# Patient Record
Sex: MALE | Race: WHITE | NOT HISPANIC OR LATINO | Employment: FULL TIME | URBAN - METROPOLITAN AREA
[De-identification: names, ages, dates, MRNs, and addresses within clinical notes are randomized per-mention and may not be internally consistent; named-entity substitution may affect disease eponyms.]

---

## 2017-05-08 ENCOUNTER — OFFICE VISIT (OUTPATIENT)
Dept: FAMILY MEDICINE | Facility: OTHER | Age: 57
End: 2017-05-08
Payer: COMMERCIAL

## 2017-05-08 VITALS
HEIGHT: 71 IN | SYSTOLIC BLOOD PRESSURE: 118 MMHG | HEART RATE: 84 BPM | TEMPERATURE: 97.6 F | RESPIRATION RATE: 18 BRPM | WEIGHT: 265.8 LBS | DIASTOLIC BLOOD PRESSURE: 74 MMHG | OXYGEN SATURATION: 98 % | BODY MASS INDEX: 37.21 KG/M2

## 2017-05-08 DIAGNOSIS — R05.9 COUGH: Primary | ICD-10-CM

## 2017-05-08 DIAGNOSIS — R09.89 RUNNY NOSE: ICD-10-CM

## 2017-05-08 PROCEDURE — 99213 OFFICE O/P EST LOW 20 MIN: CPT | Performed by: FAMILY MEDICINE

## 2017-05-08 RX ORDER — BENZONATATE 100 MG/1
100 CAPSULE ORAL 3 TIMES DAILY PRN
Qty: 21 CAPSULE | Refills: 0 | Status: SHIPPED | OUTPATIENT
Start: 2017-05-08 | End: 2017-05-31

## 2017-05-08 ASSESSMENT — PAIN SCALES - GENERAL: PAINLEVEL: NO PAIN (0)

## 2017-05-08 NOTE — PROGRESS NOTES
SUBJECTIVE:                Derek Stover is a 56 year old male who presents with;  Chief Complaint   Patient presents with     Cough     Acute Illness   Acute illness concerns: Cough  Onset: 1-2 months    Fever: no    Chills/Sweats: no    Headache (location?): YES- while working    Sinus Pressure:no    Conjunctivitis:  no    Ear Pain: no    Rhinorrhea: YES    Congestion: YES    Sore Throat: no     Cough: YES-productive of clear sputum    Wheeze: YES    Decreased Appetite: no    Nausea: no    Vomiting: no    Diarrhea:  no    Dysuria/Freq.: no    Fatigue/Achiness: no    Sick/Strep Exposure: YES- Wife similar symptoms.      Therapies Tried and outcome: Mucinex, benadryl       PROBLEMS TO ADD ON...    Problem list and histories reviewed & adjusted, as indicated.  Additional history: as documented    Patient Active Problem List   Diagnosis     Smoker     Tinea versicolor     Erectile dysfunction     Hyperlipidemia with target LDL less than 130     Past Surgical History:   Procedure Laterality Date     COLONOSCOPY N/A 9/8/2014    Procedure: COMBINED COLONOSCOPY, SINGLE BIOPSY/POLYPECTOMY BY BIOPSY;  Surgeon: Kai Bailey MD;  Location:  GI     ORTHOPEDIC SURGERY      RIght knee scope       Social History   Substance Use Topics     Smoking status: Current Every Day Smoker     Packs/day: 1.50     Years: 31.00     Types: Cigarettes     Smokeless tobacco: Never Used     Alcohol use Yes      Comment: occasional     History reviewed. No pertinent family history.      Current Outpatient Prescriptions   Medication Sig Dispense Refill     benzonatate (TESSALON) 100 MG capsule Take 1 capsule (100 mg) by mouth 3 times daily as needed for cough 21 capsule 0     ibuprofen (ADVIL,MOTRIN) 200 MG tablet Take 1 tablet by mouth every 4 hours as needed for pain. 120 tablet      Allergies   Allergen Reactions     No Known Drug Allergy        Reviewed and updated as needed this visit by clinical staff       Reviewed and updated as  "needed this visit by Provider    Problem list and histories reviewed & adjusted, as indicated.  Allergies   Allergen Reactions     No Known Drug Allergy        OBJECTIVE:                     /74 (Cuff Size: Adult Regular)  Pulse 84  Temp 97.6  F (36.4  C) (Temporal)  Resp 18  Ht 5' 11\" (1.803 m)  Wt 265 lb 12.8 oz (120.6 kg)  SpO2 98%  BMI 37.07 kg/m2  GENERAL: no apparent distress  EYES: Conjunctiva are not injected, no discharge.  EARS: Left TM -no erythema, no effusion,  not bulged.               Right TM -no erythema, no effusion,  not bulged.  NOSE: no discharge, no sinus tenderness  THROAT: no erythema, no exudate, no lesions  NECK: supple, no adenopathy.  CARDIAC: regular rate and rhythm, no murmur  RESP: clear, no wheezing, no rales, no rhonchi  ABD: soft, no distension, no tenderness  SKIN: normal, warm, no rash, nml skin turgor      ASSESSMENT/PLAN:                       ICD-10-CM    1. Cough R05 benzonatate (TESSALON) 100 MG capsule   2. Runny nose R09.89      See [facility] for [facility]Results.  Symptomatic cares and fever control(if indicated) discussed.  Symptomatic therapy suggested: use acetaminophen, ibuprofen prn.   Hydrate well. if not better 2 to 3 days, switch to R with Codeine.  Xray and labs in1 to 2 weeks if not better    Risks, benefits and alternatives of treatments discussed. Plan agreed on.    Will call, return to clinic, or go to ED if worsening or symptoms not improving as discussed.   See patient instructions.         Electronically signed:  Kayla Turner M.D.    --------------------------------------------------------------------------------------------------------------                "

## 2017-05-08 NOTE — PATIENT INSTRUCTIONS
Cough, Chronic, Uncertain Cause (Adult)    Everyone has had a cough as part of the common cold, flu, or bronchitis. This kind of cough occurs along with an achy feeling, low-grade fever, nasal and sinus congestion, and a scratchy or sore throat. This usually gets better in 2 to 3 weeks. A cough that lasts longer than 3 weeks may be due to other causes.  If your cough does not improve over the next 2 weeks, further testing may be needed. Follow up with your healthcare provider as advised. Cough suppressants may be recommended. Based on your exam today, the exact cause of your cough is not certain. Below are some common causes for persistent cough.  Smokers cough   Smoker s cough  doesn t go away. If you continue to smoke, it only gets worse. The cough is from irritation in the air passages. Talk to your healthcare provider about quitting. Medicines or nicotine-replacement products, like gum or the patch, may make quitting easier.  Postnasal drip  A cough that is worse at night may be due to postnasal drip. Excess mucus in the nose drains from the back of your nose to your throat. This triggers the cough reflex. Postnasal drip may be due to a sinus infection or allergy. Common allergens include dust, tobacco smoke (both inhaled and secondhand smoke), environmental pollutants, pollen, mold, pets, cleaning agents, room deodorizers, and chemical fumes. Over-the-counter antihistamines or decongestants may be helpful for allergies. A sinus infection may requires antibiotic treatment. See your healthcare provider if symptoms continue.  Medicines  Certain prescribed medicines can cause a chronic cough in some people:    ACE inhibitors for high blood pressure. These include benazepril, captopril, enalapril, fosinopril, lisinopril, quinapril, ramipril, and others.    Beta-blockers for high blood pressure and other conditions. These include propranolol, atenolol, metoprolol, nadolol, and others.  Let your healthcare provider  know if you are taking any of these.  Asthma  Cough may be the only sign of mild asthma. You may have tests to find out if asthma is causing your cough. You may also take asthma medicine on a trial basis.  Acid reflux (heartburn, GERD)   The esophagus is the tube that carries food from the mouth to the stomach. A valve at its lower end prevents stomach acids from flowing upward. If this valve does not work properly, acid from the stomach enters the esophagus. This may cause a burning pain in the upper abdomen or lower chest, belching, or cough. Symptoms are often worse when lying flat. Avoid eating or drinking before bedtime. Try using extra pillows to raise your upper body, or place 4-inch blocks under the head of your bed. You may try an over-the-counter antacid or an acid-blocking medicine such as famotidine, cimetidine, ranitidine, esomeprazole, lansoprazole, or omeprazole. Stronger medicines for this condition can be prescribed by your healthcare provider.  Follow-up care  Follow up with your healthcare provider, or as advised, if your cough does not improve. Further testing may be needed.  (Note: If an X-ray was taken, a specialist will review it. You will be notified of any new findings that may affect your care.)  When to seek medical advice  Call your healthcare provider right away if any of these occur:    Mild wheezing or difficulty breathing    Fever of 100.4 F (38 C) or higher, or as directed by your healthcare provider    Unexpected weight loss    Coughing up large amounts of colored sputum    Night sweats (sheets and pajamas get soaking wet)  Call 911, or get immediate medical care  Contact emergency services right away if any of these occur:    Coughing up blood    Moderate to severe trouble breathing or wheezing    3272-1842 [employer]. [address] [place] [address], [place] [address]. All rights reserved. This information is not intended as a substitute for professional medical care. Always  follow your healthcare professional's instructions.        Adult Self-Care for Colds  Colds are caused by viruses. They can t be cured with antibiotics. However, you can relieve symptoms and support your body s efforts to heal itself. No matter which symptoms you have, be sure to drink plenty of fluids (water or clear soup); stop smoking and drinking alcohol; and get plenty of rest.   Understand a fever    Take your temperature several times a day. If your fever is 100.4 F (38.0 C) for more than a day, call your doctor.    Relax, lie down. Go to bed if you want. Just get off your feet and rest. Also, drink plenty of fluids to avoid dehydration.    Take acetaminophen or a nonsteroidal anti-inflammatory agent (NSAID), such as ibuprofen.  Treat a troubled nose kindly    Breathe steam or heated humidified air to open blocked nasal passages.  a hot shower or use a vaporizer. Be careful not to get burned by the steam.    Saline nasal sprays and decongestant tablets help open a stuffy nose. Antihistamines can also help, but they can cause side effects such as drowsiness and drying of the eyes, nose, and mouth.  Soothe a sore throat and cough    Gargle every 2 hours with 1/4 teaspoon of salt dissolved in 1/2 cup of warm water. Suck on throat lozenges and cough drops to moisten your throat.    Cough medicines are available but it is unclear how effective they actually are.    Take acetaminophen or an NSAID, such as ibuprofen to ease throat pain  Ease digestive problems    Put fluid back into your body. Take frequent sips of clear liquids such as water or broth. Do not drink beverages with a lot of sugar in them, such as juices and sodas. These can make diarrhea worse. Older children and adults can drink sports drinks.    As your appetite returns, you can resume your normal diet. Ask your doctor whether there are any foods you should avoid.     When to seek medical care  When you first notice symptoms, ask your health  care provider if antiviral medications are appropriate. Antibiotics should not be taken for colds or flu. Also, call your doctor if you have any of the following symptoms or if you aren t feeling better after 7 days:    Shortness of breath    Pain or pressure in the chest or abdomen    Worsening symptoms, especially after a period of improvement    Fever of 100.4 F  (38.0 C) or higher, or fever that doesn t go down with medication    Sudden dizziness or confusion    Severe or continued vomiting    Signs of dehydration, including extreme thirst, dark urine, infrequent urination, dry mouth    Spotted, red, or very sore throat        2856-7834 [employer]. [address] [place] [address], [place] [address]. All rights reserved. This information is not intended as a substitute for professional medical care. Always follow your healthcare professional's instructions.        Preventing Sinusitis  Colds, flu, and allergies can lead more easily to sinusitis. Do your best to prevent sinusitis by preventing these underlying problems. Do what you can to avoid getting colds and other infections. Avoid any allergens (substances that cause allergies), and keep your sinuses as moist as possible.  Tips for air travel  When traveling on an airplane, use saline nasal spray to keep your sinuses moist. Drink plenty of fluids. You may also want to take a decongestant before boarding.   Prevent colds    Do what you can to avoid exposure to colds and flu. Whenever possible, take more time to rest when you feel something  coming on.     Wash your hands often, especially during cold and flu season.    As much as possible, stay away from infected people.    Follow these standbys for beating the  bugs : eat balanced meals, exercise regularly, and get plenty of sleep.  Avoid allergens  First find out what substances you re allergic to. Then take steps to minimize exposure to allergens or irritants in the air such as dust, pollution, and  pollen.    Wear a mask when you clean, or consider hiring a  to help minimize your exposure to dust.    Sit in the nonsmoking sections of restaurants.    Avoid the outdoors during peak pollution hours such as rush hour.    Keep an air conditioner on during allergy season and clean its filter regularly.  Maximize moisture  Keeping your sinuses moist makes your mucus thinner, allowing your sinuses to drain better. This, in turn, helps prevent infection. Ask your doctor about these suggestions:    Use a humidifier, regularly cleaning out any mold or mildew in the reservoir.    Drink several glasses of water a day.    Avoid drying substances such as alcohol and coffee.    Avoid smoke, which dries out sinus linings.    Use saltwater rinses.    4215-3360 [employer]. [address] [place] [address], [place] [address]. All rights reserved. This information is not intended as a substitute for professional medical care. Always follow your healthcare professional's instructions.        Self-Care for Sinusitis  Sinusitis can often be managed with self-care. Self-care can keep sinuses moist and make you feel more comfortable. Remember to follow your doctor's instructions closely, which can make a big difference in getting your sinus problem under control.    Drink fluids  Drinking extra fluids -- a glass every hour or two -- helps thin your mucus, allowing it to drain from your sinuses more easily. A humidifier helps in much the same way. Fluids can also offset the drying effects of certain drugs.  Use saltwater rinses  Rinses help keep your sinuses and nose moist. Mix a teaspoon of salt in 8 ounces of fresh, warm water. Use a bulb syringe to gently squirt the water into your nose a few times a day. You can also buy ready-made saline nasal sprays.  Apply hot or cold packs  Applying heat to the area surrounding your sinuses may make you feel more comfortable. Use a hot water bottle or a hand towel dipped in hot  water. Some people also find ice packs effective for relieving pain.  Medications  Your doctor may prescribe medications to help treat your sinusitis. If you have an infection, antibiotics can help clear it up. If you are prescribed antibiotics, take all pills on schedule until they are gone, even if you feel better. Decongestants help relieve swelling. Use decongestant sprays for short periods only under the direction of your doctor. If you have allergies, your doctor may prescribe medications to help relieve them.     9121-5568 [employer]. [address] [place] [address], [place] [address]. All rights reserved. This information is not intended as a substitute for professional medical care. Always follow your healthcare professional's instructions.

## 2017-05-08 NOTE — MR AVS SNAPSHOT
After Visit Summary   5/8/2017    Derek Stover    MRN: [id number]           Patient Information     Date Of Birth          1960        Visit Information        Provider Department      5/8/2017 9:30 AM Kayla Turner MD [facility]        Today's Diagnoses     Cough    -  1    Runny nose          Care Instructions      Cough, Chronic, Uncertain Cause (Adult)    Everyone has had a cough as part of the common cold, flu, or bronchitis. This kind of cough occurs along with an achy feeling, low-grade fever, nasal and sinus congestion, and a scratchy or sore throat. This usually gets better in 2 to 3 weeks. A cough that lasts longer than 3 weeks may be due to other causes.  If your cough does not improve over the next 2 weeks, further testing may be needed. Follow up with your healthcare provider as advised. Cough suppressants may be recommended. Based on your exam today, the exact cause of your cough is not certain. Below are some common causes for persistent cough.  Smokers cough   Smoker s cough  doesn t go away. If you continue to smoke, it only gets worse. The cough is from irritation in the air passages. Talk to your healthcare provider about quitting. Medicines or nicotine-replacement products, like gum or the patch, may make quitting easier.  Postnasal drip  A cough that is worse at night may be due to postnasal drip. Excess mucus in the nose drains from the back of your nose to your throat. This triggers the cough reflex. Postnasal drip may be due to a sinus infection or allergy. Common allergens include dust, tobacco smoke (both inhaled and secondhand smoke), environmental pollutants, pollen, mold, pets, cleaning agents, room deodorizers, and chemical fumes. Over-the-counter antihistamines or decongestants may be helpful for allergies. A sinus infection may requires antibiotic treatment. See your healthcare provider if symptoms continue.  Medicines  Certain  prescribed medicines can cause a chronic cough in some people:    ACE inhibitors for high blood pressure. These include benazepril, captopril, enalapril, fosinopril, lisinopril, quinapril, ramipril, and others.    Beta-blockers for high blood pressure and other conditions. These include propranolol, atenolol, metoprolol, nadolol, and others.  Let your healthcare provider know if you are taking any of these.  Asthma  Cough may be the only sign of mild asthma. You may have tests to find out if asthma is causing your cough. You may also take asthma medicine on a trial basis.  Acid reflux (heartburn, GERD)   The esophagus is the tube that carries food from the mouth to the stomach. A valve at its lower end prevents stomach acids from flowing upward. If this valve does not work properly, acid from the stomach enters the esophagus. This may cause a burning pain in the upper abdomen or lower chest, belching, or cough. Symptoms are often worse when lying flat. Avoid eating or drinking before bedtime. Try using extra pillows to raise your upper body, or place 4-inch blocks under the head of your bed. You may try an over-the-counter antacid or an acid-blocking medicine such as famotidine, cimetidine, ranitidine, esomeprazole, lansoprazole, or omeprazole. Stronger medicines for this condition can be prescribed by your healthcare provider.  Follow-up care  Follow up with your healthcare provider, or as advised, if your cough does not improve. Further testing may be needed.  (Note: If an X-ray was taken, a specialist will review it. You will be notified of any new findings that may affect your care.)  When to seek medical advice  Call your healthcare provider right away if any of these occur:    Mild wheezing or difficulty breathing    Fever of 100.4 F (38 C) or higher, or as directed by your healthcare provider    Unexpected weight loss    Coughing up large amounts of colored sputum    Night sweats (sheets and pajamas get  soaking wet)  Call 911, or get immediate medical care  Contact emergency services right away if any of these occur:    Coughing up blood    Moderate to severe trouble breathing or wheezing    0429-8363 [employer]. [address] [place] [address], [place] [address]. All rights reserved. This information is not intended as a substitute for professional medical care. Always follow your healthcare professional's instructions.        Adult Self-Care for Colds  Colds are caused by viruses. They can t be cured with antibiotics. However, you can relieve symptoms and support your body s efforts to heal itself. No matter which symptoms you have, be sure to drink plenty of fluids (water or clear soup); stop smoking and drinking alcohol; and get plenty of rest.   Understand a fever    Take your temperature several times a day. If your fever is 100.4 F (38.0 C) for more than a day, call your doctor.    Relax, lie down. Go to bed if you want. Just get off your feet and rest. Also, drink plenty of fluids to avoid dehydration.    Take acetaminophen or a nonsteroidal anti-inflammatory agent (NSAID), such as ibuprofen.  Treat a troubled nose kindly    Breathe steam or heated humidified air to open blocked nasal passages.  a hot shower or use a vaporizer. Be careful not to get burned by the steam.    Saline nasal sprays and decongestant tablets help open a stuffy nose. Antihistamines can also help, but they can cause side effects such as drowsiness and drying of the eyes, nose, and mouth.  Soothe a sore throat and cough    Gargle every 2 hours with 1/4 teaspoon of salt dissolved in 1/2 cup of warm water. Suck on throat lozenges and cough drops to moisten your throat.    Cough medicines are available but it is unclear how effective they actually are.    Take acetaminophen or an NSAID, such as ibuprofen to ease throat pain  Ease digestive problems    Put fluid back into your body. Take frequent sips of clear liquids such  as water or broth. Do not drink beverages with a lot of sugar in them, such as juices and sodas. These can make diarrhea worse. Older children and adults can drink sports drinks.    As your appetite returns, you can resume your normal diet. Ask your doctor whether there are any foods you should avoid.     When to seek medical care  When you first notice symptoms, ask your health care provider if antiviral medications are appropriate. Antibiotics should not be taken for colds or flu. Also, call your doctor if you have any of the following symptoms or if you aren t feeling better after 7 days:    Shortness of breath    Pain or pressure in the chest or abdomen    Worsening symptoms, especially after a period of improvement    Fever of 100.4 F  (38.0 C) or higher, or fever that doesn t go down with medication    Sudden dizziness or confusion    Severe or continued vomiting    Signs of dehydration, including extreme thirst, dark urine, infrequent urination, dry mouth    Spotted, red, or very sore throat        2969-2545 [employer]. [address] [place] [address]. All rights reserved. This information is not intended as a substitute for professional medical care. Always follow your healthcare professional's instructions.        Preventing Sinusitis  Colds, flu, and allergies can lead more easily to sinusitis. Do your best to prevent sinusitis by preventing these underlying problems. Do what you can to avoid getting colds and other infections. Avoid any allergens (substances that cause allergies), and keep your sinuses as moist as possible.  Tips for air travel  When traveling on an airplane, use saline nasal spray to keep your sinuses moist. Drink plenty of fluids. You may also want to take a decongestant before boarding.   Prevent colds    Do what you can to avoid exposure to colds and flu. Whenever possible, take more time to rest when you feel something  coming on.     Wash your hands often,  especially during cold and flu season.    As much as possible, stay away from infected people.    Follow these standbys for beating the  bugs : eat balanced meals, exercise regularly, and get plenty of sleep.  Avoid allergens  First find out what substances you re allergic to. Then take steps to minimize exposure to allergens or irritants in the air such as dust, pollution, and pollen.    Wear a mask when you clean, or consider hiring a  to help minimize your exposure to dust.    Sit in the nonsmoking sections of restaurants.    Avoid the outdoors during peak pollution hours such as rush hour.    Keep an air conditioner on during allergy season and clean its filter regularly.  Maximize moisture  Keeping your sinuses moist makes your mucus thinner, allowing your sinuses to drain better. This, in turn, helps prevent infection. Ask your doctor about these suggestions:    Use a humidifier, regularly cleaning out any mold or mildew in the reservoir.    Drink several glasses of water a day.    Avoid drying substances such as alcohol and coffee.    Avoid smoke, which dries out sinus linings.    Use saltwater rinses.    5336-2520 [employer]. [address] [place] [address]. All rights reserved. This information is not intended as a substitute for professional medical care. Always follow your healthcare professional's instructions.        Self-Care for Sinusitis  Sinusitis can often be managed with self-care. Self-care can keep sinuses moist and make you feel more comfortable. Remember to follow your doctor's instructions closely, which can make a big difference in getting your sinus problem under control.    Drink fluids  Drinking extra fluids -- a glass every hour or two -- helps thin your mucus, allowing it to drain from your sinuses more easily. A humidifier helps in much the same way. Fluids can also offset the drying effects of certain drugs.  Use saltwater rinses  Rinses help keep  your sinuses and nose moist. Mix a teaspoon of salt in 8 ounces of fresh, warm water. Use a bulb syringe to gently squirt the water into your nose a few times a day. You can also buy ready-made saline nasal sprays.  Apply hot or cold packs  Applying heat to the area surrounding your sinuses may make you feel more comfortable. Use a hot water bottle or a hand towel dipped in hot water. Some people also find ice packs effective for relieving pain.  Medications  Your doctor may prescribe medications to help treat your sinusitis. If you have an infection, antibiotics can help clear it up. If you are prescribed antibiotics, take all pills on schedule until they are gone, even if you feel better. Decongestants help relieve swelling. Use decongestant sprays for short periods only under the direction of your doctor. If you have allergies, your doctor may prescribe medications to help relieve them.     [id number] [employer]. [address] [place] [address]. All rights reserved. This information is not intended as a substitute for professional medical care. Always follow your healthcare professional's instructions.              Follow-ups after your visit        Who to contact     If you have questions or need follow up information about today's clinic visit or your schedule please contact [facility] directly at [phone].  Normal or non-critical lab and imaging results will be communicated to you by MyChart, letter or phone within 4 business days after the clinic has received the results. If you do not hear from us within 7 days, please contact the clinic through MyChart or phone. If you have a critical or abnormal lab result, we will notify you by phone as soon as possible.  Submit refill requests through [employer] or call your pharmacy and they will forward the refill request to us. Please allow 3 business days for your refill to be completed.          Additional Information About  "Your Visit        MyChart Information     [employer] gives you secure access to your electronic health record. If you see a primary care provider, you can also send messages to your care team and make appointments. If you have questions, please call your primary care clinic.  If you do not have a primary care provider, please call [phone] and they will assist you.        Care EveryWhere ID     This is your Care EveryWhere ID. This could be used by other organizations to access your [place] medical records  [id number]        Your Vitals Were     Pulse Temperature Respirations Height Pulse Oximetry BMI (Body Mass Index)    84 97.6  F (36.4  C) (Temporal) 18 5' 11\" (1.803 m) 98% 37.07 kg/m2       Blood Pressure from Last 3 Encounters:   05/08/17 118/74   09/08/14 105/65   08/18/14 118/72    Weight from Last 3 Encounters:   05/08/17 265 lb 12.8 oz (120.6 kg)   09/08/14 259 lb 3.2 oz (117.6 kg)   08/18/14 259 lb 3.2 oz (117.6 kg)              Today, you had the following     No orders found for display         Today's Medication Changes          These changes are accurate as of: 5/8/17 10:00 AM.  If you have any questions, ask your nurse or doctor.               Start taking these medicines.        Dose/Directions    benzonatate 100 MG capsule   Commonly known as:  TESSALON   Used for:  Cough   Started by:  Kayla Turner MD        Dose:  100 mg   Take 1 capsule (100 mg) by mouth 3 times daily as needed for cough   Quantity:  21 capsule   Refills:  0            Where to get your medicines      These medications were sent to [id number] - [place] - [address], [facility] [place] [address]     Phone:  [phone]     benzonatate 100 MG capsule                Primary Care Provider Office Phone # Fax #    Carlos Zenon Archibald [phone]       [facility] [address] [address]        Thank you!     Thank you for choosing [facility] " [place]  for your care. Our goal is always to provide you with excellent care. Hearing back from our patients is one way we can continue to improve our services. Please take a few minutes to complete the written survey that you may receive in the mail after your visit with us. Thank you!             Your Updated Medication List - Protect others around you: Learn how to safely use, store and throw away your medicines at www.disposemymeds.org.          This list is accurate as of: 5/8/17 10:00 AM.  Always use your most recent med list.                   Brand Name Dispense Instructions for use    benzonatate 100 MG capsule    TESSALON    21 capsule    Take 1 capsule (100 mg) by mouth 3 times daily as needed for cough       ibuprofen 200 MG tablet    ADVIL/MOTRIN    120 tablet    Take 1 tablet by mouth every 4 hours as needed for pain.

## 2017-05-08 NOTE — NURSING NOTE
"Chief Complaint   Patient presents with     Cough       Initial /74 (Cuff Size: Adult Regular)  Pulse 84  Temp 97.6  F (36.4  C) (Temporal)  Resp 18  Ht 5' 11\" (1.803 m)  Wt 265 lb 12.8 oz (120.6 kg)  SpO2 98%  BMI 37.07 kg/m2 Estimated body mass index is 37.07 kg/(m^2) as calculated from the following:    Height as of this encounter: 5' 11\" (1.803 m).    Weight as of this encounter: 265 lb 12.8 oz (120.6 kg).  Medication Reconciliation: complete   Jailene Schuler CMA (AAMA)    "

## 2017-05-31 ENCOUNTER — OFFICE VISIT (OUTPATIENT)
Dept: FAMILY MEDICINE | Facility: CLINIC | Age: 57
End: 2017-05-31
Payer: COMMERCIAL

## 2017-05-31 VITALS
DIASTOLIC BLOOD PRESSURE: 82 MMHG | SYSTOLIC BLOOD PRESSURE: 122 MMHG | OXYGEN SATURATION: 95 % | WEIGHT: 270.5 LBS | HEART RATE: 82 BPM | TEMPERATURE: 98.3 F | RESPIRATION RATE: 14 BRPM | BODY MASS INDEX: 37.73 KG/M2

## 2017-05-31 DIAGNOSIS — J01.90 ACUTE SINUSITIS WITH SYMPTOMS > 10 DAYS: ICD-10-CM

## 2017-05-31 DIAGNOSIS — R00.2 PALPITATIONS: Primary | ICD-10-CM

## 2017-05-31 DIAGNOSIS — I48.91 ATRIAL FIBRILLATION, UNSPECIFIED TYPE (H): ICD-10-CM

## 2017-05-31 PROBLEM — E66.01 MORBID OBESITY (H): Status: ACTIVE | Noted: 2017-05-31

## 2017-05-31 LAB
ANION GAP SERPL CALCULATED.3IONS-SCNC: 7 MMOL/L (ref 3–14)
BUN SERPL-MCNC: 12 MG/DL (ref 7–30)
CALCIUM SERPL-MCNC: 8.8 MG/DL (ref 8.5–10.1)
CHLORIDE SERPL-SCNC: 109 MMOL/L (ref 94–109)
CO2 SERPL-SCNC: 27 MMOL/L (ref 20–32)
CREAT SERPL-MCNC: 0.97 MG/DL (ref 0.66–1.25)
GFR SERPL CREATININE-BSD FRML MDRD: 80 ML/MIN/1.7M2
GLUCOSE SERPL-MCNC: 89 MG/DL (ref 70–99)
POTASSIUM SERPL-SCNC: 4.1 MMOL/L (ref 3.4–5.3)
SODIUM SERPL-SCNC: 143 MMOL/L (ref 133–144)
TSH SERPL DL<=0.005 MIU/L-ACNC: 2.92 MU/L (ref 0.4–4)

## 2017-05-31 PROCEDURE — 36415 COLL VENOUS BLD VENIPUNCTURE: CPT | Performed by: FAMILY MEDICINE

## 2017-05-31 PROCEDURE — 84443 ASSAY THYROID STIM HORMONE: CPT | Performed by: FAMILY MEDICINE

## 2017-05-31 PROCEDURE — 99214 OFFICE O/P EST MOD 30 MIN: CPT | Performed by: FAMILY MEDICINE

## 2017-05-31 PROCEDURE — 93000 ELECTROCARDIOGRAM COMPLETE: CPT | Performed by: FAMILY MEDICINE

## 2017-05-31 PROCEDURE — 80048 BASIC METABOLIC PNL TOTAL CA: CPT | Performed by: FAMILY MEDICINE

## 2017-05-31 RX ORDER — CARVEDILOL 3.12 MG/1
3.12 TABLET ORAL 2 TIMES DAILY WITH MEALS
Qty: 60 TABLET | Refills: 1 | Status: SHIPPED | OUTPATIENT
Start: 2017-05-31 | End: 2017-07-18

## 2017-05-31 ASSESSMENT — PAIN SCALES - GENERAL: PAINLEVEL: NO PAIN (0)

## 2017-05-31 NOTE — MR AVS SNAPSHOT
After Visit Summary   5/31/2017    Derek Stover    MRN: [id number]           Patient Information     Date Of Birth          1960        Visit Information        Provider Department      5/31/2017 5:40 PM Osbaldo Renee MD [facility]        Today's Diagnoses     Palpitations    -  1    Acute sinusitis with symptoms > 10 days        Atrial fibrillation, unspecified type (H)           Follow-ups after your visit        Follow-up notes from your care team     Return in about 2 weeks (around 6/14/2017) for Recheck atrial fibrillation.      Future tests that were ordered for you today     Open Future Orders        Priority Expected Expires Ordered    Echocardiogram Complete Routine  5/31/2018 5/31/2017            Who to contact     If you have questions or need follow up information about today's clinic visit or your schedule please contact [facility] directly at [phone].  Normal or non-critical lab and imaging results will be communicated to you by [employer], letter or phone within 4 business days after the clinic has received the results. If you do not hear from us within 7 days, please contact the clinic through [employer] or phone. If you have a critical or abnormal lab result, we will notify you by phone as soon as possible.  Submit refill requests through [email or web address] or call your pharmacy and they will forward the refill request to us. Please allow 3 business days for your refill to be completed.          Additional Information About Your Visit        MyChart Information     [employer] gives you secure access to your electronic health record. If you see a primary care provider, you can also send messages to your care team and make appointments. If you have questions, please call your primary care clinic.  If you do not have a primary care provider, please call [phone] and they will assist you.        Care EveryWhere ID     This is your Care EveryWhere ID. This  could be used by other organizations to access your [place] medical records  [id number]        Your Vitals Were     Pulse Temperature Respirations Pulse Oximetry BMI (Body Mass Index)       82 98.3  F (36.8  C) (Tympanic) 14 95% 37.73 kg/m2        Blood Pressure from Last 3 Encounters:   05/31/17 122/82   05/08/17 118/74   09/08/14 105/65    Weight from Last 3 Encounters:   05/31/17 270 lb 8 oz (122.7 kg)   05/08/17 265 lb 12.8 oz (120.6 kg)   09/08/14 259 lb 3.2 oz (117.6 kg)              We Performed the Following     Basic metabolic panel     EKG 12-lead complete w/read - Clinics     TSH with free T4 reflex          Today's Medication Changes          These changes are accurate as of: 5/31/17  6:07 PM.  If you have any questions, ask your nurse or doctor.               Start taking these medicines.        Dose/Directions    amoxicillin-clavulanate 875-125 MG per tablet   Commonly known as:  AUGMENTIN   Used for:  Acute sinusitis with symptoms > 10 days   Started by:  Osbaldo Renee MD        Dose:  1 tablet   Take 1 tablet by mouth 2 times daily   Quantity:  28 tablet   Refills:  0       carvedilol 3.125 MG tablet   Commonly known as:  COREG   Used for:  Atrial fibrillation, unspecified type (H)   Started by:  Osbaldo Renee MD        Dose:  3.125 mg   Take 1 tablet (3.125 mg) by mouth 2 times daily (with meals)   Quantity:  60 tablet   Refills:  1            Where to get your medicines      These medications were sent to [facility][id number] - [place]  [address] [facility] [address]     Phone:  [phone]     amoxicillin-clavulanate 875-125 MG per tablet    carvedilol 3.125 MG tablet                Primary Care Provider Office Phone # Fax #    Carlos Zenon Archibald [phone]       [place] [id number] [place] [address]        Thank you!     Thank you for choosing [facility]  for your care. Our goal is always to  provide you with excellent care. Hearing back from our patients is one way we can continue to improve our services. Please take a few minutes to complete the written survey that you may receive in the mail after your visit with us. Thank you!             Your Updated Medication List - Protect others around you: Learn how to safely use, store and throw away your medicines at www.disposemymeds.org.          This list is accurate as of: 5/31/17  6:07 PM.  Always use your most recent med list.                   Brand Name Dispense Instructions for use    amoxicillin-clavulanate 875-125 MG per tablet    AUGMENTIN    28 tablet    Take 1 tablet by mouth 2 times daily       carvedilol 3.125 MG tablet    COREG    60 tablet    Take 1 tablet (3.125 mg) by mouth 2 times daily (with meals)       ibuprofen 200 MG tablet    ADVIL/MOTRIN    120 tablet    Take 1 tablet by mouth every 4 hours as needed for pain.       MUCINEX PO      Taking the 12 hour

## 2017-05-31 NOTE — LETTER
[address] [address]  Phone: [phone]  Fax: [phone]          June 1, 2017    Derek Stover  [address] [address] [place] [id number]          Dear Derek,      LAB RESULTS:     Thyroid function and renal function are normal.  If you have any further questions or problems, please contact our office.          Sincerely,      Osbaldo Renee M.D.

## 2017-05-31 NOTE — NURSING NOTE
"Chief Complaint   Patient presents with     URI     follow up from 05/08/17. Upper abdominal tightness- he states its not really too much in his chest x1w     Cough     x1m brown mucus     Anorexia     not hungry at all x2d       Initial /82 (BP Location: Right arm, Patient Position: Chair, Cuff Size: Adult Regular)  Pulse 82  Temp 98.3  F (36.8  C) (Tympanic)  Resp 14  Wt 270 lb 8 oz (122.7 kg)  SpO2 95%  BMI 37.73 kg/m2 Estimated body mass index is 37.73 kg/(m^2) as calculated from the following:    Height as of 5/8/17: 5' 11\" (1.803 m).    Weight as of this encounter: 270 lb 8 oz (122.7 kg).  Medication Reconciliation: complete   Health Maintenance Due   Topic Date Due     HEPATITIS C SCREENING  11/19/1980     Maddie Contreras, [facility]      "

## 2017-05-31 NOTE — PROGRESS NOTES
SUBJECTIVE:                                                    Derek Stover is a 56 year old male who presents to clinic today for the following health issues:      Acute Illness   Acute illness concerns: upper abdominal tightness, cough, loss of appetite x2d. Lightheaded when bending over- pressure in his head.   Onset: upper abdominal tightness x5d, cough, heart flutters when coughing jacqueline. In the morning and at night    Fever: no    Chills/Sweats: no    Headache (location?): no    Sinus Pressure:YES- around his eyes    Conjunctivitis:  no    Ear Pain: no    Rhinorrhea: YES- started today    Congestion: YES- chest and nasal (morning)    Sore Throat: no- swollen rt gland off and on x1w     Cough: YES-productive of brown sputum, with shortness of breath    Wheeze: YES- at night    Decreased Appetite: YES- x2d    Nausea: no    Vomiting: no    Diarrhea:  no    Dysuria/Freq.: no    Fatigue/Achiness: YES- fatigue    Sick/Strep Exposure: no     Therapies Tried and outcome: went to see tien on 05/08 and was given tessalon- helped. OTC- benadryl, mucinex, ibu, albuterol inhaler- used from someone else and it helped          Problem list and histories reviewed & adjusted, as indicated.  Additional history: He is a smoker and is having frequent palpitations.     Patient Active Problem List   Diagnosis     Smoker     Tinea versicolor     Erectile dysfunction     Hyperlipidemia with target LDL less than 130     Past Surgical History:   Procedure Laterality Date     COLONOSCOPY N/A 9/8/2014    Procedure: COMBINED COLONOSCOPY, SINGLE BIOPSY/POLYPECTOMY BY BIOPSY;  Surgeon: Kai Bailey MD;  Location:  GI     ORTHOPEDIC SURGERY      RIght knee scope       Social History   Substance Use Topics     Smoking status: Current Every Day Smoker     Packs/day: 1.75     Years: 40.00     Types: Cigarettes     Smokeless tobacco: Never Used     Alcohol use 2.4 oz/week     4 Standard drinks or equivalent per week      Comment:  occasional     No family history on file.      Current Outpatient Prescriptions   Medication Sig Dispense Refill     GuaiFENesin (MUCINEX PO) Taking the 12 hour       ibuprofen (ADVIL,MOTRIN) 200 MG tablet Take 1 tablet by mouth every 4 hours as needed for pain. 120 tablet      Allergies   Allergen Reactions     No Known Drug Allergy      Recent Labs   Lab Test  08/13/14   0846  01/05/12   1157  01/07/11   0758   LDL  143*  166*  135*   HDL  39*  48  42   TRIG  124  160*  144   ALT  20  17  18   CR  1.06  1.02  1.00   GFRESTIMATED  73  78  80   GFRESTBLACK  89  >90  >90   POTASSIUM  4.5  4.9  4.5   TSH   --    --   2.07      BP Readings from Last 3 Encounters:   05/31/17 122/82   05/08/17 118/74   09/08/14 105/65    Wt Readings from Last 3 Encounters:   05/31/17 270 lb 8 oz (122.7 kg)   05/08/17 265 lb 12.8 oz (120.6 kg)   09/08/14 259 lb 3.2 oz (117.6 kg)                    Reviewed and updated as needed this visit by clinical staff  Tobacco  Allergies  Meds  Problems  Soc Hx      Reviewed and updated as needed this visit by Provider  Allergies  Meds  Problems         ROS:  C: NEGATIVE for fever, chills, change in weight  ENT/MOUTH: POSITIVE for nasal congestion, postnasal drainage, rhinorrhea-purulent, sinus pressure and smoker and NEGATIVE for Hx sinus infections, rhinorrhea-purulent and vertigo  RESP:POSITIVE for cough-productive and sputum brown and NEGATIVE for Hx chronic bronchitis, Hx pneumonia, pleurisy, SOB/dyspnea and wheezing  CV: POSITIVE for irregular heart beat and palpitations and NEGATIVE for chest pain/chest pressure, dyspnea on exertion, HX HTN, paroxysmal nocturnal dyspnea and syncope or near-syncope  ROS otherwise negative    OBJECTIVE:                                                    /82 (BP Location: Right arm, Patient Position: Chair, Cuff Size: Adult Regular)  Pulse 82  Temp 98.3  F (36.8  C) (Tympanic)  Resp 14  Wt 270 lb 8 oz (122.7 kg)  SpO2 95%  BMI 37.73 kg/m2  Body  "mass index is 37.73 kg/(m^2).  GENERAL: alert, no distress and obese  HENT: normal cephalic/atraumatic, ear canals and TM's normal, nose and mouth without ulcers or lesions, rhinorrhea purulent, oropharynx clear, oral mucous membranes moist and sinuses: not tender  NECK: no adenopathy, no asymmetry, masses, or scars and thyroid normal to palpation  RESP: lungs clear to auscultation - no rales, rhonchi or wheezes  CV: occasional premature beats, normal S1 S2, no S3 or S4, no murmur, click or rub, peripheral pulses strong and no peripheral edema  ABDOMEN: soft, nontender, no hepatosplenomegaly, no masses and bowel sounds normal  MS: no gross musculoskeletal defects noted, no edema    Diagnostic Test Results:  EKG - atrial fibrillation, rate 87, normal axis, normal intervals, no acute ST/T changes c/w ischemia, no LVH by voltage criteria, unchanged from previous tracings     ASSESSMENT/PLAN:                                                      Tobacco Cessation:   reports that he has been smoking Cigarettes.  He has a 70.00 pack-year smoking history. He has never used smokeless tobacco.  Tobacco Cessation Action Plan: Information offered: Patient not interested at this time    BMI:   Estimated body mass index is 37.73 kg/(m^2) as calculated from the following:    Height as of 5/8/17: 5' 11\" (1.803 m).    Weight as of this encounter: 270 lb 8 oz (122.7 kg).   Weight management plan: Discussed healthy diet and exercise guidelines and patient will follow up in 3 months in clinic to re-evaluate.      1. Palpitations  Acute due to atrial fibrillation. Most likely due to known mitral valve disease. ECHO 2011    Interpretation Summary  The left ventricular ejection fraction is normal. No regional wall motion   abnormalities noted. Thickening of the mitral valve leaflets with   slight/borderline prolapse.  Nodularity of the anterior subvalvular   apparatus. There is mild (1+) mitral regurgitation. No old study available   for " comparison.  - EKG 12-lead complete w/read - Clinics  - TSH with free T4 reflex  - Basic metabolic panel    2. Acute sinusitis with symptoms > 10 days  Acute in smoker with symptoms for 2 weeks. Symptomatic therapy suggested: gargle for sore throat, use mist at bedside for congestion.  Apply facial warm packs for sinus pain.  May use use OTC nasal saline spray two times a day for a week prn.   - amoxicillin-clavulanate (AUGMENTIN) 875-125 MG per tablet; Take 1 tablet by mouth 2 times daily  Dispense: 28 tablet; Refill: 0    3. Atrial fibrillation, unspecified type (H)  Unspecified duration. Will obtain thyroid function and start rate control with Coreg. Low risk for CHADS score=0. Will defer anticoagulation until ECHO completed.   - Echocardiogram Complete; Future  - carvedilol (COREG) 3.125 MG tablet; Take 1 tablet (3.125 mg) by mouth 2 times daily (with meals)  Dispense: 60 tablet; Refill: 1    FURTHER TESTING:       - ECHO  FUTURE APPOINTMENTS:       - Follow-up visit in 2 weeks.  Work on weight loss    Osbaldo Renee MD  [facility]

## 2017-06-02 ENCOUNTER — TELEPHONE (OUTPATIENT)
Dept: FAMILY MEDICINE | Facility: OTHER | Age: 57
End: 2017-06-02

## 2017-06-02 NOTE — TELEPHONE ENCOUNTER
Called patient to schedule appointment, he would like information messaged to him on My Chart.    Irma Grace XRO/

## 2017-06-05 ENCOUNTER — HOSPITAL ENCOUNTER (OUTPATIENT)
Dept: CARDIOLOGY | Facility: CLINIC | Age: 57
Discharge: HOME | End: 2017-06-05
Attending: FAMILY MEDICINE | Admitting: FAMILY MEDICINE
Payer: COMMERCIAL

## 2017-06-05 DIAGNOSIS — I48.91 ATRIAL FIBRILLATION, UNSPECIFIED TYPE (H): ICD-10-CM

## 2017-06-05 PROCEDURE — 40000264 ECHO COMPLETE WITH OPTISON

## 2017-06-05 PROCEDURE — 93306 TTE W/DOPPLER COMPLETE: CPT | Mod: 26 | Performed by: INTERNAL MEDICINE

## 2017-06-05 PROCEDURE — 25500064 ZZH RX 255 OP 636: Performed by: INTERNAL MEDICINE

## 2017-06-05 RX ADMIN — HUMAN ALBUMIN MICROSPHERES AND PERFLUTREN 2 ML: 10; .22 INJECTION, SOLUTION INTRAVENOUS at 09:54

## 2017-06-21 ENCOUNTER — OFFICE VISIT (OUTPATIENT)
Dept: FAMILY MEDICINE | Facility: CLINIC | Age: 57
End: 2017-06-21
Payer: COMMERCIAL

## 2017-06-21 VITALS
OXYGEN SATURATION: 97 % | BODY MASS INDEX: 37.72 KG/M2 | RESPIRATION RATE: 16 BRPM | DIASTOLIC BLOOD PRESSURE: 64 MMHG | HEART RATE: 87 BPM | WEIGHT: 263.5 LBS | SYSTOLIC BLOOD PRESSURE: 94 MMHG | HEIGHT: 70 IN | TEMPERATURE: 98.1 F

## 2017-06-21 DIAGNOSIS — I48.19 PERSISTENT ATRIAL FIBRILLATION (H): ICD-10-CM

## 2017-06-21 DIAGNOSIS — I05.1 RHEUMATIC MITRAL REGURGITATION: Primary | ICD-10-CM

## 2017-06-21 PROCEDURE — 99214 OFFICE O/P EST MOD 30 MIN: CPT | Performed by: FAMILY MEDICINE

## 2017-06-21 ASSESSMENT — PAIN SCALES - GENERAL: PAINLEVEL: NO PAIN (0)

## 2017-06-21 NOTE — PROGRESS NOTES
"  SUBJECTIVE:                                                    Hakeem Borjas is a 56 year old male who presents to clinic today for the following health issues:      Concern - results from echo on 17     Onset:     Description:   He states his \"heart flipping\" has stopped. His shortness of breath is better.     Intensity: mild    Progression of Symptoms:  improved    Accompanying Signs & Symptoms:  His dyspnea has nearly resolved. He has no dizziness or lightheadness.       Previous history of similar problem:   History of MR.     Precipitating factors:   Worsened by:     Alleviating factors:  Improved by:      Therapies Tried and outcome: Coreg      Results for orders placed or performed during the hospital encounter of 17   ECHO COMPLETE WITH OPTISON    Narrative    [id number]  [id number]  [id number]  [id number]^YUNIOR^AMAIRANI^           [facility]  Echocardiography Laboratory  [address] [place] [address]        Name: HAKEEM BORJAS  MRN: [id number]  : 1960  Study Date: 2017 09:05 AM  Age: 56 yrs  Gender: Male  Patient [facility]: [facility]  Reason For Study: , Unspecified atrial fibrillation  History: Smoker,Hyperlipidemia,Obesity,Rheumatic Fever  Ordering Physician: AMAIRANI MOJICA  Referring Physician: Carlos Archibald  Performed By: Zenon Bowling     BSA: 2.4 m2  Height: 71 in  Weight: 270 lb  HR: 71  BP: 122/82 mmHg  _____________________________________________________________________________  __        Procedure  Complete Echo Adult. Contrast Optison.  _____________________________________________________________________________  __        Interpretation Summary     The visual ejection fraction is estimated at 55-60%.  The left ventricle is mildly dilated.  There is mild to moderate concentric left ventricular hypertrophy.  The right ventricle is normal in size and function.  There is severe biatrial enlargement.  There is moderate (2+) mitral " regurgitation.  Mild aortic root dilatation.  The ascending aorta is Mildly dilated.  Technically difficult, suboptimal study. Compared to prior study, changes are  noted.  _____________________________________________________________________________  __        Left Ventricle  The left ventricle is mildly dilated. There is mild to moderate concentric  left ventricular hypertrophy. The visual ejection fraction is estimated at 55-  60%. E by E prime ratio is between 8 and 15, which is indeterminate for  assessment of left ventricular filling pressures. Regional wall motion  abnormalities cannot be excluded due to limited visualization.     Right Ventricle  The right ventricle is normal in size and function.     Atria  There is severe biatrial enlargement. The interatrial septum bows toward the  right atrium, consistent with increased left atrial pressure.     Mitral Valve  The mitral valve leaflets appear thickened, but open well. There is moderate  (2+) mitral regurgitation. There is no mitral valve stenosis.        Tricuspid Valve  The tricuspid valve is not well visualized, but is grossly normal. The  tricuspid valve is not well visualized. There is mild to moderate (1-2+)  tricuspid regurgitation. The right ventricular systolic pressure is  approximated at 27.5 mmHg plus the right atrial pressure.     Aortic Valve  There is moderate trileaflet aortic sclerosis. There is mild (1+) aortic  regurgitation. No hemodynamically significant valvular aortic stenosis.     Pulmonic Valve  The pulmonic valve is not well seen, but is grossly normal. There is trace  pulmonic valvular regurgitation.     Vessels  Mild aortic root dilatation. The ascending aorta is Mildly dilated. Dilated  inferior vena cava. Inferior vena cava not well visualized for estimation of  right atrial pressure.     Pericardium  There is no pericardial effusion.        Rhythm  The rhythm was  undetermined.  _____________________________________________________________________________  __  MMode/2D Measurements & Calculations  IVSd: 1.7 cm     LVIDd: 6.1 cm  LVIDs: 3.5 cm  LVPWd: 1.4 cm  FS: 42.3 %  EDV(Teich): 184.8 ml  ESV(Teich): 50.9 ml  LV mass(C)d: 443.6 grams  LV mass(C)dI: 185.1 grams/m2  Ao root diam: 4.0 cm  LA dimension: 5.7 cm  asc Aorta Diam: 3.7 cm  LA/Ao: 1.4  LVOT diam: 2.4 cm  LVOT area: 4.5 cm2  LA Volume (BP): 268.0 ml  LA Volume Index (BP): 111.7 ml/m2           Doppler Measurements & Calculations  MV E max ming: 88.8 cm/sec  MV dec time: 0.23 sec  AI P1/2t: 529.6 msec  MR ERO: 0.27 cm2  MR volume: 34.3 ml  TR max ming: 260.4 cm/sec  TR max P.5 mmHg  Lateral E/e': 6.4  Medial E/e': 10.2           _____________________________________________________________________________  __           Report approved by: Vu Martin 2017 10:43 AM            Problem list and histories reviewed & adjusted, as indicated.  Additional history: as documented    Patient Active Problem List   Diagnosis     Smoker     Tinea versicolor     Erectile dysfunction     Hyperlipidemia with target LDL less than 130     Morbid obesity (H)     Past Surgical History:   Procedure Laterality Date     COLONOSCOPY N/A 2014    Procedure: COMBINED COLONOSCOPY, SINGLE BIOPSY/POLYPECTOMY BY BIOPSY;  Surgeon: Kai Bailey MD;  Location:  GI     ORTHOPEDIC SURGERY      RIght knee scope       Social History   Substance Use Topics     Smoking status: Current Every Day Smoker     Packs/day: 2.00     Years: 39.00     Types: Cigarettes     Smokeless tobacco: Never Used     Alcohol use 2.4 oz/week     4 Standard drinks or equivalent per week      Comment: occasional     No family history on file.      Current Outpatient Prescriptions   Medication Sig Dispense Refill     carvedilol (COREG) 3.125 MG tablet Take 1 tablet (3.125 mg) by mouth 2 times daily (with meals) 60 tablet 1     ibuprofen (ADVIL,MOTRIN) 200 MG  "tablet Take 1 tablet by mouth every 4 hours as needed for pain. 120 tablet      Allergies   Allergen Reactions     No Known Drug Allergy      Recent Labs   Lab Test  05/31/17   1811  08/13/14   0846  01/05/12   1157  01/07/11   0758   LDL   --   143*  166*  135*   HDL   --   39*  48  42   TRIG   --   124  160*  144   ALT   --   20  17  18   CR  0.97  1.06  1.02  1.00   GFRESTIMATED  80  73  78  80   GFRESTBLACK  >90   GFR Calc    89  >90  >90   POTASSIUM  4.1  4.5  4.9  4.5   TSH  2.92   --    --   2.07      BP Readings from Last 3 Encounters:   06/21/17 94/64   05/31/17 122/82   05/08/17 118/74    Wt Readings from Last 3 Encounters:   06/21/17 263 lb 8 oz (119.5 kg)   05/31/17 270 lb 8 oz (122.7 kg)   05/08/17 265 lb 12.8 oz (120.6 kg)                    Reviewed and updated as needed this visit by clinical staff  Tobacco  Allergies  Meds  Problems  Soc Hx      Reviewed and updated as needed this visit by Provider  Allergies  Meds  Problems         ROS:  C: NEGATIVE for fever, chills, change in weight  E/M: NEGATIVE for ear, mouth and throat problems  R: NEGATIVE for significant cough or SOB  CV: POSITIVE for dyspnea on exertion and NEGATIVE for chest pain/chest pressure, claudication, cyanosis, diaphoresis, irregular heart beat, lower extremity edema, orthopnea, paroxysmal nocturnal dyspnea, syncope or near-syncope and tachycardia  ROS otherwise negative    OBJECTIVE:                                                    BP 94/64 (BP Location: Right arm, Patient Position: Chair, Cuff Size: Adult Large)  Pulse 87  Temp 98.1  F (36.7  C) (Tympanic)  Resp 16  Ht 5' 10.1\" (1.781 m)  Wt 263 lb 8 oz (119.5 kg)  SpO2 97%  BMI 37.7 kg/m2  Body mass index is 37.7 kg/(m^2).  GENERAL: healthy, alert and no distress  NECK: no adenopathy, no asymmetry, masses, or scars and thyroid normal to palpation  RESP: lungs clear to auscultation - no rales, rhonchi or wheezes  CV: regular rates and rhythm, " normal S1 S2, no S3 or S4, grade 1-2/6 diastolic murmur heard best over the RUSB, peripheral pulses strong and no peripheral edema  ABDOMEN: soft, nontender, no hepatosplenomegaly, no masses and bowel sounds normal    Diagnostic Test Results:  none      ASSESSMENT/PLAN:                                                      1. Rheumatic mitral regurgitation  Known MR due to rheumatic fever. Progression of regurgitation and severe bi-atrial enlargement although EF is only slightly down. Will have patient see Cardiology to discuss MV treatment options. He was previously in atrial fibrillation but appears to be in sinus today. Will continue Coreg. BP is on low side but he tolerates this well.   - CARDIOLOGY EVAL ADULT REFERRAL    2. Persistent atrial fibrillation (H)  Known MR due to rheumatic fever. Progression of regurgitation and severe bi-atrial enlargement although EF is only slightly down. Will have patient see Cardiology to discuss MV treatment options. He was previously in atrial fibrillation but appears to be in sinus today. Will continue Coreg. BP is on low side but he tolerates this well. Low risk for CHADS score=0. Will continue daily ASA.  - CARDIOLOGY EVAL ADULT REFERRAL    CONSULTATION/REFERRAL to Cardiology     Osbaldo Renee MD  [facility]

## 2017-06-21 NOTE — NURSING NOTE
"Chief Complaint   Patient presents with     Results     echo on 06/05/17       Initial BP 94/64 (BP Location: Right arm, Patient Position: Chair, Cuff Size: Adult Large)  Pulse 87  Temp 98.1  F (36.7  C) (Tympanic)  Resp 16  Ht 5' 10.1\" (1.781 m)  Wt 263 lb 8 oz (119.5 kg)  SpO2 97%  BMI 37.7 kg/m2 Estimated body mass index is 37.7 kg/(m^2) as calculated from the following:    Height as of this encounter: 5' 10.1\" (1.781 m).    Weight as of this encounter: 263 lb 8 oz (119.5 kg).  Medication Reconciliation: complete   Health Maintenance Due   Topic Date Due     HEPATITIS C SCREENING  11/19/1980     Maddie Contreras, [facility]      "

## 2017-06-21 NOTE — Clinical Note
Please call patient to schedule initial cardiology consult in [place].  Electronically signed by Osbaldo Renee MD

## 2017-06-21 NOTE — MR AVS SNAPSHOT
After Visit Summary   6/21/2017    Derek Stover    MRN: [id number]           Patient Information     Date Of Birth          1960        Visit Information        Provider Department      6/21/2017 5:20 PM Osbaldo Renee MD [facility]        Today's Diagnoses     Rheumatic mitral regurgitation    -  1    Persistent atrial fibrillation (H)           Follow-ups after your visit        Additional Services     CARDIOLOGY EVAL ADULT REFERRAL       Your provider has referred you to:  [facility] [phone]   [email or web address].     Please be aware that coverage of these services is subject to the terms and limitations of your health insurance plan.  Call member services at your health plan with any benefit or coverage questions.      Type of Referral:  New Cardiology Consult    Timeframe requested:  Less than 1 week    Please bring the following to your appointment:  >>   Any x-rays, CTs or MRIs which have been performed.  Contact the facility where they were done to arrange for  prior to your scheduled appointment.  Any new CT, MRI or other procedures ordered by your specialist must be performed at a [place] facility or coordinated by your clinic's referral office.   >>   List of current medications  >>   This referral request   >>   Any documents/labs given to you for this referral                  Who to contact     If you have questions or need follow up information about today's clinic visit or your schedule please contact [facility] directly at [phone].  Normal or non-critical lab and imaging results will be communicated to you by MyChart, letter or phone within 4 business days after the clinic has received the results. If you do not hear from us within 7 days, please contact the clinic through MyChart or phone. If you have a critical or abnormal  "lab result, we will notify you by phone as soon as possible.  Submit refill requests through [employer] or call your pharmacy and they will forward the refill request to us. Please allow 3 business days for your refill to be completed.          Additional Information About Your Visit        [facility] Information     [employer] gives you secure access to your electronic health record. If you see a primary care provider, you can also send messages to your care team and make appointments. If you have questions, please call your primary care clinic.  If you do not have a primary care provider, please call [phone] and they will assist you.        Care EveryWhere ID     This is your Care EveryWhere ID. This could be used by other organizations to access your [place] [facility] medical records  [id number]        Your Vitals Were     Pulse Temperature Respirations Height Pulse Oximetry BMI (Body Mass Index)    87 98.1  F (36.7  C) (Tympanic) 16 5' 10.1\" (1.781 m) 97% 37.7 kg/m2       Blood Pressure from Last 3 Encounters:   06/21/17 94/64   05/31/17 122/82   05/08/17 118/74    Weight from Last 3 Encounters:   06/21/17 263 lb 8 oz (119.5 kg)   05/31/17 270 lb 8 oz (122.7 kg)   05/08/17 265 lb 12.8 oz (120.6 kg)              We Performed the Following     CARDIOLOGY EVAL ADULT REFERRAL        Primary Care Provider Office Phone # Fax #    Carlos Zenon PoedeltainDO [phone]       [address]   [facility] [address]        Equal Access to Services     HOMER SPAIN AH: Hadii aad ku hadasho Soomaali, waaxda luqadaha, qaybta kaalmada adeegyada, malissa smyth adekatie eugene. So [facility] [phone].    ATENCIÓN: Si habla español, tiene a macias disposición servicios gratuitos de asistencia lingüística. Llame al [phone].    We comply with applicable federal civil rights laws and [place] laws. We do not discriminate on the basis of race, color, national origin, age, disability sex, sexual " orientation or gender identity.            Thank you!     Thank you for choosing [facility]  for your care. Our goal is always to provide you with excellent care. Hearing back from our patients is one way we can continue to improve our services. Please take a few minutes to complete the written survey that you may receive in the mail after your visit with us. Thank you!             Your Updated Medication List - Protect others around you: Learn how to safely use, store and throw away your medicines at www.disposemymeds.org.          This list is accurate as of: 6/21/17  6:00 PM.  Always use your most recent med list.                   Brand Name Dispense Instructions for use Diagnosis    carvedilol 3.125 MG tablet    COREG    60 tablet    Take 1 tablet (3.125 mg) by mouth 2 times daily (with meals)    Atrial fibrillation, unspecified type (H)       ibuprofen 200 MG tablet    ADVIL/MOTRIN    120 tablet    Take 1 tablet by mouth every 4 hours as needed for pain.

## 2017-06-26 ENCOUNTER — TELEPHONE (OUTPATIENT)
Dept: FAMILY MEDICINE | Facility: OTHER | Age: 57
End: 2017-06-26

## 2017-06-26 NOTE — TELEPHONE ENCOUNTER
referred the patient to cardiology, I have the appointment scheduled for Tuesday 07/18/17 @ 10:00am in [place] with Dr. Ross.     Patient called back as I was typing message, patient was given message from .   Serenity MENDOZA

## 2017-07-18 ENCOUNTER — OFFICE VISIT (OUTPATIENT)
Dept: CARDIOLOGY | Facility: CLINIC | Age: 57
End: 2017-07-18
Payer: COMMERCIAL

## 2017-07-18 ENCOUNTER — HOSPITAL ENCOUNTER (OUTPATIENT)
Dept: CARDIOLOGY | Facility: CLINIC | Age: 57
Discharge: HOME | End: 2017-07-18
Attending: INTERNAL MEDICINE | Admitting: INTERNAL MEDICINE
Payer: COMMERCIAL

## 2017-07-18 ENCOUNTER — TELEPHONE (OUTPATIENT)
Dept: ANTICOAGULATION | Facility: CLINIC | Age: 57
End: 2017-07-18

## 2017-07-18 VITALS
DIASTOLIC BLOOD PRESSURE: 88 MMHG | OXYGEN SATURATION: 97 % | WEIGHT: 260.1 LBS | BODY MASS INDEX: 37.24 KG/M2 | SYSTOLIC BLOOD PRESSURE: 120 MMHG | HEART RATE: 78 BPM | HEIGHT: 70 IN

## 2017-07-18 DIAGNOSIS — I05.9 MITRAL VALVE DISEASE, RHEUMATIC: ICD-10-CM

## 2017-07-18 DIAGNOSIS — Z79.01 LONG TERM CURRENT USE OF ANTICOAGULANT THERAPY: Primary | ICD-10-CM

## 2017-07-18 DIAGNOSIS — Z72.0 TOBACCO ABUSE: ICD-10-CM

## 2017-07-18 DIAGNOSIS — I48.20 CHRONIC ATRIAL FIBRILLATION (H): ICD-10-CM

## 2017-07-18 DIAGNOSIS — I48.91 ATRIAL FIBRILLATION, UNSPECIFIED TYPE (H): Primary | ICD-10-CM

## 2017-07-18 PROCEDURE — 93005 ELECTROCARDIOGRAM TRACING: CPT | Performed by: REHABILITATION PRACTITIONER

## 2017-07-18 PROCEDURE — 99205 OFFICE O/P NEW HI 60 MIN: CPT | Mod: 25 | Performed by: INTERNAL MEDICINE

## 2017-07-18 PROCEDURE — 93000 ELECTROCARDIOGRAM COMPLETE: CPT | Performed by: INTERNAL MEDICINE

## 2017-07-18 RX ORDER — WARFARIN SODIUM 5 MG/1
TABLET ORAL
Qty: 30 TABLET | Refills: 0 | Status: SHIPPED | OUTPATIENT
Start: 2017-07-18 | End: 2017-08-25

## 2017-07-18 RX ORDER — CARVEDILOL 3.12 MG/1
3.12 TABLET ORAL 2 TIMES DAILY WITH MEALS
Qty: 180 TABLET | Refills: 1 | Status: ON HOLD | OUTPATIENT
Start: 2017-07-18 | End: 2017-09-12

## 2017-07-18 NOTE — LETTER
7/18/2017      RE: Derek Stover  [id number] [facility] [id number]       Dear Colleague,    Thank you for the opportunity to participate in the care of your patient, Derek Stover, at the [facility] at [facility]. Please see a copy of my visit note below.    HPI and Plan:   Mr. Stover is a very pleasant 56-year-old gentleman with history of rheumatic fever as a child. Subsequently he has been noted to have mitral valve regurgitation. He was seen last time and cardiology clinic back in 2011 by Dr. Argueta and was noted to have mild mitral regurgitation with normal LV function and normal LV size. Today patient is reestablishing cardiology care with us. About a month and a half ago patient was diagnosed with atrial fibrillation. Patient tells me that about 4 months ago he had what he describes as a flulike episode with some cough and fever and his symptoms recovered but then again he started noticing dyspnea on exertion. 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 No shortness of breath at rest, no orthopnea or PND. Patient tells me that he has noticed about 2 months ago fluttering sensation in the chest and was seen in the primary care clinic-EKG showed atrial fibrillation. He was started on low-dose carvedilol. He also underwent echocardiogram last month that reported moderate mitral regurgitation with mildly dilated left ventricle, LVEF of 55 to 60%. Patient denies any chest discomfort with physical activity. He does feel mild palpitations/fluttering sensation with physical activity but this is much milder compared to what he felt back in May 2017 before he was started on carvedilol. Unfortunately patient smokes 2 packs per day, he has been smoking for last 40 years, in the past he has successfully quit for 10 months using Chantix  but then restarted smoking because he gained  20 pounds of weight after quitting tobacco. Patient denies any history of hypertension, diabetes or any family history of premature coronary artery disease. Lipid panel in 2014 shows LDL of 143, HDL  39, total cholesterol 207. EKG today confirms that he's in atrial fibrillation with controlled ventriular rates.    Echocardiogram images were personally viewed by me, rheumatic appearance of mitral valve, no significant mitral stenosis, mitral regurgitation appears more eccentric, posteriorly directed, visually it appears at least 3+, severe left atrial enlargement, mildly dilated left lenticular with left ventricle end diastolic dimension of 6 cm, LVF around 55 to 60%. These echocardiogram images were compared to 2011 images and LV size definitely appears more enlarge, left atrium also appears more enlarged and visually mitral regurgitation also appear worse in present study.    Assessment and plan  A very delightful 56 yearr gentleman with history of rheumatic fever as a child, rheumatic valve disease with mitral regurgitation, tobacco abuse, obesity. He has been newly diagnosed with atrial fibrillation in May 2017. He is also having progressive dyspnea on exertion for last 3 to 4 months. Mitral regurgitation is reported moderate on transthoracic echocardiogram but on my personal review of images I suspect that mitral regurgitation is underestimated. My concern is that he may have significant mitral regurgitation that has eventually caused severe left atrium enlargement that has led to atrial fibrillation and also now patient is symptomatic with dyspnea on exertion and LV is mildly enlarged. I had a long discussion with patient regarding need for more comprehensive evaluation of mitral regurgitation. I discussed option of trans esophageal echocardiogram. Risk-benefit of trans esophageal echocardiogram with risks including but not limited to risk sedation, esophageal injury that may rarely require surgery were  discussed with patient. Patient understands the risk involved and the rational of the procedure and agrees to proceed with it. Patient does not have any dysphagia or known esophageal structure or current peptic ulcer disease. I also had a long discussion with patient regarding the pathophysiology of atrial fibrillation, stroke prophylaxis and rate versus rhythm control. Atrial fibrillation appears valvular and oral anticoagulation with Coumadin is usually recommended. Risk-benefit of Coumadin were discussed with patient and he agrees to proceed with it. I'm going to refer patient to Coumadin clinic for Coumadin initiation and follow-up. I also had long discussion with patient regarding tobacco cessation, approximately 8 minutes were spent discussing about tobacco cessation and patient agrees to retry Chantix to help him quit tobacco(in the past he has successfully quit tobacco for 10 months after using Chantix) . Regarding rate was rhythm control strategy, he is essentially asymptomatic from atrial fibrillation at present and rates appear well-controlled and given underlying severe left atrial enlargement at this time I recommend continuing the rate control strategy.    1. Newly diagnosed atrial fibrillation. Valvular in nature due to underlying mitral valve disease which appears rheumatic in nature. Rate control strategy as noted above. To start Coumadin through INR clinic   2. Mitral valve disease. Moderate mitral regurgitation reported but on personal review of echocardiogram images appears to be underestimated. Concern for more severe mitral regurgitation given eccentric jet and severe left atrial enlargement. Mildly enlarged left ventricle. MITCHELL for further evaluation   3. Dyspnea on exertion, could be due to problem number 2 with some contribution from problem number 1.   4. Tobacco abuse, 2 packs per day     Recommendations   MITCHELL  Coumadin through INR clinic   tobacco cessation, Chantix prescribed  follow-up  in 4 to 6 weeks    50 minutes of overtime was spent with more than 50% counseling and coordination of care   Orders Placed This Encounter   Procedures     Tobacco Cessation - for Health Maintenance     QUITPLAN  Referral     INR/ANTICOAG REFERRAL     Follow-Up with Cardiologist     EKG 12-LEAD CLINIC READ     Transesophageal Echocardiogram       Orders Placed This Encounter   Medications     CHANTIX STARTING MONTH RJ 0.5 MG X 11 & 1 MG X 42 tablet     Si     Dispense:  42 tablet     Refill:  0     Use as directed on starting package.     carvedilol (COREG) 3.125 MG tablet     Sig: Take 1 tablet (3.125 mg) by mouth 2 times daily (with meals)     Dispense:  180 tablet     Refill:  1       Medications Discontinued During This Encounter   Medication Reason     carvedilol (COREG) 3.125 MG tablet Reorder         Encounter Diagnoses   Name Primary?     Atrial fibrillation, unspecified type (H) Yes     Tobacco abuse      Mitral valve disease, rheumatic        CURRENT MEDICATIONS:  Current Outpatient Prescriptions   Medication Sig Dispense Refill     CHANTIX STARTING MONTH RJ 0.5 MG X 11 & 1 MG X 42 tablet 42 42 tablet 0     carvedilol (COREG) 3.125 MG tablet Take 1 tablet (3.125 mg) by mouth 2 times daily (with meals) 180 tablet 1     ibuprofen (ADVIL,MOTRIN) 200 MG tablet Take 1 tablet by mouth every 4 hours as needed for pain. 120 tablet      [DISCONTINUED] carvedilol (COREG) 3.125 MG tablet Take 1 tablet (3.125 mg) by mouth 2 times daily (with meals) 60 tablet 1       ALLERGIES     Allergies   Allergen Reactions     No Known Drug Allergy        PAST MEDICAL HISTORY:  Past Medical History:   Diagnosis Date     Heart murmur     rheumatic fever as a child       PAST SURGICAL HISTORY:  Past Surgical History:   Procedure Laterality Date     COLONOSCOPY N/A 2014    Procedure: COMBINED COLONOSCOPY, SINGLE BIOPSY/POLYPECTOMY BY BIOPSY;  Surgeon: Kai Bailey MD;  Location:  GI     ORTHOPEDIC SURGERY      RIght  "knee scope       FAMILY HISTORY:  No family history on file.    SOCIAL HISTORY:  Social History     Social History     Marital status: Single     Spouse name: N/A     Number of children: N/A     Years of education: N/A     Social History Main Topics     Smoking status: Current Every Day Smoker     Packs/day: 2.00     Years: 39.00     Types: Cigarettes     Smokeless tobacco: Never Used     Alcohol use 2.4 oz/week     4 Standard drinks or equivalent per week      Comment: occasional     Drug use: No     Sexual activity: Yes     Partners: Female     Other Topics Concern     Parent/Sibling W/ Cabg, Mi Or Angioplasty Before 65f 55m? No     Social History Narrative       Review of Systems:  Skin:  Negative       Eyes:  Positive for glasses    ENT:         Respiratory:  Positive for wheezing;dyspnea on exertion;shortness of breath;cough;clear expectorant;purulent expectorant     Cardiovascular:    chest pain;Positive for;palpitations chest tightness across center of chest, can feel heart \"flipping\" in morning and at night   Gastroenterology: Negative      Genitourinary:  Negative      Musculoskeletal:  Positive for back pain due to physical job takes two ibuprofen at night before bed  Neurologic:  Positive for headaches heat HA  Psychiatric:  Positive for sleep disturbances due to cough  Heme/Lymph/Imm:  Negative      Endocrine:  Negative        Physical Exam:  Vitals: /88 (BP Location: Right arm, Patient Position: Fowlers, Cuff Size: Adult Large)  Pulse 78  Ht 1.778 m (5' 10\")  Wt 118 kg (260 lb 1.6 oz)  SpO2 97%  BMI 37.32 kg/m2    General- appears comfortable  Neck- normal JVP, no bruit  Cardiovascular system- s1s2 normal, grade 3 x 4 systolic murmur heard over the apex with radiation to axilla, no S3, S4 Rub or Charles  Respiratory system- CTA b/l  Abdomen- soft, non tender  Extremities- no pedal edema  Neurological - alert, oriented  Psych- normal affect  HEENT- no pallor      Oni MD EMANUEL Ross " MD Thelma  [facility]  [address] [place] [address]

## 2017-07-18 NOTE — PATIENT INSTRUCTIONS

## 2017-07-18 NOTE — TELEPHONE ENCOUNTER
Patient returned call. Relayed message below about coumadin at the pharmacy and for him to take starting tonight. I helped schedule him a consult appointment with [facility] on Friday morning 7/21 at 0915. His only question was if it was okay to take ibuprofen every night with the coumadin. I consulted with Perla and she said it was okay for him to take tonight but it shouldn't be an ongoing thing. I relayed this message to Derek and he understood. He had no further questions.     Thank you  Jean Paul Lindsay  Patient Representative

## 2017-07-18 NOTE — PROGRESS NOTES
HPI and Plan:   Mr. Stover is a very pleasant 56-year-old gentleman with history of rheumatic fever as a child. Subsequently he has been noted to have mitral valve regurgitation. He was seen last time and cardiology clinic back in 2011 by Dr. Argueta and was noted to have mild mitral regurgitation with normal LV function and normal LV size. Today patient is reestablishing cardiology care with us. About a month and a half ago patient was diagnosed with atrial fibrillation. Patient tells me that about 4 months ago he had what he describes as a flulike episode with some cough and fever and his symptoms recovered but then again he started noticing dyspnea on exertion. 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 No shortness of breath at rest, no orthopnea or PND. Patient tells me that he has noticed about 2 months ago fluttering sensation in the chest and was seen in the primary care clinic-EKG showed atrial fibrillation. He was started on low-dose carvedilol. He also underwent echocardiogram last month that reported moderate mitral regurgitation with mildly dilated left ventricle, LVEF of 55 to 60%. Patient denies any chest discomfort with physical activity. He does feel mild palpitations/fluttering sensation with physical activity but this is much milder compared to what he felt back in May 2017 before he was started on carvedilol. Unfortunately patient smokes 2 packs per day, he has been smoking for last 40 years, in the past he has successfully quit for 10 months using Chantix  but then restarted smoking because he gained 20 pounds of weight after quitting tobacco. Patient denies any history of hypertension, diabetes or any family history of premature coronary artery disease. Lipid panel in 2014 shows LDL of 143, HDL  39, total cholesterol 207. EKG today confirms that he's in atrial fibrillation with controlled ventriular  rates.    Echocardiogram images were personally viewed by me, rheumatic appearance of mitral valve, no significant mitral stenosis, mitral regurgitation appears more eccentric, posteriorly directed, visually it appears at least 3+, severe left atrial enlargement, mildly dilated left lenticular with left ventricle end diastolic dimension of 6 cm, LVF around 55 to 60%. These echocardiogram images were compared to 2011 images and LV size definitely appears more enlarge, left atrium also appears more enlarged and visually mitral regurgitation also appear worse in present study.    Assessment and plan  A very delightful 56 yearr gentleman with history of rheumatic fever as a child, rheumatic valve disease with mitral regurgitation, tobacco abuse, obesity. He has been newly diagnosed with atrial fibrillation in May 2017. He is also having progressive dyspnea on exertion for last 3 to 4 months. Mitral regurgitation is reported moderate on transthoracic echocardiogram but on my personal review of images I suspect that mitral regurgitation is underestimated. My concern is that he may have significant mitral regurgitation that has eventually caused severe left atrium enlargement that has led to atrial fibrillation and also now patient is symptomatic with dyspnea on exertion and LV is mildly enlarged. I had a long discussion with patient regarding need for more comprehensive evaluation of mitral regurgitation. I discussed option of trans esophageal echocardiogram. Risk-benefit of trans esophageal echocardiogram with risks including but not limited to risk sedation, esophageal injury that may rarely require surgery were discussed with patient. Patient understands the risk involved and the rational of the procedure and agrees to proceed with it. Patient does not have any dysphagia or known esophageal structure or current peptic ulcer disease. I also had a long discussion with patient regarding the pathophysiology of atrial  fibrillation, stroke prophylaxis and rate versus rhythm control. Atrial fibrillation appears valvular and oral anticoagulation with Coumadin is usually recommended. Risk-benefit of Coumadin were discussed with patient and he agrees to proceed with it. I'm going to refer patient to Coumadin clinic for Coumadin initiation and follow-up. I also had long discussion with patient regarding tobacco cessation, approximately 8 minutes were spent discussing about tobacco cessation and patient agrees to retry Chantix to help him quit tobacco(in the past he has successfully quit tobacco for 10 months after using Chantix) . Regarding rate was rhythm control strategy, he is essentially asymptomatic from atrial fibrillation at present and rates appear well-controlled and given underlying severe left atrial enlargement at this time I recommend continuing the rate control strategy.    1. Newly diagnosed atrial fibrillation. Valvular in nature due to underlying mitral valve disease which appears rheumatic in nature. Rate control strategy as noted above. To start Coumadin through INR clinic   2. Mitral valve disease. Moderate mitral regurgitation reported but on personal review of echocardiogram images appears to be underestimated. Concern for more severe mitral regurgitation given eccentric jet and severe left atrial enlargement. Mildly enlarged left ventricle. MITCHELL for further evaluation   3. Dyspnea on exertion, could be due to problem number 2 with some contribution from problem number 1.   4. Tobacco abuse, 2 packs per day     Recommendations   MITCHELL  Coumadin through INR clinic   tobacco cessation, Chantix prescribed  follow-up in 4 to 6 weeks    50 minutes of overtime was spent with more than 50% counseling and coordination of care   Orders Placed This Encounter   Procedures     Tobacco Cessation - for Health Maintenance     QUITPLAN  Referral     INR/ANTICOAG REFERRAL     Follow-Up with Cardiologist     EKG 12-LEAD CLINIC READ      Transesophageal Echocardiogram       Orders Placed This Encounter   Medications     CHANTIX STARTING MONTH RJ 0.5 MG X 11 & 1 MG X 42 tablet     Si     Dispense:  42 tablet     Refill:  0     Use as directed on starting package.     carvedilol (COREG) 3.125 MG tablet     Sig: Take 1 tablet (3.125 mg) by mouth 2 times daily (with meals)     Dispense:  180 tablet     Refill:  1       Medications Discontinued During This Encounter   Medication Reason     carvedilol (COREG) 3.125 MG tablet Reorder         Encounter Diagnoses   Name Primary?     Atrial fibrillation, unspecified type (H) Yes     Tobacco abuse      Mitral valve disease, rheumatic        CURRENT MEDICATIONS:  Current Outpatient Prescriptions   Medication Sig Dispense Refill     CHANTIX STARTING MONTH RJ 0.5 MG X 11 & 1 MG X 42 tablet 42 42 tablet 0     carvedilol (COREG) 3.125 MG tablet Take 1 tablet (3.125 mg) by mouth 2 times daily (with meals) 180 tablet 1     ibuprofen (ADVIL,MOTRIN) 200 MG tablet Take 1 tablet by mouth every 4 hours as needed for pain. 120 tablet      [DISCONTINUED] carvedilol (COREG) 3.125 MG tablet Take 1 tablet (3.125 mg) by mouth 2 times daily (with meals) 60 tablet 1       ALLERGIES     Allergies   Allergen Reactions     No Known Drug Allergy        PAST MEDICAL HISTORY:  Past Medical History:   Diagnosis Date     Heart murmur     rheumatic fever as a child       PAST SURGICAL HISTORY:  Past Surgical History:   Procedure Laterality Date     COLONOSCOPY N/A 2014    Procedure: COMBINED COLONOSCOPY, SINGLE BIOPSY/POLYPECTOMY BY BIOPSY;  Surgeon: Kai Bailey MD;  Location:  GI     ORTHOPEDIC SURGERY      RIght knee scope       FAMILY HISTORY:  No family history on file.    SOCIAL HISTORY:  Social History     Social History     Marital status: Single     Spouse name: N/A     Number of children: N/A     Years of education: N/A     Social History Main Topics     Smoking status: Current Every Day Smoker     Packs/day:  "2.00     Years: 39.00     Types: Cigarettes     Smokeless tobacco: Never Used     Alcohol use 2.4 oz/week     4 Standard drinks or equivalent per week      Comment: occasional     Drug use: No     Sexual activity: Yes     Partners: Female     Other Topics Concern     Parent/Sibling W/ Cabg, Mi Or Angioplasty Before 65f 55m? No     Social History Narrative       Review of Systems:  Skin:  Negative       Eyes:  Positive for glasses    ENT:         Respiratory:  Positive for wheezing;dyspnea on exertion;shortness of breath;cough;clear expectorant;purulent expectorant     Cardiovascular:    chest pain;Positive for;palpitations chest tightness across center of chest, can feel heart \"flipping\" in morning and at night   Gastroenterology: Negative      Genitourinary:  Negative      Musculoskeletal:  Positive for back pain due to physical job takes two ibuprofen at night before bed  Neurologic:  Positive for headaches heat HA  Psychiatric:  Positive for sleep disturbances due to cough  Heme/Lymph/Imm:  Negative      Endocrine:  Negative        Physical Exam:  Vitals: /88 (BP Location: Right arm, Patient Position: Fowlers, Cuff Size: Adult Large)  Pulse 78  Ht 1.778 m (5' 10\")  Wt 118 kg (260 lb 1.6 oz)  SpO2 97%  BMI 37.32 kg/m2    General- appears comfortable  Neck- normal JVP, no bruit  Cardiovascular system- s1s2 normal, grade 3 x 4 systolic murmur heard over the apex with radiation to axilla, no S3, S4 Rub or Charles  Respiratory system- CTA b/l  Abdomen- soft, non tender  Extremities- no pedal edema  Neurological - alert, oriented  Psych- normal affect  HEENT- no pallor        CC  Osbaldo Renee MD  [facility]  [address] [place] [address]              "

## 2017-07-18 NOTE — MR AVS SNAPSHOT
After Visit Summary   7/18/2017    Derek Stover    MRN: [id number]           Patient Information     Date Of Birth          1960        Visit Information        Provider Department      7/18/2017 10:00 AM Oni Ross MD [facility]        Today's Diagnoses     Atrial fibrillation, unspecified type (H)    -  1    Tobacco abuse        Mitral valve disease, rheumatic          Care Instructions      HOW TO QUIT SMOKING  Smoking is one of the hardest habits to break. About half of all those who have ever smoked have been able to quit, and most of those (about 70%) who still smoke want to quit. Here are some of the best ways to stop smoking.     KEEP TRYING:  It takes most smokers about 8 tries before they are finally able to fully quit. So, the more often you try and fail, the better your chance of quitting the next time! So, don't give up!    GO COLD TURKEY:  Most ex-smokers quit cold turkey. Trying to cut back gradually doesn't seem to work as well, perhaps because it continues the smoking habit. Also, it is possible to fool yourself by inhaling more while smoking fewer cigarettes. This results in the same amount of nicotine in your body!    GET SUPPORT:  Support programs can make an important difference, especially for the heavy smoker. These groups offer lectures, methods to change your behavior and peer support. Call the free national Quitline for more information. 800-QUIT-NOW (583-279-1889). Low-cost or free programs are offered by many hospitals, local chapters of the American Lung Association (914-206-5279) and the American Cancer Society (352-962-8026). Support at home is important too. Non-smokers can help by offering praise and encouragement. If the smoker fails to quit, encourage them to try again!    OVER-THE-COUNTER MEDICINES:  For those who can't quit on their own, Nicotine Replacement Therapy (NRT) may make quitting much easier. Certain aids such as the  nicotine patch, gum and lozenge are available without a prescription. However, it is best to use these under the guidance of your doctor. The skin patch provides a steady supply of nicotine to the body. Nicotine gum and lozenge gives temporary bursts of low levels of nicotine. Both methods take the edge off the craving for cigarettes. WARNING: If you feel symptoms of nicotine overdose, such as nausea, vomiting, dizziness, weakness, or fast heartbeat, stop using these and see your doctor.    PRESCRIPTION MEDICINES:  After evaluating your smoking patterns and prior attempts at quitting, your doctor may offer a prescription medicine such as bupropion (Zyban, Wellbutrin), varenicline (Chantix, Champix), a niocotine inhaler or nasal spray. Each has its unique advantage and side effects which your doctor can review with you.    HEALTH BENEFITS OF QUITTING:  The benefits of quitting start right away and keep improving the longer you go without smokin minutes: blood pressure and pulse return to normal  8 hours: oxygen levels return to normal  2 days: ability to smell and taste begins to improve as damaged nerves start to regrow  2-3 weeks: circulation and lung function improves  1-9 months: decreased cough, congestion and shortness of breath; less tired  1 year: risk of heart attack decreases by half  5 years: risk of lung cancer decreases by half; risk of stroke becomes the same as a non-smoker  For information about how to quit smoking, visit the following links:  National Cancer [place] ,   Clearing the Air, Quit Smoking Today   - an online booklet. http://www.smokefree.gov/pubs/clearing_the_air.pdf  Smokefree.gov http://smokefree.gov/  QuitNet http://www.quitnet.com/    9764-4440 Lazaro Wallace, [address] [place] [address], [place] [address]. All rights reserved. This information is not intended as a substitute for professional medical care. Always follow your healthcare professional's instructions.    The Benefits  of Living Smoke Free  What do you want to gain from quitting? Check off some reasons to quit.  Health Benefits  ___ Reduce my risk of lung cancer, heart disease, chronic lung disease  ___ Have fewer wrinkles and softer skin  ___ Improve my sense of taste and smell  ___ For pregnant women--reduce the risk of having a miscarriage, stillbirth, premature birth, or low-birth-weight baby  Personal Benefits  ___ Feel more in control of my life  ___ Have better-smelling hair, breath, clothes, home, and car  ___ Save time by not having to take smoke breaks, buy cigarettes, or hunt for a light  ___ Have whiter teeth  Family Benefits  ___ Reduce my children s respiratory tract infections  ___ Set a good example for my children  ___ Reduce my family s cancer risk  Financial Benefits  ___ Save hundreds of dollars each year that would be spent on cigarettes  ___ Save money on medical bills  ___ Save on life, health, and car insurance premiums    Those Dollars Add Up!  Cigarettes are expensive, and getting more expensive all the time. Do you realize how much money you are spending on cigarettes per year? What is the average amount you spend on a pack of cigarettes? What is the average number of packs that you smoke per day? Using your answers to these questions, fill in this formula to help you find out:  ($ _____ per pack) ×  ( _____ number of packs per day) × (365 days) =  $ _____ yearly cost of smoking  Besides tobacco, there are other costs, including extra cleaning bills and replacement costs for clothing and furniture; medical expenses for smoking-related illnesses; and higher health, life, and car insurance premiums.    Cigars and Pipes Count Too!  Cigars and pipes are also dangerous. So are smokeless (chewing) tobacco and snuff. All of these products contain nicotine, a highly addictive substance that has harmful effects on your body. Quitting smoking means giving up all tobacco products.      8981-6471 Lazaro Wallace,  [address] [place] [address]. All rights reserved. This information is not intended as a substitute for professional medical care. Always follow your healthcare professional's instructions.          Follow-ups after your visit        Additional Services     INR/ANTICOAG REFERRAL       Indication for Anticoagulation: Atrial Fibrillation  Range:  2.0 - 3.0  If nonstandard INR desired, range desired/explain:   Expected duration of therapy: Lifetime    Please be aware that coverage of these services is subject to the terms and limitations of your health insurance plan.  Call member services at your health plan with any benefit or coverage questions.            QUITPLAN  Referral       [place] TO[employer]ACCO QUITLINES FAX FORM  Fax form to: [phone]    The clinic will facilitate the referral to the quitline.    Provider Information:  ===============================================================  Oin Ross MD  ID#: [id number] - [facility]: [facility] [phone] [phone]   [email or web address]  Payor: SELECTCARE / Plan: [facility] LABORCARE / Product Type: Indemnity /   ===============================================================    The Public Health Service Guideline does not recommend providing over-the-counter nicotine replacement therapy products without physician authorization to patients with the following conditions: pregnancy, uncontrolled high blood pressure, or cardiovascular diseases.     I authorize the [place] Tobacco Quitlines to provide over-the-counter nicotine replacement products for the patient listed below if the patient's health plan benefits cover NRT or if the patient is eligible for QUITPLAN services.    Patient Consented to:  ===============================================================  - YES - I am ready to quit tobacco and request the above information be given to the quitline so they may contact me.  I  understand that one of [place]'s Tobacco Quitlines will inform my provider about my participation.  ===============================================================  Please check the BEST 3-hour call window for them to reach you: 5pm - 8pm  May we leave a message?  YES  Language Preference:  English  Phone Number: Home Phone      [phone]  Mobile          [phone]     E-mail Address: [email or web address]    ========================================================================  FOR QUITLINE USE ONLY:  THIS INFORMATION WILL BE PROVIDED BACK TO THE PROVIDER  Contact date: __/ __/__ or ____ Did not reach after three attempts.    Outcome:  __ Enrolled in telephone counseling program  __ Declined  __ Not Reached    Stage of readiness: _______________________  Planned Quit Date: ___/ ___/ ___  Comments:      2011 [facility]   This message funded by [employer] of [place], an independent licensee of the Blue Cross and Blue Shield Association. Rev. 11/1/12            Follow-Up with Cardiologist                 Future tests that were ordered for you today     Open Future Orders        Priority Expected Expires Ordered    Follow-Up with Cardiologist Routine 8/28/2017 7/18/2018 7/18/2017    Transesophageal Echocardiogram Routine 7/25/2017 7/18/2018 7/18/2017    QUITPLAN  Referral Routine  9/16/2017 7/18/2017            Who to contact     If you have questions or need follow up information about today's clinic visit or your schedule please contact [facility] directly at [phone].  Normal or non-critical lab and imaging results will be communicated to you by MyChart, letter or phone within 4 business days after the clinic has received the results. If you do not hear from us within 7 days, please contact the clinic through [employer] or phone. If you have a critical or abnormal lab result, we will notify you by phone as soon as possible.  Submit refill requests through  "[employer] or call your pharmacy and they will forward the refill request to us. Please allow 3 business days for your refill to be completed.          Additional Information About Your Visit        MyChart Information     [employer] gives you secure access to your electronic health record. If you see a primary care provider, you can also send messages to your care team and make appointments. If you have questions, please call your primary care clinic.  If you do not have a primary care provider, please call [phone] and they will assist you.        Care EveryWhere ID     This is your Care EveryWhere ID. This could be used by other organizations to access your [facility] medical records  [id number]        Your Vitals Were     Pulse Height Pulse Oximetry BMI (Body Mass Index)          78 1.778 m (5' 10\") 97% 37.32 kg/m2         Blood Pressure from Last 3 Encounters:   07/18/17 120/88   06/21/17 94/64   05/31/17 122/82    Weight from Last 3 Encounters:   07/18/17 118 kg (260 lb 1.6 oz)   06/21/17 119.5 kg (263 lb 8 oz)   05/31/17 122.7 kg (270 lb 8 oz)              We Performed the Following     EKG 12-LEAD CLINIC READ     INR/ANTICOAG REFERRAL     Tobacco Cessation - for Health Maintenance          Today's Medication Changes          These changes are accurate as of: 7/18/17 10:56 AM.  If you have any questions, ask your nurse or doctor.               Start taking these medicines.        Dose/Directions    CHANTIX STARTING MONTH RJ 0.5 MG X 11 & 1 MG X 42 tablet   Used for:  Tobacco abuse   Generic drug:  varenicline        42   Quantity:  42 tablet   Refills:  0            Where to get your medicines      These medications were sent to [id number] - [place] [address]  [address] [facility] [address]     Phone:  [phone]     carvedilol 3.125 MG tablet    CHANTIX STARTING MONTH RJ 0.5 MG X 11 & 1 MG X 42 tablet                Primary Care Provider Office Phone # Fax #    Carlos " Zenon Archibald,  [phone]       [address]   [facility] [address]        Equal Access to Services     HOMER SPAIN : Ainsley ware hillary Shukla, wacharlyda luqbrian, renatata kaalmada desirae, malissa de leon braulio eugene. So [facility] [phone].    ATENCIÓN: Si habla español, tiene a macias disposición servicios gratuitos de asistencia lingüística. Llame al [phone].    We comply with applicable federal civil rights laws and [place] laws. We do not discriminate on the basis of race, color, national origin, age, disability sex, sexual orientation or gender identity.            Thank you!     Thank you for choosing [facility]  for your care. Our goal is always to provide you with excellent care. Hearing back from our patients is one way we can continue to improve our services. Please take a few minutes to complete the written survey that you may receive in the mail after your visit with us. Thank you!             Your Updated Medication List - Protect others around you: Learn how to safely use, store and throw away your medicines at www.disposemymeds.org.          This list is accurate as of: 7/18/17 10:56 AM.  Always use your most recent med list.                   Brand Name Dispense Instructions for use Diagnosis    carvedilol 3.125 MG tablet    COREG    180 tablet    Take 1 tablet (3.125 mg) by mouth 2 times daily (with meals)    Atrial fibrillation, unspecified type (H)       CHANTIX STARTING MONTH RJ 0.5 MG X 11 & 1 MG X 42 tablet   Generic drug:  varenicline     42 tablet    42    Tobacco abuse       ibuprofen 200 MG tablet    ADVIL/MOTRIN    120 tablet    Take 1 tablet by mouth every 4 hours as needed for pain.

## 2017-07-18 NOTE — TELEPHONE ENCOUNTER
Dr. Ross advised pt to start coumadin, though he did not send any to the pharmacy. I have sent 5 mg tablet of coumadin to the pharmacy for him.   VM left for pt advising him to take one 5 mg tab of Coumadin Tuesday evening, Wednesday evening, and Thursday evening, and that he should call and make an appt for a consult with [facility] on Friday morning.     Will keep TE open to be sure he schedules an appt.     Perla Walden RN

## 2017-07-21 ENCOUNTER — TELEPHONE (OUTPATIENT)
Dept: CARDIOLOGY | Facility: CLINIC | Age: 57
End: 2017-07-21

## 2017-07-21 ENCOUNTER — ANTICOAGULATION THERAPY VISIT (OUTPATIENT)
Dept: ANTICOAGULATION | Facility: CLINIC | Age: 57
End: 2017-07-21
Payer: COMMERCIAL

## 2017-07-21 ENCOUNTER — TELEPHONE (OUTPATIENT)
Dept: ANTICOAGULATION | Facility: CLINIC | Age: 57
End: 2017-07-21

## 2017-07-21 DIAGNOSIS — I48.91 ATRIAL FIBRILLATION (H): ICD-10-CM

## 2017-07-21 DIAGNOSIS — Z79.01 LONG-TERM (CURRENT) USE OF ANTICOAGULANTS: ICD-10-CM

## 2017-07-21 LAB — INR POINT OF CARE: 1.8 (ref 0.86–1.14)

## 2017-07-21 PROCEDURE — 99207 ZZC NO CHARGE NURSE ONLY: CPT

## 2017-07-21 PROCEDURE — 85610 PROTHROMBIN TIME: CPT | Mod: QW

## 2017-07-21 PROCEDURE — 36416 COLLJ CAPILLARY BLOOD SPEC: CPT

## 2017-07-21 NOTE — TELEPHONE ENCOUNTER
Dr. Renee is out of clinic this week.  Patient does not need bridging.  Okay to stop Coumadin 5 days prior and resume 12- 24 hours after procedure.     Electronically signed by Michelle León CNP.

## 2017-07-21 NOTE — TELEPHONE ENCOUNTER
Pt will be having a MITCHELL on 7/25. Please review pt's history and advise if he/she will need to be bridged with Lovenox. If so, 1 mg/kg BID or 1.5 mg/kg daily? If not, OK to stop coumadin 5 days prior and resume usual dose 12-24 hours after procedure? Please advise.  Pt is new to coumadin for A Fib as of this week.   Perla Palomino RN

## 2017-07-21 NOTE — TELEPHONE ENCOUNTER
Routed to Dr. Ross as well  Please read notes below and send back to the [facility] pool, as I will be leaving until Tuesday.   Perla Walden RN

## 2017-07-21 NOTE — MR AVS SNAPSHOT
Derek SHERMAN Stover   7/21/2017 9:15 AM   Anticoagulation Therapy Visit    Description:  56 year old male   Provider:  [facility] ANTI COAG   Department:  [facility]           INR as of 7/21/2017     Today's INR 1.8!      Anticoagulation Summary as of 7/21/2017     INR goal 2.0-3.0   Today's INR 1.8!   Full instructions 5 mg on Mon, Wed, Fri; 2.5 mg all other days   Next INR check 7/28/2017    Indications   Long-term (current) use of anticoagulants [Z79.01] [Z79.01]  Atrial fibrillation (H) [I48.91] [I48.91]         Your next Anticoagulation Clinic appointment(s)     Jul 28, 2017  9:15 AM CDT   [facility]    [address] [place] [address]   [phone]              Contact Numbers     Clinic Number:         July 2017 Details    Sun Mon Tue Wed Thu Fri Sat           1                 2               3               4               5               6               7               8                 9               10               11               12               13               14               15                 16               17               18               19               20               21      5 mg   See details      22      2.5 mg           23      2.5 mg         24      5 mg         25      2.5 mg         26      5 mg         27      2.5 mg         28            29                 30               31                     Date Details   07/21 This INR check       Date of next INR:  7/28/2017         How to take your warfarin dose     To take:  2.5 mg Take 0.5 of a 5 mg tablet.    To take:  5 mg Take 1 of the 5 mg tablets.

## 2017-07-21 NOTE — TELEPHONE ENCOUNTER
Left detailed message and asked patient to call and leave a message if he had further questions or concerns.   reviewed MITCHELL  scheduled 7- to arrive at 7:00 for a  8:30 am procedure. Instructed patient to not eat or drink after midnight and take AM medications to include ASA. . Verified someone will drive patient home and be available 24 hours post MITCHELL.

## 2017-07-21 NOTE — PROGRESS NOTES
ANTICOAGULATION INITIAL CLINIC VISIT    Patient Name:  Derek Stover  Date:  7/21/2017  Referred by:   Contact Type:  Face to Face    SUBJECTIVE:  Coumadin education was completed today.  Topics covered include:  -Introduction to coumadin  -Proper Administration  -INR Testing  -Sign/Symptoms of Bleeding  -Signs/Symptoms of Clot Formation or Stroke  -Dietary Intake of Vitamin K  -Drug Interactions  -Anticoagulation Identification (bracelet, necklace or wallet card)  -Future Surgery  -Effects of Alcohol, Tobacco, and Exercise on Coumadin           Patient Findings     Comments Starting taking 5 mg of Coumadin on Tuesday            OBJECTIVE    INR Protime   Date Value Ref Range Status   07/21/2017 1.8 (A) 0.86 - 1.14 Final       ASSESSMENT / PLAN  INR assessment SUB    Recheck INR In: 1 WEEK    INR Location Clinic      Anticoagulation Summary as of 7/21/2017     INR goal 2.0-3.0   Today's INR 1.8!   Maintenance plan 5 mg (5 mg x 1) on Mon, Wed, Fri; 2.5 mg (5 mg x 0.5) all other days   Full instructions 5 mg on Mon, Wed, Fri; 2.5 mg all other days   Weekly total 25 mg   Plan last modified Perla Walden RN (7/21/2017)   Next INR check 7/28/2017   Target end date     Indications   Long-term (current) use of anticoagulants [Z79.01] [Z79.01]  Atrial fibrillation (H) [I48.91] [I48.91]         Anticoagulation Episode Summary     INR check location     Preferred lab     Send INR reminders to [facility] POOL    Comments 5 mg       Anticoagulation Care Providers     Provider Role Specialty Phone number    Luimaru Carlos Yarbrough DO [facility] [phone]            See the Encounter Report to view Anticoagulation Flowsheet and Dosing Calendar (Go to Encounters tab in chart review, and find the Anticoagulation Therapy Visit)    Dosage adjustment made based on physician directed care plan.    Started taking Coumadin Tuesday (5 mg daily).   Unable to come until Friday        Perla Walden,  RN

## 2017-07-25 ENCOUNTER — HOSPITAL ENCOUNTER (OUTPATIENT)
Dept: CARDIOLOGY | Facility: CLINIC | Age: 57
End: 2017-07-25
Attending: INTERNAL MEDICINE | Admitting: INTERNAL MEDICINE
Payer: COMMERCIAL

## 2017-07-25 ENCOUNTER — HOSPITAL ENCOUNTER (OUTPATIENT)
Facility: CLINIC | Age: 57
Discharge: HOME | End: 2017-07-25
Attending: INTERNAL MEDICINE | Admitting: INTERNAL MEDICINE
Payer: COMMERCIAL

## 2017-07-25 VITALS
OXYGEN SATURATION: 93 % | TEMPERATURE: 97.5 F | BODY MASS INDEX: 34.9 KG/M2 | WEIGHT: 257.7 LBS | HEART RATE: 71 BPM | DIASTOLIC BLOOD PRESSURE: 73 MMHG | HEIGHT: 72 IN | RESPIRATION RATE: 16 BRPM | SYSTOLIC BLOOD PRESSURE: 106 MMHG

## 2017-07-25 DIAGNOSIS — I05.9 MITRAL VALVE DISEASE, RHEUMATIC: ICD-10-CM

## 2017-07-25 PROCEDURE — 25000128 H RX IP 250 OP 636: Performed by: INTERNAL MEDICINE

## 2017-07-25 PROCEDURE — 25000125 ZZHC RX 250: Performed by: INTERNAL MEDICINE

## 2017-07-25 PROCEDURE — 40000235 ZZH STATISTIC TELEMETRY

## 2017-07-25 PROCEDURE — 93325 DOPPLER ECHO COLOR FLOW MAPG: CPT | Mod: 26 | Performed by: INTERNAL MEDICINE

## 2017-07-25 PROCEDURE — 93312 ECHO TRANSESOPHAGEAL: CPT | Mod: 26 | Performed by: INTERNAL MEDICINE

## 2017-07-25 PROCEDURE — 93320 DOPPLER ECHO COMPLETE: CPT | Mod: 26 | Performed by: INTERNAL MEDICINE

## 2017-07-25 PROCEDURE — 40000857 ZZH STATISTIC TEE INCLUDES SEDATION

## 2017-07-25 PROCEDURE — 76376 3D RENDER W/INTRP POSTPROCES: CPT

## 2017-07-25 RX ORDER — NALOXONE HYDROCHLORIDE 0.4 MG/ML
.1-.4 INJECTION, SOLUTION INTRAMUSCULAR; INTRAVENOUS; SUBCUTANEOUS
Status: DISCONTINUED | OUTPATIENT
Start: 2017-07-25 | End: 2017-07-25 | Stop reason: HOSPADM

## 2017-07-25 RX ORDER — SODIUM CHLORIDE 9 MG/ML
INJECTION, SOLUTION INTRAVENOUS CONTINUOUS PRN
Status: DISCONTINUED | OUTPATIENT
Start: 2017-07-25 | End: 2017-07-25 | Stop reason: HOSPADM

## 2017-07-25 RX ORDER — FENTANYL CITRATE 50 UG/ML
25-50 INJECTION, SOLUTION INTRAMUSCULAR; INTRAVENOUS
Status: DISCONTINUED | OUTPATIENT
Start: 2017-07-25 | End: 2017-07-25 | Stop reason: HOSPADM

## 2017-07-25 RX ORDER — FENTANYL CITRATE 50 UG/ML
50-100 INJECTION, SOLUTION INTRAMUSCULAR; INTRAVENOUS
Status: COMPLETED | OUTPATIENT
Start: 2017-07-25 | End: 2017-07-25

## 2017-07-25 RX ORDER — FLUMAZENIL 0.1 MG/ML
0.2 INJECTION, SOLUTION INTRAVENOUS
Status: DISCONTINUED | OUTPATIENT
Start: 2017-07-25 | End: 2017-07-25 | Stop reason: HOSPADM

## 2017-07-25 RX ORDER — LIDOCAINE HYDROCHLORIDE 40 MG/ML
1.5 SOLUTION TOPICAL ONCE
Status: COMPLETED | OUTPATIENT
Start: 2017-07-25 | End: 2017-07-25

## 2017-07-25 RX ORDER — GLYCOPYRROLATE 0.2 MG/ML
0.1 INJECTION, SOLUTION INTRAMUSCULAR; INTRAVENOUS ONCE
Status: COMPLETED | OUTPATIENT
Start: 2017-07-25 | End: 2017-07-25

## 2017-07-25 RX ADMIN — FENTANYL CITRATE 125 MCG: 50 INJECTION, SOLUTION INTRAMUSCULAR; INTRAVENOUS at 08:44

## 2017-07-25 RX ADMIN — SODIUM CHLORIDE: 9 INJECTION, SOLUTION INTRAVENOUS at 08:02

## 2017-07-25 RX ADMIN — BENZOCAINE 2 SPRAY: 220 SPRAY, METERED PERIODONTAL at 08:27

## 2017-07-25 RX ADMIN — GLYCOPYRROLATE 0.1 MG: 0.2 INJECTION, SOLUTION INTRAMUSCULAR; INTRAVENOUS at 08:08

## 2017-07-25 RX ADMIN — MIDAZOLAM HYDROCHLORIDE 5 MG: 1 INJECTION, SOLUTION INTRAMUSCULAR; INTRAVENOUS at 08:42

## 2017-07-25 RX ADMIN — LIDOCAINE HYDROCHLORIDE 1.5 ML: 40 SOLUTION TOPICAL at 08:15

## 2017-07-25 NOTE — PROGRESS NOTES
Sedation Post Procedure Summary:    Immediately prior to starting the procedure a Time Out was conducted with procedural staff and re-confirmed the patient s name, procedure, and site/side. Md completed sedation assessment.     Consent obtained from MITCHELL after discussing the risks, benefits and alternatives.       Indication:  Sedation was required to allow for MITCHELL    Sedatives: Fentanyl 125 Mcg and Versed 5 Mg    Vital signs, airway, and pulse oximetry were monitored throughout the procedure and sedation was maintained until the procedure was complete.      Patient tolerance: Patient tolerated the procedure well with no immediate complications.        (See Doc Flow-sheets and MAR for additional information)    Ramone Watson

## 2017-07-25 NOTE — PROGRESS NOTES
Care Suites Discharge Summary    Discharge Criteria:   Discharge Criteria met per MD orders: Yes.   Vital signs stable.     Pt demonstrates ability to ambulate safely: Yes.  (See discharge questionnaire for additional information)    Discharge instructions & education:   Discharge instructions reviewed with patient and spouse. Patient verbalizes understanding.   Patient education provided:  MITCHELL instructions    Medications:   Patient will be discharging on new medications- No. Patient verbalizes reason for use, start date, and side effects NA.    Items returned to patient:   Home and hospital acquired medications returned to patient NA   Listed belongings gathered and returned to patient: Yes    Patient discharged to home with spouse.     Ramone Watson

## 2017-07-25 NOTE — IP AVS SNAPSHOT
[place] [address]    [place] [address]    Phone:  [phone]                                       After Visit Summary   7/25/2017    Derek Stover    MRN: [id number]           After Visit Summary Signature Page     I have received my discharge instructions, and my questions have been answered. I have discussed any challenges I see with this plan with the nurse or doctor.    ..........................................................................................................................................  Patient/Patient Representative Signature      ..........................................................................................................................................  Patient Representative Print Name and Relationship to Patient    ..................................................               ................................................  Date                                            Time    ..........................................................................................................................................  Reviewed by Signature/Title    ...................................................              ..............................................  Date                                                            Time

## 2017-07-25 NOTE — DISCHARGE INSTRUCTIONS
MITCHELL  (Transesophageal Echocardiogram)  Discharge Instructions    After you go home:      Have an adult stay with you for 6 hours.       For 24 hours - due to the sedation you received:    Relax and take it easy.    Do NOT make any important or legal decisions.    Do NOT drive or operate machines at home or at work.    Do NOT drink alcohol.    Diet:    You may resume your normal diet, but no scratchy foods for two days.    If your throat is sore, eat cold, bland or soft foods.    You may have heartburn if the tube used in the exam entered your stomach.  If so:   - Do not eat acidic and spicy foods.   - Do not eat three hours before bedtime. Clear liquids are okay.   - When lying down, use two pillows to raise your head.    Medicines:      Take your medications, including blood thinners, unless your provider tells you not to.    If you have stopped any medicines, check with your provider about when to restart them.    You may take Tylenol (Acetaminophen) if your throat is sore.    You may take antacids if you have heartburn.      Follow Up Appointments:      Follow up with your cardiologist at [facility] of patient preference as instructed.    Follow up with your primary care provider as needed.    Call the clinic if:      You have heartburn that is severe or lasts more than 72 hours.    You have a sore throat that feels worse after 72 hours.    You have shortness of breath, neck pain, chest pain, fever, chills, coughing up blood, or other unusual signs.    Questions or concerns      [facility] [place]:    [phone] [facility] (7 days a week)

## 2017-07-25 NOTE — IP AVS SNAPSHOT
MRN:[id number]                      After Visit Summary   7/25/2017    Derek Stover    MRN: [id number]           Visit Information        Department      7/25/2017  6:06 AM [facility]          Review of your medicines      UNREVIEWED medicines. Ask your doctor about these medicines        Dose / Directions    carvedilol 3.125 MG tablet   Commonly known as:  COREG   Used for:  Atrial fibrillation, unspecified type (H)        Dose:  3.125 mg   Take 1 tablet (3.125 mg) by mouth 2 times daily (with meals)   Quantity:  180 tablet   Refills:  1       CHANTIX STARTING MONTH RJ 0.5 MG X 11 & 1 MG X 42 tablet   Used for:  Tobacco abuse   Generic drug:  varenicline        42   Quantity:  42 tablet   Refills:  0       ibuprofen 200 MG tablet   Commonly known as:  ADVIL/MOTRIN        Dose:  200 mg   Take 1 tablet by mouth every 4 hours as needed for pain.   Quantity:  120 tablet   Refills:  0       warfarin 5 MG tablet   Commonly known as:  COUMADIN   Used for:  Long term current use of anticoagulant therapy, Chronic atrial fibrillation (H)        Take 5 mg daily, or as directed by the coumadin clinic.   Quantity:  30 tablet   Refills:  0                Protect others around you: Learn how to safely use, store and throw away your medicines at www.disposemymeds.org.         Follow-ups after your visit        Your next 10 appointments already scheduled     Jul 25, 2017  8:30 AM CDT   Ech Tito with KYLIE   [facility] ([facility])    [address] [place] [address]   [phone]           1.  Please bring or wear a comfortable two-piece outfit. 2.  Arrival time: -   [facility]:  arrive 75 minutes prior to examination time. -   [facility]:  arrive 90 minutes prior to examination time. -   [facility]:   arrive 15 minutes prior to examination time. 3.  Plan to have someone here to drive you home after the test. -   Someone should stay with  you for 6 hours after your test. 4.  No food or drink: -   6 hours before the test 5.  If you take antacids or water pills (diuretics): Do not take them until after your test. You may take blood pressure medicine with a few sips of water. 6.  If you have diabetes: -   Morning slots preferred -   If you take insulin, call your diabetes care team. Ask if you should take a   dose the morning of your test. -   If you take diabetes medicine by mouth, don't take it on the morning of your test. Bring it with you to take after the test. (If you have questions, call your diabetes care team.) 7.  Bring a list of any medicines you are taking. 8.  Do not drive for 24 hours after the test. 9.   A responsible adult must stay with you for 24 hours after the test.  10.  For any questions that cannot be answered, please contact the ordering physician            Jul 28, 2017  9:15 AM CDT   Anticoagulation Visit with PH ANTI COAG   [facility] ([address] [address]   [phone]            Sep 05, 2017  9:00 AM CDT   Return Visit with Oni Ross MD   [facility] ([address] [address]   [phone]               Care Instructions        Further instructions from your care team       MITCHELL  (Transesophageal Echocardiogram)  Discharge Instructions    After you go home:      Have an adult stay with you for 6 hours.       For 24 hours - due to the sedation you received:    Relax and take it easy.    Do NOT make any important or legal decisions.    Do NOT drive or operate machines at home or at work.    Do NOT drink alcohol.    Diet:    You may resume your normal diet, but no scratchy foods for two days.    If your throat is sore, eat cold, bland or soft foods.    You may have heartburn if the tube used in the exam entered your stomach.  If so:   - Do not eat acidic and spicy foods.   - Do not eat  three hours before bedtime. Clear liquids are okay.   - When lying down, use two pillows to raise your head.    Medicines:      Take your medications, including blood thinners, unless your provider tells you not to.    If you have stopped any medicines, check with your provider about when to restart them.    You may take Tylenol (Acetaminophen) if your throat is sore.    You may take antacids if you have heartburn.      Follow Up Appointments:      Follow up with your cardiologist at [facility] of patient preference as instructed.    Follow up with your primary care provider as needed.    Call the clinic if:      You have heartburn that is severe or lasts more than 72 hours.    You have a sore throat that feels worse after 72 hours.    You have shortness of breath, neck pain, chest pain, fever, chills, coughing up blood, or other unusual signs.    Questions or concerns      [facility] [place]:    [phone] [facility] (7 days a week)         Additional Information About Your Visit        [employer] Information     [employer] gives you secure access to your electronic health record. If you see a primary care provider, you can also send messages to your care team and make appointments. If you have questions, please call your primary care clinic.  If you do not have a primary care provider, please call [phone] and they will assist you.        Care EveryWhere ID     This is your Care EveryWhere ID. This could be used by other organizations to access your [place] medical records  [id number]        Your Vitals Were     Blood Pressure Pulse Temperature Respirations Height Weight    103/67 (BP Location: Left arm) 76 97.5  F (36.4  C) (Oral) 18 1.829 m (6') 116.9 kg (257 lb 11.2 oz)    Pulse Oximetry BMI (Body Mass Index)                95% 34.95 kg/m2           Primary Care Provider Office Phone # Fax #    Carloselva Archibald [phone]      Equal Access to  Services     [facility]: Hadii aad ku hadhaileeevan Sahrashaquille, wacharlyda luqadaha, qaybta kaalmaangel merrill, malissa ramsey . So [facility] [phone].    ATENCIÓN: Si habla lavern, tiene a macias disposición servicios gratuitos de asistencia lingüística. Llame al [phone].    We comply with applicable federal civil rights laws and [place] laws. We do not discriminate on the basis of race, color, national origin, age, disability sex, sexual orientation or gender identity.            Thank you!     Thank you for choosing [facility] for your care. Our goal is always to provide you with excellent care. Hearing back from our patients is one way we can continue to improve our services. Please take a few minutes to complete the written survey that you may receive in the mail after you visit with us. Thank you!             Medication List: This is a list of all your medications and when to take them. Check marks below indicate your daily home schedule. Keep this list as a reference.      Medications           Morning Afternoon Evening Bedtime As Needed    carvedilol 3.125 MG tablet   Commonly known as:  COREG   Take 1 tablet (3.125 mg) by mouth 2 times daily (with meals)                                CHANTIX STARTING MONTH RJ 0.5 MG X 11 & 1 MG X 42 tablet   42   Generic drug:  varenicline                                ibuprofen 200 MG tablet   Commonly known as:  ADVIL/MOTRIN   Take 1 tablet by mouth every 4 hours as needed for pain.                                warfarin 5 MG tablet   Commonly known as:  COUMADIN   Take 5 mg daily, or as directed by the coumadin clinic.

## 2017-07-26 ENCOUNTER — DOCUMENTATION ONLY (OUTPATIENT)
Dept: CARDIOLOGY | Facility: CLINIC | Age: 57
End: 2017-07-26

## 2017-07-26 ENCOUNTER — TELEPHONE (OUTPATIENT)
Dept: CARDIOLOGY | Facility: CLINIC | Age: 57
End: 2017-07-26

## 2017-07-26 NOTE — PROGRESS NOTES
"Hi Dr. Ross: MITCHELL for your review that was done to evaluate the severity of his mitral valve disease. He returns to see you on 9/5. We can contact him if you would like-Thanks, Timoteo      HR: 71  BP: 103/67 mmHg  _____________________________________________________________________________  __        Procedure  Complete MITCHELL Adult. MITCHELL Probe #2 was used during the procedure. 3D image  acquisition, reconstruction, and real-time interpretation was performed.  _____________________________________________________________________________  __        Interpretation Summary     The left ventricle is moderately dilated.  The visual ejection fraction is estimated at 55%.  Left ventricular systolic function is low normal.  Mildly decreased right ventricular systolic function  The left atrium is severely dilated.  The right atrium is moderate to severely dilated.  Unusual echo structure on LA side of intra atrial septum. I believe this is a  fold of tissue from the primum septum. Prominent fossa ovalis and negative  bubble study. May wish to repeat MITCHELL in 6 months to exclude that this isn't  early myxoma (doubt)  MV is not classic rheumatic. The ant. leaflet is mildly thickened but not very  restricted (not classic hockey stick). The posterior lealfet may be slightly  retracted toward LV such that the closure plane is incomplete. The MR jet is  laterally directed to back wall of LA with \"coanda\" effect. PISA ERO 0.51cm2,  regurg vol 72cc suggests 3 to 4+ MR  There is mild to moderate (1-2+) tricuspid regurgitation.  Right ventricular systolic pressure is elevated, consistent with mild  pulmonary hypertension.  The right ventricular systolic pressure is approximated at 35-40mmHg plus the  right atrial pressure.  Aortic valve is trileaflet but not normal. The RCC/LCC are thickened and  mildly retracted.  There is mild to moderate (1-2+) aortic regurgitation.  No hemodynamically significant valvular aortic stenosis.  On 3D echo of " "MV, there is a small \"nodule\" on A2 but suspect the MR is due to  limited post. leaflet tissue along entire surface coapting with the ant.  leaflet (this is c/w observation that the post lealfet looks retracted from  the true closure plane)  _____________________________________________________________________________  __        MITCHELL  Versed (5mg) was given intravenously. Fentanyl (125mcg) was given  intravenously. Total sedation time: 37 minutes of continuous bedside 1:1  monitoring. The transesophageal probe was passed without difficulty.     Left Ventricle  The left ventricle is moderately dilated. There is borderline concentric left  ventricular hypertrophy. The visual ejection fraction is estimated at 55%.  Left ventricular systolic function is low normal. Normal left ventricular wall  motion.     Right Ventricle  Mildly decreased right ventricular systolic function.     Atria  The left atrium is severely dilated. No thrombus is detected in the left  atrial appendage. The right atrium is moderate to severely dilated. Unusual  echo structure on LA side of intra atrial septum. I believe this is a fold of  tissue from the primum septum. Prominent fossa ovalis and negative bubble  study. May wish to repeat MITCHELL in 6 months to exclude that this isn't early  myxoma (doubt).        Mitral Valve  MV is not classic rheumatic. The ant. leaflet is mildly thickened but not very  restricted (not classic hockey stick). The posterior lealfet may be slightly  retracted toward LV such that the closure plane is incomplete. The MR jet is  laterally directed to back wall of LA with \"coanda\" effect. PISA ERO 0.51cm2,  regurg vol 72cc suggests 3 to 4+ MR. There is mod-severe to severe (3-4+)  mitral regurgitation.     Tricuspid Valve  There is mild to moderate (1-2+) tricuspid regurgitation. The right  ventricular systolic pressure is approximated at 35-40mmHg plus the right  atrial pressure. Right ventricular systolic pressure is " elevated, consistent  with mild pulmonary hypertension.     Aortic Valve  Aortic valve is trileaflet but not normal. The RCC/LCC are thickened and  mildly retracted. There is mild to moderate (1-2+) aortic regurgitation. No  hemodynamically significant valvular aortic stenosis.     Pulmonic Valve  The pulmonic valve is not well seen, but is grossly normal.     Vessels  Normal size aorta.        Pericardial/Pleural  The pericardium appears normal.     Rhythm  The rhythm was atrial fibrillation.  _____________________________________________________________________________  __           Doppler Measurements & Calculations  TR max ming: 318.3 cm/sec  TR max P.7 mmHg           _____________________________________________________________________________  __           Report approved by: Camden Tam            Assessment and plan  A very delightful 56 yearr gentleman with history of rheumatic fever as a child, rheumatic valve disease with mitral regurgitation, tobacco abuse, obesity. He has been newly diagnosed with atrial fibrillation in May 2017. He is also having progressive dyspnea on exertion for last 3 to 4 months. Mitral regurgitation is reported moderate on transthoracic echocardiogram but on my personal review of images I suspect that mitral regurgitation is underestimated. My concern is that he may have significant mitral regurgitation that has eventually caused severe left atrium enlargement that has led to atrial fibrillation and also now patient is symptomatic with dyspnea on exertion and LV is mildly enlarged. I had a long discussion with patient regarding need for more comprehensive evaluation of mitral regurgitation. I discussed option of trans esophageal echocardiogram. Risk-benefit of trans esophageal echocardiogram with risks including but not limited to risk sedation, esophageal injury that may rarely require surgery were discussed with patient. Patient understands the risk involved and the  rational of the procedure and agrees to proceed with it. Patient does not have any dysphagia or known esophageal structure or current peptic ulcer disease. I also had a long discussion with patient regarding the pathophysiology of atrial fibrillation, stroke prophylaxis and rate versus rhythm control. Atrial fibrillation appears valvular and oral anticoagulation with Coumadin is usually recommended. Risk-benefit of Coumadin were discussed with patient and he agrees to proceed with it. I'm going to refer patient to Coumadin clinic for Coumadin initiation and follow-up. I also had long discussion with patient regarding tobacco cessation, approximately 8 minutes were spent discussing about tobacco cessation and patient agrees to retry Chantix to help him quit tobacco(in the past he has successfully quit tobacco for 10 months after using Chantix) . Regarding rate was rhythm control strategy, he is essentially asymptomatic from atrial fibrillation at present and rates appear well-controlled and given underlying severe left atrial enlargement at this time I recommend continuing the rate control strategy.     1. Newly diagnosed atrial fibrillation. Valvular in nature due to underlying mitral valve disease which appears rheumatic in nature. Rate control strategy as noted above. To start Coumadin through INR clinic   2. Mitral valve disease. Moderate mitral regurgitation reported but on personal review of echocardiogram images appears to be underestimated. Concern for more severe mitral regurgitation given eccentric jet and severe left atrial enlargement. Mildly enlarged left ventricle. MITCHELL for further evaluation   3. Dyspnea on exertion, could be due to problem number 2 with some contribution from problem number 1.   4. Tobacco abuse, 2 packs per day      Recommendations   MITCHELL  Coumadin through INR clinic   tobacco cessation, Chantix prescribed  follow-up in 4 to 6 weeks

## 2017-07-26 NOTE — TELEPHONE ENCOUNTER
----- Message from Jihan Ruiz RN sent at 7/26/2017  4:33 PM CDT -----  Regarding: FW: coronary angio + cv surgery      ----- Message -----     From: Oni Ross MD     Sent: 7/25/2017   9:06 PM       To: Yair Finley RN, Jihan Ruiz RN  Subject: coronary angio + cv surgery                      I reviewed MITCHELL, it shows severe MR, dilated LV  I recommend following  1. CV surgery review for MVR  2. Coronary angiogram for pre MVR (will need to withhold coumadin 4 days prior to procedure)    For convenience both these may be scheduled the same day.    Thanks  Oni

## 2017-07-27 NOTE — PROGRESS NOTES
I reviewed MITCHELL images, MR is severe and he is symptomatic from it with LV dilatation.   I recommend  1. CV surgery review - the left atrial inter atrial mass can also be explored during surgery.  2. Coronary angiogram for pre MVR, will need to withhold coumadin for 4 days prior to cath    For convenience above may be scheduled the same day.    Thanks  Oni

## 2017-07-28 ENCOUNTER — ANTICOAGULATION THERAPY VISIT (OUTPATIENT)
Dept: ANTICOAGULATION | Facility: CLINIC | Age: 57
End: 2017-07-28
Payer: COMMERCIAL

## 2017-07-28 DIAGNOSIS — I34.0 MITRAL VALVE REGURGITATION: Primary | ICD-10-CM

## 2017-07-28 DIAGNOSIS — Z79.01 LONG-TERM (CURRENT) USE OF ANTICOAGULANTS: ICD-10-CM

## 2017-07-28 DIAGNOSIS — I48.91 ATRIAL FIBRILLATION (H): ICD-10-CM

## 2017-07-28 LAB — INR POINT OF CARE: 2.7 (ref 0.86–1.14)

## 2017-07-28 PROCEDURE — 36416 COLLJ CAPILLARY BLOOD SPEC: CPT

## 2017-07-28 PROCEDURE — 99207 ZZC NO CHARGE NURSE ONLY: CPT

## 2017-07-28 PROCEDURE — 85610 PROTHROMBIN TIME: CPT | Mod: QW

## 2017-07-28 NOTE — MR AVS SNAPSHOT
Derek SHERMAN Stover   7/28/2017 9:15 AM   Anticoagulation Therapy Visit    Description:  56 year old male   Provider:  [facility]   Department:  [facility]           INR as of 7/28/2017     Today's INR 2.7      Anticoagulation Summary as of 7/28/2017     INR goal 2.0-3.0   Today's INR 2.7   Full instructions 5 mg on Mon, Wed, Fri; 2.5 mg all other days   Next INR check 8/11/2017    Indications   Long-term (current) use of anticoagulants [Z79.01] [Z79.01]  Atrial fibrillation (H) [I48.91] [I48.91]         Your next Anticoagulation Clinic appointment(s)     Aug 11, 2017  9:15 AM CDT   Anticoagulation Visit with PH ANTI COAG   [facility]    [address] [place] [address]   [phone]              Contact Numbers     Clinic Number:         July 2017 Details    Sun Mon Tue Wed Thu Fri Sat           1                 2               3               4               5               6               7               8                 9               10               11               12               13               14               15                 16               17               18               19               20               21               22                 23               24               25               26               27               28      5 mg   See details      29      2.5 mg           30      2.5 mg         31      5 mg               Date Details   07/28 This INR check               How to take your warfarin dose     To take:  2.5 mg Take 0.5 of a 5 mg tablet.    To take:  5 mg Take 1 of the 5 mg tablets.           August 2017 Details    Sun Mon Tue Wed Thu Fri Sat       1      2.5 mg         2      5 mg         3      2.5 mg         4      5 mg         5      2.5 mg           6      2.5 mg         7      5 mg         8      2.5 mg         9      5 mg         10      2.5 mg         11            12                 13               14                15               16               17               18               19                 20               21               22               23               24               25               26                 27               28               29               30               31                  Date Details   No additional details    Date of next INR:  8/11/2017         How to take your warfarin dose     To take:  2.5 mg Take 0.5 of a 5 mg tablet.    To take:  5 mg Take 1 of the 5 mg tablets.

## 2017-07-28 NOTE — PROGRESS NOTES
ANTICOAGULATION FOLLOW-UP CLINIC VISIT    Patient Name:  Derek Stover  Date:  7/28/2017  Contact Type:  Face to Face    SUBJECTIVE:     Patient Findings     Positives No Problem Findings           OBJECTIVE    INR Protime   Date Value Ref Range Status   07/28/2017 2.7 (A) 0.86 - 1.14 Final       ASSESSMENT / PLAN  INR assessment THER    Recheck INR In: 2 WEEKS    INR Location Clinic      Anticoagulation Summary as of 7/28/2017     INR goal 2.0-3.0   Today's INR 2.7   Maintenance plan 5 mg (5 mg x 1) on Mon, Wed, Fri; 2.5 mg (5 mg x 0.5) all other days   Full instructions 5 mg on Mon, Wed, Fri; 2.5 mg all other days   Weekly total 25 mg   No change documented Perla Walden RN   Plan last modified Perla Walden RN (7/21/2017)   Next INR check 8/11/2017   Target end date     Indications   Long-term (current) use of anticoagulants [Z79.01] [Z79.01]  Atrial fibrillation (H) [I48.91] [I48.91]         Anticoagulation Episode Summary     INR check location     Preferred lab     Send INR reminders to [facility]    Comments 5 mg       Anticoagulation Care Providers     Provider Role Specialty Phone number    Carlos Archibald DO [facility] [phone]            See the Encounter Report to view Anticoagulation Flowsheet and Dosing Calendar (Go to Encounters tab in chart review, and find the Anticoagulation Therapy Visit)    Dosage adjustment made based on physician directed care plan.        Perla Walden RN

## 2017-07-28 NOTE — TELEPHONE ENCOUNTER
Phoned patient with review of MITCHELL and Dr. Ross's recommendations. I told patient that Dr. Ross wants him to be worked up for mitral valve surgery. This will require a coronary angiogram nad RY vist and a office visit with Dr. Marie. Patient is agreeable to pursuing this and coming to [place] for same day RY visit and angiogram. This will be for 8/14 with Radha Kenyon and angiogram at 10:00. He will then see Dr. Marie on 8/21. Zachary

## 2017-08-04 PROBLEM — I48.91 ATRIAL FIBRILLATION (H): Status: RESOLVED | Noted: 2017-07-21 | Resolved: 2017-08-04

## 2017-08-10 DIAGNOSIS — I34.0 MITRAL REGURGITATION: Primary | ICD-10-CM

## 2017-08-10 RX ORDER — SODIUM CHLORIDE 9 MG/ML
INJECTION, SOLUTION INTRAVENOUS CONTINUOUS
Status: CANCELLED | OUTPATIENT
Start: 2017-08-10

## 2017-08-10 RX ORDER — POTASSIUM CHLORIDE 1500 MG/1
20 TABLET, EXTENDED RELEASE ORAL
Status: CANCELLED | OUTPATIENT
Start: 2017-08-10

## 2017-08-10 RX ORDER — LIDOCAINE 40 MG/G
CREAM TOPICAL
Status: CANCELLED | OUTPATIENT
Start: 2017-08-10

## 2017-08-11 ENCOUNTER — ANTICOAGULATION THERAPY VISIT (OUTPATIENT)
Dept: ANTICOAGULATION | Facility: CLINIC | Age: 57
End: 2017-08-11
Payer: COMMERCIAL

## 2017-08-11 ENCOUNTER — TELEPHONE (OUTPATIENT)
Dept: INTERNAL MEDICINE | Facility: CLINIC | Age: 57
End: 2017-08-11

## 2017-08-11 ENCOUNTER — TELEPHONE (OUTPATIENT)
Dept: CARDIOLOGY | Facility: CLINIC | Age: 57
End: 2017-08-11

## 2017-08-11 DIAGNOSIS — I48.91 ATRIAL FIBRILLATION (H): ICD-10-CM

## 2017-08-11 DIAGNOSIS — Z72.0 TOBACCO ABUSE: Primary | ICD-10-CM

## 2017-08-11 DIAGNOSIS — Z79.01 LONG-TERM (CURRENT) USE OF ANTICOAGULANTS: ICD-10-CM

## 2017-08-11 LAB — INR POINT OF CARE: 2.8 (ref 0.86–1.14)

## 2017-08-11 PROCEDURE — 36416 COLLJ CAPILLARY BLOOD SPEC: CPT

## 2017-08-11 PROCEDURE — 99207 ZZC NO CHARGE NURSE ONLY: CPT

## 2017-08-11 PROCEDURE — 85610 PROTHROMBIN TIME: CPT | Mod: QW

## 2017-08-11 RX ORDER — VARENICLINE TARTRATE 1 MG/1
1 TABLET, FILM COATED ORAL 2 TIMES DAILY
Qty: 56 TABLET | Refills: 2 | Status: ON HOLD | OUTPATIENT
Start: 2017-08-11 | End: 2017-09-12

## 2017-08-11 NOTE — PROGRESS NOTES
ANTICOAGULATION FOLLOW-UP CLINIC VISIT    Patient Name:  Derek Stover  Date:  8/11/2017  Contact Type:  Face to Face    SUBJECTIVE:     Patient Findings     Positives Intentional hold of therapy (Pt holding 8/10-8/13 for procedure on Monday)    Comments Per TE from cardiology 7/26/17, pt advised to hold coumadin for 4 days prior for angiogram on Monday, unsure of plan for pt after procedure as far as possible surgery in near future.  Tentatively scheduled pt for INR 8/18 at lab, but advised pt to call with plan after Monday.  Pt also states that he quit smoking 7/31.           OBJECTIVE    INR Protime   Date Value Ref Range Status   08/11/2017 2.8 (A) 0.86 - 1.14 Final       ASSESSMENT / PLAN  INR assessment THER    Recheck INR In: 1 WEEK    INR Location Clinic      Anticoagulation Summary as of 8/11/2017     INR goal 2.0-3.0   Today's INR 2.8   Maintenance plan 5 mg (5 mg x 1) on Mon, Wed, Fri; 2.5 mg (5 mg x 0.5) all other days   Full instructions 8/11: Hold; 8/12: Hold; 8/13: Hold; 8/14: 7.5 mg; 8/15: 5 mg; Otherwise 5 mg on Mon, Wed, Fri; 2.5 mg all other days   Weekly total 25 mg   Plan last modified Ghanshyam Tian RN (8/11/2017)   Next INR check 8/18/2017   Target end date     Indications   Atrial fibrillation (H) [I48.91]  Atrial fibrillation (H) [I48.91] (Resolved) [I48.91]  Long-term (current) use of anticoagulants [Z79.01] [Z79.01]         Anticoagulation Episode Summary     INR check location     Preferred lab     Send INR reminders to [facility]    Comments       Anticoagulation Care Providers     Provider Role Specialty Phone number    Carlos Archibald DO [facility] Internal Medicine [phone]            See the Encounter Report to view Anticoagulation Flowsheet and Dosing Calendar (Go to Encounters tab in chart review, and find the Anticoagulation Therapy Visit)    Dosage adjustment made based on physician directed care plan.    Ghanshyam Tian, LUL

## 2017-08-11 NOTE — TELEPHONE ENCOUNTER
Spoke to patient to review left heart cath scheduled 8/14/17 to arrive at 0800 for a 1000 procedure. Instructed patient to not eat or drink after midnight and take AM medications to include aspirin 325 mg one day prior to procedure and on the morning of the procedure. Reviewed per Dr. Ross that patient is to hold Coumadin for 4 days prior to procedure and patient confirmed that he stopped taking this yesterday 8/10/17. Verified patient does NOT have a known contrast allergy. Verified that patient's wife will drive patient home and be available 24 hours post procedure. Answered all Derek's questions and he verbalized understanding. Orders placed in Epic. ELISA Finley RN

## 2017-08-11 NOTE — TELEPHONE ENCOUNTER
Chantix  Routing refill request to provider for review/approval because:  Pt had starter pack, needs refill      Last Written Prescription Date: 7/18/17  Last Fill Quantity: 42 tabs (starting pack), # refills: 0  Last Office Visit with [facility], [facility] or [facility] prescribing provider: 6/21/17   Next 5 appointments (look out 90 days)     Aug 14, 2017  7:30 AM CDT   Return Visit with JESSIE Del Real CNP   [facility] [place] ([facility])    [address] [place] [id number] [phone]            Sep 05, 2017  9:00 AM CDT   [facility]    [address] [place] [id number] [phone]                   BP Readings from Last 3 Encounters:   07/25/17 106/73   07/18/17 120/88   06/21/17 94/64     Ghanshyam Tian RN, BSN

## 2017-08-11 NOTE — MR AVS SNAPSHOT
Derek Stover   8/11/2017 9:15 AM   Anticoagulation Therapy Visit    Description:  56 year old male   Provider:  PH ANTI COAG   Department:  [facility]           INR as of 8/11/2017     Today's INR 2.8      Anticoagulation Summary as of 8/11/2017     INR goal 2.0-3.0   Today's INR 2.8   Full instructions 8/11: Hold; 8/12: Hold; 8/13: Hold; 8/14: 7.5 mg; 8/15: 5 mg; Otherwise 5 mg on Mon, Wed, Fri; 2.5 mg all other days   Next INR check 8/18/2017    Indications   Atrial fibrillation (H) [I48.91]  Atrial fibrillation (H) [I48.91] (Resolved) [I48.91]  Long-term (current) use of anticoagulants [Z79.01] [Z79.01]         Contact Numbers     Clinic Number:         August 2017 Details    Sun Mon Tue Wed Thu Fri Sat       1               2               3               4               5                 6               7               8               9               10               11      Hold   See details      12      Hold           13      Hold         14      7.5 mg         15      5 mg         16      5 mg         17      2.5 mg         18            19                 20               21               22               23               24               25               26                 27               28               29               30               31                  Date Details   08/11 This INR check       Date of next INR:  8/18/2017         How to take your warfarin dose     To take:  2.5 mg Take 0.5 of a 5 mg tablet.    To take:  5 mg Take 1 of the 5 mg tablets.    To take:  7.5 mg Take 1.5 of the 5 mg tablets.    Hold Do not take your warfarin dose. See the Details table to the right for additional instructions.

## 2017-08-14 ENCOUNTER — OFFICE VISIT (OUTPATIENT)
Dept: CARDIOLOGY | Facility: CLINIC | Age: 57
End: 2017-08-14
Payer: COMMERCIAL

## 2017-08-14 ENCOUNTER — HOSPITAL ENCOUNTER (OUTPATIENT)
Facility: CLINIC | Age: 57
Discharge: HOME | End: 2017-08-14
Attending: INTERNAL MEDICINE | Admitting: INTERNAL MEDICINE
Payer: COMMERCIAL

## 2017-08-14 ENCOUNTER — APPOINTMENT (OUTPATIENT)
Dept: CARDIOLOGY | Facility: CLINIC | Age: 57
End: 2017-08-14
Attending: INTERNAL MEDICINE
Payer: COMMERCIAL

## 2017-08-14 VITALS
BODY MASS INDEX: 37.37 KG/M2 | WEIGHT: 261 LBS | SYSTOLIC BLOOD PRESSURE: 95 MMHG | DIASTOLIC BLOOD PRESSURE: 67 MMHG | HEART RATE: 82 BPM | HEIGHT: 70 IN

## 2017-08-14 VITALS
RESPIRATION RATE: 16 BRPM | DIASTOLIC BLOOD PRESSURE: 54 MMHG | SYSTOLIC BLOOD PRESSURE: 108 MMHG | BODY MASS INDEX: 35.21 KG/M2 | WEIGHT: 260 LBS | TEMPERATURE: 97.9 F | HEIGHT: 72 IN | HEART RATE: 71 BPM | OXYGEN SATURATION: 97 %

## 2017-08-14 DIAGNOSIS — I48.91 ATRIAL FIBRILLATION, UNSPECIFIED TYPE (H): Primary | ICD-10-CM

## 2017-08-14 DIAGNOSIS — Z72.0 TOBACCO ABUSE: ICD-10-CM

## 2017-08-14 DIAGNOSIS — I34.0 MITRAL VALVE REGURGITATION: ICD-10-CM

## 2017-08-14 DIAGNOSIS — I05.1 RHEUMATIC MITRAL REGURGITATION: ICD-10-CM

## 2017-08-14 LAB
ANION GAP SERPL CALCULATED.3IONS-SCNC: 5 MMOL/L (ref 3–14)
APTT PPP: 28 SEC (ref 22–37)
BUN SERPL-MCNC: 20 MG/DL (ref 7–30)
CALCIUM SERPL-MCNC: 8.8 MG/DL (ref 8.5–10.1)
CHLORIDE SERPL-SCNC: 106 MMOL/L (ref 94–109)
CO2 SERPL-SCNC: 28 MMOL/L (ref 20–32)
CREAT SERPL-MCNC: 1.17 MG/DL (ref 0.66–1.25)
ERYTHROCYTE [DISTWIDTH] IN BLOOD BY AUTOMATED COUNT: 14.1 % (ref 10–15)
GFR SERPL CREATININE-BSD FRML MDRD: 65 ML/MIN/1.7M2
GLUCOSE SERPL-MCNC: 97 MG/DL (ref 70–99)
HCT VFR BLD AUTO: 46 % (ref 40–53)
HGB BLD-MCNC: 15.5 G/DL (ref 13.3–17.7)
INR PPP: 1.1 (ref 0.86–1.14)
MCH RBC QN AUTO: 31.3 PG (ref 26.5–33)
MCHC RBC AUTO-ENTMCNC: 33.7 G/DL (ref 31.5–36.5)
MCV RBC AUTO: 93 FL (ref 78–100)
PLATELET # BLD AUTO: 125 10E9/L (ref 150–450)
POTASSIUM SERPL-SCNC: 4.4 MMOL/L (ref 3.4–5.3)
RBC # BLD AUTO: 4.96 10E12/L (ref 4.4–5.9)
SODIUM SERPL-SCNC: 139 MMOL/L (ref 133–144)
WBC # BLD AUTO: 8.2 10E9/L (ref 4–11)

## 2017-08-14 PROCEDURE — 40000065 ZZH STATISTIC EKG NON-CHARGEABLE

## 2017-08-14 PROCEDURE — 25000128 H RX IP 250 OP 636: Performed by: INTERNAL MEDICINE

## 2017-08-14 PROCEDURE — 93005 ELECTROCARDIOGRAM TRACING: CPT

## 2017-08-14 PROCEDURE — 27211089 ZZH KIT ACIST INJECTOR CR3

## 2017-08-14 PROCEDURE — 27210787 ZZH MANIFOLD CR2

## 2017-08-14 PROCEDURE — 85610 PROTHROMBIN TIME: CPT | Performed by: INTERNAL MEDICINE

## 2017-08-14 PROCEDURE — 27210946 ZZH KIT HC TOTES DISP CR8

## 2017-08-14 PROCEDURE — 27210795 ZZH PAD DEFIB QUICK CR4

## 2017-08-14 PROCEDURE — 93454 CORONARY ARTERY ANGIO S&I: CPT

## 2017-08-14 PROCEDURE — 40000852 ZZH STATISTIC HEART CATH LAB OR EP LAB

## 2017-08-14 PROCEDURE — 93454 CORONARY ARTERY ANGIO S&I: CPT | Mod: 26 | Performed by: INTERNAL MEDICINE

## 2017-08-14 PROCEDURE — 85027 COMPLETE CBC AUTOMATED: CPT | Performed by: INTERNAL MEDICINE

## 2017-08-14 PROCEDURE — 99153 MOD SED SAME PHYS/QHP EA: CPT

## 2017-08-14 PROCEDURE — 85730 THROMBOPLASTIN TIME PARTIAL: CPT | Performed by: INTERNAL MEDICINE

## 2017-08-14 PROCEDURE — C1769 GUIDE WIRE: HCPCS

## 2017-08-14 PROCEDURE — 99152 MOD SED SAME PHYS/QHP 5/>YRS: CPT | Performed by: INTERNAL MEDICINE

## 2017-08-14 PROCEDURE — 93010 ELECTROCARDIOGRAM REPORT: CPT | Performed by: INTERNAL MEDICINE

## 2017-08-14 PROCEDURE — 99214 OFFICE O/P EST MOD 30 MIN: CPT | Performed by: NURSE PRACTITIONER

## 2017-08-14 PROCEDURE — B2111ZZ FLUOROSCOPY OF MULTIPLE CORONARY ARTERIES USING LOW OSMOLAR CONTRAST: ICD-10-PCS | Performed by: INTERNAL MEDICINE

## 2017-08-14 PROCEDURE — C1760 CLOSURE DEV, VASC: HCPCS

## 2017-08-14 PROCEDURE — 25000125 ZZHC RX 250: Performed by: INTERNAL MEDICINE

## 2017-08-14 PROCEDURE — 36415 COLL VENOUS BLD VENIPUNCTURE: CPT | Performed by: INTERNAL MEDICINE

## 2017-08-14 PROCEDURE — 27210910 ZZH NEEDLE CR6

## 2017-08-14 PROCEDURE — 99152 MOD SED SAME PHYS/QHP 5/>YRS: CPT

## 2017-08-14 PROCEDURE — 80048 BASIC METABOLIC PNL TOTAL CA: CPT | Performed by: INTERNAL MEDICINE

## 2017-08-14 RX ORDER — LIDOCAINE HYDROCHLORIDE 10 MG/ML
1-10 INJECTION, SOLUTION EPIDURAL; INFILTRATION; INTRACAUDAL; PERINEURAL
Status: COMPLETED | OUTPATIENT
Start: 2017-08-14 | End: 2017-08-14

## 2017-08-14 RX ORDER — NITROGLYCERIN 0.4 MG/1
0.4 TABLET SUBLINGUAL EVERY 5 MIN PRN
Status: DISCONTINUED | OUTPATIENT
Start: 2017-08-14 | End: 2017-08-14 | Stop reason: HOSPADM

## 2017-08-14 RX ORDER — NALOXONE HYDROCHLORIDE 0.4 MG/ML
.2-.4 INJECTION, SOLUTION INTRAMUSCULAR; INTRAVENOUS; SUBCUTANEOUS
Status: DISCONTINUED | OUTPATIENT
Start: 2017-08-14 | End: 2017-08-14 | Stop reason: HOSPADM

## 2017-08-14 RX ORDER — ASPIRIN 81 MG/1
81 TABLET ORAL DAILY
Status: ON HOLD | COMMUNITY
End: 2017-09-12

## 2017-08-14 RX ORDER — METOPROLOL TARTRATE 1 MG/ML
5 INJECTION, SOLUTION INTRAVENOUS EVERY 5 MIN PRN
Status: DISCONTINUED | OUTPATIENT
Start: 2017-08-14 | End: 2017-08-14 | Stop reason: HOSPADM

## 2017-08-14 RX ORDER — ATROPINE SULFATE 0.1 MG/ML
0.5 INJECTION INTRAVENOUS EVERY 5 MIN PRN
Status: DISCONTINUED | OUTPATIENT
Start: 2017-08-14 | End: 2017-08-14 | Stop reason: HOSPADM

## 2017-08-14 RX ORDER — NALOXONE HYDROCHLORIDE 0.4 MG/ML
0.4 INJECTION, SOLUTION INTRAMUSCULAR; INTRAVENOUS; SUBCUTANEOUS EVERY 5 MIN PRN
Status: DISCONTINUED | OUTPATIENT
Start: 2017-08-14 | End: 2017-08-14 | Stop reason: HOSPADM

## 2017-08-14 RX ORDER — MORPHINE SULFATE 2 MG/ML
1-2 INJECTION, SOLUTION INTRAMUSCULAR; INTRAVENOUS EVERY 5 MIN PRN
Status: DISCONTINUED | OUTPATIENT
Start: 2017-08-14 | End: 2017-08-14 | Stop reason: HOSPADM

## 2017-08-14 RX ORDER — NITROGLYCERIN 5 MG/ML
100-200 VIAL (ML) INTRAVENOUS
Status: DISCONTINUED | OUTPATIENT
Start: 2017-08-14 | End: 2017-08-14 | Stop reason: HOSPADM

## 2017-08-14 RX ORDER — NIFEDIPINE 10 MG/1
10 CAPSULE ORAL
Status: DISCONTINUED | OUTPATIENT
Start: 2017-08-14 | End: 2017-08-14 | Stop reason: HOSPADM

## 2017-08-14 RX ORDER — DEXTROSE MONOHYDRATE 25 G/50ML
12.5-5 INJECTION, SOLUTION INTRAVENOUS EVERY 30 MIN PRN
Status: DISCONTINUED | OUTPATIENT
Start: 2017-08-14 | End: 2017-08-14 | Stop reason: HOSPADM

## 2017-08-14 RX ORDER — NALOXONE HYDROCHLORIDE 0.4 MG/ML
.1-.4 INJECTION, SOLUTION INTRAMUSCULAR; INTRAVENOUS; SUBCUTANEOUS
Status: DISCONTINUED | OUTPATIENT
Start: 2017-08-14 | End: 2017-08-14 | Stop reason: HOSPADM

## 2017-08-14 RX ORDER — LIDOCAINE 40 MG/G
CREAM TOPICAL
Status: DISCONTINUED | OUTPATIENT
Start: 2017-08-14 | End: 2017-08-14 | Stop reason: HOSPADM

## 2017-08-14 RX ORDER — POTASSIUM CHLORIDE 1500 MG/1
20 TABLET, EXTENDED RELEASE ORAL
Status: DISCONTINUED | OUTPATIENT
Start: 2017-08-14 | End: 2017-08-14 | Stop reason: HOSPADM

## 2017-08-14 RX ORDER — ASPIRIN 325 MG
325 TABLET ORAL
Status: DISCONTINUED | OUTPATIENT
Start: 2017-08-14 | End: 2017-08-14 | Stop reason: HOSPADM

## 2017-08-14 RX ORDER — VERAPAMIL HYDROCHLORIDE 2.5 MG/ML
1-2.5 INJECTION, SOLUTION INTRAVENOUS
Status: DISCONTINUED | OUTPATIENT
Start: 2017-08-14 | End: 2017-08-14 | Stop reason: HOSPADM

## 2017-08-14 RX ORDER — DOBUTAMINE HYDROCHLORIDE 200 MG/100ML
2-20 INJECTION INTRAVENOUS CONTINUOUS PRN
Status: DISCONTINUED | OUTPATIENT
Start: 2017-08-14 | End: 2017-08-14 | Stop reason: HOSPADM

## 2017-08-14 RX ORDER — ENALAPRILAT 1.25 MG/ML
1.25-2.5 INJECTION INTRAVENOUS
Status: DISCONTINUED | OUTPATIENT
Start: 2017-08-14 | End: 2017-08-14 | Stop reason: HOSPADM

## 2017-08-14 RX ORDER — SODIUM CHLORIDE 9 MG/ML
INJECTION, SOLUTION INTRAVENOUS CONTINUOUS
Status: DISCONTINUED | OUTPATIENT
Start: 2017-08-14 | End: 2017-08-14 | Stop reason: HOSPADM

## 2017-08-14 RX ORDER — PROTAMINE SULFATE 10 MG/ML
25-100 INJECTION, SOLUTION INTRAVENOUS EVERY 5 MIN PRN
Status: DISCONTINUED | OUTPATIENT
Start: 2017-08-14 | End: 2017-08-14 | Stop reason: HOSPADM

## 2017-08-14 RX ORDER — LIDOCAINE HYDROCHLORIDE 10 MG/ML
30 INJECTION, SOLUTION EPIDURAL; INFILTRATION; INTRACAUDAL; PERINEURAL
Status: DISCONTINUED | OUTPATIENT
Start: 2017-08-14 | End: 2017-08-14 | Stop reason: HOSPADM

## 2017-08-14 RX ORDER — HYDRALAZINE HYDROCHLORIDE 20 MG/ML
10-20 INJECTION INTRAMUSCULAR; INTRAVENOUS
Status: DISCONTINUED | OUTPATIENT
Start: 2017-08-14 | End: 2017-08-14 | Stop reason: HOSPADM

## 2017-08-14 RX ORDER — FENTANYL CITRATE 50 UG/ML
25-50 INJECTION, SOLUTION INTRAMUSCULAR; INTRAVENOUS
Status: DISCONTINUED | OUTPATIENT
Start: 2017-08-14 | End: 2017-08-14 | Stop reason: HOSPADM

## 2017-08-14 RX ORDER — SODIUM NITROPRUSSIDE 25 MG/ML
100-200 INJECTION INTRAVENOUS
Status: DISCONTINUED | OUTPATIENT
Start: 2017-08-14 | End: 2017-08-14 | Stop reason: HOSPADM

## 2017-08-14 RX ORDER — NITROGLYCERIN 20 MG/100ML
.07-1.7 INJECTION INTRAVENOUS CONTINUOUS PRN
Status: DISCONTINUED | OUTPATIENT
Start: 2017-08-14 | End: 2017-08-14 | Stop reason: HOSPADM

## 2017-08-14 RX ORDER — EPTIFIBATIDE 2 MG/ML
2 INJECTION, SOLUTION INTRAVENOUS CONTINUOUS PRN
Status: DISCONTINUED | OUTPATIENT
Start: 2017-08-14 | End: 2017-08-14 | Stop reason: HOSPADM

## 2017-08-14 RX ORDER — ASPIRIN 81 MG/1
81-324 TABLET, CHEWABLE ORAL
Status: DISCONTINUED | OUTPATIENT
Start: 2017-08-14 | End: 2017-08-14 | Stop reason: HOSPADM

## 2017-08-14 RX ORDER — HEPARIN SODIUM 1000 [USP'U]/ML
1000-10000 INJECTION, SOLUTION INTRAVENOUS; SUBCUTANEOUS EVERY 5 MIN PRN
Status: DISCONTINUED | OUTPATIENT
Start: 2017-08-14 | End: 2017-08-14 | Stop reason: HOSPADM

## 2017-08-14 RX ORDER — ACETAMINOPHEN 325 MG/1
325-650 TABLET ORAL EVERY 4 HOURS PRN
Status: DISCONTINUED | OUTPATIENT
Start: 2017-08-14 | End: 2017-08-14 | Stop reason: HOSPADM

## 2017-08-14 RX ORDER — PROMETHAZINE HYDROCHLORIDE 25 MG/ML
6.25-25 INJECTION, SOLUTION INTRAMUSCULAR; INTRAVENOUS EVERY 4 HOURS PRN
Status: DISCONTINUED | OUTPATIENT
Start: 2017-08-14 | End: 2017-08-14 | Stop reason: HOSPADM

## 2017-08-14 RX ORDER — IOPAMIDOL 755 MG/ML
40 INJECTION, SOLUTION INTRAVASCULAR ONCE
Status: COMPLETED | OUTPATIENT
Start: 2017-08-14 | End: 2017-08-14

## 2017-08-14 RX ORDER — CLOPIDOGREL BISULFATE 75 MG/1
75 TABLET ORAL
Status: DISCONTINUED | OUTPATIENT
Start: 2017-08-14 | End: 2017-08-14 | Stop reason: HOSPADM

## 2017-08-14 RX ORDER — NICARDIPINE HYDROCHLORIDE 2.5 MG/ML
100 INJECTION INTRAVENOUS
Status: DISCONTINUED | OUTPATIENT
Start: 2017-08-14 | End: 2017-08-14 | Stop reason: HOSPADM

## 2017-08-14 RX ORDER — ADENOSINE 3 MG/ML
12-12000 INJECTION, SOLUTION INTRAVENOUS
Status: DISCONTINUED | OUTPATIENT
Start: 2017-08-14 | End: 2017-08-14 | Stop reason: HOSPADM

## 2017-08-14 RX ORDER — POTASSIUM CHLORIDE 7.45 MG/ML
10 INJECTION INTRAVENOUS
Status: DISCONTINUED | OUTPATIENT
Start: 2017-08-14 | End: 2017-08-14 | Stop reason: HOSPADM

## 2017-08-14 RX ORDER — FLUMAZENIL 0.1 MG/ML
0.2 INJECTION, SOLUTION INTRAVENOUS
Status: DISCONTINUED | OUTPATIENT
Start: 2017-08-14 | End: 2017-08-14 | Stop reason: HOSPADM

## 2017-08-14 RX ORDER — HYDROCODONE BITARTRATE AND ACETAMINOPHEN 5; 325 MG/1; MG/1
1-2 TABLET ORAL EVERY 4 HOURS PRN
Status: DISCONTINUED | OUTPATIENT
Start: 2017-08-14 | End: 2017-08-14 | Stop reason: HOSPADM

## 2017-08-14 RX ORDER — PHENYLEPHRINE HCL IN 0.9% NACL 1 MG/10 ML
20-100 SYRINGE (ML) INTRAVENOUS
Status: DISCONTINUED | OUTPATIENT
Start: 2017-08-14 | End: 2017-08-14 | Stop reason: HOSPADM

## 2017-08-14 RX ORDER — PROTAMINE SULFATE 10 MG/ML
1-5 INJECTION, SOLUTION INTRAVENOUS
Status: DISCONTINUED | OUTPATIENT
Start: 2017-08-14 | End: 2017-08-14 | Stop reason: HOSPADM

## 2017-08-14 RX ORDER — ATROPINE SULFATE 0.1 MG/ML
.5-1 INJECTION INTRAVENOUS
Status: DISCONTINUED | OUTPATIENT
Start: 2017-08-14 | End: 2017-08-14 | Stop reason: HOSPADM

## 2017-08-14 RX ORDER — CLOPIDOGREL BISULFATE 75 MG/1
300-600 TABLET ORAL
Status: DISCONTINUED | OUTPATIENT
Start: 2017-08-14 | End: 2017-08-14 | Stop reason: HOSPADM

## 2017-08-14 RX ORDER — LORAZEPAM 2 MG/ML
.5-2 INJECTION INTRAMUSCULAR EVERY 4 HOURS PRN
Status: DISCONTINUED | OUTPATIENT
Start: 2017-08-14 | End: 2017-08-14 | Stop reason: HOSPADM

## 2017-08-14 RX ORDER — DOPAMINE HYDROCHLORIDE 160 MG/100ML
2-20 INJECTION, SOLUTION INTRAVENOUS CONTINUOUS PRN
Status: DISCONTINUED | OUTPATIENT
Start: 2017-08-14 | End: 2017-08-14 | Stop reason: HOSPADM

## 2017-08-14 RX ORDER — DIPHENHYDRAMINE HYDROCHLORIDE 50 MG/ML
25-50 INJECTION INTRAMUSCULAR; INTRAVENOUS
Status: DISCONTINUED | OUTPATIENT
Start: 2017-08-14 | End: 2017-08-14 | Stop reason: HOSPADM

## 2017-08-14 RX ORDER — METHYLPREDNISOLONE SODIUM SUCCINATE 125 MG/2ML
125 INJECTION, POWDER, LYOPHILIZED, FOR SOLUTION INTRAMUSCULAR; INTRAVENOUS
Status: DISCONTINUED | OUTPATIENT
Start: 2017-08-14 | End: 2017-08-14 | Stop reason: HOSPADM

## 2017-08-14 RX ORDER — FUROSEMIDE 10 MG/ML
20-100 INJECTION INTRAMUSCULAR; INTRAVENOUS
Status: DISCONTINUED | OUTPATIENT
Start: 2017-08-14 | End: 2017-08-14 | Stop reason: HOSPADM

## 2017-08-14 RX ORDER — ONDANSETRON 2 MG/ML
4 INJECTION INTRAMUSCULAR; INTRAVENOUS EVERY 4 HOURS PRN
Status: DISCONTINUED | OUTPATIENT
Start: 2017-08-14 | End: 2017-08-14 | Stop reason: HOSPADM

## 2017-08-14 RX ORDER — BUPIVACAINE HYDROCHLORIDE 2.5 MG/ML
1-10 INJECTION, SOLUTION EPIDURAL; INFILTRATION; INTRACAUDAL
Status: DISCONTINUED | OUTPATIENT
Start: 2017-08-14 | End: 2017-08-14 | Stop reason: HOSPADM

## 2017-08-14 RX ORDER — EPTIFIBATIDE 2 MG/ML
180 INJECTION, SOLUTION INTRAVENOUS EVERY 10 MIN PRN
Status: DISCONTINUED | OUTPATIENT
Start: 2017-08-14 | End: 2017-08-14 | Stop reason: HOSPADM

## 2017-08-14 RX ORDER — PRASUGREL 10 MG/1
10-60 TABLET, FILM COATED ORAL
Status: DISCONTINUED | OUTPATIENT
Start: 2017-08-14 | End: 2017-08-14 | Stop reason: HOSPADM

## 2017-08-14 RX ORDER — NITROGLYCERIN 5 MG/ML
100-500 VIAL (ML) INTRAVENOUS
Status: DISCONTINUED | OUTPATIENT
Start: 2017-08-14 | End: 2017-08-14 | Stop reason: HOSPADM

## 2017-08-14 RX ORDER — POTASSIUM CHLORIDE 29.8 MG/ML
20 INJECTION INTRAVENOUS
Status: DISCONTINUED | OUTPATIENT
Start: 2017-08-14 | End: 2017-08-14 | Stop reason: HOSPADM

## 2017-08-14 RX ADMIN — IOPAMIDOL 40 ML: 755 INJECTION, SOLUTION INTRAVASCULAR at 10:15

## 2017-08-14 RX ADMIN — SODIUM CHLORIDE: 9 INJECTION, SOLUTION INTRAVENOUS at 08:38

## 2017-08-14 RX ADMIN — LIDOCAINE HYDROCHLORIDE 80 MG: 10 INJECTION, SOLUTION EPIDURAL; INFILTRATION; INTRACAUDAL; PERINEURAL at 09:53

## 2017-08-14 RX ADMIN — FENTANYL CITRATE 100 MCG: 50 INJECTION, SOLUTION INTRAMUSCULAR; INTRAVENOUS at 09:56

## 2017-08-14 RX ADMIN — MIDAZOLAM HYDROCHLORIDE 2 MG: 1 INJECTION, SOLUTION INTRAMUSCULAR; INTRAVENOUS at 09:56

## 2017-08-14 NOTE — DISCHARGE INSTRUCTIONS
Cardiac Angiogram Discharge Instructions - Femoral    After you go home:      Have an adult stay with you until tomorrow.    Drink extra fluids for 2 days.    You may resume your normal diet.    No smoking       For 24 hours - due to the sedation you received:    Relax and take it easy.    Do NOT make any important or legal decisions.    Do NOT drive or operate machines at home or at work.    Do NOT drink alcohol.    Care of Groin Puncture Site:      For the first 24 hrs - check the puncture site every 1-2 hours while awake.    For 2 days, when you cough, sneeze, laugh or move your bowels, hold your hand over the puncture site and press firmly.    Remove the bandaid after 24 hours. If there is minor oozing, apply another bandaid and remove it after 12 hours.    It is normal to have a small bruise or pea size lump at the site.    You may shower tomorrow. Do NOT take a bath, or use a hot tub or pool for at least 3 days. Do NOT scrub the site. Do not use lotion or powder near the puncture site.    Activity:            For 2 days:    No stooping or squatting    Do NOT do any heavy activity such as exercise, lifting, or straining.     No housework, yard work or any activity that make you sweat    Do NOT lift more than 10 pounds    Bleeding:      If you start bleeding from the site in your groin, lie down flat and press firmly on/above the site for 10 minutes.     Once bleeding stops, lay flat for 2 hours.     Call [facility] as soon as you can.       Call 911 right away if you have heavy bleeding or bleeding that does not stop.      Medicines:      If you are taking antiplatelet medications such as Plavix, Brilinta or Effient, do not stop taking it until you talk to your cardiologist.      If you are on Metformin (Glucophage) and your GFR (kidney function level) is >30, you may continue taking your Metformin.    If you are on Metformin (Glucophage) and your GFR (kidney function level) is <30, do not restart the Metformin  for 48 hours after your procedure. Check with your primary care giver before restarting the Metformin to see if you need to have blood drawn to recheck your kidney function (GFR).    Take your medications, including blood thinners, unless your provider tells you not to.  If you take Coumadin (Warfarin), have your INR checked by your provider in  3-5 days. Call your clinic to schedule this.    If you have stopped any medicines, check with your provider about when to restart them.    Follow Up Appointments:      Follow up with [facility] Nurse Practitioner at [facility] of patient preference in 7-10 days.    Call the clinic if:      You have increased pain or a large or growing hard lump around the site.    The site is red, swollen, hot or tender.    Blood or fluid is draining from the site.    You have chills or a fever greater than 101 F (38 C).    Your leg turns feels numb, cool or changes color.    You have hives, a rash or unusual itching.    New pain in the back or belly that you cannot control with Tylenol.    Any questions or concerns.          [facility] [place]:    [phone] [facility] (7 days a week)

## 2017-08-14 NOTE — LETTER
8/14/2017    Carlos Zenon Dragan, DO  [address] [address]    RE: Derek Jaureguiberg       Dear Colleague,    I had the pleasure of seeing Derek Stover in the [facility].    PRIMARY CARDIOLOGIST:  Oni Ross MD       PRIMARY PHYSICIAN:  Osbaldo Renee MD       Derek Stover is a 56-year-old gentleman who is here today to review the recommendations for a coronary angiogram as part of a workup in preparation for surgical management of his severe mitral valve regurgitation.      This gentleman has a history of rheumatic fever as a child, and subsequently he has been followed rather intermittently; however, being more recently he reestablished cardiac care last month in [place] with Dr. Ross.  Cardiac echo indicated rheumatic appearance of mitral valve without significant mitral stenosis.  Mitral regurgitation appeared more eccentric, posteriorly directed, 3+ severe left atrial enlargement, mild dilated left ventricle.  There was mild aortic root dilatation with ascending aorta, moderately dilated.  The patient went on to have a transesophageal echo, which demonstrated severe mitral regurgitation.  Coronary angiogram now has been recommended.      This gentleman's past medical history is significant for newly diagnosed atrial fibrillation.  He has been started on warfarin therapy; however, it has been held for 4 days prior to today.He reports no bleeding issues with warfarin.    He also has a 40 year history of smoking 2 packs a day.  He has started and stopped smoking several times over the years.  However, he did quit smoking 14 days ago.  His wife still smokes.    His last cholesterol documented was checked back in 2014.  At that time, his total cholesterol was 207, HDL 39, , triglycerides 124.      The patient does not have a history of hypertension, diabetes or family history of premature coronary artery disease.      Today he reports  shortness of breath if he walks fast or walks more than 1 block.  He denies any shortness of breath at rest, no orthopnea or PND.  At times he can feel a fluttering sensation in his chest, but after starting carvedilol, that has diminished.      PHYSICAL EXAMINATION:   GENERAL:  The patient is alert and oriented.  Skin is warm and dry.  He is in no acute distress.   VITAL SIGNS:  Blood pressure is 95/67.  Pulse rate is 84.  He weighs 261 pounds.  He is alert and oriented.  He is in no acute distress.   CARDIAC:  Heart tones are regular with an S1, S2 with a 3/6 murmur heard at the left mid clavicular line, 5th intercostal space.  Some radiation towards the axilla.   LUNGS:  Clear without crackles or wheezes.   ABDOMEN:  Large.  Negative for any abdominal bruits.     NECK:  Neck veins are flat.  Negative for carotid bruits.  Negative for femoral bruits.   EXTREMITIES:  Lower extremities with 1+ pulses without edema.     Outpatient Encounter Prescriptions as of 8/14/2017   Medication Sig Dispense Refill     aspirin EC 81 MG EC tablet Take 81 mg by mouth daily Took  4 baby ASA 8/13 and 8/14 in AM       varenicline (CHANTIX) 1 MG tablet Take 1 tablet (1 mg) by mouth 2 times daily 56 tablet 2     carvedilol (COREG) 3.125 MG tablet Take 1 tablet (3.125 mg) by mouth 2 times daily (with meals) 180 tablet 1     warfarin (COUMADIN) 5 MG tablet Take 5 mg daily, or as directed by the coumadin clinic. (Patient taking differently: Take 5 mg daily, or as directed by the coumadin clinic.  ON HOLD!!!!!) 30 tablet 0     [DISCONTINUED] CHANTIX STARTING MONTH RJ 0.5 MG X 11 & 1 MG X 42 tablet 42 42 tablet 0     [DISCONTINUED] ibuprofen (ADVIL,MOTRIN) 200 MG tablet Take 1 tablet by mouth every 4 hours as needed for pain. 120 tablet      No facility-administered encounter medications on file as of 8/14/2017.       IMPRESSION AND PLAN:   1.  Patient with recent diagnosis of severe mitral regurgitation with a history of rheumatic fever as  a child.  A coronary angiogram has been recommended as part of his workup for mitral valve surgical management.  Today I did review the coronary angiogram procedure with the patient as well as the risks.  The patient is accompanied today by his wife.  Their questions were answered.  The patient will have this procedure done later this morning.  He verbalizes understanding of the risks involved and the rationale for the procedure and agrees to proceed with it.  His warfarin therapy has been held for 4 days.  He has been n.p.o. since going to bed last night.   2.  Tobacco abuse.  He was congratulated on his 2 week history of being smoke free.     3.  Newly diagnosed atrial fibrillation.  Rate-control strategy is pursued.  The patient will restart his warfarin, and his INR is being managed through the INR Clinic.      It has been my pleasure to meet Derek.     Sincerely,    JESSIE Rogers CNP     [facility]    cc:   Osbaldo Renee MD  [address] [facility] [address]

## 2017-08-14 NOTE — PROGRESS NOTES
Care Suites Discharge Summary    Discharge Criteria:   Discharge Criteria met per MD orders: Yes.   Vital signs stable.     Pt demonstrates ability to ambulate safely: Yes.  (See discharge questionnaire for additional information)    Discharge instructions & education:   Discharge instructions reviewed with patient and spouse. Patient verbalizes understanding.   Patient education provided:  Cardiac angiogram and contrast discharge instructions    Medications:   Patient will be discharging on new medications- No.    Items returned to patient:   Home and hospital acquired medications returned to patient NA   Listed belongings gathered and returned to patient: Yes    Patient discharged to home    Ramone Watson

## 2017-08-14 NOTE — MR AVS SNAPSHOT
After Visit Summary   8/14/2017    Derek Stover    MRN: [id number]           Patient Information     Date Of Birth          1960        Visit Information        Provider Department      8/14/2017 7:30 AM Radha Kenyon, JESSIE VINES [facility] [place]        Today's Diagnoses     Mitral valve regurgitation           Follow-ups after your visit        Your next 10 appointments already scheduled     Aug 14, 2017 10:00 AM CDT   Heart Cath Left Heart Cath with SHCVR1   [facility] ([facility])    [address]  [place] [address]   [phone]            Aug 18, 2017  9:30 AM CDT   LAB with [facility] LAB [facility]   [facility]    [address] [place] [id number] [phone]           Patient must bring picture ID. Patient should be prepared to give a urine specimen  Please do not eat 10-12 hours before your appointment if you are coming in fasting for labs on lipids, cholesterol, or glucose (sugar). Pregnant women should follow their Care Team instructions. Water with medications is okay. Do not drink coffee or other fluids. If you have concerns about taking  your medications, please ask at office or if scheduling via [employer], send a message by clicking on Secure Messaging, Message Your Care Team.            Aug 21, 2017  2:00 PM CDT   New Patient Visit with Ivana Marie MD   [facility] ([facility])    [address] [place] [address]   [phone]            Sep 05, 2017  9:00 AM CDT   Return Visit with Oni Ross MD   [facility]    [address] [place] [id number] [phone]              Who to contact     If you have questions or need follow up information about today's clinic visit or your schedule please contact [facility]   "[place] directly at [phone].  Normal or non-critical lab and imaging results will be communicated to you by MyChart, letter or phone within 4 business days after the clinic has received the results. If you do not hear from us within 7 days, please contact the clinic through [employer] or phone. If you have a critical or abnormal lab result, we will notify you by phone as soon as possible.  Submit refill requests through [employer] or call your pharmacy and they will forward the refill request to us. Please allow 3 business days for your refill to be completed.          Additional Information About Your Visit        [employer] Information     [employer] gives you secure access to your electronic health record. If you see a primary care provider, you can also send messages to your care team and make appointments. If you have questions, please call your primary care clinic.  If you do not have a primary care provider, please call [phone] and they will assist you.        Care EveryWhere ID     This is your Care EveryWhere ID. This could be used by other organizations to access your [place] medical records  [id number]        Your Vitals Were     Pulse Height BMI (Body Mass Index)             82 1.778 m (5' 10\") 37.45 kg/m2          Blood Pressure from Last 3 Encounters:   08/14/17 95/67   07/25/17 106/73   07/18/17 120/88    Weight from Last 3 Encounters:   08/14/17 118.4 kg (261 lb)   07/25/17 116.9 kg (257 lb 11.2 oz)   07/18/17 118 kg (260 lb 1.6 oz)              We Performed the Following     Follow-Up with Cardiac Advanced Practice Provider          Today's Medication Changes          These changes are accurate as of: 8/14/17  7:48 AM.  If you have any questions, ask your nurse or doctor.               These medicines have changed or have updated prescriptions.        Dose/Directions    warfarin 5 MG tablet   Commonly known as:  COUMADIN   This may have changed:  additional instructions   Used for:  Long term " current use of anticoagulant therapy, Chronic atrial fibrillation (H)        Take 5 mg daily, or as directed by the coumadin clinic.   Quantity:  30 tablet   Refills:  0                Primary Care Provider Office Phone # Fax #    Osbaldo Renee [phone]       [address] [facility] [address]        Equal Access to Services     HOMER SPAIN : Hadii kendrick ku hadasho Soomaali, waaxda luqadaha, qaybta kaalmada adekatieyada, malissa ulloarohinijamila eugene. So [facility] [phone].    ATENCIÓN: Si habla español, tiene a macias disposición servicios gratuitos de asistencia lingüística. Llame al [phone].    We comply with applicable federal civil rights laws and [place] laws. We do not discriminate on the basis of race, color, national origin, age, disability sex, sexual orientation or gender identity.            Thank you!     Thank you for choosing [facility] [place]  for your care. Our goal is always to provide you with excellent care. Hearing back from our patients is one way we can continue to improve our services. Please take a few minutes to complete the written survey that you may receive in the mail after your visit with us. Thank you!             Your Updated Medication List - Protect others around you: Learn how to safely use, store and throw away your medicines at www.disposemymeds.org.          This list is accurate as of: 8/14/17  7:48 AM.  Always use your most recent med list.                   Brand Name Dispense Instructions for use Diagnosis    aspirin EC 81 MG EC tablet      Take 81 mg by mouth daily Took  4 baby ASA 8/13 and 8/14 in AM        carvedilol 3.125 MG tablet    COREG    180 tablet    Take 1 tablet (3.125 mg) by mouth 2 times daily (with meals)    Atrial fibrillation, unspecified type (H)       varenicline 1 MG tablet    CHANTIX    56 tablet    Take 1 tablet (1 mg) by mouth 2 times daily    Tobacco abuse       warfarin 5 MG tablet     COUMADIN    30 tablet    Take 5 mg daily, or as directed by the coumadin clinic.    Long term current use of anticoagulant therapy, Chronic atrial fibrillation (H)

## 2017-08-14 NOTE — IP AVS SNAPSHOT
[place] [address]    [place] [address]    Phone:  [phone]                                       After Visit Summary   8/14/2017    Derek Stover    MRN: [id number]           After Visit Summary Signature Page     I have received my discharge instructions, and my questions have been answered. I have discussed any challenges I see with this plan with the nurse or doctor.    ..........................................................................................................................................  Patient/Patient Representative Signature      ..........................................................................................................................................  Patient Representative Print Name and Relationship to Patient    ..................................................               ................................................  Date                                            Time    ..........................................................................................................................................  Reviewed by Signature/Title    ...................................................              ..............................................  Date                                                            Time

## 2017-08-14 NOTE — PROGRESS NOTES
PRIMARY CARDIOLOGIST:  Oni Ross MD       PRIMARY PHYSICIAN:  Osbaldo Renee MD       HISTORY OF PRESENT ILLNESS:  Derek Stover is a 56-year-old gentleman who is here today to review the recommendations for a coronary angiogram as part of a workup in preparation for surgical management of his severe mitral valve regurgitation.      This gentleman has a history of rheumatic fever as a child, and subsequently he has been followed rather intermittently; however, being more recently he reestablished cardiac care last month in [place] with Dr. Ross.  Cardiac echo indicated rheumatic appearance of mitral valve without significant mitral stenosis.  Mitral regurgitation appeared more eccentric, posteriorly directed, 3+ severe left atrial enlargement, mild dilated left ventricle.  There was mild aortic root dilatation with ascending aorta, moderately dilated.  The patient went on to have a transesophageal echo, which demonstrated severe mitral regurgitation.  Coronary angiogram now has been recommended.      This gentleman's past medical history is significant for newly diagnosed atrial fibrillation.  He has been started on warfarin therapy; however, it has been held for 4 days prior to today.He reports no bleeding issues with warfarin.    He also has a 40 year history of smoking 2 packs a day.  He has started and stopped smoking several times over the years.  However, he did quit smoking 14 days ago.  His wife still smokes.    His last cholesterol documented was checked back in 2014.  At that time, his total cholesterol was 207, HDL 39, , triglycerides 124.      The patient does not have a history of hypertension, diabetes or family history of premature coronary artery disease.      Today he reports shortness of breath if he walks fast or walks more than 1 block.  He denies any shortness of breath at rest, no orthopnea or PND.  At times he can feel a fluttering sensation in his chest, but after starting  carvedilol, that has diminished.      PHYSICAL EXAMINATION:   GENERAL:  The patient is alert and oriented.  Skin is warm and dry.  He is in no acute distress.   VITAL SIGNS:  Blood pressure is 95/67.  Pulse rate is 84.  He weighs 261 pounds.  He is alert and oriented.  He is in no acute distress.   CARDIAC:  Heart tones are regular with an S1, S2 with a 3/6 murmur heard at the left mid clavicular line, 5th intercostal space.  Some radiation towards the axilla.   LUNGS:  Clear without crackles or wheezes.   ABDOMEN:  Large.  Negative for any abdominal bruits.     NECK:  Neck veins are flat.  Negative for carotid bruits.  Negative for femoral bruits.   EXTREMITIES:  Lower extremities with 1+ pulses without edema.      IMPRESSION AND PLAN:   1.  Patient with recent diagnosis of severe mitral regurgitation with a history of rheumatic fever as a child.  A coronary angiogram has been recommended as part of his workup for mitral valve surgical management.  Today I did review the coronary angiogram procedure with the patient as well as the risks.  The patient is accompanied today by his wife.  Their questions were answered.  The patient will have this procedure done later this morning.  He verbalizes understanding of the risks involved and the rationale for the procedure and agrees to proceed with it.  His warfarin therapy has been held for 4 days.  He has been n.p.o. since going to bed last night.   2.  Tobacco abuse.  He was congratulated on his 2 week history of being smoke free.     3.  Newly diagnosed atrial fibrillation.  Rate-control strategy is pursued.  The patient will restart his warfarin, and his INR is being managed through the INR Clinic.      It has been my pleasure to meet HakeemJESSIE ARDON, MOHINDER             D: 2017 07:59   T: 2017 08:44   MT: lamonte      Name:     HAKEEM BORJAS   MRN:      [id number]        Account:      [id number]   :      1960           Service Date:  08/14/2017      Document: [id number]

## 2017-08-14 NOTE — IP AVS SNAPSHOT
MRN:[id number]                      After Visit Summary   8/14/2017    Derek Stover    MRN: [id number]           Visit Information        Department      8/14/2017  8:01 AM [facility]          Review of your medicines      UNREVIEWED medicines. Ask your doctor about these medicines        Dose / Directions    aspirin EC 81 MG EC tablet        Dose:  81 mg   Take 81 mg by mouth daily Took  4 baby ASA 8/13 and 8/14 in AM   Refills:  0       carvedilol 3.125 MG tablet   Commonly known as:  COREG   Used for:  Atrial fibrillation, unspecified type (H)        Dose:  3.125 mg   Take 1 tablet (3.125 mg) by mouth 2 times daily (with meals)   Quantity:  180 tablet   Refills:  1       varenicline 1 MG tablet   Commonly known as:  CHANTIX   Used for:  Tobacco abuse        Dose:  1 mg   Take 1 tablet (1 mg) by mouth 2 times daily   Quantity:  56 tablet   Refills:  2       warfarin 5 MG tablet   Commonly known as:  COUMADIN   Used for:  Long term current use of anticoagulant therapy, Chronic atrial fibrillation (H)        Take 5 mg daily, or as directed by the coumadin clinic.   Quantity:  30 tablet   Refills:  0                Protect others around you: Learn how to safely use, store and throw away your medicines at www.disposemymeds.org.         Follow-ups after your visit        Your next 10 appointments already scheduled     Aug 18, 2017  9:30 AM CDT   LAB with [facility] Formerly named [facility] ([facility])    [address] [place] [id number] [phone]           Patient must bring picture ID. Patient should be prepared to give a urine specimen  Please do not eat 10-12 hours before your appointment if you are coming in fasting for labs on lipids, cholesterol, or glucose (sugar). Pregnant women should follow their Care Team instructions. Water with medications is okay. Do not drink coffee or other fluids. If you have concerns about taking  your  medications, please ask at office or if scheduling via [employer] send a message by clicking on Secure Messaging, Message Your Care Team.            Aug 21, 2017  2:00 PM CDT   New Patient Visit with Ivana Marie MD   [facility] Cardiothoracic ([facility])    [address] [place] [address]   [phone]            Sep 05, 2017  9:00 AM CDT   [facility]    [address] [place] [address]   [phone]               Care Instructions        After Care Instructions     Discharge Instructions - Follow up with [facility] Heart Nurse Practitioner        Follow up with [facility] Heart Nurse Practitioner at [facility] Heart Clinic of patient preference in 7-10 days.            Discharge Instructions - IF on Metformin (Glucophage or Glucovance) or Metformin containing medications       IF on Metformin (Glucophage or Glucovance) or Metformin containing medications , schedule a Basic Metabolic Panel at [facility] Heart or Primary Clinic in 48 - 72 hours post procedure and PRIOR TO resuming the Metformin or Metformin containing medications.  Hold Metformin (Glucophage or Glucovance) or Metformin containing medications until after the Basic Metabolic Panel on the 2nd or 3rd day following the procedure.  May resume after blood draw is complete.                  Further instructions from your care team       Cardiac Angiogram Discharge Instructions - Femoral    After you go home:      Have an adult stay with you until tomorrow.    Drink extra fluids for 2 days.    You may resume your normal diet.    No smoking       For 24 hours - due to the sedation you received:    Relax and take it easy.    Do NOT make any important or legal decisions.    Do NOT drive or operate machines at home or at work.    Do NOT drink alcohol.    Care of Groin Puncture Site:      For the first 24 hrs - check the puncture site every 1-2 hours while awake.    For 2  days, when you cough, sneeze, laugh or move your bowels, hold your hand over the puncture site and press firmly.    Remove the bandaid after 24 hours. If there is minor oozing, apply another bandaid and remove it after 12 hours.    It is normal to have a small bruise or pea size lump at the site.    You may shower tomorrow. Do NOT take a bath, or use a hot tub or pool for at least 3 days. Do NOT scrub the site. Do not use lotion or powder near the puncture site.    Activity:            For 2 days:    No stooping or squatting    Do NOT do any heavy activity such as exercise, lifting, or straining.     No housework, yard work or any activity that make you sweat    Do NOT lift more than 10 pounds    Bleeding:      If you start bleeding from the site in your groin, lie down flat and press firmly on/above the site for 10 minutes.     Once bleeding stops, lay flat for 2 hours.     Call [facility] as soon as you can.       Call 911 right away if you have heavy bleeding or bleeding that does not stop.      Medicines:      If you are taking antiplatelet medications such as Plavix, Brilinta or Effient, do not stop taking it until you talk to your cardiologist.      If you are on Metformin (Glucophage) and your GFR (kidney function level) is >30, you may continue taking your Metformin.    If you are on Metformin (Glucophage) and your GFR (kidney function level) is <30, do not restart the Metformin for 48 hours after your procedure. Check with your primary care giver before restarting the Metformin to see if you need to have blood drawn to recheck your kidney function (GFR).    Take your medications, including blood thinners, unless your provider tells you not to.  If you take Coumadin (Warfarin), have your INR checked by your provider in  3-5 days. Call your clinic to schedule this.    If you have stopped any medicines, check with your provider about when to restart them.    Follow Up Appointments:      Follow up with [facility]  Heart Nurse Practitioner at [facility] Clinic of patient preference in 7-10 days.    Call the clinic if:      You have increased pain or a large or growing hard lump around the site.    The site is red, swollen, hot or tender.    Blood or fluid is draining from the site.    You have chills or a fever greater than 101 F (38 C).    Your leg turns feels numb, cool or changes color.    You have hives, a rash or unusual itching.    New pain in the back or belly that you cannot control with Tylenol.    Any questions or concerns.          [facility] [place]:    [phone] [facility] (7 days a week)           Additional Information About Your Visit        [employer] Information     [employer] gives you secure access to your electronic health record. If you see a primary care provider, you can also send messages to your care team and make appointments. If you have questions, please call your primary care clinic.  If you do not have a primary care provider, please call [phone] and they will assist you.        Care EveryWhere ID     This is your Care EveryWhere ID. This could be used by other organizations to access your [place] medical records  [id number]        Your Vitals Were     Blood Pressure Pulse Temperature Respirations Height Weight    107/57 77 97.9  F (36.6  C) (Oral) 18 1.829 m (6') 117.9 kg (260 lb)    Pulse Oximetry BMI (Body Mass Index)                97% 35.26 kg/m2           Primary Care Provider Office Phone # Fax #    Osbaldo Renee [phone]      Equal Access to Services     HOMER SPAIN : Hadii kendrick ku hadasho Soomaali, waaxda luqadaha, qaybta kaalmada adeegyada, malissa ramsey . So [facility] [phone].    ATENCIÓN: Si habla español, tiene a macias disposición servicios gratuitos de asistencia lingüística. Llame al [phone].    We comply with applicable federal civil rights laws and [place] laws. We do not discriminate on the basis  of race, color, national origin, age, disability sex, sexual orientation or gender identity.            Thank you!     Thank you for choosing [facility] for your care. Our goal is always to provide you with excellent care. Hearing back from our patients is one way we can continue to improve our services. Please take a few minutes to complete the written survey that you may receive in the mail after you visit with us. Thank you!             Medication List: This is a list of all your medications and when to take them. Check marks below indicate your daily home schedule. Keep this list as a reference.      Medications           Morning Afternoon Evening Bedtime As Needed    aspirin EC 81 MG EC tablet   Take 81 mg by mouth daily Took  4 baby ASA 8/13 and 8/14 in AM                                carvedilol 3.125 MG tablet   Commonly known as:  COREG   Take 1 tablet (3.125 mg) by mouth 2 times daily (with meals)                                varenicline 1 MG tablet   Commonly known as:  CHANTIX   Take 1 tablet (1 mg) by mouth 2 times daily                                warfarin 5 MG tablet   Commonly known as:  COUMADIN   Take 5 mg daily, or as directed by the coumadin clinic.

## 2017-08-14 NOTE — PROGRESS NOTES
HPI and Plan:   See dictation    No orders of the defined types were placed in this encounter.    Orders Placed This Encounter   Medications     aspirin EC 81 MG EC tablet     Sig: Take 81 mg by mouth daily Took  4 baby ASA 8/13 and 8/14 in AM     Medications Discontinued During This Encounter   Medication Reason     ibuprofen (ADVIL,MOTRIN) 200 MG tablet Stopped by Patient     CHANTIX STARTING MONTH RJ 0.5 MG X 11 & 1 MG X 42 tablet Therapy completed         Encounter Diagnosis   Name Primary?     Mitral valve regurgitation        CURRENT MEDICATIONS:  Current Outpatient Prescriptions   Medication Sig Dispense Refill     aspirin EC 81 MG EC tablet Take 81 mg by mouth daily Took  4 baby ASA 8/13 and 8/14 in AM       varenicline (CHANTIX) 1 MG tablet Take 1 tablet (1 mg) by mouth 2 times daily 56 tablet 2     carvedilol (COREG) 3.125 MG tablet Take 1 tablet (3.125 mg) by mouth 2 times daily (with meals) 180 tablet 1     warfarin (COUMADIN) 5 MG tablet Take 5 mg daily, or as directed by the coumadin clinic. (Patient taking differently: Take 5 mg daily, or as directed by the coumadin clinic.  ON HOLD!!!!!) 30 tablet 0       ALLERGIES     Allergies   Allergen Reactions     No Known Drug Allergy        PAST MEDICAL HISTORY:  Past Medical History:   Diagnosis Date     Atrial fibrillation (H)     Valvular Afib.  Diagnosed 06/2017. Pt initiated on coumadin 7/18/17     Erectile dysfunction      Heart murmur     rheumatic fever as a child     Hyperlipidemia      Mitral regurgitation     rheumatic fever as a child. 3-4+ per MITCHELL 7/25/17     Tobacco abuse        PAST SURGICAL HISTORY:  Past Surgical History:   Procedure Laterality Date     COLONOSCOPY N/A 9/8/2014    Procedure: COMBINED COLONOSCOPY, SINGLE BIOPSY/POLYPECTOMY BY BIOPSY;  Surgeon: Kai Bailey MD;  Location:  GI     ORTHOPEDIC SURGERY      RIght knee scope       FAMILY HISTORY:  Family History   Problem Relation Age of Onset     DIABETES Mother      Obesity  "Mother      DIABETES Father        SOCIAL HISTORY:  Social History     Social History     Marital status: Single     Spouse name: N/A     Number of children: N/A     Years of education: N/A     Social History Main Topics     Smoking status: Former Smoker     Packs/day: 2.00     Years: 39.00     Types: Cigarettes     Quit date: 8/1/2017     Smokeless tobacco: Never Used     Alcohol use 2.4 oz/week     4 Standard drinks or equivalent per week      Comment: occasional     Drug use: No     Sexual activity: Yes     Partners: Female     Other Topics Concern     Parent/Sibling W/ Cabg, Mi Or Angioplasty Before 65f 55m? No     Social History Narrative       Review of Systems:  Skin:  Negative     Eyes:  Positive for glasses  ENT:  Negative    Respiratory:  Positive for dyspnea on exertion  Cardiovascular:    chest pain;Positive for;palpitations;lightheadedness (heaviness, palpitations, none recently)  Gastroenterology: Negative    Genitourinary:  Negative    Musculoskeletal:  Positive for back pain  Neurologic:  Positive for headaches  Psychiatric:  Positive for sleep disturbances  Heme/Lymph/Imm:  Negative    Endocrine:  Negative      Physical Exam:  Vitals: BP 95/67  Pulse 82  Ht 1.778 m (5' 10\")  Wt 118.4 kg (261 lb)  BMI 37.45 kg/m2    Constitutional:  cooperative, alert and oriented, well developed, well nourished, in no acute distress        Skin:  warm and dry to the touch, no apparent skin lesions or masses noted        Head:  not assessed this visit;no masses or lesions        Eyes:  pupils equal and round, conjunctivae and lids unremarkable, sclera white, no xanthalasma, EOMS intact, no nystagmus        ENT:           Neck:  carotid pulses are full and equal bilaterally, JVP normal, no carotid bruit, no thyromegaly        Chest:  normal breath sounds, clear to auscultation, normal A-P diameter, normal symmetry, normal respiratory excursion, no use of accessory muscles        Cardiac: regular rhythm, normal " S1/S2, no S3 or S4, apical impulse not displaced, no murmurs, gallops or rubs       systolic murmur;grade 3;radiation to the axilla          Abdomen:           Vascular: pulses full and equal, no bruits auscultated                                      Extremities and Back:  no deformities, clubbing, cyanosis, erythema observed;no edema        Neurological:  affect appropriate, oriented to time, person and place          Recent Lab Results:  LIPID RESULTS:  Lab Results   Component Value Date    CHOL 207 (H) 08/13/2014    HDL 39 (L) 08/13/2014     (H) 08/13/2014    TRIG 124 08/13/2014    CHOLHDLRATIO 5.3 (H) 08/13/2014       LIVER ENZYME RESULTS:  Lab Results   Component Value Date    AST 8 08/13/2014    ALT 20 08/13/2014       CBC RESULTS:  Lab Results   Component Value Date    WBC 8.7 08/13/2014    RBC 4.87 08/13/2014    HGB 15.4 08/13/2014    HCT 46.0 08/13/2014    MCV 95 08/13/2014    MCH 31.6 08/13/2014    MCHC 33.5 08/13/2014    RDW 14.5 08/13/2014     08/13/2014       BMP RESULTS:  Lab Results   Component Value Date     05/31/2017    POTASSIUM 4.1 05/31/2017    CHLORIDE 109 05/31/2017    CO2 27 05/31/2017    ANIONGAP 7 05/31/2017    GLC 89 05/31/2017    BUN 12 05/31/2017    CR 0.97 05/31/2017    GFRESTIMATED 80 05/31/2017    GFRESTBLACK >90   GFR Calc   05/31/2017    FIORELLA 8.8 05/31/2017        A1C RESULTS:  No results found for: A1C    INR RESULTS:  Lab Results   Component Value Date    INR 2.8 (A) 08/11/2017    INR 2.7 (A) 07/28/2017           CC  Carloselva Archibald, DO  [address] [place] [address]

## 2017-08-16 LAB — INTERPRETATION ECG - MUSE: NORMAL

## 2017-08-18 ENCOUNTER — ANTICOAGULATION THERAPY VISIT (OUTPATIENT)
Dept: ANTICOAGULATION | Facility: CLINIC | Age: 57
End: 2017-08-18
Payer: COMMERCIAL

## 2017-08-18 DIAGNOSIS — I48.91 ATRIAL FIBRILLATION (H): ICD-10-CM

## 2017-08-18 DIAGNOSIS — Z79.01 LONG-TERM (CURRENT) USE OF ANTICOAGULANTS: ICD-10-CM

## 2017-08-18 LAB — INR POINT OF CARE: 2.3 (ref 0.86–1.14)

## 2017-08-18 PROCEDURE — 85610 PROTHROMBIN TIME: CPT | Mod: QW

## 2017-08-18 PROCEDURE — 99207 ZZC NO CHARGE NURSE ONLY: CPT

## 2017-08-18 PROCEDURE — 36416 COLLJ CAPILLARY BLOOD SPEC: CPT

## 2017-08-18 NOTE — PROGRESS NOTES
ANTICOAGULATION FOLLOW-UP CLINIC VISIT    Patient Name:  Derek Stover  Date:  8/18/2017  Contact Type:  Face to Face    SUBJECTIVE:     Patient Findings     Positives Intentional hold of therapy (Held Thu, Fri, Sat, Sun for angiogram), No Problem Findings    Comments Pt has appt w/cardiologist to discuss valve surgery on Monday. Advised pt to call [facility] if surgery is going to be soon, so appt Fri can be scheduled sooner if needed.           OBJECTIVE    INR Protime   Date Value Ref Range Status   08/18/2017 2.3 (A) 0.86 - 1.14 Final       ASSESSMENT / PLAN  INR assessment THER    Recheck INR In: 1 WEEK    INR Location Clinic      Anticoagulation Summary as of 8/18/2017     INR goal 2.0-3.0   Today's INR 2.3   Maintenance plan 5 mg (5 mg x 1) on Fri; 2.5 mg (5 mg x 0.5) all other days   Full instructions 5 mg on Fri; 2.5 mg all other days   Weekly total 20 mg   Plan last modified Ghanshyam Tian RN (8/18/2017)   Next INR check 8/25/2017   Target end date     Indications   Atrial fibrillation (H) [I48.91]  Atrial fibrillation (H) [I48.91] (Resolved) [I48.91]  Long-term (current) use of anticoagulants [Z79.01] [Z79.01]         Anticoagulation Episode Summary     INR check location     Preferred lab     Send INR reminders to SAMY MARIE    Comments       Anticoagulation Care Providers     Provider Role Specialty Phone number    Carlos ArchibaldDO [facility] [phone]            See the Encounter Report to view Anticoagulation Flowsheet and Dosing Calendar (Go to Encounters tab in chart review, and find the Anticoagulation Therapy Visit)    Dosage adjustment made based on physician directed care plan.    Ghanshyam Tian, LUL

## 2017-08-18 NOTE — MR AVS SNAPSHOT
Derek SHERMAN Stover   8/18/2017 9:30 AM   Anticoagulation Therapy Visit    Description:  56 year old male   Provider:  PH ANTI COAG   Department:  [facility]           INR as of 8/18/2017     Today's INR 2.3      Anticoagulation Summary as of 8/18/2017     INR goal 2.0-3.0   Today's INR 2.3   Full instructions 5 mg on Fri; 2.5 mg all other days   Next INR check 8/25/2017    Indications   Atrial fibrillation (H) [I48.91]  Atrial fibrillation (H) [I48.91] (Resolved) [I48.91]  Long-term (current) use of anticoagulants [Z79.01] [Z79.01]         Your next Anticoagulation Clinic appointment(s)     Aug 18, 2017  9:30 AM CDT   [facility]    [address] [place] [id number]   [phone]              Contact Numbers     Clinic Number:         August 2017 Details    Sun Mon Tue Wed Thu Fri Sat       1               2               3               4               5                 6               7               8               9               10               11               12                 13               14               15               16               17               18      5 mg   See details      19      2.5 mg           20      2.5 mg         21      2.5 mg         22      2.5 mg         23      2.5 mg         24      2.5 mg         25            26                 27               28               29               30               31                  Date Details   08/18 This INR check       Date of next INR:  8/25/2017         How to take your warfarin dose     To take:  2.5 mg Take 0.5 of a 5 mg tablet.    To take:  5 mg Take 1 of the 5 mg tablets.

## 2017-08-21 ENCOUNTER — HOSPITAL ENCOUNTER (OUTPATIENT)
Dept: GENERAL RADIOLOGY | Facility: CLINIC | Age: 57
Discharge: HOME | End: 2017-08-21
Attending: SURGERY | Admitting: SURGERY
Payer: COMMERCIAL

## 2017-08-21 ENCOUNTER — OFFICE VISIT (OUTPATIENT)
Dept: CARDIOLOGY | Facility: CLINIC | Age: 57
End: 2017-08-21

## 2017-08-21 ENCOUNTER — HOSPITAL ENCOUNTER (OUTPATIENT)
Dept: LAB | Facility: CLINIC | Age: 57
End: 2017-08-21
Attending: SURGERY
Payer: COMMERCIAL

## 2017-08-21 VITALS
OXYGEN SATURATION: 95 % | SYSTOLIC BLOOD PRESSURE: 90 MMHG | BODY MASS INDEX: 38.51 KG/M2 | HEART RATE: 82 BPM | DIASTOLIC BLOOD PRESSURE: 68 MMHG | HEIGHT: 70 IN | WEIGHT: 269 LBS

## 2017-08-21 DIAGNOSIS — I05.9 MITRAL VALVE DISEASE: Primary | ICD-10-CM

## 2017-08-21 DIAGNOSIS — I05.9 MITRAL VALVE DISEASE: ICD-10-CM

## 2017-08-21 LAB
ALBUMIN UR-MCNC: NEGATIVE MG/DL
APPEARANCE UR: CLEAR
BILIRUB UR QL STRIP: NEGATIVE
COLOR UR AUTO: NORMAL
GLUCOSE UR STRIP-MCNC: NEGATIVE MG/DL
HGB UR QL STRIP: NEGATIVE
KETONES UR STRIP-MCNC: NEGATIVE MG/DL
LEUKOCYTE ESTERASE UR QL STRIP: NEGATIVE
NITRATE UR QL: NEGATIVE
PH UR STRIP: 6.5 PH (ref 5–7)
SOURCE: NORMAL
SP GR UR STRIP: 1.01 (ref 1–1.03)
UROBILINOGEN UR STRIP-MCNC: NORMAL MG/DL (ref 0–2)

## 2017-08-21 PROCEDURE — 86923 COMPATIBILITY TEST ELECTRIC: CPT | Performed by: SURGERY

## 2017-08-21 PROCEDURE — 36415 COLL VENOUS BLD VENIPUNCTURE: CPT | Performed by: SURGERY

## 2017-08-21 PROCEDURE — 81003 URINALYSIS AUTO W/O SCOPE: CPT | Performed by: SURGERY

## 2017-08-21 PROCEDURE — 71020 XR CHEST 2 VW: CPT

## 2017-08-21 PROCEDURE — 86901 BLOOD TYPING SEROLOGIC RH(D): CPT | Performed by: SURGERY

## 2017-08-21 PROCEDURE — 86900 BLOOD TYPING SEROLOGIC ABO: CPT | Performed by: SURGERY

## 2017-08-21 PROCEDURE — 86850 RBC ANTIBODY SCREEN: CPT | Performed by: SURGERY

## 2017-08-21 NOTE — MR AVS SNAPSHOT
"              After Visit Summary   8/21/2017    Derek Stover    MRN: [id number]           Patient Information     Date Of Birth          1960        Visit Information        Provider Department      8/21/2017 2:00 PM Ivana Marie MD [facility] Cardiothoracic        Today's Diagnoses     Mitral valve disease    -  1       Follow-ups after your visit        Who to contact     Please call your clinic at [phone] to:    Ask questions about your health    Make or cancel appointments    Discuss your medicines    Learn about your test results    Speak to your doctor   If you have compliments or concerns about an experience at your clinic, or if you wish to file a complaint, please contact [facility] Patient Relations at [phone] or email us at [email or web address]         Additional Information About Your Visit        MyChart Information     [employer] gives you secure access to your electronic health record. If you see a primary care provider, you can also send messages to your care team and make appointments. If you have questions, please call your primary care clinic.  If you do not have a primary care provider, please call [phone] and they will assist you.      [employer] is an electronic gateway that provides easy, online access to your medical records. With [employer], you can request a clinic appointment, read your test results, renew a prescription or communicate with your care team.     To access your existing account, please contact your [facility] Clinic or call [phone] for assistance.        Care EveryWhere ID     This is your Care EveryWhere ID. This could be used by other organizations to access your [place] medical records  [id number]        Your Vitals Were     Pulse Height Pulse Oximetry BMI (Body Mass Index)          82 1.778 m (5' 10\") 95% 38.6 kg/m2         Blood Pressure from Last 3 Encounters:   09/14/17 105/67 "   09/05/17 104/70   08/21/17 90/68    Weight from Last 3 Encounters:   09/14/17 125.3 kg (276 lb 3.8 oz)   09/05/17 122.8 kg (270 lb 12.8 oz)   08/21/17 122 kg (269 lb)               Primary Care Provider Office Phone # Fax #    Osbaldo Renee [phone]       [address] [facility] [address]        Equal Access to Services     HOMER SPAIN : Hadii aad ku hadasho Soomaali, waaxda luqadaha, qaybta kaalmada adeegyada, waxay idiin hayaan adeeg haley latalia . So [facility] [phone].    ATENCIÓN: Si habla español, tiene a macias disposición servicios gratuitos de asistencia lingüística. [facility] [phone].    We comply with applicable federal civil rights laws and [place] laws. We do not discriminate on the basis of race, color, national origin, age, disability sex, sexual orientation or gender identity.            Thank you!     Thank you for choosing [facility]  for your care. Our goal is always to provide you with excellent care. Hearing back from our patients is one way we can continue to improve our services. Please take a few minutes to complete the written survey that you may receive in the mail after your visit with us. Thank you!             Your Updated Medication List - Protect others around you: Learn how to safely use, store and throw away your medicines at www.disposemymeds.org.      Notice  As of 8/21/2017 11:59 PM    You have not been prescribed any medications.

## 2017-08-21 NOTE — LETTER
8/21/2017      RE: Derek Stover  [address] [facility] [address]       REFERRING CARDIOLOGIST:  Oni Ross MD       REASON FOR CONSULTATION:  Evaluation for mitral valve surgery.      HISTORY OF PRESENT ILLNESS:  Mr. 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 Ross.  He also went into atrial fibrillation recently, which was felt to be associated with underlying mitral valve disease and was started on rate-control therapy and Coumadin.  Most recently, he is also now having some symptoms of shortness of breath.  A recent MITCHELL demonstrated severe mitral valve regurgitation and mild to moderate tricuspid valve regurgitation with preserved LV function.  In anticipation for surgery, a coronary angiogram was also performed that demonstrated no coronary artery disease.  The patient presents today for evaluation for mitral valve surgery.      PAST MEDICAL HISTORY:  Rheumatic fever as a child.      PAST SURGICAL HISTORY:  Right knee arthroscopic surgery.      FAMILY HISTORY:  Noncontributory.      SOCIAL HISTORY:  He was a 2 pack a day smoker for 39 years, but just recently quit.  He denies any significant alcohol use.      ALLERGIES:  No known drug allergies.      CURRENT MEDICATIONS:  Coumadin, carvedilol, Chantix and aspirin.      REVIEW OF SYSTEMS:  As per HPI.  All other 10 point reviews of systems are completed and were otherwise negative unless stated above.      PHYSICAL EXAMINATION:     VITAL SIGNS:  Stable.   GENERAL:  He appears well, in no acute distress.   HEENT:  Within normal limits.   NECK:  Supple.  No lymphadenopathy.   CARDIOVASCULAR:  Irregularly irregular.  Normal S1, S2.  Grade 3/6 systolic murmur at the apex.   LUNGS:  Clear bilaterally.   ABDOMEN:  Soft, nontender, nondistended.   EXTREMITIES:  Negative for edema, cyanosis or clubbing.    NEUROLOGIC:  He is A&O x3 with no focal deficits.      LABORATORY STUDIES:  Coronary angiogram performed 08/14/2017 demonstrated no significant coronary artery disease.  Transesophageal echo performed 07/25/2017 demonstrated normal LV systolic size and function with a severely dilated left atrium, moderate to severely dilated right atrium, severe mitral valve regurgitation with mildly thickened leaflets and a slightly retracted posterior leaflet with eccentric jet.  Mild to moderate tricuspid valve regurgitation.      IMPRESSION AND PLAN:  Mr. Stover is a 56-year-old gentleman with a history of rheumatic fever as a child and now has severe mitral valve regurgitation.  On echo, his mitral valve does not have the classic appearance of rheumatic heart disease, and he has no significant mitral valve stenosis; however, the leaflets do appear thickened.  He also has new onset atrial fibrillation recently and was started on Coumadin.  Even though his AFib has not been long-standing, given the severely dilated left atrium, I do not think that adding a Aldana maze procedure would have a high success rate of restoring normal sinus rhythm, although we could still consider it, but I am at this point hesitant.  My recommendation is mitral valve repair/replacement.  Given the appearance of his mitral valve and the leaflet thickening, I do not think a repair would be highly likely.  We did discuss the most likelihood of replacing the valve with a mechanical valve given his age and the fact that he is already on Coumadin.  He understands.  I went over the indications for the mitral valve surgery, mitral valve replacement, exclusion of left atrial appendage and its risks and benefits and the potential complications.  These complications include, but are not strictly limited to, infection, bleeding, stroke, postop MI, postop arrhythmias, pulmonary or renal complications.  I think overall he is an excellent surgical candidate, and I  quoted an operative mortality risk of 1%.  The patient understands and agrees to proceed.  We will be scheduling him for a mitral valve replacement and exclusion of the left atrial appendage within the next couple weeks.        Thank you very much for this referral.         TAN EUGENE MD             D: 2017 10:29   T: 2017 09:05   MT: lamonte      Name:     HAKEEM BORJAS   MRN:      [id number]        Account:      [id number]   :      1960           Service Date: 2017      Document: [id number]        Tan Eugene MD

## 2017-08-21 NOTE — LETTER
8/21/2017      RE: Derek Stover  [address] [facility] [address]       Dear Colleague,    Thank you for the opportunity to participate in the care of your patient, Derek Stover, at the [facility] CARDIOTHORACIC at [facility]. Please see a copy of my visit note below.    REFERRING CARDIOLOGIST:  Oni Ross MD       REASON FOR CONSULTATION:  Evaluation for mitral valve surgery.      HISTORY OF PRESENT ILLNESS:  Mr. 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 Ross.  He also went into atrial fibrillation recently, which was felt to be associated with underlying mitral valve disease and was started on rate-control therapy and Coumadin.  Most recently, he is also now having some symptoms of shortness of breath.  A recent MITCHELL demonstrated severe mitral valve regurgitation and mild to moderate tricuspid valve regurgitation with preserved LV function.  In anticipation for surgery, a coronary angiogram was also performed that demonstrated no coronary artery disease.  The patient presents today for evaluation for mitral valve surgery.      PAST MEDICAL HISTORY:  Rheumatic fever as a child.      PAST SURGICAL HISTORY:  Right knee arthroscopic surgery.      FAMILY HISTORY:  Noncontributory.      SOCIAL HISTORY:  He was a 2 pack a day smoker for 39 years, but just recently quit.  He denies any significant alcohol use.      ALLERGIES:  No known drug allergies.      CURRENT MEDICATIONS:  Coumadin, carvedilol, Chantix and aspirin.      REVIEW OF SYSTEMS:  As per HPI.  All other 10 point reviews of systems are completed and were otherwise negative unless stated above.      PHYSICAL EXAMINATION:     VITAL SIGNS:  Stable.   GENERAL:  He appears well, in no acute distress.   HEENT:  Within normal limits.   NECK:  Supple.  No  lymphadenopathy.   CARDIOVASCULAR:  Irregularly irregular.  Normal S1, S2.  Grade 3/6 systolic murmur at the apex.   LUNGS:  Clear bilaterally.   ABDOMEN:  Soft, nontender, nondistended.   EXTREMITIES:  Negative for edema, cyanosis or clubbing.   NEUROLOGIC:  He is A&O x3 with no focal deficits.      LABORATORY STUDIES:  Coronary angiogram performed 08/14/2017 demonstrated no significant coronary artery disease.  Transesophageal echo performed 07/25/2017 demonstrated normal LV systolic size and function with a severely dilated left atrium, moderate to severely dilated right atrium, severe mitral valve regurgitation with mildly thickened leaflets and a slightly retracted posterior leaflet with eccentric jet.  Mild to moderate tricuspid valve regurgitation.      IMPRESSION AND PLAN:  Mr. Stover is a 56-year-old gentleman with a history of rheumatic fever as a child and now has severe mitral valve regurgitation.  On echo, his mitral valve does not have the classic appearance of rheumatic heart disease, and he has *** significant mitral valve stenosis; however, the leaflets do appear thickened.  He also has new onset atrial fibrillation recently and was started on Coumadin.  Even though his AFib has not been long-standing, given the severely dilated left atrium, I do not think that adding a Aldana maze procedure would have a high success rate of restoring normal sinus rhythm, although we could still consider it, but I am at this point hesitant.  My recommendation is mitral valve repair/replacement.  Given the appearance of his mitral valve and the leaflet thickening, I do not think a repair would be highly likely.  We did discuss the most likelihood of replacing the valve with a mechanical valve given his age and the fact that he is already on Coumadin.  He understands.  I went over the indications for the mitral valve surgery, mitral valve replacement, exclusion of left atrial appendage and its risks and benefits and  the potential complications.  These complications include, but are not strictly limited to, infection, bleeding, stroke, postop MI, postop arrhythmias, pulmonary or renal complications.  I think overall he is an excellent surgical candidate, and I quoted an operative mortality risk of 1%.  The patient understands and agrees to proceed.  We will be scheduling him for a mitral valve replacement and exclusion of the left atrial appendage within the next couple weeks.        Thank you very much for this referral.         TAN EUGENE MD             D: 2017 10:29   T: 2017 09:05   MT: lamonte      Name:     HAKEEM BORJAS   MRN:      [id number]        Account:      [id number]   :      1960           Service Date: 2017      Document: [id number]

## 2017-08-22 ENCOUNTER — TELEPHONE (OUTPATIENT)
Dept: CARDIOLOGY | Facility: CLINIC | Age: 57
End: 2017-08-22

## 2017-08-22 DIAGNOSIS — I05.9 MITRAL VALVE DISEASE, RHEUMATIC: Primary | ICD-10-CM

## 2017-08-22 NOTE — TELEPHONE ENCOUNTER
Per task, pt needs to schedule surgery with Dr. Marie after labor day. Talked with pt and offered him the first opening 9/12. Pt ok with that. Will call if anything changes

## 2017-08-23 ENCOUNTER — TELEPHONE (OUTPATIENT)
Dept: OTHER | Facility: CLINIC | Age: 57
End: 2017-08-23

## 2017-08-23 DIAGNOSIS — I05.1 RHEUMATIC MITRAL REGURGITATION: Primary | ICD-10-CM

## 2017-08-23 NOTE — TELEPHONE ENCOUNTER
Spoke with patient this am. OR scheduled 9/12. Last dose of Coumadin 9/6. PFT's scheduled 8/29. Dental appt 8/29.

## 2017-08-24 DIAGNOSIS — I05.9 MITRAL VALVE DISEASE: Primary | ICD-10-CM

## 2017-08-24 RX ORDER — MUPIROCIN 20 MG/G
OINTMENT TOPICAL 2 TIMES DAILY
Status: CANCELLED | OUTPATIENT
Start: 2017-08-24 | End: 2017-08-25

## 2017-08-24 RX ORDER — CHLORHEXIDINE GLUCONATE 40 MG/ML
SOLUTION TOPICAL ONCE
Status: CANCELLED | OUTPATIENT
Start: 2017-08-24 | End: 2017-08-24

## 2017-08-24 RX ORDER — CEFAZOLIN SODIUM 1 G/50ML
3 SOLUTION INTRAVENOUS
Status: CANCELLED | OUTPATIENT
Start: 2017-08-24

## 2017-08-24 RX ORDER — CEFAZOLIN SODIUM 1 G/3ML
1 INJECTION, POWDER, FOR SOLUTION INTRAMUSCULAR; INTRAVENOUS SEE ADMIN INSTRUCTIONS
Status: CANCELLED | OUTPATIENT
Start: 2017-08-24

## 2017-08-24 RX ORDER — METOPROLOL TARTRATE 25 MG/1
25 TABLET, FILM COATED ORAL
Status: CANCELLED | OUTPATIENT
Start: 2017-08-24

## 2017-08-25 ENCOUNTER — ANTICOAGULATION THERAPY VISIT (OUTPATIENT)
Dept: ANTICOAGULATION | Facility: CLINIC | Age: 57
End: 2017-08-25
Payer: COMMERCIAL

## 2017-08-25 DIAGNOSIS — Z79.01 LONG-TERM (CURRENT) USE OF ANTICOAGULANTS: ICD-10-CM

## 2017-08-25 DIAGNOSIS — I48.20 CHRONIC ATRIAL FIBRILLATION (H): ICD-10-CM

## 2017-08-25 DIAGNOSIS — I48.91 ATRIAL FIBRILLATION (H): ICD-10-CM

## 2017-08-25 DIAGNOSIS — Z79.01 LONG TERM CURRENT USE OF ANTICOAGULANT THERAPY: ICD-10-CM

## 2017-08-25 LAB — INR POINT OF CARE: 2 (ref 0.86–1.14)

## 2017-08-25 PROCEDURE — 85610 PROTHROMBIN TIME: CPT | Mod: QW

## 2017-08-25 PROCEDURE — 99207 ZZC NO CHARGE NURSE ONLY: CPT

## 2017-08-25 PROCEDURE — 36416 COLLJ CAPILLARY BLOOD SPEC: CPT

## 2017-08-25 RX ORDER — WARFARIN SODIUM 5 MG/1
TABLET ORAL
Qty: 25 TABLET | Refills: 0 | Status: ON HOLD | OUTPATIENT
Start: 2017-08-25 | End: 2017-09-12

## 2017-08-25 NOTE — PROGRESS NOTES
ANTICOAGULATION FOLLOW-UP CLINIC VISIT    Patient Name:  Derek Stover  Date:  8/25/2017  Contact Type:  Face to Face    SUBJECTIVE:     Patient Findings     Positives No Problem Findings           OBJECTIVE    INR Protime   Date Value Ref Range Status   08/25/2017 2.0 (A) 0.86 - 1.14 Final       ASSESSMENT / PLAN  INR assessment THER    Recheck INR In: 10 DAYS    INR Location Clinic      Anticoagulation Summary as of 8/25/2017     INR goal 2.0-3.0   Today's INR 2.0   Maintenance plan 5 mg (5 mg x 1) on Fri; 2.5 mg (5 mg x 0.5) all other days   Full instructions 5 mg on Fri; 2.5 mg all other days   Weekly total 20 mg   No change documented Perla Walden RN   Plan last modified Ghanshyam Tian RN (8/18/2017)   Next INR check 9/5/2017   Target end date     Indications   Atrial fibrillation (H) [I48.91]  Atrial fibrillation (H) [I48.91] (Resolved) [I48.91]  Long-term (current) use of anticoagulants [Z79.01] [Z79.01]         Anticoagulation Episode Summary     INR check location     Preferred lab     Send INR reminders to [facility] [place]    Comments       Anticoagulation Care Providers     Provider Role Specialty Phone number    Dragan Carlos Yarbrough DO [facility] Internal Medicine [phone]            See the Encounter Report to view Anticoagulation Flowsheet and Dosing Calendar (Go to Encounters tab in chart review, and find the Anticoagulation Therapy Visit)    Dosage adjustment made based on physician directed care plan.    Mitral valve replacement on 9/12. Per pt, he was advised by surgeon's nurse that he does not need to bridge with lovenox, but should hold starting 9/6. He will be admitted 7 days (per pt) and then rehabbing at a facility for 2 weeks.     Perla Walden, RN

## 2017-08-25 NOTE — MR AVS SNAPSHOT
Derek SHERMAN Stover   8/25/2017 10:30 AM   Anticoagulation Therapy Visit    Description:  56 year old male   Provider:  [facility] ANTI COAG   Department:  Ph Anticoag           INR as of 8/25/2017     Today's INR 2.0      Anticoagulation Summary as of 8/25/2017     INR goal 2.0-3.0   Today's INR 2.0   Full instructions 5 mg on Fri; 2.5 mg all other days   Next INR check 9/5/2017    Indications   Atrial fibrillation (H) [I48.91]  Atrial fibrillation (H) [I48.91] (Resolved) [I48.91]  Long-term (current) use of anticoagulants [Z79.01] [Z79.01]         Your next Anticoagulation Clinic appointment(s)     Aug 25, 2017 10:30 AM CDT   Anticoagulation Visit with [facility]AG   [facility] ([address] [address]   [phone]            Sep 05, 2017  8:45 AM CDT   Anticoagulation Visit with PH ANTI COAG   [facility] ([address] [address]   [phone]              Contact Numbers     Clinic Number:         August 2017 Details    Sun Mon Tue Wed Thu Fri Sat       1               2               3               4               5                 6               7               8               9               10               11               12                 13               14               15               16               17               18               19                 20               21               22               23               24               25      5 mg   See details      26      2.5 mg           27      2.5 mg         28      2.5 mg         29      2.5 mg         30      2.5 mg         31      2.5 mg            Date Details   08/25 This INR check               How to take your warfarin dose     To take:  2.5 mg Take 0.5 of a 5 mg tablet.    To take:  5 mg Take 1 of the 5 mg tablets.           September 2017 Details    Sun Mon Tue Wed Thu Fri Sat          1      5 mg          2      2.5 mg           3      2.5 mg         4      2.5 mg         5            6               7               8               9                 10               11               12               13               14               15               16                 17               18               19               20               21               22               23                 24               25               26               27               28               29               30                Date Details   No additional details    Date of next INR:  9/5/2017         How to take your warfarin dose     To take:  2.5 mg Take 0.5 of a 5 mg tablet.    To take:  5 mg Take 1 of the 5 mg tablets.

## 2017-08-25 NOTE — PROGRESS NOTES
REFERRING CARDIOLOGIST:  Oni Ross MD       REASON FOR CONSULTATION:  Evaluation for mitral valve surgery.      HISTORY OF PRESENT ILLNESS:  Mr. 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 Ross.  He also went into atrial fibrillation recently, which was felt to be associated with underlying mitral valve disease and was started on rate-control therapy and Coumadin.  Most recently, he is also now having some symptoms of shortness of breath.  A recent MITCHELL demonstrated severe mitral valve regurgitation and mild to moderate tricuspid valve regurgitation with preserved LV function.  In anticipation for surgery, a coronary angiogram was also performed that demonstrated no coronary artery disease.  The patient presents today for evaluation for mitral valve surgery.      PAST MEDICAL HISTORY:  Rheumatic fever as a child.      PAST SURGICAL HISTORY:  Right knee arthroscopic surgery.      FAMILY HISTORY:  Noncontributory.      SOCIAL HISTORY:  He was a 2 pack a day smoker for 39 years, but just recently quit.  He denies any significant alcohol use.      ALLERGIES:  No known drug allergies.      CURRENT MEDICATIONS:  Coumadin, carvedilol, Chantix and aspirin.      REVIEW OF SYSTEMS:  As per HPI.  All other 10 point reviews of systems are completed and were otherwise negative unless stated above.      PHYSICAL EXAMINATION:     VITAL SIGNS:  Stable.   GENERAL:  He appears well, in no acute distress.   HEENT:  Within normal limits.   NECK:  Supple.  No lymphadenopathy.   CARDIOVASCULAR:  Irregularly irregular.  Normal S1, S2.  Grade 3/6 systolic murmur at the apex.   LUNGS:  Clear bilaterally.   ABDOMEN:  Soft, nontender, nondistended.   EXTREMITIES:  Negative for edema, cyanosis or clubbing.   NEUROLOGIC:  He is A&O x3 with no focal deficits.      LABORATORY STUDIES:  Coronary  angiogram performed 08/14/2017 demonstrated no significant coronary artery disease.  Transesophageal echo performed 07/25/2017 demonstrated normal LV systolic size and function with a severely dilated left atrium, moderate to severely dilated right atrium, severe mitral valve regurgitation with mildly thickened leaflets and a slightly retracted posterior leaflet with eccentric jet.  Mild to moderate tricuspid valve regurgitation.      IMPRESSION AND PLAN:  Mr. Stover is a 56-year-old gentleman with a history of rheumatic fever as a child and now has severe mitral valve regurgitation.  On echo, his mitral valve does not have the classic appearance of rheumatic heart disease, and he has no significant mitral valve stenosis; however, the leaflets do appear thickened.  He also has new onset atrial fibrillation recently and was started on Coumadin.  Even though his AFib has not been long-standing, given the severely dilated left atrium, I do not think that adding a Aldana maze procedure would have a high success rate of restoring normal sinus rhythm, although we could still consider it, but I am at this point hesitant.  My recommendation is mitral valve repair/replacement.  Given the appearance of his mitral valve and the leaflet thickening, I do not think a repair would be highly likely.  We did discuss the most likelihood of replacing the valve with a mechanical valve given his age and the fact that he is already on Coumadin.  He understands.  I went over the indications for the mitral valve surgery, mitral valve replacement, exclusion of left atrial appendage and its risks and benefits and the potential complications.  These complications include, but are not strictly limited to, infection, bleeding, stroke, postop MI, postop arrhythmias, pulmonary or renal complications.  I think overall he is an excellent surgical candidate, and I quoted an operative mortality risk of 1%.  The patient understands and agrees to  proceed.  We will be scheduling him for a mitral valve replacement and exclusion of the left atrial appendage within the next couple weeks.        Thank you very much for this referral.         TAN EUGENE MD             D: 2017 10:29   T: 2017 09:05   MT: lamonte      Name:     HAKEEM BORJAS   MRN:      [id number]        Account:      [id number]   :      1960           Service Date: 2017      Document: [id number]

## 2017-08-29 ENCOUNTER — TELEPHONE (OUTPATIENT)
Dept: FAMILY MEDICINE | Facility: OTHER | Age: 57
End: 2017-08-29

## 2017-08-29 ENCOUNTER — HOSPITAL ENCOUNTER (OUTPATIENT)
Dept: RESPIRATORY THERAPY | Facility: CLINIC | Age: 57
Discharge: HOME | End: 2017-08-29
Attending: SURGERY | Admitting: SURGERY
Payer: COMMERCIAL

## 2017-08-29 VITALS — OXYGEN SATURATION: 97 %

## 2017-08-29 DIAGNOSIS — Z29.89 NEED FOR SBE (SUBACUTE BACTERIAL ENDOCARDITIS) PROPHYLAXIS: ICD-10-CM

## 2017-08-29 DIAGNOSIS — I05.9 MITRAL VALVE DISEASE, RHEUMATIC: ICD-10-CM

## 2017-08-29 PROCEDURE — 25000125 ZZHC RX 250: Performed by: SURGERY

## 2017-08-29 PROCEDURE — 94726 PLETHYSMOGRAPHY LUNG VOLUMES: CPT

## 2017-08-29 PROCEDURE — 94729 DIFFUSING CAPACITY: CPT

## 2017-08-29 PROCEDURE — 94060 EVALUATION OF WHEEZING: CPT

## 2017-08-29 RX ORDER — ALBUTEROL SULFATE 0.83 MG/ML
2.5 SOLUTION RESPIRATORY (INHALATION)
Status: COMPLETED | OUTPATIENT
Start: 2017-08-29 | End: 2017-08-29

## 2017-08-29 RX ORDER — AMOXICILLIN 500 MG/1
CAPSULE ORAL
Qty: 9 CAPSULE | Refills: 0 | Status: ON HOLD | OUTPATIENT
Start: 2017-08-29 | End: 2017-09-12

## 2017-08-29 RX ADMIN — ALBUTEROL SULFATE 2.5 MG: 2.5 SOLUTION RESPIRATORY (INHALATION) at 15:04

## 2017-08-29 NOTE — PROGRESS NOTES
Pre-Bronch    Post-Bronch         Actual Pred %Pred  Actual %Pred %Chng       ---- SPIROMETRY ----                          FVC (L) 3.37 4.83 69   4.05 83 +20            FEV1 (L) 2.62 3.78 69   2.97 78 +13            FEV1/FVC (%) 78 78     73   -5            FEV1/SVC (%) 62 74                      FEV1/FEV6 (%) 78 80     75   -4            FEF Max (L/sec) 7.24 9.57 75   8.33 87 +15            FEF 25-75% (L/sec) 2.24 3.30 68   3.25 98 +44            FIVC (L) 3.22       3.37   +4            FIF Max (L/sec) 3.34 7.74 43   4.15 53 +24            Expiratory Time (sec) 7.00       7.30   +4            ----LUNG MECHANICS                           MVV (L/min)   147                      MEP (cmH2O)                          MIP (cmH2O)                          ---- LUNG VOLUMES ----                          SVC (L) 4.22 5.11 82                    IC (L) 3.75 4.38 85                    ERV (L) 0.47 0.73 64                    FRC(Pleth) (L) 3.77 3.56 105                    RV (Pleth) (L) 3.30 2.30 143                    TLC (Pleth) (L) 7.52 7.13 105                    RV/TLC (Pleth) (%) 44 35                      ---- DIFFUSION ----                          DLCOunc (ml/min/mmHg) 31.78 29.63 107                    DLCOcor (ml/min/mmHg) 31.02 29.63 104                    DL/VA (ml/min/mmHg/L) 4.61 4.34                      VA (L) 6.73 6.82 98                    IVC (L) 4.08                        Hgb (gm/dL) 15.5 12-18

## 2017-08-29 NOTE — DISCHARGE INSTRUCTIONS
Thank you for completing pulmonary function testing today.  All results will be scanned into your epic results for your doctor to review.  Please resume taking all your current prescribed medications and diet as directed by your provider.   If you have not heard from your provider about your testing within two weeks and do not have a follow-up appointment scheduled with them please contact your provider about any questions you have concerning your testing.   Thank you  [facility] Pulmonary Function [facility]

## 2017-08-29 NOTE — TELEPHONE ENCOUNTER
Reason for Call:  Medication or medication refill:    Do you use a [facility]?  Name of the pharmacy and phone number for the current request:  [facility] - [phone]    Name of the medication requested: antibiotic, pre dental work    Other request: appointment is today at 10:00 am, please call in as soon as possible.   Patient states is having open heart surgery on 9/12/17    Can we leave a detailed message on this number? YES    Phone number patient can be reached at: Home number on file [phone] (home)    Best Time: any    Call taken on 8/29/2017 at 8:26 AM by Chela Bailey

## 2017-08-29 NOTE — PROGRESS NOTES
The FEV1, FVC and BCF46-58% are reduced but the FEV1/FVC ratio is normal.  The inspiratory flow rates are reduced.  The FVC is reduced relative to the SVC indicating air trapping.  The airway resistance is normal.  While the TLC, FRC and SVC are within normal limits, the RV is increased.  Following administration of bronchodilators, there is a significant response indicated by the increased FVC.  The diffusing capacity is normal.    Minimal airway obstruction and a response to bronchodilators indicates asthma.    IMPRESSION:  Minimal Airflow Obstruction  -Asthmatic Type        This preliminary report should not be used clinically unless reviewed and signed by a physician.

## 2017-08-29 NOTE — TELEPHONE ENCOUNTER
Please notify patient.  Signed Prescriptions:                        Disp   Refills    amoxicillin (AMOXIL) 500 MG capsule        9 caps*0        Sig: Take 6 tablets one hour before dental procedure and 3           tablets 6 hours after  Authorizing Provider: OSBALDO MOJICA    Electronically signed by Osbaldo Mojica MD

## 2017-09-03 LAB
DLCOCOR-%PRED-PRE: 104 %
DLCOCOR-PRE: 31.02 ML/MIN/MMHG
DLCOUNC-%PRED-PRE: 107 %
DLCOUNC-PRE: 31.78 ML/MIN/MMHG
DLCOUNC-PRED: 29.63 ML/MIN/MMHG
ERV-%PRED-PRE: 64 %
ERV-PRE: 0.47 L
ERV-PRED: 0.73 L
EXPTIME-PRE: 7 SEC
FEF2575-%PRED-POST: 98 %
FEF2575-%PRED-PRE: 68 %
FEF2575-POST: 3.25 L/SEC
FEF2575-PRE: 2.24 L/SEC
FEF2575-PRED: 3.3 L/SEC
FEFMAX-%PRED-PRE: 75 %
FEFMAX-PRE: 7.24 L/SEC
FEFMAX-PRED: 9.57 L/SEC
FEV1-%PRED-PRE: 69 %
FEV1-PRE: 2.62 L
FEV1FEV6-PRE: 78 %
FEV1FEV6-PRED: 80 %
FEV1FVC-PRE: 78 %
FEV1FVC-PRED: 78 %
FEV1SVC-PRE: 62 %
FEV1SVC-PRED: 74 %
FIFMAX-PRE: 3.34 L/SEC
FRCPLETH-%PRED-PRE: 105 %
FRCPLETH-PRE: 3.77 L
FRCPLETH-PRED: 3.56 L
FVC-%PRED-PRE: 69 %
FVC-PRE: 3.37 L
FVC-PRED: 4.83 L
GAW-%PRED-PRE: 62 %
GAW-PRE: 0.64 L/S/CMH2O
GAW-PRED: 1.03 L/S/CMH2O
IC-%PRED-PRE: 85 %
IC-PRE: 3.75 L
IC-PRED: 4.38 L
RVPLETH-%PRED-PRE: 143 %
RVPLETH-PRE: 3.3 L
RVPLETH-PRED: 2.3 L
SGAW-%PRED-PRE: 253 %
SGAW-PRE: 0.2 1/CMH2O*S
SGAW-PRED: 0.08 1/CMH2O*S
SRAW-%PRED-PRE: 117 %
SRAW-PRE: 5.59 CMH2O*S
SRAW-PRED: 4.76 CMH2O*S
TLCPLETH-%PRED-PRE: 105 %
TLCPLETH-PRE: 7.52 L
TLCPLETH-PRED: 7.13 L
VA-%PRED-PRE: 98 %
VA-PRE: 6.73 L
VC-%PRED-PRE: 82 %
VC-PRE: 4.22 L
VC-PRED: 5.11 L

## 2017-09-05 ENCOUNTER — OFFICE VISIT (OUTPATIENT)
Dept: CARDIOLOGY | Facility: CLINIC | Age: 57
End: 2017-09-05
Payer: COMMERCIAL

## 2017-09-05 ENCOUNTER — ANTICOAGULATION THERAPY VISIT (OUTPATIENT)
Dept: ANTICOAGULATION | Facility: CLINIC | Age: 57
End: 2017-09-05
Payer: COMMERCIAL

## 2017-09-05 VITALS
DIASTOLIC BLOOD PRESSURE: 70 MMHG | OXYGEN SATURATION: 97 % | BODY MASS INDEX: 38.77 KG/M2 | WEIGHT: 270.8 LBS | HEART RATE: 80 BPM | SYSTOLIC BLOOD PRESSURE: 104 MMHG | HEIGHT: 70 IN

## 2017-09-05 DIAGNOSIS — I05.9 MITRAL VALVE DISEASE, RHEUMATIC: ICD-10-CM

## 2017-09-05 DIAGNOSIS — I48.91 ATRIAL FIBRILLATION (H): ICD-10-CM

## 2017-09-05 DIAGNOSIS — I48.91 ATRIAL FIBRILLATION, UNSPECIFIED TYPE (H): ICD-10-CM

## 2017-09-05 DIAGNOSIS — Z79.01 LONG-TERM (CURRENT) USE OF ANTICOAGULANTS: ICD-10-CM

## 2017-09-05 LAB — INR POINT OF CARE: 1.8 (ref 0.86–1.14)

## 2017-09-05 PROCEDURE — 36416 COLLJ CAPILLARY BLOOD SPEC: CPT

## 2017-09-05 PROCEDURE — 99207 ZZC NO CHARGE NURSE ONLY: CPT

## 2017-09-05 PROCEDURE — 85610 PROTHROMBIN TIME: CPT | Mod: QW

## 2017-09-05 PROCEDURE — 99213 OFFICE O/P EST LOW 20 MIN: CPT | Performed by: INTERNAL MEDICINE

## 2017-09-05 NOTE — LETTER
9/5/2017    Osbaldo Renee MD  [address] [facility] [address]    RE: Derek Stover       Dear Colleague,    I had the pleasure of seeing Derek Stover in the [facility].    REASON FOR CLINIC VISIT:  Followup MITCHELL and coronary angiogram.      Mr. Stover is a very pleasant 56-year-old gentleman who I saw in July of this year for new-onset dyspnea on exertion and newly diagnosed atrial fibrillation with transthoracic echocardiogram showing at least 3+ mitral regurgitation.  He underwent MITCHELL that confirmed 3-4+ mitral regurgitation, eccentric jet.  It was not classical dramatic in appearance, but there was mitral valve thickening.  LV was mild to moderately dilated with LVEF of 55%.  The patient also underwent coronary angiogram that did not show any coronary artery disease.  He was seen by Dr. Marie from [facility] Surgery and is planned for a mitral valve replacement next week.  The patient is on Coumadin and low-dose carvedilol.  He is today coming to clinic accompanied by his wife.  The patient works in [employer] and over the last few months has been noticing progressive dyspnea on exertion.  He was smoking 2 packs per day but has quit tobacco since 1 month.      PHYSICAL EXAMINATION:   VITAL SIGNS:  Blood pressure 104/70, heart rate 80 and regular, weight 270 pounds, BMI 38.94.   GENERAL:  The patient appears pleasant, comfortable.   NECK:  Normal JVP, no bruit.   CARDIOVASCULAR SYSTEM:  Regular, normal rate.  There is grade 3/6 systolic murmur heard over the apex radiating to the axilla, no S3, S4, rub or gallop.   RESPIRATORY SYSTEM:  Clear to auscultation bilaterally.   ABDOMEN:  Soft, nontender.   EXTREMITIES:  No pitting pedal edema.   NEUROLOGICAL:  Alert, oriented x3.   PSYCHIATRIC:  Normal affect.   SKIN:  No obvious rash.   HEENT:  No pallor or icterus.     Outpatient Encounter Prescriptions as of 9/5/2017   Medication Sig Dispense Refill     [DISCONTINUED] warfarin (COUMADIN)  5 MG tablet Take 5 mg on Friday and 2.5 mg all other days, or as directed by the coumadin clinic. 25 tablet 0     [DISCONTINUED] varenicline (CHANTIX) 1 MG tablet Take 1 tablet (1 mg) by mouth 2 times daily 56 tablet 2     [DISCONTINUED] carvedilol (COREG) 3.125 MG tablet Take 1 tablet (3.125 mg) by mouth 2 times daily (with meals) 180 tablet 1     [DISCONTINUED] amoxicillin (AMOXIL) 500 MG capsule Take 6 tablets one hour before dental procedure and 3 tablets 6 hours after (Patient not taking: Reported on 9/5/2017) 9 capsule 0     [DISCONTINUED] aspirin EC 81 MG EC tablet Take 81 mg by mouth daily Took  4 baby ASA 8/13 and 8/14 in AM       No facility-administered encounter medications on file as of 9/5/2017.       IMPRESSION AND PLAN:  A very delightful 56-year-old gentleman with 3-4+ mitral regurgitation with LV dilatation, progressive dyspnea on exertion, newly diagnosed atrial fibrillation.  Adequate rate control on beta blocker, on Coumadin for stroke prophylaxis with no significant coronary artery disease on pre MVR coronary angiogram.  The patient has history of tobacco abuse but fortunately has quit for the last 1 month.  The patient is set up for mitral valve surgery next week.  The patient does not have any new complaints.  I commended him on quitting tobacco.  We will see him back in Cardiology Clinic after the surgery.     Again, thank you for allowing me to participate in the care of your patient.      Sincerely,    Oni Ross MD     [facility]

## 2017-09-05 NOTE — LETTER
9/5/2017      RE: Derek Stover  [address] [facility] [address]       Dear Colleague,    Thank you for the opportunity to participate in the care of your patient, Derek Stover, at the [facility]. Please see a copy of my visit note below.    HPI and Plan:   See dictation(#[id number])    No orders of the defined types were placed in this encounter.      No orders of the defined types were placed in this encounter.      There are no discontinued medications.      Encounter Diagnoses   Name Primary?     Atrial fibrillation, unspecified type (H)      Mitral valve disease, rheumatic        CURRENT MEDICATIONS:  Current Outpatient Prescriptions   Medication Sig Dispense Refill     warfarin (COUMADIN) 5 MG tablet Take 5 mg on Friday and 2.5 mg all other days, or as directed by the coumadin clinic. 25 tablet 0     varenicline (CHANTIX) 1 MG tablet Take 1 tablet (1 mg) by mouth 2 times daily 56 tablet 2     carvedilol (COREG) 3.125 MG tablet Take 1 tablet (3.125 mg) by mouth 2 times daily (with meals) 180 tablet 1     amoxicillin (AMOXIL) 500 MG capsule Take 6 tablets one hour before dental procedure and 3 tablets 6 hours after (Patient not taking: Reported on 9/5/2017) 9 capsule 0     aspirin EC 81 MG EC tablet Take 81 mg by mouth daily Took  4 baby ASA 8/13 and 8/14 in AM         ALLERGIES     Allergies   Allergen Reactions     No Known Drug Allergy        PAST MEDICAL HISTORY:  Past Medical History:   Diagnosis Date     Atrial fibrillation (H)     Valvular Afib.  Diagnosed 06/2017. Pt initiated on coumadin 7/18/17     Erectile dysfunction      Heart murmur     rheumatic fever as a child     Hyperlipidemia      Mitral regurgitation     rheumatic fever as a child. 3-4+ per MITCHELL 7/25/17     Tobacco abuse        PAST SURGICAL HISTORY:  Past Surgical History:   Procedure Laterality Date     COLONOSCOPY N/A 9/8/2014    Procedure: COMBINED COLONOSCOPY,  "SINGLE BIOPSY/POLYPECTOMY BY BIOPSY;  Surgeon: Kai Bailey MD;  Location:  GI     ORTHOPEDIC SURGERY      RIght knee scope       FAMILY HISTORY:  Family History   Problem Relation Age of Onset     DIABETES Mother      Obesity Mother      DIABETES Father        SOCIAL HISTORY:  Social History     Social History     Marital status:      Spouse name: N/A     Number of children: N/A     Years of education: N/A     Social History Main Topics     Smoking status: Former Smoker     Packs/day: 2.00     Years: 39.00     Types: Cigarettes     Quit date: 8/1/2017     Smokeless tobacco: Never Used     Alcohol use 2.4 oz/week     4 Standard drinks or equivalent per week      Comment: occasional     Drug use: No     Sexual activity: Yes     Partners: Female     Other Topics Concern     Parent/Sibling W/ Cabg, Mi Or Angioplasty Before 65f 55m? No     Social History Narrative       Review of Systems:  Skin:  Negative       Eyes:  Positive for glasses    ENT:  Negative      Respiratory:  Positive for dyspnea on exertion     Cardiovascular:  edema;Negative for Positive for;chest pain;palpitations;lightheadedness;dizziness pressure in chest when bending over, pin prick feeling on the left side of chest when gets stressed, bending over will get dizzy   Gastroenterology: Negative      Genitourinary:  Negative      Musculoskeletal:  Positive for back pain due to physical job   Neurologic:  Positive for headaches had one took tylenol  Psychiatric:  Positive for sleep disturbances has to get up to use the restroom   Heme/Lymph/Imm:  Negative      Endocrine:  Negative        Physical Exam:  Vitals: /70 (BP Location: Right arm, Patient Position: Fowlers, Cuff Size: Adult Large)  Pulse 80  Ht 1.778 m (5' 10\")  Wt 122.8 kg (270 lb 12.8 oz)  SpO2 97%  BMI 38.86 kg/m2    Constitutional:           Skin:           Head:           Eyes:           ENT:           Neck:           Chest:             Cardiac:                "     Abdomen:           Vascular:                                          Extremities and Back:                 Neurological:             Oni Ross MD

## 2017-09-05 NOTE — MR AVS SNAPSHOT
Derek Stover   9/5/2017 8:45 AM   Anticoagulation Therapy Visit    Description:  56 year old male   Provider:  [facility]   Department:  [facility]           INR as of 9/5/2017     Today's INR 1.8!      Anticoagulation Summary as of 9/5/2017     INR goal 2.0-3.0   Today's INR 1.8!   Full instructions 9/5: 5 mg; Otherwise 5 mg on Mon, Fri; 2.5 mg all other days   Next INR check 10/10/2017    Indications   Atrial fibrillation (H) [I48.91]  Atrial fibrillation (H) [I48.91] (Resolved) [I48.91]  Long-term (current) use of anticoagulants [Z79.01] [Z79.01]         Contact Numbers     Clinic Number:         September 2017 Details    Sun Mon Tue Wed Thu Fri Sat          1               2                 3               4               5      5 mg   See details      6      2.5 mg         7      2.5 mg         8      5 mg         9      2.5 mg           10      2.5 mg         11      5 mg         12      2.5 mg         13      2.5 mg         14      2.5 mg         15      5 mg         16      2.5 mg           17      2.5 mg         18      5 mg         19      2.5 mg         20      2.5 mg         21      2.5 mg         22      5 mg         23      2.5 mg           24      2.5 mg         25      5 mg         26      2.5 mg         27      2.5 mg         28      2.5 mg         29      5 mg         30      2.5 mg          Date Details   09/05 This INR check               How to take your warfarin dose     To take:  2.5 mg Take 0.5 of a 5 mg tablet.    To take:  5 mg Take 1 of the 5 mg tablets.           October 2017 Details    Sun Mon Tue Wed Thu Fri Sat     1      2.5 mg         2      5 mg         3      2.5 mg         4      2.5 mg         5      2.5 mg         6      5 mg         7      2.5 mg           8      2.5 mg         9      5 mg         10            11               12               13               14                 15               16               17               18               19                20               21                 22               23               24               25               26               27               28                 29               30               31                    Date Details   No additional details    Date of next INR:  10/10/2017         How to take your warfarin dose     To take:  2.5 mg Take 0.5 of a 5 mg tablet.    To take:  5 mg Take 1 of the 5 mg tablets.

## 2017-09-05 NOTE — PROGRESS NOTES
HPI and Plan:   See dictation(#[id number])    No orders of the defined types were placed in this encounter.      No orders of the defined types were placed in this encounter.      There are no discontinued medications.      Encounter Diagnoses   Name Primary?     Atrial fibrillation, unspecified type (H)      Mitral valve disease, rheumatic        CURRENT MEDICATIONS:  Current Outpatient Prescriptions   Medication Sig Dispense Refill     warfarin (COUMADIN) 5 MG tablet Take 5 mg on Friday and 2.5 mg all other days, or as directed by the coumadin clinic. 25 tablet 0     varenicline (CHANTIX) 1 MG tablet Take 1 tablet (1 mg) by mouth 2 times daily 56 tablet 2     carvedilol (COREG) 3.125 MG tablet Take 1 tablet (3.125 mg) by mouth 2 times daily (with meals) 180 tablet 1     amoxicillin (AMOXIL) 500 MG capsule Take 6 tablets one hour before dental procedure and 3 tablets 6 hours after (Patient not taking: Reported on 9/5/2017) 9 capsule 0     aspirin EC 81 MG EC tablet Take 81 mg by mouth daily Took  4 baby ASA 8/13 and 8/14 in AM         ALLERGIES     Allergies   Allergen Reactions     No Known Drug Allergy        PAST MEDICAL HISTORY:  Past Medical History:   Diagnosis Date     Atrial fibrillation (H)     Valvular Afib.  Diagnosed 06/2017. Pt initiated on coumadin 7/18/17     Erectile dysfunction      Heart murmur     rheumatic fever as a child     Hyperlipidemia      Mitral regurgitation     rheumatic fever as a child. 3-4+ per MITCHELL 7/25/17     Tobacco abuse        PAST SURGICAL HISTORY:  Past Surgical History:   Procedure Laterality Date     COLONOSCOPY N/A 9/8/2014    Procedure: COMBINED COLONOSCOPY, SINGLE BIOPSY/POLYPECTOMY BY BIOPSY;  Surgeon: Kai Bailey MD;  Location:  GI     ORTHOPEDIC SURGERY      RIght knee scope       FAMILY HISTORY:  Family History   Problem Relation Age of Onset     DIABETES Mother      Obesity Mother      DIABETES Father        SOCIAL HISTORY:  Social History     Social History      "Marital status:      Spouse name: N/A     Number of children: N/A     Years of education: N/A     Social History Main Topics     Smoking status: Former Smoker     Packs/day: 2.00     Years: 39.00     Types: Cigarettes     Quit date: 8/1/2017     Smokeless tobacco: Never Used     Alcohol use 2.4 oz/week     4 Standard drinks or equivalent per week      Comment: occasional     Drug use: No     Sexual activity: Yes     Partners: Female     Other Topics Concern     Parent/Sibling W/ Cabg, Mi Or Angioplasty Before 65f 55m? No     Social History Narrative       Review of Systems:  Skin:  Negative       Eyes:  Positive for glasses    ENT:  Negative      Respiratory:  Positive for dyspnea on exertion     Cardiovascular:  edema;Negative for Positive for;chest pain;palpitations;lightheadedness;dizziness pressure in chest when bending over, pin prick feeling on the left side of chest when gets stressed, bending over will get dizzy   Gastroenterology: Negative      Genitourinary:  Negative      Musculoskeletal:  Positive for back pain due to physical job   Neurologic:  Positive for headaches had one took tylenol  Psychiatric:  Positive for sleep disturbances has to get up to use the restroom   Heme/Lymph/Imm:  Negative      Endocrine:  Negative        Physical Exam:  Vitals: /70 (BP Location: Right arm, Patient Position: Fowlers, Cuff Size: Adult Large)  Pulse 80  Ht 1.778 m (5' 10\")  Wt 122.8 kg (270 lb 12.8 oz)  SpO2 97%  BMI 38.86 kg/m2    Constitutional:           Skin:           Head:           Eyes:           ENT:           Neck:           Chest:             Cardiac:                    Abdomen:           Vascular:                                          Extremities and Back:                 Neurological:                 CC  Oni Ross MD  [address]  [place] [address]                  "

## 2017-09-05 NOTE — MR AVS SNAPSHOT
After Visit Summary   9/5/2017    Derek Stover    MRN: [id number]           Patient Information     Date Of Birth          1960        Visit Information        Provider Department      9/5/2017 9:00 AM Oni Ross MD [facility]        Today's Diagnoses     Atrial fibrillation, unspecified type (H)        Mitral valve disease, rheumatic           Follow-ups after your visit        Your next 10 appointments already scheduled     Sep 12, 2017  6:00 AM CDT   Ech Tito- Or with SHECHR2   [facility] ([facility])    [address] [place] [address] [phone]           1.  Please bring or wear a comfortable two-piece outfit. 2.  Arrival time: -   [facility]:  arrive 75 minutes prior to examination time. -   [facility]:  arrive 90 minutes prior to examination time. -   [facility]:   arrive 15 minutes prior to examination time. 3.  Plan to have someone here to drive you home after the test. -   Someone should stay with you for 6 hours after your test. 4.  No food or drink: -   6 hours before the test 5.  If you take antacids or water pills (diuretics): Do not take them until after your test. You may take blood pressure medicine with a few sips of water. 6.  If you have diabetes: -   Morning slots preferred -   If you take insulin, call your diabetes care team. Ask if you should take a   dose the morning of your test. -   If you take diabetes medicine by mouth, don't take it on the morning of your test. Bring it with you to take after the test. (If you have questions, call your diabetes care team.) 7.  Bring a list of any medicines you are taking. 8.  Do not drive for 24 hours after the test. 9.  For any questions that cannot be answered, please contact the ordering physician.            Sep 12, 2017   Procedure with Ivana Marie MD   [facility] (--)    [address]  [place]  "[phone]              Who to contact     If you have questions or need follow up information about today's clinic visit or your schedule please contact [facility] directly at [phone].  Normal or non-critical lab and imaging results will be communicated to you by MyChart, letter or phone within 4 business days after the clinic has received the results. If you do not hear from us within 7 days, please contact the clinic through [employer] or phone. If you have a critical or abnormal lab result, we will notify you by phone as soon as possible.  Submit refill requests through [employer] or call your pharmacy and they will forward the refill request to us. Please allow 3 business days for your refill to be completed.          Additional Information About Your Visit        [employer] Information     [employer] gives you secure access to your electronic health record. If you see a primary care provider, you can also send messages to your care team and make appointments. If you have questions, please call your primary care clinic.  If you do not have a primary care provider, please call [phone] and they will assist you.        Care EveryWhere ID     This is your Care EveryWhere ID. This could be used by other organizations to access your [facility] medical records  [id number]        Your Vitals Were     Pulse Height Pulse Oximetry BMI (Body Mass Index)          80 1.778 m (5' 10\") 97% 38.86 kg/m2         Blood Pressure from Last 3 Encounters:   09/05/17 104/70   08/21/17 90/68   08/14/17 108/54    Weight from Last 3 Encounters:   09/05/17 122.8 kg (270 lb 12.8 oz)   08/21/17 122 kg (269 lb)   08/14/17 117.9 kg (260 lb)              We Performed the Following     Follow-Up with Cardiologist        Primary Care Provider Office Phone # Fax #    Osbaldo Renee [phone]       [address] [facility] [address]        Equal Access to Services     HOMER SPAIN AH: Ainsley thornton " Sahrashaquille, wacharlyda luqadaha, qaybta kaalmada desirae, malissa rodriguezfaustino valorie. So [facility] [phone].    ATENCIÓN: Si luis eduardo puckett, tiene a macias disposición servicios gratuitos de asistencia lingüística. Taco al [phone].    We comply with applicable federal civil rights laws and [place] laws. We do not discriminate on the basis of race, color, national origin, age, disability sex, sexual orientation or gender identity.            Thank you!     Thank you for choosing [facility]  for your care. Our goal is always to provide you with excellent care. Hearing back from our patients is one way we can continue to improve our services. Please take a few minutes to complete the written survey that you may receive in the mail after your visit with us. Thank you!             Your Updated Medication List - Protect others around you: Learn how to safely use, store and throw away your medicines at www.disposemymeds.org.          This list is accurate as of: 9/5/17  9:20 AM.  Always use your most recent med list.                   Brand Name Dispense Instructions for use Diagnosis    amoxicillin 500 MG capsule    AMOXIL    9 capsule    Take 6 tablets one hour before dental procedure and 3 tablets 6 hours after    Need for SBE (subacute bacterial endocarditis) prophylaxis       aspirin EC 81 MG EC tablet      Take 81 mg by mouth daily Took  4 baby ASA 8/13 and 8/14 in AM        carvedilol 3.125 MG tablet    COREG    180 tablet    Take 1 tablet (3.125 mg) by mouth 2 times daily (with meals)    Atrial fibrillation, unspecified type (H)       varenicline 1 MG tablet    CHANTIX    56 tablet    Take 1 tablet (1 mg) by mouth 2 times daily    Tobacco abuse       warfarin 5 MG tablet    COUMADIN    25 tablet    Take 5 mg on Friday and 2.5 mg all other days, or as directed by the coumadin clinic.    Long term current use of anticoagulant therapy, Chronic atrial fibrillation (H)

## 2017-09-05 NOTE — PROGRESS NOTES
ANTICOAGULATION FOLLOW-UP CLINIC VISIT    Patient Name:  Derek Stover  Date:  9/5/2017  Contact Type:  Face to Face    SUBJECTIVE:     Patient Findings     Positives Unexplained INR or factor level change           OBJECTIVE    INR Protime   Date Value Ref Range Status   09/05/2017 1.8 (A) 0.86 - 1.14 Final       ASSESSMENT / PLAN  INR assessment SUB    Recheck INR In: 4 WEEKS    INR Location Clinic      Anticoagulation Summary as of 9/5/2017     INR goal 2.0-3.0   Today's INR 1.8!   Maintenance plan 5 mg (5 mg x 1) on Mon, Fri; 2.5 mg (5 mg x 0.5) all other days   Full instructions 9/5: 5 mg; 9/7: Hold; 9/8: Hold; 9/9: Hold; 9/10: Hold; 9/11: Hold; Otherwise 5 mg on Mon, Fri; 2.5 mg all other days   Weekly total 22.5 mg   Plan last modified Perla Walden RN (9/5/2017)   Next INR check 10/10/2017   Target end date     Indications   Atrial fibrillation (H) [I48.91]  Atrial fibrillation (H) [I48.91] (Resolved) [I48.91]  Long-term (current) use of anticoagulants [Z79.01] [Z79.01]         Anticoagulation Episode Summary     INR check location     Preferred lab     Send INR reminders to [facility]NTHIA    Comments       Anticoagulation Care Providers     Provider Role Specialty Phone number    Carlos ArchibaldDO [facility] [phone]            See the Encounter Report to view Anticoagulation Flowsheet and Dosing Calendar (Go to Encounters tab in chart review, and find the Anticoagulation Therapy Visit)    Dosage adjustment made based on physician directed care plan.    Pt mauri start holding Coumadin on 9/7-9/11, he will be having mitral valve replacement on 9/12. He is thinking he will be admitted for  A week, then will be rehabbing for at least two weeks after that. I have advised him to f/u with me when he is discharged      Perla Walden, RN

## 2017-09-05 NOTE — PROGRESS NOTES
REASON FOR CLINIC VISIT:  Followup MITCHELL and coronary angiogram.      HISTORY OF PRESENT ILLNESS:  Mr. Stover is a very pleasant 56-year-old gentleman who I saw in July of this year for new-onset dyspnea on exertion and newly diagnosed atrial fibrillation with transthoracic echocardiogram showing at least 3+ mitral regurgitation.  He underwent MITCHELL that confirmed 3-4+ mitral regurgitation, eccentric jet.  It was not classical dramatic in appearance, but there was mitral valve thickening.  LV was mild to moderately dilated with LVEF of 55%.  The patient also underwent coronary angiogram that did not show any coronary artery disease.  He was seen by Dr. Marie from CV Surgery and is planned for a mitral valve replacement next week.  The patient is on Coumadin and low-dose carvedilol.  He is today coming to clinic accompanied by his wife.  The patient works in [employer] and over the last few months has been noticing progressive dyspnea on exertion.  He was smoking 2 packs per day but has quit tobacco since 1 month.      PHYSICAL EXAMINATION:   VITAL SIGNS:  Blood pressure 104/70, heart rate 80 and regular, weight 270 pounds, BMI 38.94.   GENERAL:  The patient appears pleasant, comfortable.   NECK:  Normal JVP, no bruit.   CARDIOVASCULAR SYSTEM:  Regular, normal rate.  There is grade 3/6 systolic murmur heard over the apex radiating to the axilla, no S3, S4, rub or gallop.   RESPIRATORY SYSTEM:  Clear to auscultation bilaterally.   ABDOMEN:  Soft, nontender.   EXTREMITIES:  No pitting pedal edema.   NEUROLOGICAL:  Alert, oriented x3.   PSYCHIATRIC:  Normal affect.   SKIN:  No obvious rash.   HEENT:  No pallor or icterus.      IMPRESSION AND PLAN:  A very delightful 56-year-old gentleman with 3-4+ mitral regurgitation with LV dilatation, progressive dyspnea on exertion, newly diagnosed atrial fibrillation.  Adequate rate control on beta blocker, on Coumadin for stroke prophylaxis with no significant coronary artery disease on  pre MVR coronary angiogram.  The patient has history of tobacco abuse but fortunately has quit for the last 1 month.  The patient is set up for mitral valve surgery next week.  The patient does not have any new complaints.  I commended him on quitting tobacco.  We will see him back in Cardiology Clinic after the surgery.         HATTIE SHAVER MD             D: 2017 09:24   T: 2017 09:51   MT: al      Name:     HAKEEM BORJAS   MRN:      [id number]        Account:      [id number]   :      1960           Service Date: 2017      Document: [id number]

## 2017-09-08 NOTE — H&P (VIEW-ONLY)
HPI and Plan:   See dictation(#[id number])    No orders of the defined types were placed in this encounter.      No orders of the defined types were placed in this encounter.      There are no discontinued medications.      Encounter Diagnoses   Name Primary?     Atrial fibrillation, unspecified type (H)      Mitral valve disease, rheumatic        CURRENT MEDICATIONS:  Current Outpatient Prescriptions   Medication Sig Dispense Refill     warfarin (COUMADIN) 5 MG tablet Take 5 mg on Friday and 2.5 mg all other days, or as directed by the coumadin clinic. 25 tablet 0     varenicline (CHANTIX) 1 MG tablet Take 1 tablet (1 mg) by mouth 2 times daily 56 tablet 2     carvedilol (COREG) 3.125 MG tablet Take 1 tablet (3.125 mg) by mouth 2 times daily (with meals) 180 tablet 1     amoxicillin (AMOXIL) 500 MG capsule Take 6 tablets one hour before dental procedure and 3 tablets 6 hours after (Patient not taking: Reported on 9/5/2017) 9 capsule 0     aspirin EC 81 MG EC tablet Take 81 mg by mouth daily Took  4 baby ASA 8/13 and 8/14 in AM         ALLERGIES     Allergies   Allergen Reactions     No Known Drug Allergy        PAST MEDICAL HISTORY:  Past Medical History:   Diagnosis Date     Atrial fibrillation (H)     Valvular Afib.  Diagnosed 06/2017. Pt initiated on coumadin 7/18/17     Erectile dysfunction      Heart murmur     rheumatic fever as a child     Hyperlipidemia      Mitral regurgitation     rheumatic fever as a child. 3-4+ per MITCHELL 7/25/17     Tobacco abuse        PAST SURGICAL HISTORY:  Past Surgical History:   Procedure Laterality Date     COLONOSCOPY N/A 9/8/2014    Procedure: COMBINED COLONOSCOPY, SINGLE BIOPSY/POLYPECTOMY BY BIOPSY;  Surgeon: Kai Bailey MD;  Location:  GI     ORTHOPEDIC SURGERY      RIght knee scope       FAMILY HISTORY:  Family History   Problem Relation Age of Onset     DIABETES Mother      Obesity Mother      DIABETES Father        SOCIAL HISTORY:  Social History     Social History      "Marital status:      Spouse name: N/A     Number of children: N/A     Years of education: N/A     Social History Main Topics     Smoking status: Former Smoker     Packs/day: 2.00     Years: 39.00     Types: Cigarettes     Quit date: 8/1/2017     Smokeless tobacco: Never Used     Alcohol use 2.4 oz/week     4 Standard drinks or equivalent per week      Comment: occasional     Drug use: No     Sexual activity: Yes     Partners: Female     Other Topics Concern     Parent/Sibling W/ Cabg, Mi Or Angioplasty Before 65f 55m? No     Social History Narrative       Review of Systems:  Skin:  Negative       Eyes:  Positive for glasses    ENT:  Negative      Respiratory:  Positive for dyspnea on exertion     Cardiovascular:  edema;Negative for Positive for;chest pain;palpitations;lightheadedness;dizziness pressure in chest when bending over, pin prick feeling on the left side of chest when gets stressed, bending over will get dizzy   Gastroenterology: Negative      Genitourinary:  Negative      Musculoskeletal:  Positive for back pain due to physical job   Neurologic:  Positive for headaches had one took tylenol  Psychiatric:  Positive for sleep disturbances has to get up to use the restroom   Heme/Lymph/Imm:  Negative      Endocrine:  Negative        Physical Exam:  Vitals: /70 (BP Location: Right arm, Patient Position: Fowlers, Cuff Size: Adult Large)  Pulse 80  Ht 1.778 m (5' 10\")  Wt 122.8 kg (270 lb 12.8 oz)  SpO2 97%  BMI 38.86 kg/m2    Constitutional:           Skin:           Head:           Eyes:           ENT:           Neck:           Chest:             Cardiac:                    Abdomen:           Vascular:                                          Extremities and Back:                 Neurological:                 CC  Oni Ross MD  [id number]  [place] [id number]                  "

## 2017-09-11 ENCOUNTER — ANESTHESIA EVENT (OUTPATIENT)
Dept: SURGERY | Facility: CLINIC | Age: 57
DRG: 219 | End: 2017-09-11
Payer: COMMERCIAL

## 2017-09-12 ENCOUNTER — APPOINTMENT (OUTPATIENT)
Dept: GENERAL RADIOLOGY | Facility: CLINIC | Age: 57
DRG: 219 | End: 2017-09-12
Attending: INTERNAL MEDICINE
Payer: COMMERCIAL

## 2017-09-12 ENCOUNTER — APPOINTMENT (OUTPATIENT)
Dept: GENERAL RADIOLOGY | Facility: CLINIC | Age: 57
DRG: 219 | End: 2017-09-12
Attending: THORACIC SURGERY (CARDIOTHORACIC VASCULAR SURGERY)
Payer: COMMERCIAL

## 2017-09-12 ENCOUNTER — HOSPITAL ENCOUNTER (INPATIENT)
Facility: CLINIC | Age: 57
LOS: 5 days | Discharge: HOME | DRG: 219 | End: 2017-09-17
Attending: SURGERY | Admitting: SURGERY
Payer: COMMERCIAL

## 2017-09-12 ENCOUNTER — ANESTHESIA (OUTPATIENT)
Dept: SURGERY | Facility: CLINIC | Age: 57
DRG: 219 | End: 2017-09-12
Payer: COMMERCIAL

## 2017-09-12 ENCOUNTER — HOSPITAL ENCOUNTER (OUTPATIENT)
Dept: CARDIOLOGY | Facility: CLINIC | Age: 57
DRG: 219 | End: 2017-09-12
Attending: SURGERY | Admitting: SURGERY
Payer: COMMERCIAL

## 2017-09-12 DIAGNOSIS — I05.1 RHEUMATIC MITRAL REGURGITATION: ICD-10-CM

## 2017-09-12 DIAGNOSIS — I05.9 MITRAL VALVE DISORDERS(424.0): ICD-10-CM

## 2017-09-12 DIAGNOSIS — Z95.4 S/P MITRAL VALVE REPLACEMENT WITH METALLIC VALVE: Primary | ICD-10-CM

## 2017-09-12 PROBLEM — I34.0 SEVERE MITRAL REGURGITATION: Status: ACTIVE | Noted: 2017-09-12

## 2017-09-12 LAB
ABO + RH BLD: NORMAL
ABO + RH BLD: NORMAL
ALBUMIN SERPL-MCNC: 2.7 G/DL (ref 3.4–5)
ALP SERPL-CCNC: 59 U/L (ref 40–150)
ALT SERPL W P-5'-P-CCNC: 25 U/L (ref 0–70)
ANION GAP SERPL CALCULATED.3IONS-SCNC: 11 MMOL/L (ref 3–14)
ANION GAP SERPL CALCULATED.3IONS-SCNC: 9 MMOL/L (ref 3–14)
APTT PPP: 48 SEC (ref 22–37)
APTT PPP: 52 SEC (ref 22–37)
AST SERPL W P-5'-P-CCNC: 52 U/L (ref 0–45)
BASE DEFICIT BLDA-SCNC: 3.5 MMOL/L
BASE DEFICIT BLDA-SCNC: 3.8 MMOL/L
BASE DEFICIT BLDA-SCNC: 5 MMOL/L
BASOPHILS # BLD AUTO: 0 10E9/L (ref 0–0.2)
BASOPHILS NFR BLD AUTO: 0 %
BILIRUB SERPL-MCNC: 1 MG/DL (ref 0.2–1.3)
BLD GP AB SCN SERPL QL: NORMAL
BLD PROD TYP BPU: NORMAL
BLD UNIT ID BPU: 0
BLOOD BANK CMNT PATIENT-IMP: NORMAL
BLOOD PRODUCT CODE: NORMAL
BPU ID: NORMAL
BUN SERPL-MCNC: 15 MG/DL (ref 7–30)
BUN SERPL-MCNC: 16 MG/DL (ref 7–30)
CA-I BLD-SCNC: 4.3 MG/DL (ref 4.4–5.2)
CA-I BLD-SCNC: 5 MG/DL (ref 4.4–5.2)
CA-I SERPL ISE-MCNC: 4.4 MG/DL (ref 4.4–5.2)
CALCIUM SERPL-MCNC: 8 MG/DL (ref 8.5–10.1)
CALCIUM SERPL-MCNC: 8.1 MG/DL (ref 8.5–10.1)
CHLORIDE SERPL-SCNC: 109 MMOL/L (ref 94–109)
CHLORIDE SERPL-SCNC: 110 MMOL/L (ref 94–109)
CO2 BLD-SCNC: 24 MMOL/L (ref 21–28)
CO2 BLD-SCNC: 24 MMOL/L (ref 21–28)
CO2 BLD-SCNC: 25 MMOL/L (ref 21–28)
CO2 BLD-SCNC: 25 MMOL/L (ref 21–28)
CO2 BLD-SCNC: 26 MMOL/L (ref 21–28)
CO2 BLD-SCNC: 26 MMOL/L (ref 21–28)
CO2 BLD-SCNC: 27 MMOL/L (ref 21–28)
CO2 BLD-SCNC: 27 MMOL/L (ref 21–28)
CO2 BLD-SCNC: 28 MMOL/L (ref 21–28)
CO2 BLDCOV-SCNC: 27 MMOL/L (ref 21–28)
CO2 SERPL-SCNC: 22 MMOL/L (ref 20–32)
CO2 SERPL-SCNC: 24 MMOL/L (ref 20–32)
CPB APPLIED: ABNORMAL
CREAT SERPL-MCNC: 1.17 MG/DL (ref 0.66–1.25)
CREAT SERPL-MCNC: 1.21 MG/DL (ref 0.66–1.25)
DIFFERENTIAL METHOD BLD: ABNORMAL
EOSINOPHIL # BLD AUTO: 0.1 10E9/L (ref 0–0.7)
EOSINOPHIL NFR BLD AUTO: 0.3 %
ERYTHROCYTE [DISTWIDTH] IN BLOOD BY AUTOMATED COUNT: 14.3 % (ref 10–15)
ERYTHROCYTE [DISTWIDTH] IN BLOOD BY AUTOMATED COUNT: 14.3 % (ref 10–15)
FIBRINOGEN PPP-MCNC: 245 MG/DL (ref 200–420)
GFR SERPL CREATININE-BSD FRML MDRD: 62 ML/MIN/1.7M2
GFR SERPL CREATININE-BSD FRML MDRD: 65 ML/MIN/1.7M2
GLUCOSE BLDC GLUCOMTR-MCNC: 120 MG/DL (ref 70–99)
GLUCOSE BLDC GLUCOMTR-MCNC: 129 MG/DL (ref 70–99)
GLUCOSE BLDC GLUCOMTR-MCNC: 145 MG/DL (ref 70–99)
GLUCOSE BLDC GLUCOMTR-MCNC: 145 MG/DL (ref 70–99)
GLUCOSE BLDC GLUCOMTR-MCNC: 150 MG/DL (ref 70–99)
GLUCOSE BLDC GLUCOMTR-MCNC: 157 MG/DL (ref 70–99)
GLUCOSE BLDC GLUCOMTR-MCNC: 165 MG/DL (ref 70–99)
GLUCOSE BLDC GLUCOMTR-MCNC: 165 MG/DL (ref 70–99)
GLUCOSE BLDC GLUCOMTR-MCNC: 166 MG/DL (ref 70–99)
GLUCOSE BLDC GLUCOMTR-MCNC: 175 MG/DL (ref 70–99)
GLUCOSE BLDC GLUCOMTR-MCNC: 189 MG/DL (ref 70–99)
GLUCOSE SERPL-MCNC: 185 MG/DL (ref 70–99)
GLUCOSE SERPL-MCNC: 226 MG/DL (ref 70–99)
HCO3 BLD-SCNC: 22 MMOL/L (ref 21–28)
HCO3 BLD-SCNC: 22 MMOL/L (ref 21–28)
HCO3 BLD-SCNC: 24 MMOL/L (ref 21–28)
HCT VFR BLD AUTO: 32.4 % (ref 40–53)
HCT VFR BLD AUTO: 37 % (ref 40–53)
HCT VFR BLD CALC: 31 %PCV (ref 40–53)
HCT VFR BLD CALC: 33 %PCV (ref 40–53)
HCT VFR BLD CALC: 34 %PCV (ref 40–53)
HCT VFR BLD CALC: 34 %PCV (ref 40–53)
HCT VFR BLD CALC: 35 %PCV (ref 40–53)
HCT VFR BLD CALC: 36 %PCV (ref 40–53)
HCT VFR BLD CALC: 36 %PCV (ref 40–53)
HCT VFR BLD CALC: 38 %PCV (ref 40–53)
HCT VFR BLD CALC: 39 %PCV (ref 40–53)
HGB BLD CALC-MCNC: 10.5 G/DL (ref 13.3–17.7)
HGB BLD CALC-MCNC: 11.2 G/DL (ref 13.3–17.7)
HGB BLD CALC-MCNC: 11.6 G/DL (ref 13.3–17.7)
HGB BLD CALC-MCNC: 11.6 G/DL (ref 13.3–17.7)
HGB BLD CALC-MCNC: 11.9 G/DL (ref 13.3–17.7)
HGB BLD CALC-MCNC: 12.2 G/DL (ref 13.3–17.7)
HGB BLD CALC-MCNC: 12.2 G/DL (ref 13.3–17.7)
HGB BLD CALC-MCNC: 12.9 G/DL (ref 13.3–17.7)
HGB BLD CALC-MCNC: 13.3 G/DL (ref 13.3–17.7)
HGB BLD-MCNC: 11 G/DL (ref 13.3–17.7)
HGB BLD-MCNC: 12.5 G/DL (ref 13.3–17.7)
IMM GRANULOCYTES # BLD: 0.1 10E9/L (ref 0–0.4)
IMM GRANULOCYTES NFR BLD: 0.5 %
INR PPP: 1.1 (ref 0.86–1.14)
INR PPP: 1.3 (ref 0.86–1.14)
INR PPP: 1.39 (ref 0.86–1.14)
LACTATE BLD-SCNC: 0.6 MMOL/L (ref 0.7–2.1)
LACTATE BLD-SCNC: 0.7 MMOL/L (ref 0.7–2.1)
LACTATE BLD-SCNC: 0.8 MMOL/L (ref 0.7–2.1)
LACTATE BLD-SCNC: 1 MMOL/L (ref 0.7–2.1)
LACTATE BLD-SCNC: 1.4 MMOL/L (ref 0.7–2.1)
LACTATE BLD-SCNC: 1.8 MMOL/L (ref 0.7–2.1)
LACTATE BLD-SCNC: 2.3 MMOL/L (ref 0.7–2.1)
LACTATE BLD-SCNC: 3.7 MMOL/L (ref 0.7–2.1)
LACTATE SERPL-SCNC: 2.5 MMOL/L (ref 0.4–2)
LYMPHOCYTES # BLD AUTO: 1.2 10E9/L (ref 0.8–5.3)
LYMPHOCYTES NFR BLD AUTO: 8.1 %
MAGNESIUM SERPL-MCNC: 3.3 MG/DL (ref 1.6–2.3)
MCH RBC QN AUTO: 31.1 PG (ref 26.5–33)
MCH RBC QN AUTO: 31.1 PG (ref 26.5–33)
MCHC RBC AUTO-ENTMCNC: 33.8 G/DL (ref 31.5–36.5)
MCHC RBC AUTO-ENTMCNC: 34 G/DL (ref 31.5–36.5)
MCV RBC AUTO: 92 FL (ref 78–100)
MCV RBC AUTO: 92 FL (ref 78–100)
MONOCYTES # BLD AUTO: 0.6 10E9/L (ref 0–1.3)
MONOCYTES NFR BLD AUTO: 3.8 %
MRSA DNA SPEC QL NAA+PROBE: NEGATIVE
NEUTROPHILS # BLD AUTO: 12.9 10E9/L (ref 1.6–8.3)
NEUTROPHILS NFR BLD AUTO: 87.3 %
NRBC # BLD AUTO: 0 10*3/UL
NRBC BLD AUTO-RTO: 0 /100
NUM BPU REQUESTED: 4
OXYHGB MFR BLD: 93 % (ref 92–100)
OXYHGB MFR BLD: 95 % (ref 92–100)
OXYHGB MFR BLD: 97 % (ref 92–100)
PCO2 BLD: 44 MM HG (ref 35–45)
PCO2 BLD: 47 MM HG (ref 35–45)
PCO2 BLD: 49 MM HG (ref 35–45)
PCO2 BLD: 51 MM HG (ref 35–45)
PCO2 BLD: 52 MM HG (ref 35–45)
PCO2 BLD: 53 MM HG (ref 35–45)
PCO2 BLD: 56 MM HG (ref 35–45)
PCO2 BLDV: 54 MM HG (ref 40–50)
PH BLD: 7.26 PH (ref 7.35–7.45)
PH BLD: 7.27 PH (ref 7.35–7.45)
PH BLD: 7.28 PH (ref 7.35–7.45)
PH BLD: 7.28 PH (ref 7.35–7.45)
PH BLD: 7.3 PH (ref 7.35–7.45)
PH BLD: 7.31 PH (ref 7.35–7.45)
PH BLD: 7.32 PH (ref 7.35–7.45)
PH BLD: 7.34 PH (ref 7.35–7.45)
PH BLD: 7.35 PH (ref 7.35–7.45)
PH BLD: 7.36 PH (ref 7.35–7.45)
PH BLD: 7.38 PH (ref 7.35–7.45)
PH BLD: 7.38 PH (ref 7.35–7.45)
PH BLDV: 7.3 PH (ref 7.32–7.43)
PHOSPHATE SERPL-MCNC: 2.7 MG/DL (ref 2.5–4.5)
PLATELET # BLD AUTO: 117 10E9/L (ref 150–450)
PLATELET # BLD AUTO: 99 10E9/L (ref 150–450)
PO2 BLD: 126 MM HG (ref 80–105)
PO2 BLD: 168 MM HG (ref 80–105)
PO2 BLD: 176 MM HG (ref 80–105)
PO2 BLD: 194 MM HG (ref 80–105)
PO2 BLD: 203 MM HG (ref 80–105)
PO2 BLD: 280 MM HG (ref 80–105)
PO2 BLD: 318 MM HG (ref 80–105)
PO2 BLD: 340 MM HG (ref 80–105)
PO2 BLD: 384 MM HG (ref 80–105)
PO2 BLD: 415 MM HG (ref 80–105)
PO2 BLD: 81 MM HG (ref 80–105)
PO2 BLD: 85 MM HG (ref 80–105)
PO2 BLDV: 49 MM HG (ref 25–47)
POTASSIUM BLD-SCNC: 4.1 MMOL/L (ref 3.4–5.3)
POTASSIUM BLD-SCNC: 4.3 MMOL/L (ref 3.4–5.3)
POTASSIUM BLD-SCNC: 4.6 MMOL/L (ref 3.4–5.3)
POTASSIUM BLD-SCNC: 4.8 MMOL/L (ref 3.4–5.3)
POTASSIUM BLD-SCNC: 5 MMOL/L (ref 3.4–5.3)
POTASSIUM BLD-SCNC: 5.2 MMOL/L (ref 3.4–5.3)
POTASSIUM BLD-SCNC: 5.3 MMOL/L (ref 3.4–5.3)
POTASSIUM BLD-SCNC: 5.4 MMOL/L (ref 3.4–5.3)
POTASSIUM BLD-SCNC: 5.5 MMOL/L (ref 3.4–5.3)
POTASSIUM SERPL-SCNC: 4.2 MMOL/L (ref 3.4–5.3)
POTASSIUM SERPL-SCNC: 4.3 MMOL/L (ref 3.4–5.3)
PROT SERPL-MCNC: 5.2 G/DL (ref 6.8–8.8)
RBC # BLD AUTO: 3.54 10E12/L (ref 4.4–5.9)
RBC # BLD AUTO: 4.02 10E12/L (ref 4.4–5.9)
SAO2 % BLDA FROM PO2: 100 % (ref 92–100)
SAO2 % BLDA FROM PO2: 99 % (ref 92–100)
SAO2 % BLDV FROM PO2: 79 %
SODIUM BLD-SCNC: 139 MMOL/L (ref 133–144)
SODIUM BLD-SCNC: 139 MMOL/L (ref 133–144)
SODIUM BLD-SCNC: 140 MMOL/L (ref 133–144)
SODIUM BLD-SCNC: 141 MMOL/L (ref 133–144)
SODIUM BLD-SCNC: 142 MMOL/L (ref 133–144)
SODIUM SERPL-SCNC: 142 MMOL/L (ref 133–144)
SODIUM SERPL-SCNC: 143 MMOL/L (ref 133–144)
SPECIMEN EXP DATE BLD: NORMAL
SPECIMEN SOURCE: NORMAL
TRANSFUSION STATUS PATIENT QL: NORMAL
WBC # BLD AUTO: 14.8 10E9/L (ref 4–11)
WBC # BLD AUTO: 18.9 10E9/L (ref 4–11)

## 2017-09-12 PROCEDURE — 37000009 ZZH ANESTHESIA TECHNICAL FEE, EACH ADDTL 15 MIN: Performed by: SURGERY

## 2017-09-12 PROCEDURE — 80053 COMPREHEN METABOLIC PANEL: CPT | Performed by: SURGERY

## 2017-09-12 PROCEDURE — 99291 CRITICAL CARE FIRST HOUR: CPT | Performed by: INTERNAL MEDICINE

## 2017-09-12 PROCEDURE — 41000023 ZZH PERA-PERFUSION, SH ONLY,  EACH ADDTL 15 MIN: Performed by: SURGERY

## 2017-09-12 PROCEDURE — P9037 PLATE PHERES LEUKOREDU IRRAD: HCPCS | Performed by: SURGERY

## 2017-09-12 PROCEDURE — 82803 BLOOD GASES ANY COMBINATION: CPT

## 2017-09-12 PROCEDURE — 88305 TISSUE EXAM BY PATHOLOGIST: CPT | Performed by: SURGERY

## 2017-09-12 PROCEDURE — 25000125 ZZHC RX 250

## 2017-09-12 PROCEDURE — 27210995 ZZH RX 272: Performed by: SURGERY

## 2017-09-12 PROCEDURE — 25000125 ZZHC RX 250: Performed by: SURGERY

## 2017-09-12 PROCEDURE — 85610 PROTHROMBIN TIME: CPT | Performed by: ANESTHESIOLOGY

## 2017-09-12 PROCEDURE — 25000125 ZZHC RX 250: Performed by: NURSE ANESTHETIST, CERTIFIED REGISTERED

## 2017-09-12 PROCEDURE — 84295 ASSAY OF SERUM SODIUM: CPT

## 2017-09-12 PROCEDURE — 25000565 ZZH ISOFLURANE, EA 15 MIN: Performed by: SURGERY

## 2017-09-12 PROCEDURE — P9059 PLASMA, FRZ BETWEEN 8-24HOUR: HCPCS | Performed by: SURGERY

## 2017-09-12 PROCEDURE — 41000022 ZZH PER-PERFUSION, SH ONLY,  1ST 30 MIN: Performed by: SURGERY

## 2017-09-12 PROCEDURE — 25000128 H RX IP 250 OP 636: Performed by: SURGERY

## 2017-09-12 PROCEDURE — 40000275 ZZH STATISTIC RCP TIME EA 10 MIN

## 2017-09-12 PROCEDURE — 93010 ELECTROCARDIOGRAM REPORT: CPT | Mod: 77 | Performed by: INTERNAL MEDICINE

## 2017-09-12 PROCEDURE — 40000986 XR CHEST PORT 1 VW

## 2017-09-12 PROCEDURE — 85014 HEMATOCRIT: CPT

## 2017-09-12 PROCEDURE — 02RG0JZ REPLACEMENT OF MITRAL VALVE WITH SYNTHETIC SUBSTITUTE, OPEN APPROACH: ICD-10-PCS | Performed by: SURGERY

## 2017-09-12 PROCEDURE — 85610 PROTHROMBIN TIME: CPT | Performed by: SURGERY

## 2017-09-12 PROCEDURE — 25800025 ZZH RX 258: Performed by: THORACIC SURGERY (CARDIOTHORACIC VASCULAR SURGERY)

## 2017-09-12 PROCEDURE — 25000128 H RX IP 250 OP 636: Performed by: ANESTHESIOLOGY

## 2017-09-12 PROCEDURE — 5A1221Z PERFORMANCE OF CARDIAC OUTPUT, CONTINUOUS: ICD-10-PCS | Performed by: SURGERY

## 2017-09-12 PROCEDURE — 36000075 ZZH SURGERY LEVEL 6 EA 15 ADDTL MIN: Performed by: SURGERY

## 2017-09-12 PROCEDURE — 25000132 ZZH RX MED GY IP 250 OP 250 PS 637: Performed by: SURGERY

## 2017-09-12 PROCEDURE — 87641 MR-STAPH DNA AMP PROBE: CPT | Performed by: SURGERY

## 2017-09-12 PROCEDURE — 83735 ASSAY OF MAGNESIUM: CPT | Performed by: THORACIC SURGERY (CARDIOTHORACIC VASCULAR SURGERY)

## 2017-09-12 PROCEDURE — 36415 COLL VENOUS BLD VENIPUNCTURE: CPT | Performed by: ANESTHESIOLOGY

## 2017-09-12 PROCEDURE — 25000128 H RX IP 250 OP 636: Performed by: NURSE ANESTHETIST, CERTIFIED REGISTERED

## 2017-09-12 PROCEDURE — 83605 ASSAY OF LACTIC ACID: CPT | Performed by: THORACIC SURGERY (CARDIOTHORACIC VASCULAR SURGERY)

## 2017-09-12 PROCEDURE — 85027 COMPLETE CBC AUTOMATED: CPT | Performed by: THORACIC SURGERY (CARDIOTHORACIC VASCULAR SURGERY)

## 2017-09-12 PROCEDURE — 85025 COMPLETE CBC W/AUTO DIFF WBC: CPT | Performed by: SURGERY

## 2017-09-12 PROCEDURE — 40000344 ZZHCL STATISTIC THAWING COMPONENT: Performed by: SURGERY

## 2017-09-12 PROCEDURE — 82330 ASSAY OF CALCIUM: CPT | Performed by: THORACIC SURGERY (CARDIOTHORACIC VASCULAR SURGERY)

## 2017-09-12 PROCEDURE — 27211024 ZZHC OR SUPPLY OTHER OPNP: Performed by: SURGERY

## 2017-09-12 PROCEDURE — 82805 BLOOD GASES W/O2 SATURATION: CPT | Performed by: THORACIC SURGERY (CARDIOTHORACIC VASCULAR SURGERY)

## 2017-09-12 PROCEDURE — 83605 ASSAY OF LACTIC ACID: CPT

## 2017-09-12 PROCEDURE — 93005 ELECTROCARDIOGRAM TRACING: CPT

## 2017-09-12 PROCEDURE — 85730 THROMBOPLASTIN TIME PARTIAL: CPT | Performed by: THORACIC SURGERY (CARDIOTHORACIC VASCULAR SURGERY)

## 2017-09-12 PROCEDURE — 85730 THROMBOPLASTIN TIME PARTIAL: CPT | Performed by: SURGERY

## 2017-09-12 PROCEDURE — 88305 TISSUE EXAM BY PATHOLOGIST: CPT | Mod: 26 | Performed by: SURGERY

## 2017-09-12 PROCEDURE — 85610 PROTHROMBIN TIME: CPT | Performed by: THORACIC SURGERY (CARDIOTHORACIC VASCULAR SURGERY)

## 2017-09-12 PROCEDURE — 84132 ASSAY OF SERUM POTASSIUM: CPT

## 2017-09-12 PROCEDURE — 36000073 ZZH SURGERY LEVEL 6 1ST 30 MIN: Performed by: SURGERY

## 2017-09-12 PROCEDURE — 00000146 ZZHCL STATISTIC GLUCOSE BY METER IP

## 2017-09-12 PROCEDURE — 93010 ELECTROCARDIOGRAM REPORT: CPT | Performed by: INTERNAL MEDICINE

## 2017-09-12 PROCEDURE — 20000003 ZZH R&B ICU

## 2017-09-12 PROCEDURE — 40000171 ZZH STATISTIC PRE-PROCEDURE ASSESSMENT III: Performed by: SURGERY

## 2017-09-12 PROCEDURE — 40000008 ZZH STATISTIC AIRWAY CARE

## 2017-09-12 PROCEDURE — 25000128 H RX IP 250 OP 636: Performed by: THORACIC SURGERY (CARDIOTHORACIC VASCULAR SURGERY)

## 2017-09-12 PROCEDURE — 80048 BASIC METABOLIC PNL TOTAL CA: CPT | Performed by: THORACIC SURGERY (CARDIOTHORACIC VASCULAR SURGERY)

## 2017-09-12 PROCEDURE — P9041 ALBUMIN (HUMAN),5%, 50ML: HCPCS

## 2017-09-12 PROCEDURE — 85384 FIBRINOGEN ACTIVITY: CPT | Performed by: SURGERY

## 2017-09-12 PROCEDURE — 25000128 H RX IP 250 OP 636

## 2017-09-12 PROCEDURE — 25000131 ZZH RX MED GY IP 250 OP 636 PS 637: Performed by: THORACIC SURGERY (CARDIOTHORACIC VASCULAR SURGERY)

## 2017-09-12 PROCEDURE — 82330 ASSAY OF CALCIUM: CPT

## 2017-09-12 PROCEDURE — 25000125 ZZHC RX 250: Performed by: THORACIC SURGERY (CARDIOTHORACIC VASCULAR SURGERY)

## 2017-09-12 PROCEDURE — 27810169 ZZH OR IMPLANT GENERAL: Performed by: SURGERY

## 2017-09-12 PROCEDURE — 84100 ASSAY OF PHOSPHORUS: CPT | Performed by: THORACIC SURGERY (CARDIOTHORACIC VASCULAR SURGERY)

## 2017-09-12 PROCEDURE — 37000008 ZZH ANESTHESIA TECHNICAL FEE, 1ST 30 MIN: Performed by: SURGERY

## 2017-09-12 PROCEDURE — 27210794 ZZH OR GENERAL SUPPLY STERILE: Performed by: SURGERY

## 2017-09-12 PROCEDURE — 25000132 ZZH RX MED GY IP 250 OP 250 PS 637: Performed by: THORACIC SURGERY (CARDIOTHORACIC VASCULAR SURGERY)

## 2017-09-12 PROCEDURE — 87640 STAPH A DNA AMP PROBE: CPT | Performed by: SURGERY

## 2017-09-12 DEVICE — IMPLANTABLE DEVICE: Type: IMPLANTABLE DEVICE | Site: HEART | Status: FUNCTIONAL

## 2017-09-12 RX ORDER — HYDRALAZINE HYDROCHLORIDE 20 MG/ML
10 INJECTION INTRAMUSCULAR; INTRAVENOUS EVERY 30 MIN PRN
Status: DISCONTINUED | OUTPATIENT
Start: 2017-09-12 | End: 2017-09-17 | Stop reason: HOSPADM

## 2017-09-12 RX ORDER — ONDANSETRON 2 MG/ML
4 INJECTION INTRAMUSCULAR; INTRAVENOUS EVERY 6 HOURS PRN
Status: DISCONTINUED | OUTPATIENT
Start: 2017-09-12 | End: 2017-09-17 | Stop reason: HOSPADM

## 2017-09-12 RX ORDER — CEFAZOLIN SODIUM 1 G/50ML
3 SOLUTION INTRAVENOUS
Status: COMPLETED | OUTPATIENT
Start: 2017-09-12 | End: 2017-09-12

## 2017-09-12 RX ORDER — CHLORHEXIDINE GLUCONATE 40 MG/ML
SOLUTION TOPICAL ONCE
Status: DISCONTINUED | OUTPATIENT
Start: 2017-09-12 | End: 2017-09-12 | Stop reason: HOSPADM

## 2017-09-12 RX ORDER — SUFENTANIL CITRATE 50 UG/ML
INJECTION EPIDURAL; INTRAVENOUS PRN
Status: DISCONTINUED | OUTPATIENT
Start: 2017-09-12 | End: 2017-09-12

## 2017-09-12 RX ORDER — IPRATROPIUM BROMIDE AND ALBUTEROL SULFATE 2.5; .5 MG/3ML; MG/3ML
3 SOLUTION RESPIRATORY (INHALATION) EVERY 4 HOURS PRN
Status: DISCONTINUED | OUTPATIENT
Start: 2017-09-12 | End: 2017-09-17 | Stop reason: HOSPADM

## 2017-09-12 RX ORDER — HEPARIN SODIUM 1000 [USP'U]/ML
INJECTION, SOLUTION INTRAVENOUS; SUBCUTANEOUS PRN
Status: DISCONTINUED | OUTPATIENT
Start: 2017-09-12 | End: 2017-09-12

## 2017-09-12 RX ORDER — HYDROMORPHONE HYDROCHLORIDE 1 MG/ML
.3-.5 INJECTION, SOLUTION INTRAMUSCULAR; INTRAVENOUS; SUBCUTANEOUS
Status: DISCONTINUED | OUTPATIENT
Start: 2017-09-12 | End: 2017-09-17 | Stop reason: HOSPADM

## 2017-09-12 RX ORDER — MUPIROCIN 20 MG/G
OINTMENT TOPICAL 2 TIMES DAILY
Status: DISCONTINUED | OUTPATIENT
Start: 2017-09-12 | End: 2017-09-12 | Stop reason: HOSPADM

## 2017-09-12 RX ORDER — CEFAZOLIN SODIUM 2 G/100ML
2 INJECTION, SOLUTION INTRAVENOUS EVERY 8 HOURS
Status: COMPLETED | OUTPATIENT
Start: 2017-09-12 | End: 2017-09-13

## 2017-09-12 RX ORDER — SODIUM CHLORIDE 9 MG/ML
INJECTION, SOLUTION INTRAVENOUS CONTINUOUS
Status: DISCONTINUED | OUTPATIENT
Start: 2017-09-12 | End: 2017-09-17 | Stop reason: HOSPADM

## 2017-09-12 RX ORDER — MAGNESIUM SULFATE HEPTAHYDRATE 40 MG/ML
4 INJECTION, SOLUTION INTRAVENOUS EVERY 4 HOURS PRN
Status: DISCONTINUED | OUTPATIENT
Start: 2017-09-12 | End: 2017-09-17 | Stop reason: HOSPADM

## 2017-09-12 RX ORDER — ALBUMIN, HUMAN INJ 5% 5 %
500-1000 SOLUTION INTRAVENOUS
Status: DISCONTINUED | OUTPATIENT
Start: 2017-09-12 | End: 2017-09-13

## 2017-09-12 RX ORDER — ALBUTEROL SULFATE 90 UG/1
6 AEROSOL, METERED RESPIRATORY (INHALATION) EVERY 4 HOURS PRN
Status: DISCONTINUED | OUTPATIENT
Start: 2017-09-12 | End: 2017-09-17 | Stop reason: HOSPADM

## 2017-09-12 RX ORDER — MAGNESIUM HYDROXIDE 1200 MG/15ML
LIQUID ORAL PRN
Status: DISCONTINUED | OUTPATIENT
Start: 2017-09-12 | End: 2017-09-12 | Stop reason: HOSPADM

## 2017-09-12 RX ORDER — MEPERIDINE HYDROCHLORIDE 25 MG/ML
12.5-25 INJECTION INTRAMUSCULAR; INTRAVENOUS; SUBCUTANEOUS
Status: DISCONTINUED | OUTPATIENT
Start: 2017-09-12 | End: 2017-09-13

## 2017-09-12 RX ORDER — DEXTROSE MONOHYDRATE, SODIUM CHLORIDE, AND POTASSIUM CHLORIDE 50; 1.49; 4.5 G/1000ML; G/1000ML; G/1000ML
INJECTION, SOLUTION INTRAVENOUS CONTINUOUS
Status: DISCONTINUED | OUTPATIENT
Start: 2017-09-12 | End: 2017-09-13

## 2017-09-12 RX ORDER — NITROGLYCERIN 20 MG/100ML
.07-.1 INJECTION INTRAVENOUS CONTINUOUS
Status: DISCONTINUED | OUTPATIENT
Start: 2017-09-12 | End: 2017-09-13

## 2017-09-12 RX ORDER — METOPROLOL TARTRATE 25 MG/1
25 TABLET, FILM COATED ORAL EVERY 12 HOURS
Status: DISCONTINUED | OUTPATIENT
Start: 2017-09-13 | End: 2017-09-14

## 2017-09-12 RX ORDER — NALOXONE HYDROCHLORIDE 0.4 MG/ML
.1-.4 INJECTION, SOLUTION INTRAMUSCULAR; INTRAVENOUS; SUBCUTANEOUS
Status: DISCONTINUED | OUTPATIENT
Start: 2017-09-12 | End: 2017-09-17 | Stop reason: HOSPADM

## 2017-09-12 RX ORDER — DEXTROSE MONOHYDRATE 25 G/50ML
25-50 INJECTION, SOLUTION INTRAVENOUS
Status: DISCONTINUED | OUTPATIENT
Start: 2017-09-12 | End: 2017-09-17 | Stop reason: HOSPADM

## 2017-09-12 RX ORDER — PROPOFOL 10 MG/ML
INJECTION, EMULSION INTRAVENOUS PRN
Status: DISCONTINUED | OUTPATIENT
Start: 2017-09-12 | End: 2017-09-12

## 2017-09-12 RX ORDER — NALOXONE HYDROCHLORIDE 0.4 MG/ML
.1-.4 INJECTION, SOLUTION INTRAMUSCULAR; INTRAVENOUS; SUBCUTANEOUS
Status: DISCONTINUED | OUTPATIENT
Start: 2017-09-12 | End: 2017-09-12

## 2017-09-12 RX ORDER — ASPIRIN 81 MG/1
81 TABLET ORAL DAILY
Status: DISCONTINUED | OUTPATIENT
Start: 2017-09-13 | End: 2017-09-17 | Stop reason: HOSPADM

## 2017-09-12 RX ORDER — POTASSIUM CHLORIDE 1.5 G/1.58G
20-40 POWDER, FOR SOLUTION ORAL
Status: DISCONTINUED | OUTPATIENT
Start: 2017-09-12 | End: 2017-09-17 | Stop reason: HOSPADM

## 2017-09-12 RX ORDER — METOPROLOL TARTRATE 25 MG/1
25 TABLET, FILM COATED ORAL
Status: DISCONTINUED | OUTPATIENT
Start: 2017-09-12 | End: 2017-09-12 | Stop reason: HOSPADM

## 2017-09-12 RX ORDER — SODIUM CHLORIDE, SODIUM LACTATE, POTASSIUM CHLORIDE, CALCIUM CHLORIDE 600; 310; 30; 20 MG/100ML; MG/100ML; MG/100ML; MG/100ML
INJECTION, SOLUTION INTRAVENOUS CONTINUOUS
Status: DISCONTINUED | OUTPATIENT
Start: 2017-09-12 | End: 2017-09-12 | Stop reason: HOSPADM

## 2017-09-12 RX ORDER — LIDOCAINE 40 MG/G
CREAM TOPICAL
Status: DISCONTINUED | OUTPATIENT
Start: 2017-09-12 | End: 2017-09-17 | Stop reason: HOSPADM

## 2017-09-12 RX ORDER — DEXTROSE MONOHYDRATE 25 G/50ML
25-50 INJECTION, SOLUTION INTRAVENOUS
Status: DISCONTINUED | OUTPATIENT
Start: 2017-09-12 | End: 2017-09-12

## 2017-09-12 RX ORDER — VECURONIUM BROMIDE 1 MG/ML
INJECTION, POWDER, LYOPHILIZED, FOR SOLUTION INTRAVENOUS PRN
Status: DISCONTINUED | OUTPATIENT
Start: 2017-09-12 | End: 2017-09-12

## 2017-09-12 RX ORDER — OXYCODONE HYDROCHLORIDE 5 MG/1
5-10 TABLET ORAL
Status: DISCONTINUED | OUTPATIENT
Start: 2017-09-12 | End: 2017-09-17 | Stop reason: HOSPADM

## 2017-09-12 RX ORDER — NICOTINE POLACRILEX 4 MG
15-30 LOZENGE BUCCAL
Status: DISCONTINUED | OUTPATIENT
Start: 2017-09-12 | End: 2017-09-12

## 2017-09-12 RX ORDER — NICOTINE POLACRILEX 4 MG
15-30 LOZENGE BUCCAL
Status: DISCONTINUED | OUTPATIENT
Start: 2017-09-12 | End: 2017-09-17 | Stop reason: HOSPADM

## 2017-09-12 RX ORDER — EPHEDRINE SULFATE 50 MG/ML
INJECTION, SOLUTION INTRAMUSCULAR; INTRAVENOUS; SUBCUTANEOUS PRN
Status: DISCONTINUED | OUTPATIENT
Start: 2017-09-12 | End: 2017-09-12

## 2017-09-12 RX ORDER — LIDOCAINE HYDROCHLORIDE 20 MG/ML
INJECTION, SOLUTION INFILTRATION; PERINEURAL PRN
Status: DISCONTINUED | OUTPATIENT
Start: 2017-09-12 | End: 2017-09-12

## 2017-09-12 RX ORDER — SODIUM CHLORIDE, SODIUM LACTATE, POTASSIUM CHLORIDE, CALCIUM CHLORIDE 600; 310; 30; 20 MG/100ML; MG/100ML; MG/100ML; MG/100ML
INJECTION, SOLUTION INTRAVENOUS CONTINUOUS PRN
Status: DISCONTINUED | OUTPATIENT
Start: 2017-09-12 | End: 2017-09-12

## 2017-09-12 RX ORDER — POTASSIUM CHLORIDE 7.45 MG/ML
10 INJECTION INTRAVENOUS
Status: DISCONTINUED | OUTPATIENT
Start: 2017-09-12 | End: 2017-09-17 | Stop reason: HOSPADM

## 2017-09-12 RX ORDER — SODIUM CHLORIDE 9 MG/ML
INJECTION, SOLUTION INTRAVENOUS CONTINUOUS PRN
Status: DISCONTINUED | OUTPATIENT
Start: 2017-09-12 | End: 2017-09-12

## 2017-09-12 RX ORDER — ACETAMINOPHEN 325 MG/1
975 TABLET ORAL EVERY 8 HOURS
Status: DISPENSED | OUTPATIENT
Start: 2017-09-12 | End: 2017-09-15

## 2017-09-12 RX ORDER — PROTAMINE SULFATE 10 MG/ML
INJECTION, SOLUTION INTRAVENOUS PRN
Status: DISCONTINUED | OUTPATIENT
Start: 2017-09-12 | End: 2017-09-12

## 2017-09-12 RX ORDER — FENTANYL CITRATE 50 UG/ML
50-100 INJECTION, SOLUTION INTRAMUSCULAR; INTRAVENOUS
Status: DISCONTINUED | OUTPATIENT
Start: 2017-09-12 | End: 2017-09-13

## 2017-09-12 RX ORDER — PROPOFOL 10 MG/ML
5-75 INJECTION, EMULSION INTRAVENOUS CONTINUOUS
Status: DISCONTINUED | OUTPATIENT
Start: 2017-09-12 | End: 2017-09-13

## 2017-09-12 RX ORDER — ACETAMINOPHEN 650 MG/1
650 SUPPOSITORY RECTAL EVERY 4 HOURS PRN
Status: DISCONTINUED | OUTPATIENT
Start: 2017-09-12 | End: 2017-09-13

## 2017-09-12 RX ORDER — ONDANSETRON 4 MG/1
4 TABLET, ORALLY DISINTEGRATING ORAL EVERY 6 HOURS PRN
Status: DISCONTINUED | OUTPATIENT
Start: 2017-09-12 | End: 2017-09-17 | Stop reason: HOSPADM

## 2017-09-12 RX ORDER — CEFAZOLIN SODIUM 1 G/3ML
1 INJECTION, POWDER, FOR SOLUTION INTRAMUSCULAR; INTRAVENOUS SEE ADMIN INSTRUCTIONS
Status: DISCONTINUED | OUTPATIENT
Start: 2017-09-12 | End: 2017-09-12 | Stop reason: HOSPADM

## 2017-09-12 RX ORDER — CHLORHEXIDINE GLUCONATE ORAL RINSE 1.2 MG/ML
15 SOLUTION DENTAL EVERY 12 HOURS
Status: DISCONTINUED | OUTPATIENT
Start: 2017-09-12 | End: 2017-09-13

## 2017-09-12 RX ORDER — PROPOFOL 10 MG/ML
10-20 INJECTION, EMULSION INTRAVENOUS EVERY 30 MIN PRN
Status: DISCONTINUED | OUTPATIENT
Start: 2017-09-12 | End: 2017-09-13

## 2017-09-12 RX ORDER — POTASSIUM CL/LIDO/0.9 % NACL 10MEQ/0.1L
10 INTRAVENOUS SOLUTION, PIGGYBACK (ML) INTRAVENOUS
Status: DISCONTINUED | OUTPATIENT
Start: 2017-09-12 | End: 2017-09-17 | Stop reason: HOSPADM

## 2017-09-12 RX ORDER — AMOXICILLIN 250 MG
1-2 CAPSULE ORAL 2 TIMES DAILY
Status: DISCONTINUED | OUTPATIENT
Start: 2017-09-12 | End: 2017-09-17 | Stop reason: HOSPADM

## 2017-09-12 RX ORDER — POTASSIUM CHLORIDE 1500 MG/1
20-40 TABLET, EXTENDED RELEASE ORAL
Status: DISCONTINUED | OUTPATIENT
Start: 2017-09-12 | End: 2017-09-17 | Stop reason: HOSPADM

## 2017-09-12 RX ORDER — POTASSIUM CHLORIDE 29.8 MG/ML
20 INJECTION INTRAVENOUS
Status: DISCONTINUED | OUTPATIENT
Start: 2017-09-12 | End: 2017-09-17 | Stop reason: HOSPADM

## 2017-09-12 RX ORDER — MUPIROCIN 20 MG/G
0.5 OINTMENT TOPICAL 2 TIMES DAILY
Status: DISCONTINUED | OUTPATIENT
Start: 2017-09-12 | End: 2017-09-17 | Stop reason: HOSPADM

## 2017-09-12 RX ORDER — ACETAMINOPHEN 325 MG/1
650 TABLET ORAL EVERY 4 HOURS PRN
Status: DISCONTINUED | OUTPATIENT
Start: 2017-09-15 | End: 2017-09-17 | Stop reason: HOSPADM

## 2017-09-12 RX ADMIN — Medication 5 MG: at 08:47

## 2017-09-12 RX ADMIN — Medication 1 G/HR: at 09:30

## 2017-09-12 RX ADMIN — Medication 10 MG: at 08:37

## 2017-09-12 RX ADMIN — Medication 1 G: at 10:25

## 2017-09-12 RX ADMIN — SUFENTANIL CITRATE 50 MCG: 50 INJECTION EPIDURAL; INTRAVENOUS at 12:17

## 2017-09-12 RX ADMIN — PHENYLEPHRINE HYDROCHLORIDE 100 MCG: 10 INJECTION, SOLUTION INTRAMUSCULAR; INTRAVENOUS; SUBCUTANEOUS at 11:41

## 2017-09-12 RX ADMIN — RANITIDINE 150 MG: 150 TABLET ORAL at 06:06

## 2017-09-12 RX ADMIN — MIDAZOLAM HYDROCHLORIDE 2 MG: 1 INJECTION, SOLUTION INTRAMUSCULAR; INTRAVENOUS at 07:32

## 2017-09-12 RX ADMIN — PHENYLEPHRINE HYDROCHLORIDE 100 MCG: 10 INJECTION, SOLUTION INTRAMUSCULAR; INTRAVENOUS; SUBCUTANEOUS at 11:42

## 2017-09-12 RX ADMIN — PHENYLEPHRINE HYDROCHLORIDE 100 MCG: 10 INJECTION, SOLUTION INTRAMUSCULAR; INTRAVENOUS; SUBCUTANEOUS at 08:29

## 2017-09-12 RX ADMIN — MIDAZOLAM HYDROCHLORIDE 1 MG: 1 INJECTION, SOLUTION INTRAMUSCULAR; INTRAVENOUS at 08:08

## 2017-09-12 RX ADMIN — MIDAZOLAM HYDROCHLORIDE 1 MG: 1 INJECTION, SOLUTION INTRAMUSCULAR; INTRAVENOUS at 09:33

## 2017-09-12 RX ADMIN — VANCOMYCIN HYDROCHLORIDE 1500 MG: 5 INJECTION, POWDER, LYOPHILIZED, FOR SOLUTION INTRAVENOUS at 20:13

## 2017-09-12 RX ADMIN — PHENYLEPHRINE HYDROCHLORIDE 100 MCG: 10 INJECTION, SOLUTION INTRAMUSCULAR; INTRAVENOUS; SUBCUTANEOUS at 08:47

## 2017-09-12 RX ADMIN — SUFENTANIL CITRATE 20 MCG: 50 INJECTION EPIDURAL; INTRAVENOUS at 08:23

## 2017-09-12 RX ADMIN — PHENYLEPHRINE HYDROCHLORIDE 0.3 MCG/KG/MIN: 10 INJECTION, SOLUTION INTRAMUSCULAR; INTRAVENOUS; SUBCUTANEOUS at 08:30

## 2017-09-12 RX ADMIN — ROCURONIUM BROMIDE 30 MG: 10 INJECTION INTRAVENOUS at 13:16

## 2017-09-12 RX ADMIN — SODIUM CHLORIDE: 9 INJECTION, SOLUTION INTRAVENOUS at 08:05

## 2017-09-12 RX ADMIN — Medication 5 MG: at 08:25

## 2017-09-12 RX ADMIN — SUFENTANIL CITRATE 20 MCG: 50 INJECTION EPIDURAL; INTRAVENOUS at 12:37

## 2017-09-12 RX ADMIN — Medication 3 G: at 08:32

## 2017-09-12 RX ADMIN — LIDOCAINE HYDROCHLORIDE 100 MG: 20 INJECTION, SOLUTION INFILTRATION; PERINEURAL at 08:20

## 2017-09-12 RX ADMIN — AMIODARONE HYDROCHLORIDE 0.5 MG/MIN: 50 INJECTION, SOLUTION INTRAVENOUS at 21:41

## 2017-09-12 RX ADMIN — CHLORHEXIDINE GLUCONATE 15 ML: 1.2 RINSE ORAL at 20:08

## 2017-09-12 RX ADMIN — SODIUM CHLORIDE: 9 INJECTION, SOLUTION INTRAVENOUS at 12:45

## 2017-09-12 RX ADMIN — PHENYLEPHRINE HYDROCHLORIDE 100 MCG: 10 INJECTION, SOLUTION INTRAMUSCULAR; INTRAVENOUS; SUBCUTANEOUS at 08:40

## 2017-09-12 RX ADMIN — PHENYLEPHRINE HYDROCHLORIDE 100 MCG: 10 INJECTION, SOLUTION INTRAMUSCULAR; INTRAVENOUS; SUBCUTANEOUS at 08:32

## 2017-09-12 RX ADMIN — MIDAZOLAM HYDROCHLORIDE 1 MG: 1 INJECTION, SOLUTION INTRAMUSCULAR; INTRAVENOUS at 10:26

## 2017-09-12 RX ADMIN — SODIUM CHLORIDE, POTASSIUM CHLORIDE, SODIUM LACTATE AND CALCIUM CHLORIDE: 600; 310; 30; 20 INJECTION, SOLUTION INTRAVENOUS at 08:05

## 2017-09-12 RX ADMIN — MUPIROCIN: 20 OINTMENT TOPICAL at 06:06

## 2017-09-12 RX ADMIN — MIDAZOLAM HYDROCHLORIDE 1 MG: 1 INJECTION, SOLUTION INTRAMUSCULAR; INTRAVENOUS at 12:04

## 2017-09-12 RX ADMIN — CEFAZOLIN SODIUM 2 G: 2 INJECTION, SOLUTION INTRAVENOUS at 21:34

## 2017-09-12 RX ADMIN — Medication 5 MG: at 08:46

## 2017-09-12 RX ADMIN — SUFENTANIL CITRATE 20 MCG: 50 INJECTION EPIDURAL; INTRAVENOUS at 12:39

## 2017-09-12 RX ADMIN — EPINEPHRINE 0.03 MCG/KG/MIN: 1 INJECTION INTRAMUSCULAR; INTRAVENOUS; SUBCUTANEOUS at 11:45

## 2017-09-12 RX ADMIN — PHENYLEPHRINE HYDROCHLORIDE 100 MCG: 10 INJECTION, SOLUTION INTRAMUSCULAR; INTRAVENOUS; SUBCUTANEOUS at 08:42

## 2017-09-12 RX ADMIN — PHENYLEPHRINE HYDROCHLORIDE 100 MCG: 10 INJECTION, SOLUTION INTRAMUSCULAR; INTRAVENOUS; SUBCUTANEOUS at 08:41

## 2017-09-12 RX ADMIN — POTASSIUM CHLORIDE, DEXTROSE MONOHYDRATE AND SODIUM CHLORIDE: 150; 5; 450 INJECTION, SOLUTION INTRAVENOUS at 14:52

## 2017-09-12 RX ADMIN — Medication 5 MG: at 11:45

## 2017-09-12 RX ADMIN — Medication 49000 UNITS: at 09:01

## 2017-09-12 RX ADMIN — SUFENTANIL CITRATE 10 MCG: 50 INJECTION EPIDURAL; INTRAVENOUS at 08:50

## 2017-09-12 RX ADMIN — PHENYLEPHRINE HYDROCHLORIDE 100 MCG: 10 INJECTION, SOLUTION INTRAMUSCULAR; INTRAVENOUS; SUBCUTANEOUS at 08:48

## 2017-09-12 RX ADMIN — PHENYLEPHRINE HYDROCHLORIDE 200 MCG: 10 INJECTION, SOLUTION INTRAMUSCULAR; INTRAVENOUS; SUBCUTANEOUS at 09:02

## 2017-09-12 RX ADMIN — SUFENTANIL CITRATE 30 MCG: 50 INJECTION EPIDURAL; INTRAVENOUS at 08:21

## 2017-09-12 RX ADMIN — ROCURONIUM BROMIDE 50 MG: 10 INJECTION INTRAVENOUS at 08:22

## 2017-09-12 RX ADMIN — SODIUM CHLORIDE: 9 INJECTION, SOLUTION INTRAVENOUS at 18:48

## 2017-09-12 RX ADMIN — MUPIROCIN 0.5 G: 20 OINTMENT TOPICAL at 20:25

## 2017-09-12 RX ADMIN — PHENYLEPHRINE HYDROCHLORIDE 100 MCG: 10 INJECTION, SOLUTION INTRAMUSCULAR; INTRAVENOUS; SUBCUTANEOUS at 08:38

## 2017-09-12 RX ADMIN — MIDAZOLAM HYDROCHLORIDE 2 MG: 1 INJECTION, SOLUTION INTRAMUSCULAR; INTRAVENOUS at 08:47

## 2017-09-12 RX ADMIN — PHENYLEPHRINE HYDROCHLORIDE 100 MCG: 10 INJECTION, SOLUTION INTRAMUSCULAR; INTRAVENOUS; SUBCUTANEOUS at 08:36

## 2017-09-12 RX ADMIN — MIDAZOLAM HYDROCHLORIDE 2 MG: 1 INJECTION, SOLUTION INTRAMUSCULAR; INTRAVENOUS at 08:20

## 2017-09-12 RX ADMIN — DEXMEDETOMIDINE HYDROCHLORIDE 0.3 MCG/KG/HR: 4 INJECTION, SOLUTION INTRAVENOUS at 09:38

## 2017-09-12 RX ADMIN — MIDAZOLAM HYDROCHLORIDE 1 MG: 1 INJECTION, SOLUTION INTRAMUSCULAR; INTRAVENOUS at 08:00

## 2017-09-12 RX ADMIN — VECURONIUM BROMIDE 10 MG: 1 INJECTION, POWDER, LYOPHILIZED, FOR SOLUTION INTRAVENOUS at 10:01

## 2017-09-12 RX ADMIN — AMIODARONE HYDROCHLORIDE 1 MG/MIN: 50 INJECTION, SOLUTION INTRAVENOUS at 11:50

## 2017-09-12 RX ADMIN — Medication 1 G: at 12:40

## 2017-09-12 RX ADMIN — Medication 5 MG: at 08:29

## 2017-09-12 RX ADMIN — PHENYLEPHRINE HYDROCHLORIDE 100 MCG: 10 INJECTION, SOLUTION INTRAMUSCULAR; INTRAVENOUS; SUBCUTANEOUS at 08:35

## 2017-09-12 RX ADMIN — VECURONIUM BROMIDE 10 MG: 1 INJECTION, POWDER, LYOPHILIZED, FOR SOLUTION INTRAVENOUS at 08:45

## 2017-09-12 RX ADMIN — PHENYLEPHRINE HYDROCHLORIDE 5 MCG: 10 INJECTION, SOLUTION INTRAMUSCULAR; INTRAVENOUS; SUBCUTANEOUS at 11:48

## 2017-09-12 RX ADMIN — SUFENTANIL CITRATE 20 MCG: 50 INJECTION EPIDURAL; INTRAVENOUS at 08:49

## 2017-09-12 RX ADMIN — Medication 5 MG: at 08:30

## 2017-09-12 RX ADMIN — SUFENTANIL CITRATE 10 MCG: 50 INJECTION EPIDURAL; INTRAVENOUS at 12:38

## 2017-09-12 RX ADMIN — PROPOFOL 70 MG: 10 INJECTION, EMULSION INTRAVENOUS at 08:22

## 2017-09-12 RX ADMIN — SODIUM CHLORIDE 1.5 UNITS/HR: 9 INJECTION, SOLUTION INTRAVENOUS at 14:25

## 2017-09-12 RX ADMIN — SUFENTANIL CITRATE 20 MCG: 50 INJECTION EPIDURAL; INTRAVENOUS at 12:15

## 2017-09-12 RX ADMIN — AMIODARONE HYDROCHLORIDE 150 MG: 1.5 INJECTION, SOLUTION INTRAVENOUS at 11:22

## 2017-09-12 RX ADMIN — PANTOPRAZOLE SODIUM 40 MG: 40 INJECTION, POWDER, FOR SOLUTION INTRAVENOUS at 14:55

## 2017-09-12 RX ADMIN — Medication 5 G/HR: at 08:30

## 2017-09-12 RX ADMIN — MIDAZOLAM HYDROCHLORIDE 1 MG: 1 INJECTION, SOLUTION INTRAMUSCULAR; INTRAVENOUS at 11:17

## 2017-09-12 RX ADMIN — SODIUM CHLORIDE, POTASSIUM CHLORIDE, SODIUM LACTATE AND CALCIUM CHLORIDE: 600; 310; 30; 20 INJECTION, SOLUTION INTRAVENOUS at 06:06

## 2017-09-12 RX ADMIN — Medication 10 MG: at 08:42

## 2017-09-12 RX ADMIN — VANCOMYCIN HYDROCHLORIDE 2000 MG: 500 INJECTION, POWDER, LYOPHILIZED, FOR SOLUTION INTRAVENOUS at 07:36

## 2017-09-12 RX ADMIN — Medication 5 MG: at 08:31

## 2017-09-12 RX ADMIN — AMIODARONE HYDROCHLORIDE 1 MG/MIN: 50 INJECTION, SOLUTION INTRAVENOUS at 16:24

## 2017-09-12 RX ADMIN — PROTAMINE SULFATE 250 MG: 10 INJECTION, SOLUTION INTRAVENOUS at 12:18

## 2017-09-12 RX ADMIN — PHENYLEPHRINE HYDROCHLORIDE 100 MCG: 10 INJECTION, SOLUTION INTRAMUSCULAR; INTRAVENOUS; SUBCUTANEOUS at 11:43

## 2017-09-12 RX ADMIN — LIDOCAINE HYDROCHLORIDE 100 MG: 20 INJECTION, SOLUTION INFILTRATION; PERINEURAL at 11:29

## 2017-09-12 ASSESSMENT — ENCOUNTER SYMPTOMS: DYSRHYTHMIAS: 1

## 2017-09-12 ASSESSMENT — LIFESTYLE VARIABLES: TOBACCO_USE: 1

## 2017-09-12 NOTE — PROGRESS NOTES
1600: pt awakens and follows. Denies pain. RT to bedside, huddle complete. CPAP started at 1618.   MDA at bedside to repo Parveen, pulled back 10 cm. CXR pending. csccrn

## 2017-09-12 NOTE — PROGRESS NOTES
Patient was placed in restraints during direct anesthesia recovery.  See flowsheet for details about provider(s) involved, patient and family education, discontinuation criteria and restraint type.csccrn

## 2017-09-12 NOTE — PLAN OF CARE
Problem: Cardiac Surgery (Adult)  Goal: Signs and Symptoms of Listed Potential Problems Will be Absent or Manageable (Cardiac Surgery)  Signs and symptoms of listed potential problems will be absent or manageable by discharge/transition of care (reference Cardiac Surgery (Adult) CPG).  Outcome: No Change  Pt remains stable post op MVR with mech [facility]. Valve click audible , no rub. CT output total in 6 hours = 95 cc. rhythem remains stable. PA cath in proper position, c/o 5.7,c/i 2.4, sv 85, and svr 869. Stable. csccrn

## 2017-09-12 NOTE — PROGRESS NOTES
Pt arrives to ICU at 1322, EKG, CXR complete,PA cath position discussed with [facility]. Formal report suggests repositioning, will notify MDA.   BP stable on epi, amio. No further v tac at this time. Ct minimal. csccrn

## 2017-09-12 NOTE — BRIEF OP NOTE
[facility]    Brief Operative Note    Pre-operative diagnosis: MITRAL VALVE DISEASE  Post-operative diagnosis Severe mitral regurgitation  Procedure: Procedure(s):  MITRAL VALVE REPLACEMENT  [facility] Masters Series Mechanical Heart Valve 33mm  Ref, 33MJ-501 Serial [id number]   ON PUMP, MITCHELL - Wound Class: I-Clean  Surgeon: Surgeon(s) and Role:     * Ivana Marie MD - Primary  Anesthesia: General   Estimated blood loss: 3L  Drains: Mediastinal chest tubes x2  Specimens:   ID Type Source Tests Collected by Time Destination   A : Anterior Mitral Valve Leaflet Tissue Heart SURGICAL PATHOLOGY EXAM Ivana Marie MD 9/12/2017 10:05 AM      Findings:   Severe MR, AI, pericarditis  Complications: none  Implants: 33mm mechanical mitral valve

## 2017-09-12 NOTE — ANESTHESIA POSTPROCEDURE EVALUATION
Patient: Derek Stover    Procedure(s):  MITRAL VALVE REPLACEMENT  [facility] Masters Series Mechanical Heart Valve 33mm  Ref, 33MJ-501 Serial [id number]   ON PUMP, MITCHELL - Wound Class: I-Clean    Diagnosis:MITRAL VALVE DISEASEQ  Diagnosis Additional Information: No value filed.    Anesthesia Type:  General, ETT    Note:  Anesthesia Post Evaluation    Patient location during evaluation: ICU  Patient participation: Unable to evaluate secondary to administered sedation  Airway patency: patent  Cardiovascular status: acceptable  Respiratory status: acceptable  Hydration status: acceptable       Comments: Taken directly to ICU, intubated and sedated; Epi gtt for BP management; precedex gtt for sedation; on amiodarone for episodes of irritability following CPB; currently VSS; report given to intensivist;        Last vitals:  Vitals:    09/12/17 1545 09/12/17 1600 09/12/17 1615   BP:      Resp: 15 14 19   Temp: 36.2  C (97.2  F) 36.2  C (97.2  F) 36.3  C (97.3  F)   SpO2: 97% 97% 99%         Electronically Signed By: KERRY SANTANA MD  September 12, 2017  4:36 PM

## 2017-09-12 NOTE — PROVIDER NOTIFICATION
BEBETO reviewed with [facility], will repeat PS attempt @1900. Flipped back to CMV. [facility]

## 2017-09-12 NOTE — IP AVS SNAPSHOT
MRN:[id number]                      After Visit Summary   9/12/2017    Derek Stover    MRN: [id number]           Thank you!     Thank you for choosing [facility] for your care. Our goal is always to provide you with excellent care. Hearing back from our patients is one way we can continue to improve our services. Please take a few minutes to complete the written survey that you may receive in the mail after you visit with us. Thank you!        Patient Information     Date Of Birth          1960        Designated Caregiver       Most Recent Value    Caregiver    Will someone help with your care after discharge? no [pt planning on rehab on discharge as wife works ]      About your hospital stay     You were admitted on:  September 12, 2017 You last received care in the:  [place] [address] [facility]    You were discharged on:  September 17, 2017       Who to Call     For medical emergencies, please call 911.  For non-urgent questions about your medical care, please call your primary care provider or clinic, [phone]  For questions related to your surgery, please call your surgery clinic        Attending Provider     Provider Specialty    Ivana Marie MD Thoracic Diseases       Primary Care Provider Office Phone # Fax #    Osbaldo Renee [phone]      After Care Instructions     Diet       Follow this diet upon discharge: Orders Placed This Encounter      Snacks/Supplements Adult: Other - Please comment; Chocolate PLUS2 milkshake at 10am and 2pm  (RD); Between Meals      Advance Diet as Tolerated: Regular Diet Adult; Regular Diet Adult            Wound care and dressings       Cleanse sternal wound daily with antibacterial soap                  Follow-up Appointments     Follow-up and recommended labs and tests        *Take 7.5 mg coumadin tonight 9/17/17 and have your INR drawn tomorrow. Your INR goal is 2.5-3.5 for your mechanical mitral  valve  *Follow up primary care provider in 7-10 days after discharge in order to review your medication, vital signs, obtain any necessary lab work your doctor may want, and to update them on your hospitalization and medical issues.  *Follow up with Aminta/Liza Adame with Dr Marie, heart surgeon, at [facility] [address] on October 2, 2017 at 2:00 PM. If any questions or concerns call [phone].  You will see us once at this visit and then if everything is going well you will not need to see us again.  You will follow long term with your cardiologist.  *Follow up with Danny JOHNSTON with cardiology on 10/10/17 at 0945 to discuss rhythm issues and medication  *Follow up with Dr Ross, cardiologist, on November 16, 2017 at 0930. This is who you will follow with long term about your heart issues. [phone].  *For [place] outpatient cardiac rehab, call [phone].                  Your next 10 appointments already scheduled     Sep 18, 2017 10:30 AM CDT   Anticoagulation Visit with PH ANTI COAG   [facility]    [address] [facility] [place] [facility] [id number] [phone]            Sep 20, 2017 12:30 PM CDT   Cardiac Evaluation with ANUM Seaman   [facility] ([facility])    [address] [place] [address] [address] [place] [address] [phone]            Sep 22, 2017  2:10 PM CDT   Office Visit with Osbaldo Renee MD   [facility]    [address] [facility] [phone]           Bring a current list of meds and any records pertaining to this visit. For Physicals, please bring immunization records and any forms needing to be filled out. Please arrive 10 minutes early to complete paperwork.            Oct 02, 2017  3:00 PM CDT   Post-Op with [facility] CARDIOTHORACIC SURGERY, PA   [facility] ([facility])    [address]  [address]   [phone]            Oct 10, 2017  9:45 AM CDT   Return Visit with Danny Mariee PA-C   [facility] ([facility])    [address] [place] [id number] [phone]            Nov 16, 2017  9:30 AM CST   Return Visit with Oni Ross MD   [facility] ([address] [phone]              Additional Services     CARDIAC REHAB REFERRAL       Please be aware that coverage of these services is subject to the terms and limitations of your health insurance plan.  Call member services at your health plan with any benefit or coverage questions.     Order is sent electronically to central rehab scheduling. Call [phone] if you haven't been contacted regarding these appointments within 2 business days of discharge.            CARDIAC REHAB REFERRAL       Your provider has referred you to: [facility]: [facility] [phone]   [email or web address]            INR Clinic Referral       S/p mechanical MV INR goal 2.5-3.5                  Further instructions from your care team       YOUR CARE AFTER HEART SURGERY   DISCHARGE INTRUCTIONS      Weight - Weigh yourself daily and record on daily weight sheet provided in this packet.     Incentive Spirometer - Continue to use 3 to 4 times a day for 10 days; follow use of spirometer with coughing. Use attached sheet to report progress.     Daily shower - Wash all surgical incisions daily with liquid antibacterial soap, such as Dial.   No tub baths until incisions are healed. The sticky glue used to close your incisions may peel off slowly.    Incisions - Avoid all lotions, oils, ointments or sunscreen on incisions for 8 weeks. Avoid sunlight on incision sites for 8 weeks and then use sunscreen on incisions for one year.   You may have numbness, tingling, and increased  sensitivity around your incision lines for several weeks/months as the nerves grow back. Some drainage from the sites where your chest tubes were is normal and can persist for a while until it has healed. It is best to keep this open to air to allow scab formation and healing, you can cover it with a gauze pad or band-aid if needed.     Diet - Eat a well balanced diet to promote healing. It can be normal to have a decreased appetite for a while after surgery. You can eat whatever it takes to keep up a normal food/calorie intake in the immediate post-operative period for about a month. Try to limit high sodium (salt) foods to prevent fluid retention.  If you are a diabetic, continue to balance your carbohydrate intake with your medicine. Eventually you will need to adopt a heart healthy diet, especially if you have coronary artery disease.     Daily exercise/activity precautions - Cardiac rehab therapist will review your restrictions with you.  No lifting, pushing or pulling anything over 10 pounds for 12 weeks if you are a diabetic or 8 weeks if you are not a diabetic (reference: a gallon of milk weighs 8 pounds).    Positioning -Keep your legs elevated above the level of your heart when you are in bed. You may lie on your side and stomach if it s comfortable and you have no sternal click (popping in breastbone).    Pain medications - Use as directed. Call surgeon for pain medication refill if needed. Other medications should be filled by your primary doctor.     Depression - It is not uncommon after surgery.  If it lasts longer than two weeks or you feel you need to talk to someone, contact your primary physician.    Driving -No driving for 4 weeks from the day of surgery (or until your surgeon has approved it).    Work and Travel - Consult with your physician about specific work and travel restrictions    Cardiac Rehabilitation - Usually begins approximately one week after discharge and lasts up to 6 weeks. In the  meantime, continue to work on the rehab activities you learned in the hospital and maintain your physical activity. Aerobic activity, especially walking, is a great way to regain your physical endurance.     Checking your Blood sugar (glucose) - If you have been instructed to begin checking your blood sugars at home, record them on the back page of this packet and take the packet with you to your follow appointment with you primary physician (usually about 1 week after surgery).          CALL YOUR SURGEON IF ANY OF THE FOLLOWING OCCURS:      Fever - If you feel chills, shaking, or your temperature is over 101 degrees    Sternal Click - Some popping or clicking sensations can be normal as the chest has been stretched open. If you notice a significant change or if pain becomes bothersome, please call.    Angina/Chest Pain - Or symptoms similar to those you experienced before surgery    Infection - If incisions look infected (increasing redness, tenderness, swelling, warmth, odor, drainage, or change in color if drainage).    Weight gain - Please call if weight gain of 2-3pounds over 24 hours or if greater than 5 pounds in 1 week as this may indicate fluid overload and could signify a problem with your heart.     Swelling - If you notice worsening swelling in your legs. A certain amount of swelling is expected, especially if you had a vein taken out of your leg for bypass surgery.      Shortness of breath - Not relieved by rest    Persistent heart palpitations - Rapid, pounding heartbeat    Dizziness/lightheadedness - While sitting or at rest    Pain - Not relieved by pain medications      Your Daily Weight  Weigh yourself every morning in the same clothes after urinating and before breakfast.* Call your physician if your weight increases 2-3 lbs in one day or 3-5 lbs in a week**  My baseline weight (first morning home):____________   Date Weight   Date Weight   1.    17.     2.    18.     3.    19.     4.    20.      5.    21.     6.    22.     7.    23.     8.    24.     9.    25.     10.    26.     11.    27.     12.    28.     13.    29.     14.    30.     15.    31.     16.    32.           Incentive Spirometer- After Surgery Progress Record    Discharge Volume_______________ml    As you recover from your surgery it is important to continue the use of your incentive spirometer at home.  We recommend that you use your spirometer at least 3-4 times per day.  You should take 5 slow deep breaths with each use. Inhale slowly to raise the piston in the chamber as high as you are able.  Hold breath to count of 3 and then exhale normally.  Allow piston to return to bottom of chamber, rest and repeat 4 more times. It is helpful to see your progress by recording your average volume in the spaces provided below.    Day  1       Day  2       Day  3       Day  4       Day  5       Day  6       Day  7       Day  8       Day  9       Day  10             Warfarin Instruction     You have started taking a medicine called warfarin. This is a blood-thinning medicine (anticoagulant). It helps prevent and treat blood clots.      Before leaving the hospital, make sure you know how much to take and how long to take it.      You will need regular blood tests to make sure your blood is clotting safely. It is very important to see your doctor for regular blood tests.    Talk to your doctor before taking any new medicine (this includes over-the-counter drugs and herbal products). Many medicines can interact with warfarin. This may cause more bleeding or too much clotting.     Eating a lot of vitamin K--found in green, leafy vegetables--can change the way warfarin works in your body. Do NOT avoid these foods. Instead, try to eat the same amount each day.     Bleeding is the most common side-effect of warfarin. You may notice bleeding gums, a bloody nose, bruises and bleeding longer when you cut yourself. See a doctor at once if:   o You cough up  "blood  o You find blood in your stool (poop)  o You have a deep cut, or a cut that bleeds longer than 10 minutes   o You have a bad cut, hard fall, accident or hit your head (go to urgent care or the emergency room).    For women who can get pregnant: This medicine can harm an unborn baby. Be very careful not to get pregnant while taking this medicine. If you think you might be pregnant, call your doctor right away.    For more information, read \"Guide to Warfarin Therapy,  the booklet you received in the hospital.        Pending Results     Date and Time Order Name Status Description    9/12/2017 1145 Plasma prepare order unit In process             Statement of Approval     Ordered          09/17/17 1232  I have reviewed and agree with all the recommendations and orders detailed in this document.  EFFECTIVE NOW     Approved and electronically signed by:  Aminta Devlin PA-C             Admission Information     Date & Time Provider Department Dept. Phone    9/12/2017 Ivana Marie MD [place] [address] [facility] [phone]      Your Vitals Were     Blood Pressure Pulse Temperature Respirations Height Weight    107/57 72 97.9  F (36.6  C) (Oral) 16 1.829 m (6') 102.9 kg (226 lb 13.7 oz)    Pulse Oximetry BMI (Body Mass Index)                96% 30.77 kg/m2          MyChart Information     [employer] gives you secure access to your electronic health record. If you see a primary care provider, you can also send messages to your care team and make appointments. If you have questions, please call your primary care clinic.  If you do not have a primary care provider, please call [phone] and they will assist you.        Care EveryWhere ID     This is your Care EveryWhere ID. This could be used by other organizations to access your [facility] [place] medical records  [id number]        Equal Access to Services     HOMER SPAIN AH: ignacio Huddleston, marisol melgar " malissa merrillkatie meza'aan ah. So [facility] [phone].    ATENCIÓN: Si farzadla lavern, tiene a macias disposición servicios gratuitos de asistencia lingüística. Taco al [phone].    We comply with applicable federal civil rights laws and [place] laws. We do not discriminate on the basis of race, color, national origin, age, disability sex, sexual orientation or gender identity.               Review of your medicines      START taking        Dose / Directions    aspirin 81 MG EC tablet   Used for:  S/P mitral valve replacement with metallic valve        Dose:  81 mg   Take 1 tablet (81 mg) by mouth daily   Quantity:  30 tablet   Refills:  0       metoprolol 50 MG tablet   Commonly known as:  LOPRESSOR   Used for:  S/P mitral valve replacement with metallic valve        Dose:  50 mg   Take 1 tablet (50 mg) by mouth every 12 hours   Quantity:  60 tablet   Refills:  3       oxyCODONE 5 MG IR tablet   Commonly known as:  ROXICODONE   Used for:  S/P mitral valve replacement with metallic valve        Dose:  5-10 mg   Take 1-2 tablets (5-10 mg) by mouth every 3 hours as needed for moderate to severe pain   Quantity:  60 tablet   Refills:  0       pantoprazole 40 MG EC tablet   Commonly known as:  PROTONIX   Used for:  S/P mitral valve replacement with metallic valve   Notes to Patient:  Last dose 10/2/17        Dose:  40 mg   Take 1 tablet (40 mg) by mouth every morning for 14 days   Quantity:  14 tablet   Refills:  0       senna-docusate 8.6-50 MG per tablet   Commonly known as:  SENOKOT-S;PERICOLACE   Used for:  S/P mitral valve replacement with metallic valve   Notes to Patient:  As needed to soften stool        Dose:  1-2 tablet   Take 1-2 tablets by mouth 2 times daily   Quantity:  100 tablet   Refills:  0         CONTINUE these medicines which may have CHANGED, or have new prescriptions. If we are uncertain of the size of tablets/capsules you have at home, strength may be listed as something  that might have changed.        Dose / Directions    acetaminophen 325 MG tablet   Commonly known as:  TYLENOL   This may have changed:    - medication strength  - how much to take  - when to take this  - reasons to take this   Used for:  S/P mitral valve replacement with metallic valve        Dose:  650 mg   Take 2 tablets (650 mg) by mouth every 4 hours as needed for other (surgical pain)   Quantity:  100 tablet   Refills:  0       warfarin 7.5 MG tablet   Commonly known as:  COUMADIN   This may have changed:    - medication strength  - how much to take  - when to take this  - additional instructions        Dose:  7.5 mg   Take 1 tablet (7.5 mg) by mouth once for 1 dose   Quantity:  1 tablet   Refills:  0         CONTINUE these medicines which have NOT CHANGED        Dose / Directions    AMOXIL PO        Dose:  5242-1546 mg   Take 1,500-3,000 mg by mouth daily as needed (patient takes 6 X 500 = 3,000 mg dose 1 hour before dental appointment and 3 X 500 = 1,500 mg dose 6 hours after dental appointment.)   Refills:  0       CHANTIX PO   Indication:  Treatment to Stop Smoking        Dose:  1 mg   Take 1 mg by mouth 2 times daily   Refills:  0         STOP taking     COREG PO                Where to get your medicines      These medications were sent to [facility] [place], [place] - [address]  [address] [facility] [address], [place] [address]     Phone:  [phone]     acetaminophen 325 MG tablet    aspirin 81 MG EC tablet    metoprolol 50 MG tablet    pantoprazole 40 MG EC tablet    senna-docusate 8.6-50 MG per tablet    warfarin 7.5 MG tablet         Some of these will need a paper prescription and others can be bought over the counter. Ask your nurse if you have questions.     Bring a paper prescription for each of these medications     oxyCODONE 5 MG IR tablet                Protect others around you: Learn how to safely use, store and throw away your medicines at www.disposemymeds.org.              Medication List: This is a list of all your medications and when to take them. Check marks below indicate your daily home schedule. Keep this list as a reference.      Medications           Morning Afternoon Evening Bedtime As Needed    acetaminophen 325 MG tablet   Commonly known as:  TYLENOL   Take 2 tablets (650 mg) by mouth every 4 hours as needed for other (surgical pain)   Last time this was given:  650 mg on 9/17/2017 10:48 AM   Next Dose Due:  Anytime after 3:00 PM 9/17/17                                   AMOXIL PO   Take 1,500-3,000 mg by mouth daily as needed (patient takes 6 X 500 = 3,000 mg dose 1 hour before dental appointment and 3 X 500 = 1,500 mg dose 6 hours after dental appointment.)                                   aspirin 81 MG EC tablet   Take 1 tablet (81 mg) by mouth daily   Last time this was given:  81 mg on 9/17/2017 10:42 AM                                   CHANTIX PO   Take 1 mg by mouth 2 times daily   Last time this was given:  1 mg on 9/17/2017 10:43 AM   Next Dose Due:  9/18/17                                        metoprolol 50 MG tablet   Commonly known as:  LOPRESSOR   Take 1 tablet (50 mg) by mouth every 12 hours   Last time this was given:  50 mg on 9/17/2017 10:42 AM   Next Dose Due:  9/17/17 8:00 PM                                      oxyCODONE 5 MG IR tablet   Commonly known as:  ROXICODONE   Take 1-2 tablets (5-10 mg) by mouth every 3 hours as needed for moderate to severe pain   Last time this was given:  5 mg on 9/16/2017  8:05 PM                                   pantoprazole 40 MG EC tablet   Commonly known as:  PROTONIX   Take 1 tablet (40 mg) by mouth every morning for 14 days   Last time this was given:  40 mg on 9/17/2017 10:42 AM   Notes to Patient:  Last dose 10/2/17                                   senna-docusate 8.6-50 MG per tablet   Commonly known as:  SENOKOT-S;PERICOLACE   Take 1-2 tablets by mouth 2 times daily   Last time this was given:  1 tablet on  9/16/2017  8:05 PM   Notes to Patient:  As needed to soften stool                                      warfarin 7.5 MG tablet   Commonly known as:  COUMADIN   Take 1 tablet (7.5 mg) by mouth once for 1 dose   Last time this was given:  5 mg on 9/16/2017  7:00 PM

## 2017-09-12 NOTE — ANESTHESIA PROCEDURE NOTES
CENTRAL LINE INSERTION PROCEDURE NOTE:   Pre-Procedure  Performed by KERRY SANTANA  Location: pre-op      Pre-Anesthestic Checklist: patient identified, IV checked, risks and benefits discussed, informed consent, monitors and equipment checked and pre-op evaluation    Timeout  Correct Patient: Yes   Correct Procedure: Yes   Correct Site: Yes   Correct Laterality: N/A   Correct Position: Yes   Site Marked: N/A   .   Procedure Documentation   Procedure: central line  Position: Trendelenburg  Patient Prep;all elements of maximal sterile barrier technique followed, mask, hat, sterile gown, sterile gloves, draped, hand hygiene, chlorhexidine gluconate and isopropyl alcohol, patient draped      Insertion Site:right, internal jugular  Using U/S with sterile probe cover and sterile gel, vein evaluated for patency/adequacy of cortis insertion is adequate, and using realtime U/S imaging the dino was punctured, and needle was observed entering vein on U/S. A permanent image is entered into the patient's record.   Skin infiltrated with mL of 1% lidocaine.  Catheter: 9 Green[place]ic, 10 cm (sheath introducer), PA Catheter  Assessment/Narrative         Secured by suture  Tegaderm and Biopatch dressing used.  blood aspirated from all lumens  All lumens flushed: Yes    Comments:  PA placed catheter placed in sterile manner with good wave form.  No complications.

## 2017-09-12 NOTE — ANESTHESIA PREPROCEDURE EVALUATION
"  Anesthesia Evaluation     .             ROS/MED HX    ENT/Pulmonary:     (+)ADELA risk factors snores loudly, obese, observed stopped breathing tobacco use, Past use 2 packs/day  , . .   (-) sleep apnea   Neurologic:       Cardiovascular:     (+) Dyslipidemia, ----. Taking blood thinners : . . ROMAN, . :. dysrhythmias a-fib, valvular problems/murmurs type: MR MR: 3-4+;:. Previous cardiac testing Echodate:8/2017results:Interpretation Summary     The left ventricle is moderately dilated.  The visual ejection fraction is estimated at 55%.  Left ventricular systolic function is low normal.  Mildly decreased right ventricular systolic function  The left atrium is severely dilated.  The right atrium is moderate to severely dilated.  Unusual echo structure on LA side of intra atrial septum. I believe this is a  fold of tissue from the primum septum. Prominent fossa ovalis and negative  bubble study. May wish to repeat MITCHELL in 6 months to exclude that this isn't  early myxoma (doubt)  MV is not classic rheumatic. The ant. leaflet is mildly thickened but not very  restricted (not classic hockey stick). The posterior lealfet may be slightly  retracted toward LV such that the closure plane is incomplete. The MR jet is  laterally directed to back wall of LA with \"coanda\" effect. PISA ERO 0.51cm2,  regurg vol 72cc suggests 3 to 4+ MR  There is mild to moderate (1-2+) tricuspid regurgitation.  Right ventricular systolic pressure is elevated, consistent with mild  pulmonary hypertension.  The right ventricular systolic pressure is approximated at 35-40mmHg plus the  right atrial pressure.  Aortic valve is trileaflet but not normal. The RCC/LCC are thickened and  mildly retracted.  There is mild to moderate (1-2+) aortic regurgitation.  No hemodynamically significant valvular aortic stenosis.  On 3D echo of MV, there is a small \"nodule\" on A2 but suspect the MR is due to  limited post. leaflet tissue along entire surface coapting with " the ant.  leaflet (this is c/w observation that the post lealfet looks retracted from  the true closure plane)  There were no complications associated with this procedure.date: results: date: results:Cath date: 8/2017 results:Coronary findings    Coronary circulation is codominant    LM: Left main is a large-caliber long in length vessel. This vessel  gives rise to the LAD and circumflex respectively. There is no disease  the left main    LAD: Left anterior descending artery is a medium size vessel which  extends and wraps on apex. This vessel gives rise to several septal  perforators and 2 major diagonal vessels.  There is no disease in the  LAD or its branches    CFX: Circumflex is a large-caliber codominant vessel. This vessel  gives rise to 1 major obtuse marginal branch in the mid segment and  left-sided posterior lateral branches. There is no disease in the  circumflex or its branches    RCA: Right coronary is a large-caliber codominant vessel. This vessel  gives rise to a conus branch proximally there are RV marginal branches  in the mid segment. The vessel then terminates with a medium sized  PDA. There is no disease in the RCA or its branches      Summary  1.  Normal coronary angiography  2.  Bed rest 2 hours post Angio-Seal  3.  Follow up with cardiothoracic surgery as scheduled          METS/Exercise Tolerance:     Hematologic:         Musculoskeletal:         GI/Hepatic:        (-) GERD   Renal/Genitourinary:         Endo:     (+) Obesity, .      Psychiatric:         Infectious Disease:         Malignancy:         Other:                     Physical Exam  Normal systems: dental    Airway   Mallampati: II  TM distance: >3 FB  Neck ROM: full    Dental     Cardiovascular   Rhythm and rate: irregular and normal      Pulmonary    breath sounds clear to auscultation                    Anesthesia Plan      History & Physical Review  History and physical reviewed and following examination; no interval  change.    ASA Status:  4 .    NPO Status:  > 8 hours    Plan for General and ETT with Intravenous induction. Maintenance will be Balanced.      Additional equipment: Arterial Line, Central Line, CVP, PA Catheter and MITCHELL      Postoperative Care      Consents  Anesthetic plan, risks, benefits and alternatives discussed with:  Patient.  Use of blood products discussed: Yes.   Consented to blood products.  .                          .

## 2017-09-12 NOTE — ANESTHESIA PROCEDURE NOTES
ARTERIAL LINE PROCEDURE NOTE:   Pre-Procedure  Performed by KERRY SANTANA  Location: pre-op      Pre-Anesthestic Checklist: patient identified, IV checked, risks and benefits discussed, informed consent, monitors and equipment checked and pre-op evaluation    Timeout  Correct Patient: Yes   Correct Procedure: Yes   Correct Site: Yes   Correct Laterality: N/A   Correct Position: Yes   Site Marked: N/A   .   Procedure Documentation  Procedure: arterial line    Supine  Insertion Site:left, radial.Skin infiltrated with mL of 1% lidocaine. Injection technique: Seldinger Technique  .  .  Patient Prep;chlorhexidine gluconate and isopropyl alcohol, patient draped    Assessment/Narrative    Catheter: 20 gauge, 12 cm     Secured by suture and other  Tegaderm dressing used.        Comments:  No complications.

## 2017-09-12 NOTE — CONSULTS
CARDIAC SURGERY NUTRITION CONSULT    Received standing order to assess and educate patient.    Patient inappropriate for instructions at this time.    Will follow and complete assessment once patient is extubated and/or transferred to medical unit.    Kenia Carrero RD, LD, CNSC

## 2017-09-12 NOTE — ANESTHESIA CARE TRANSFER NOTE
Patient: Derek Stover    Procedure(s):  MITRAL VALVE REPLACEMENT  SJM Masters Series Mechanical Heart Valve 33mm  Ref, [id number]-[id number] Serial [id number]   ON PUMP, MITCHELL - Wound Class: I-Clean    Diagnosis: MITRAL VALVE DISEASEQ  Diagnosis Additional Information: No value filed.    Anesthesia Type:   General, ETT     Note:  Airway :ETT  Patient transferred to:PACU  Comments: Anesthesia Care Note    Patient: Derek Stover    Transferred to: ICU    Patient vital signs: Stable    Airway: ETT in situ w/ambu    Monitors placed. VSS. PIV patent. No change in dentition. Report given to ROCÍO Enamorado CRNA   9/12/2017        Vitals: (Last set prior to Anesthesia Care Transfer)    CRNA VITALS  9/12/2017 1248 - 9/12/2017 1329      9/12/2017             Resp Rate (set): 10                Electronically Signed By: JESSIE Carvalho CRNA  September 12, 2017  1:29 PM

## 2017-09-12 NOTE — PLAN OF CARE
Problem: Ventilation, Mechanical Invasive (Adult)  Goal: Signs and Symptoms of Listed Potential Problems Will be Absent or Manageable (Ventilation, Mechanical Invasive)  Signs and symptoms of listed potential problems will be absent or manageable by discharge/transition of care (reference Ventilation, Mechanical Invasive (Adult) CPG).   Outcome: Improving  Pt remains intubated. Will repeat PS wean at 1900. LS clear. Scant ett scrt. csccrn

## 2017-09-12 NOTE — PROGRESS NOTES
Pt assesses for weaning readiness. Breathing above the vent, and follow commands. Thus, set on Pressure support and draw ABG after half hour, Result shows Respiratory acidosis. Set back to CMV will give some time and follow with weaning readiness.otherwise, normal vitals.9/12/2017  Zac Jasmine

## 2017-09-12 NOTE — PROGRESS NOTES
Critical Care  Note      09/12/2017    Name: Derek Stover MRN#: [id number]   Age: 56 year old YOB: 1960     [facility]    MV DAY # 0             Problem List:   Active Problems:    Severe mitral regurgitation  s/p MVR 9/12/17  tobacco use (2 ppd per report)  Mitral regurg 2/2 rheum fever as child  H/o A fib           Summary/Hospital Course:     56M h/o a fib, on coumadin at baseline, with severe MR 2/2 rheumatic fever as a child who underwent MV replacement 9/12/17.  Noted to have pericarditis in OR, somewhat oozy, got 2 ffp, 2 ptlts in addition to cell saver.  Had v fib coming off pump, but improved with amio loading.  RV somewhat sluggish post-op, but overall decent EF per anesthesia report.      Assessment and plan :     Derek Stover IS a 69 year old male admitted on 9/12/17 for severe MVR and MV replacement.   I have personally reviewed the daily labs, imaging studies, cultures and discussed the case with referring physician and consulting physicians.   My assessment and plan by system for this patient is as follows:    Neurology/Psychiatry:   1. Analgesia -- fentanyl, oxycodone, tylenol, dilaudid ordered prn.    2.  Anxiety -- wean sedative drips to extubate.  No acute anxiety needs at this time.    Cardiovascular:   1. Shock, post-procedural, unspecified - likely vasoplegia following heart/lung bypass which would be expected after a procedure of this magnitude.  Phenylephrine and epinephrine to maintain goal MAP 65  2.  S/p mvr -- ASA, metoprolol  3.  V tach coming off pump -- amio drip tonight.    Pulmonary/Ventilator Management:   1. Airway -- intubated post-op.  Weaning to extubate  2. Tobacco hx -- no air trapping on vent now.  Monitor for evidence of underlying disease.    GI and Nutrition :   1. Diet when cleared by CT surgery   2. Protonix and ranitidine for PUD prophy    Renal/Fluids/Electrolytes:   1. Monitor UOP/creatinine post-op.  2. Replete electrolytes  prn.  3. IVF and albumin administration for volume prn per protocol.    Infectious Disease:   1. Prophylactic abx post-op per CT surgery    Endocrine:   1. Monitor for stress induced hyperglycemia. ICU insulin protocol, goal sugar <180       Hematology/Oncology:   1. Hgb/pltlts stable post op.  pRBC for goal hgb 8.0 or as indicated by CT surgery     IV/Access:   1. Venous access - Central access from OR  2. Arterial access - remove A-line when off vasoactives and extubated      ICU Prophylaxis:   1. DVT: Hep Subq/mechanical  2. VAP: HOB 30 degrees, chlorhexidine rinse  3. Stress Ulcer: PPI/H2 blocker  4. Restraints: Nonviolent soft two point restraints required and necessary for patient safety and continued cares and good effect as patient continues to pull at necessary lines, tubes despite education and distraction. Will readdress daily.   5. IV Access - central access required and necessary for continued patient cares  6. Disposition - critically ill        Key goals for next 24 hours:   1.  Wean vent and extubate  2.  Wean vasoactives as tolerated  3.  Analgesia post-op             Medical History:     Past Medical History:   Diagnosis Date     Atrial fibrillation (H)     Valvular Afib.  Diagnosed 06/2017. Pt initiated on coumadin 7/18/17     Erectile dysfunction      Heart murmur     rheumatic fever as a child     Hyperlipidemia      Mitral regurgitation     rheumatic fever as a child. 3-4+ per MITCHELL 7/25/17     Tobacco abuse      Past Surgical History:   Procedure Laterality Date     COLONOSCOPY N/A 9/8/2014    Procedure: COMBINED COLONOSCOPY, SINGLE BIOPSY/POLYPECTOMY BY BIOPSY;  Surgeon: Kai Bailey MD;  Location:  GI     ORTHOPEDIC SURGERY      RIght knee scope     Social History     Social History     Marital status:      Spouse name: N/A     Number of children: N/A     Years of education: N/A     Occupational History     Not on file.     Social History Main Topics     Smoking status: Former  Smoker     Packs/day: 2.00     Years: 39.00     Types: Cigarettes     Quit date: 8/1/2017     Smokeless tobacco: Never Used     Alcohol use 2.4 oz/week     4 Standard drinks or equivalent per week      Comment: occasional     Drug use: No     Sexual activity: Yes     Partners: Female     Other Topics Concern     Parent/Sibling W/ Cabg, Mi Or Angioplasty Before 65f 55m? No     Social History Narrative        Allergies   Allergen Reactions     No Known Drug Allergy               Key Medications:       sodium chloride (PF)  3 mL Intracatheter Q8H     insulin aspart   Subcutaneous TID w/meals     pantoprazole (PROTONIX) IV PEDS/NICU  40 mg Intravenous Daily     chlorhexidine  15 mL Mouth/Throat Q12H     [START ON 9/13/2017] metoprolol  25 mg Oral Q12H    Or     [START ON 9/13/2017] metoprolol  25 mg Per NG tube Q12H     ceFAZolin  2 g Intravenous Q8H     mupirocin  0.5 g Both Nostrils BID     vancomycin (VANCOCIN) IV  1,500 mg Intravenous Q12H     [START ON 9/13/2017] aspirin  81 mg Oral Daily     acetaminophen  975 mg Oral Q8H     senna-docusate  1-2 tablet Oral BID       amiodarone (CORDARONE) infusion ADULT 1 mg/min (09/12/17 1448)     amiodarone (CORDARONE) infusion ADULT       dextrose 5% and 0.45% NaCl + KCl 20 mEq/L 30 mL/hr at 09/12/17 1452     IV fluid REPLACEMENT ONLY       insulin (regular) 1 Units/hr (09/12/17 1506)     EPINEPHrine IV infusion ADULT 0.04 mcg/kg/min (09/12/17 1456)     phenylephrine IV infusion ADULT       nitroGLYcerin       propofol (DIPRIVAN) infusion          Home Meds    No current facility-administered medications on file prior to encounter.   No current outpatient prescriptions on file prior to encounter.           Physical Examination:   Temp:  [95.9  F (35.5  C)-98  F (36.7  C)] 96.4  F (35.8  C)  Heart Rate:  [71-78] 71  Resp:  [13-22] 13  BP: ()/(51-74) 98/65  MAP:  [75 mmHg-81 mmHg] 81 mmHg  Arterial Line BP: ()/(52-65) 110/65  SpO2:  [91 %-99 %] 97 %    Intake/Output  Summary (Last 24 hours) at 09/12/17 1525  Last data filed at 09/12/17 1506   Gross per 24 hour   Intake           3916.5 ml   Output             4275 ml   Net           -358.5 ml     Wt Readings from Last 4 Encounters:   09/12/17 122 kg (269 lb)   09/05/17 122.8 kg (270 lb 12.8 oz)   08/21/17 122 kg (269 lb)   08/14/17 117.9 kg (260 lb)     Arterial Line BP: ()/(52-65) 110/65  MAP:  [75 mmHg-81 mmHg] 81 mmHg  BP - Mean:  [74-75] 74  CVP:  [17 mmHg-19 mmHg] 19 mmHg  Ventilation Mode: CMV/AC  Rate Set (breaths/minute): 14 breaths/min  Tidal Volume Set (mL): 550 mL  PEEP (cm H2O): 5 cmH2O  Oxygen Concentration (%): 80 %  Resp: 13    Recent Labs  Lab 09/12/17  1345 09/12/17  1231 09/12/17  1159 09/12/17  1137   PH 7.27* 7.31*  7.30* 7.38 7.38   PCO2 53* 49*  49* 44 44   PO2 81 415*  384* 318* 176*   HCO3 24 25  24 26 26       GEN: no acute distress, intubated/sedated  HEENT: head ncat, sclera anicteric, OP patent, trachea midline, PERRL  PULM: unlabored synchronous with vent, clear anteriorly    CV/COR: RRR S1S2 no gallop,  No rub, no murmur.  Midline incision dressed, c/d/i.  ABD: soft nontender, hypoactive bowel sounds, no mass  EXT:   No Edema,   Warm x4  NEURO: sedated but no gross deficit apparent  SKIN: no obvious rash  LINES: clean, dry intact         Data:   All data and imaging reviewed     ROUTINE ICU LABS (Last four results)  CMP  Recent Labs  Lab 09/12/17  1345 09/12/17  1253 09/12/17  1231 09/12/17  1159    143 139 139   POTASSIUM 4.3 4.2 4.6 5.2   CHLORIDE 109 110*  --   --    CO2 24 22  --   --    ANIONGAP 9 11  --   --    * 226*  --   --    BUN 16 15  --   --    CR 1.21 1.17  --   --    GFRESTIMATED 62 65  --   --    GFRESTBLACK 75 78  --   --    FIORELLA 8.1* 8.0*  --   --    MAG 3.3*  --   --   --    PHOS 2.7  --   --   --    PROTTOTAL  --  5.2*  --   --    ALBUMIN  --  2.7*  --   --    BILITOTAL  --  1.0  --   --    ALKPHOS  --  59  --   --    AST  --  52*  --   --    ALT  --  25  --    --      CBC  Recent Labs  Lab 09/12/17  1345 09/12/17  1253 09/12/17  1231 09/12/17  1159   WBC 18.9* 14.8*  --   --    RBC 4.02* 3.54*  --   --    HGB 12.5* 11.0* 10.5* 11.2*   HCT 37.0* 32.4*  --   --    MCV 92 92  --   --    MCH 31.1 31.1  --   --    MCHC 33.8 34.0  --   --    RDW 14.3 14.3  --   --    * 99*  --   --      INR  Recent Labs  Lab 09/12/17  1345 09/12/17  1253 09/12/17  0602   INR 1.30* 1.39* 1.10     Arterial Blood Gas  Recent Labs  Lab 09/12/17  1345 09/12/17  1231 09/12/17  1159 09/12/17  1137   PH 7.27* 7.31*  7.30* 7.38 7.38   PCO2 53* 49*  49* 44 44   PO2 81 415*  384* 318* 176*   HCO3 24 25  24 26 26       All cultures:  No results for input(s): CULT in the last 168 hours.  Recent Results (from the past 24 hour(s))   XR Chest Port 1 View    Narrative    XR CHEST PORT 1 VW 9/12/2017 1:55 PM    COMPARISON: 8/21/2017    HISTORY: Postop cardiovascular surgery.      Impression    IMPRESSION: Median sternotomy wires appear intact. Right internal  jugular central venous catheter tip coiled in the left main pulmonary  artery, recommend repositioning. Valvular prosthesis in place.  Endotracheal tube tip approximately 2 cm above the leigha. Enteric  tube courses below the diaphragm, tip not included in this image.  Mediastinal drains in place. No pneumothorax on either side. No  significant pleural effusion.    ETHAN SHAH MD     Labs (personally reviewed):  Mild creatinine increase 1.21, lactate improved to 2.5, mild respiratory acidosis at time abg was drawn 7.27/53/81/24, but patient now over-breathing vent.  hgb stable at 12.5.  Mild leukocytosis at 18.9.  pltlts ok at 117.    CXR:  ETT, chest tubes in acceptable position.  Lungs clear. [place] with single coil in PA--pulled back by CT surg.    EKG:  Sinus at 77.  Nl axis.  Nl intervals.  No ischemia to my eye.    Billing: This patient is critically ill: Yes. Total critical care time today 45 min.

## 2017-09-12 NOTE — PROGRESS NOTES
Admission medication history interview status for the 9/12/2017  admission is complete. See EPIC admission navigator for prior to admission medications     Medication history source reliability:Good    Medication history interview source(s):Patient    Medication history resources (including written lists, pill bottles, clinic record):None    Primary pharmacy.[facility], [place]    Additional medication history information not noted on PTA med list :None    Time spent in this activity: 30 minutes    Prior to Admission medications    Medication Sig Last Dose Taking? Auth Provider   Amoxicillin (AMOXIL PO) Take 1,500-3,000 mg by mouth daily as needed (patient takes 6 X 500 = 3,000 mg dose 1 hour before dental appointment and 3 X 500 = 1,500 mg dose 6 hours after dental appointment.) Over 2 weeks ago at am Yes Reported, Patient   Carvedilol (COREG PO) Take 3.125 mg by mouth 2 times daily (with meals) 9/12/2017 at 0300 Yes Reported, Patient   Varenicline Tartrate (CHANTIX PO) Take 1 mg by mouth 2 times daily 9/12/2017 at 0300 Yes Reported, Patient   Warfarin Sodium (COUMADIN PO) Take 2.5-5 mg by mouth At Bedtime Take 5 mg on Monday and Friday.   2.5 mg on Tuesday,Wednesday, Thursday, Saturday and Sunday, or as directed by the coumadin clinic. 9/7/2017 at hs Yes Reported, Patient   Acetaminophen (TYLENOL PO) Take 1,000 mg by mouth daily as needed for mild pain or fever Over 1 week ago at prn Yes Reported, Patient

## 2017-09-12 NOTE — IP AVS SNAPSHOT
[facility]    [address] [place] [address]    Phone:  [phone]                                       After Visit Summary   9/12/2017    Derek Stover    MRN: [id number]           After Visit Summary Signature Page     I have received my discharge instructions, and my questions have been answered. I have discussed any challenges I see with this plan with the nurse or doctor.    ..........................................................................................................................................  Patient/Patient Representative Signature      ..........................................................................................................................................  Patient Representative Print Name and Relationship to Patient    ..................................................               ................................................  Date                                            Time    ..........................................................................................................................................  Reviewed by Signature/Title    ...................................................              ..............................................  Date                                                            Time

## 2017-09-12 NOTE — LETTER
Transition Communication Hand-off for Care Transitions to Next Level of Care Provider    Name: Derek Stover  MRN #: [id number]  Primary Care Provider: Osbaldo Renee     Primary Clinic: [address] [facility] [address]     Reason for Hospitalization:  S/P mitral valve replacement with metallic valve [Z95.4]  Admit Date/Time: 9/12/2017  5:14 AM  Discharge Date: 9/17/2017  Payor Source: Payor: [employer] / Plan: [facility] / Product Type: Indemnity /     Readmission Assessment Measure (DAVID) Risk Score/category: low           Reason for Communication Hand-off Referral: Multiple providers/specialties    Discharge Plan:       Concern for non-adherence with plan of care:   Y/N no  Discharge Needs Assessment:  Needs       Most Recent Value    Equipment Currently Used at Home none    Transportation Available car, family or friend will provide          Already enrolled in Tele-monitoring program and name of program NA  Follow-up specialty is recommended: Yes    Follow-up plan:  Future Appointments  Date Time Provider Department Center   9/18/2017 6:30 AM Rosario Coello, OT [facility]   9/18/2017 10:30 AM PH ANTI COAG [facility]   9/18/2017 2:30 PM Rosario Coello, OT [facility]   9/20/2017 12:30 PM Cynthia Arenas EP [facility]   9/22/2017 2:10 PM Osbaldo Renee MD long term [facility]   10/2/2017 3:00 PM [facility] CARDIOTHORACIC SURGERY, [facility]   10/10/2017 9:45 AM Danny Mariee PA-C [facility]   11/16/2017 9:30 AM Oni Ross MD [facility]       Any outstanding tests or procedures:        Referrals     Future Labs/Procedures    CARDIAC REHAB REFERRAL     Comments:    Please be aware that coverage of these services is subject to the terms and limitations of your health insurance plan.  Call member services at your health plan with any benefit or coverage questions.     Order is sent electronically to central rehab  scheduling. Call [phone] if you haven't been contacted regarding these appointments within 2 business days of discharge.    CARDIAC REHAB REFERRAL     Comments:    Your provider has referred you to: [facility]IR[facility]: [facility] [phone]   [email or web address]    INR Clinic Referral     Comments:    S/p mechanical MV INR goal 2.5-3.5            Key Recommendations:  New mechanical mitral valve. INR appointment made for 9-18 note new goal for heart valve surgery in AVS.     Tameka Lira    AVS/Discharge Summary is the source of truth; this is a helpful guide for improved communication of patient story

## 2017-09-13 ENCOUNTER — APPOINTMENT (OUTPATIENT)
Dept: OCCUPATIONAL THERAPY | Facility: CLINIC | Age: 57
DRG: 219 | End: 2017-09-13
Attending: THORACIC SURGERY (CARDIOTHORACIC VASCULAR SURGERY)
Payer: COMMERCIAL

## 2017-09-13 ENCOUNTER — APPOINTMENT (OUTPATIENT)
Dept: GENERAL RADIOLOGY | Facility: CLINIC | Age: 57
DRG: 219 | End: 2017-09-13
Attending: THORACIC SURGERY (CARDIOTHORACIC VASCULAR SURGERY)
Payer: COMMERCIAL

## 2017-09-13 LAB
ANION GAP SERPL CALCULATED.3IONS-SCNC: 6 MMOL/L (ref 3–14)
BLD PROD TYP BPU: NORMAL
BLD PROD TYP BPU: NORMAL
BLD UNIT ID BPU: 0
BLD UNIT ID BPU: 0
BLOOD PRODUCT CODE: NORMAL
BLOOD PRODUCT CODE: NORMAL
BPU ID: NORMAL
BPU ID: NORMAL
BUN SERPL-MCNC: 20 MG/DL (ref 7–30)
CA-I BLD-MCNC: 4.5 MG/DL (ref 4.4–5.2)
CALCIUM SERPL-MCNC: 7.9 MG/DL (ref 8.5–10.1)
CHLORIDE SERPL-SCNC: 110 MMOL/L (ref 94–109)
CO2 SERPL-SCNC: 24 MMOL/L (ref 20–32)
COPATH REPORT: NORMAL
CREAT SERPL-MCNC: 1.13 MG/DL (ref 0.66–1.25)
ERYTHROCYTE [DISTWIDTH] IN BLOOD BY AUTOMATED COUNT: 14.6 % (ref 10–15)
GFR SERPL CREATININE-BSD FRML MDRD: 67 ML/MIN/1.7M2
GLUCOSE BLDC GLUCOMTR-MCNC: 106 MG/DL (ref 70–99)
GLUCOSE BLDC GLUCOMTR-MCNC: 110 MG/DL (ref 70–99)
GLUCOSE BLDC GLUCOMTR-MCNC: 115 MG/DL (ref 70–99)
GLUCOSE BLDC GLUCOMTR-MCNC: 118 MG/DL (ref 70–99)
GLUCOSE BLDC GLUCOMTR-MCNC: 120 MG/DL (ref 70–99)
GLUCOSE BLDC GLUCOMTR-MCNC: 122 MG/DL (ref 70–99)
GLUCOSE BLDC GLUCOMTR-MCNC: 122 MG/DL (ref 70–99)
GLUCOSE BLDC GLUCOMTR-MCNC: 124 MG/DL (ref 70–99)
GLUCOSE BLDC GLUCOMTR-MCNC: 126 MG/DL (ref 70–99)
GLUCOSE BLDC GLUCOMTR-MCNC: 138 MG/DL (ref 70–99)
GLUCOSE BLDC GLUCOMTR-MCNC: 97 MG/DL (ref 70–99)
GLUCOSE SERPL-MCNC: 130 MG/DL (ref 70–99)
HBA1C MFR BLD: 6.1 % (ref 4.3–6)
HCT VFR BLD AUTO: 34 % (ref 40–53)
HGB BLD-MCNC: 11.4 G/DL (ref 13.3–17.7)
INTERPRETATION ECG - MUSE: NORMAL
MAGNESIUM SERPL-MCNC: 2.4 MG/DL (ref 1.6–2.3)
MCH RBC QN AUTO: 30.6 PG (ref 26.5–33)
MCHC RBC AUTO-ENTMCNC: 33.5 G/DL (ref 31.5–36.5)
MCV RBC AUTO: 91 FL (ref 78–100)
PHOSPHATE SERPL-MCNC: 3.4 MG/DL (ref 2.5–4.5)
PLATELET # BLD AUTO: 106 10E9/L (ref 150–450)
POTASSIUM SERPL-SCNC: 5 MMOL/L (ref 3.4–5.3)
RBC # BLD AUTO: 3.73 10E12/L (ref 4.4–5.9)
SODIUM SERPL-SCNC: 140 MMOL/L (ref 133–144)
TRANSFUSION STATUS PATIENT QL: NORMAL
WBC # BLD AUTO: 17 10E9/L (ref 4–11)

## 2017-09-13 PROCEDURE — 80048 BASIC METABOLIC PNL TOTAL CA: CPT | Performed by: THORACIC SURGERY (CARDIOTHORACIC VASCULAR SURGERY)

## 2017-09-13 PROCEDURE — 97110 THERAPEUTIC EXERCISES: CPT | Mod: GO

## 2017-09-13 PROCEDURE — 93010 ELECTROCARDIOGRAM REPORT: CPT | Performed by: INTERNAL MEDICINE

## 2017-09-13 PROCEDURE — 97535 SELF CARE MNGMENT TRAINING: CPT | Mod: GO

## 2017-09-13 PROCEDURE — 99232 SBSQ HOSP IP/OBS MODERATE 35: CPT | Performed by: INTERNAL MEDICINE

## 2017-09-13 PROCEDURE — 25000132 ZZH RX MED GY IP 250 OP 250 PS 637: Performed by: PHYSICIAN ASSISTANT

## 2017-09-13 PROCEDURE — 12000007 ZZH R&B INTERMEDIATE

## 2017-09-13 PROCEDURE — 97166 OT EVAL MOD COMPLEX 45 MIN: CPT | Mod: GO

## 2017-09-13 PROCEDURE — 25000128 H RX IP 250 OP 636: Performed by: THORACIC SURGERY (CARDIOTHORACIC VASCULAR SURGERY)

## 2017-09-13 PROCEDURE — 40000275 ZZH STATISTIC RCP TIME EA 10 MIN

## 2017-09-13 PROCEDURE — 25000132 ZZH RX MED GY IP 250 OP 250 PS 637: Performed by: THORACIC SURGERY (CARDIOTHORACIC VASCULAR SURGERY)

## 2017-09-13 PROCEDURE — 25000132 ZZH RX MED GY IP 250 OP 250 PS 637: Performed by: SURGERY

## 2017-09-13 PROCEDURE — 27210339 ZZH CIRCUIT HUMIDITY W/CPAP BIP

## 2017-09-13 PROCEDURE — 93005 ELECTROCARDIOGRAM TRACING: CPT

## 2017-09-13 PROCEDURE — 25000131 ZZH RX MED GY IP 250 OP 636 PS 637: Performed by: THORACIC SURGERY (CARDIOTHORACIC VASCULAR SURGERY)

## 2017-09-13 PROCEDURE — 84100 ASSAY OF PHOSPHORUS: CPT | Performed by: THORACIC SURGERY (CARDIOTHORACIC VASCULAR SURGERY)

## 2017-09-13 PROCEDURE — 00000146 ZZHCL STATISTIC GLUCOSE BY METER IP

## 2017-09-13 PROCEDURE — 83036 HEMOGLOBIN GLYCOSYLATED A1C: CPT | Performed by: THORACIC SURGERY (CARDIOTHORACIC VASCULAR SURGERY)

## 2017-09-13 PROCEDURE — 40000133 ZZH STATISTIC OT WARD VISIT

## 2017-09-13 PROCEDURE — 83735 ASSAY OF MAGNESIUM: CPT | Performed by: THORACIC SURGERY (CARDIOTHORACIC VASCULAR SURGERY)

## 2017-09-13 PROCEDURE — 85027 COMPLETE CBC AUTOMATED: CPT | Performed by: THORACIC SURGERY (CARDIOTHORACIC VASCULAR SURGERY)

## 2017-09-13 PROCEDURE — 25000125 ZZHC RX 250: Performed by: THORACIC SURGERY (CARDIOTHORACIC VASCULAR SURGERY)

## 2017-09-13 PROCEDURE — 25000128 H RX IP 250 OP 636: Performed by: PHYSICIAN ASSISTANT

## 2017-09-13 PROCEDURE — 40000885 ZZH STATISTIC STEP DOWN HRS EVENING

## 2017-09-13 PROCEDURE — 71010 XR CHEST PORT 1 VW: CPT

## 2017-09-13 PROCEDURE — 82330 ASSAY OF CALCIUM: CPT | Performed by: THORACIC SURGERY (CARDIOTHORACIC VASCULAR SURGERY)

## 2017-09-13 PROCEDURE — 94660 CPAP INITIATION&MGMT: CPT

## 2017-09-13 RX ORDER — FUROSEMIDE 10 MG/ML
20 INJECTION INTRAMUSCULAR; INTRAVENOUS ONCE
Status: COMPLETED | OUTPATIENT
Start: 2017-09-13 | End: 2017-09-13

## 2017-09-13 RX ORDER — PANTOPRAZOLE SODIUM 40 MG/1
40 TABLET, DELAYED RELEASE ORAL EVERY MORNING
Status: DISCONTINUED | OUTPATIENT
Start: 2017-09-13 | End: 2017-09-17 | Stop reason: HOSPADM

## 2017-09-13 RX ORDER — WARFARIN SODIUM 5 MG/1
5 TABLET ORAL
Status: COMPLETED | OUTPATIENT
Start: 2017-09-13 | End: 2017-09-13

## 2017-09-13 RX ORDER — VARENICLINE TARTRATE 1 MG/1
1 TABLET, FILM COATED ORAL 2 TIMES DAILY
Status: DISCONTINUED | OUTPATIENT
Start: 2017-09-13 | End: 2017-09-17 | Stop reason: HOSPADM

## 2017-09-13 RX ADMIN — VARENICLINE TARTRATE 1 MG: 1 TABLET, FILM COATED ORAL at 13:24

## 2017-09-13 RX ADMIN — METOPROLOL TARTRATE 25 MG: 25 TABLET, FILM COATED ORAL at 21:55

## 2017-09-13 RX ADMIN — OXYCODONE HYDROCHLORIDE 10 MG: 5 TABLET ORAL at 21:55

## 2017-09-13 RX ADMIN — SENNOSIDES AND DOCUSATE SODIUM 1 TABLET: 8.6; 5 TABLET ORAL at 08:55

## 2017-09-13 RX ADMIN — METOPROLOL TARTRATE 25 MG: 25 TABLET, FILM COATED ORAL at 08:55

## 2017-09-13 RX ADMIN — OXYCODONE HYDROCHLORIDE 10 MG: 5 TABLET ORAL at 15:54

## 2017-09-13 RX ADMIN — SODIUM CHLORIDE 1.5 UNITS/HR: 9 INJECTION, SOLUTION INTRAVENOUS at 01:14

## 2017-09-13 RX ADMIN — CEFAZOLIN SODIUM 2 G: 2 INJECTION, SOLUTION INTRAVENOUS at 13:28

## 2017-09-13 RX ADMIN — VANCOMYCIN HYDROCHLORIDE 1500 MG: 5 INJECTION, POWDER, LYOPHILIZED, FOR SOLUTION INTRAVENOUS at 10:50

## 2017-09-13 RX ADMIN — AMIODARONE HYDROCHLORIDE 0.5 MG/MIN: 50 INJECTION, SOLUTION INTRAVENOUS at 06:49

## 2017-09-13 RX ADMIN — ACETAMINOPHEN 975 MG: 325 TABLET, FILM COATED ORAL at 21:48

## 2017-09-13 RX ADMIN — OXYCODONE HYDROCHLORIDE 10 MG: 5 TABLET ORAL at 08:55

## 2017-09-13 RX ADMIN — OXYCODONE HYDROCHLORIDE 10 MG: 5 TABLET ORAL at 12:48

## 2017-09-13 RX ADMIN — ACETAMINOPHEN 975 MG: 325 TABLET, FILM COATED ORAL at 13:24

## 2017-09-13 RX ADMIN — PANTOPRAZOLE SODIUM 40 MG: 40 INJECTION, POWDER, FOR SOLUTION INTRAVENOUS at 08:56

## 2017-09-13 RX ADMIN — ASPIRIN 81 MG: 81 TABLET, COATED ORAL at 08:55

## 2017-09-13 RX ADMIN — CEFAZOLIN SODIUM 2 G: 2 INJECTION, SOLUTION INTRAVENOUS at 05:16

## 2017-09-13 RX ADMIN — SODIUM CHLORIDE 1.5 UNITS/HR: 9 INJECTION, SOLUTION INTRAVENOUS at 19:23

## 2017-09-13 RX ADMIN — MUPIROCIN 0.5 G: 20 OINTMENT TOPICAL at 12:51

## 2017-09-13 RX ADMIN — HYDROMORPHONE HYDROCHLORIDE 0.3 MG: 1 INJECTION, SOLUTION INTRAMUSCULAR; INTRAVENOUS; SUBCUTANEOUS at 00:10

## 2017-09-13 RX ADMIN — ACETAMINOPHEN 975 MG: 325 TABLET, FILM COATED ORAL at 08:55

## 2017-09-13 RX ADMIN — WARFARIN SODIUM 5 MG: 5 TABLET ORAL at 17:54

## 2017-09-13 RX ADMIN — SENNOSIDES AND DOCUSATE SODIUM 1 TABLET: 8.6; 5 TABLET ORAL at 21:49

## 2017-09-13 RX ADMIN — FUROSEMIDE 20 MG: 10 INJECTION, SOLUTION INTRAVENOUS at 13:25

## 2017-09-13 RX ADMIN — VARENICLINE TARTRATE 1 MG: 1 TABLET, FILM COATED ORAL at 21:48

## 2017-09-13 RX ADMIN — OXYCODONE HYDROCHLORIDE 10 MG: 5 TABLET ORAL at 19:04

## 2017-09-13 RX ADMIN — HYDROMORPHONE HYDROCHLORIDE 0.3 MG: 1 INJECTION, SOLUTION INTRAMUSCULAR; INTRAVENOUS; SUBCUTANEOUS at 03:16

## 2017-09-13 RX ADMIN — HYDROMORPHONE HYDROCHLORIDE 0.3 MG: 1 INJECTION, SOLUTION INTRAMUSCULAR; INTRAVENOUS; SUBCUTANEOUS at 05:59

## 2017-09-13 ASSESSMENT — PAIN DESCRIPTION - DESCRIPTORS
DESCRIPTORS: ACHING

## 2017-09-13 NOTE — PROGRESS NOTES
Pt transferred to station 33 with RN and Aide. Report given to LUL Maharaj and questions answered. Will call and update family of transfer, per patient request.

## 2017-09-13 NOTE — PROGRESS NOTES
[facility]  Cardiovascular and Thoracic Surgery Daily Note          Assessment and Plan:   POD#1 s/p Mitral valve replacement with a 33 mm [employer]. Servando [place] mechanical valve, intraoperative MITCHELL by Dr. Ivana Marie.  -CVS: HR: 60s-70s. SBP: 100s-120s. Weaned off gtts. On amiodarone for v fib in the OR- will stop today. ASA, BB. No statin, no CAD. Pacer wires capped. Chest tubes to suction. Start coumadin tonight for mechanical valve, start heparin gtt tomorrow once chest tubes and pacer wires are out.   -Resp: Extubated within protocol. Saturating well on 4L. Continue to encourage IS, cough, deep breathing, ambulation.  -Neuro:  Grossly intact. Pain controlled.  -Renal: good UO, up about 3kg from preoperative weight. Will give lasix 20mg IV once today.  Creatinine   Date Value Ref Range Status   2017 1.13 0.66 - 1.25 mg/dL Final   ]  -GI:  Continue bowel regimen. Advance diet as tolerated.  -:  Guerra in place d/t limited mobility and need for accurate I&Os.  -Endo: pre op a1c: 6.1 Continue Insulin gtt 48hrs  -FEN: replete lytes as needed, ADAT, Na: 140. K: 5.0    Active Diet Order      Advance Diet as Tolerated: Clear Liquid Diet; Regular Diet Adult  -ID: Temp (24hrs), Av.6  F (37  C), Min:95.9  F (35.5  C), Max:99.5  F (37.5  C)   Completing perioperative abx.  WBC   Date Value Ref Range Status   2017 17.0 (H) 4.0 - 11.0 10e9/L Final   ]  -Heme:  Plt: 106, Acute blood loss anemia and thrombocytopenia related to surgery.  Hemoglobin   Date Value Ref Range Status   2017 11.4 (L) 13.3 - 17.7 g/dL Final   ],   -Proph: PCD, ASA, BB, PPI, coumadin, no statin, no CAD.  -Dispo: Transfer to [facility]. Initiate therapies. Continue to encourage IS, cough, deep breathing. Start coumadin today for mechanical valve.          Interval History:   Extubated overnight. Weaned off gtts. Saturating well on 4L. Tolerating sips of water. Pain with cough and deep breaths but controlled.           Medications:       varenicline (CHANTIX) tablet 1 mg  1 mg Oral BID     sodium chloride (PF)  3 mL Intracatheter Q8H     insulin aspart   Subcutaneous TID w/meals     pantoprazole (PROTONIX) IV PEDS/NICU  40 mg Intravenous Daily     chlorhexidine  15 mL Mouth/Throat Q12H     metoprolol  25 mg Oral Q12H    Or     metoprolol  25 mg Per NG tube Q12H     ceFAZolin  2 g Intravenous Q8H     mupirocin  0.5 g Both Nostrils BID     aspirin  81 mg Oral Daily     acetaminophen  975 mg Oral Q8H     senna-docusate  1-2 tablet Oral BID     acetaminophen, lidocaine (buffered or not buffered), lidocaine 4%, sodium chloride (PF), albumin human, hydrALAZINE, insulin aspart, meperidine, IV fluid REPLACEMENT ONLY, glucose **OR** dextrose **OR** glucagon, naloxone, potassium chloride, potassium chloride, potassium chloride, potassium chloride with lidocaine, potassium chloride, magnesium sulfate, magnesium sulfate, sodium phosphate (NaPHOS) intermittent infusion, sodium phosphate (NaPHOS) intermittent infusion, sodium phosphate (NaPHOS) intermittent infusion, sodium phosphate (NaPHOS) intermittent infusion, albuterol, ipratropium - albuterol 0.5 mg/2.5 mg/3 mL, fentaNYL, propofol (DIPRIVAN) infusion **AND** propofol **AND** CK total **AND** Triglycerides, HYDROmorphone, [START ON 9/15/2017] acetaminophen, oxyCODONE, ondansetron **OR** ondansetron          Physical Exam:   Vitals were reviewed  Blood pressure 110/63, temperature 99.1  F (37.3  C), resp. rate 23, height 1.829 m (6'), weight 125.7 kg (277 lb 1.9 oz), SpO2 95 %.  Rhythm: NSR    Lungs: diminished bilateral bases    Cardiovascular: RRR normal s1 and s2. +mitral valve click    Abdomen: soft NTND    Extremeties: no lower extremity edema    Incision: CDI    CT: to suction    Weight:   Vitals:    09/12/17 0618 09/13/17 0600   Weight: 122 kg (269 lb) 125.7 kg (277 lb 1.9 oz)            Data:   Labs:   Lab Results   Component Value Date    WBC 17.0 09/13/2017     Lab Results    Component Value Date    RBC 3.73 09/13/2017     Lab Results   Component Value Date    HGB 11.4 09/13/2017     Lab Results   Component Value Date    HCT 34.0 09/13/2017     No components found for: MCT  Lab Results   Component Value Date    MCV 91 09/13/2017     Lab Results   Component Value Date    MCH 30.6 09/13/2017     Lab Results   Component Value Date    MCHC 33.5 09/13/2017     Lab Results   Component Value Date    RDW 14.6 09/13/2017     Lab Results   Component Value Date     09/13/2017       Last Basic Metabolic Panel:  Lab Results   Component Value Date     09/13/2017      Lab Results   Component Value Date    POTASSIUM 5.0 09/13/2017     Lab Results   Component Value Date    CHLORIDE 110 09/13/2017     Lab Results   Component Value Date    FIORELLA 7.9 09/13/2017     Lab Results   Component Value Date    CO2 24 09/13/2017     Lab Results   Component Value Date    BUN 20 09/13/2017     Lab Results   Component Value Date    CR 1.13 09/13/2017     Lab Results   Component Value Date     09/13/2017       CXR: 9/12/17    FINDINGS: Sternotomy. Mediastinal drainage tubes. SG catheter from  right jugular approach with tip in the main pulmonary artery. No  pneumothorax. Minimal atelectasis both bases, unchanged in interval.  The ET tube has been removed since yesterday.         IMPRESSION: ET tube has been removed since yesterday. Otherwise no  change.    Liza Kenyon PA-C  CV Surgery  Pager #[phone]

## 2017-09-13 NOTE — PLAN OF CARE
Problem: Cardiac Surgery (Adult)  Goal: Signs and Symptoms of Listed Potential Problems Will be Absent or Manageable (Cardiac Surgery)  Signs and symptoms of listed potential problems will be absent or manageable by discharge/transition of care (reference Cardiac Surgery (Adult) CPG).   Outcome: Improving  Pt rested well overnight, states pain controlled with current pain meds, see MAR, extubated to bipap at 2112, tolerated bipap well overnight, changed to nasal cannula around 0600 this morning, well tolerated, titrated pressors off overnight, unable to dangle pt or sit him up in chair d/t right femoral arterial catheter which was removed at 0245, pt on bedrest x 6 hrs laying flat after removal per protocol, able to dangle/get out of bed at 0900, Dr. Marie updated this morning at bedside, IV abx continue, IV amiodarone continues, IV insulin continues, see MAR, continue to monitor closely, cardiac rehab to work with pt today.

## 2017-09-13 NOTE — OP NOTE
DATE OF PROCEDURE:  09/12/2017      PREOPERATIVE DIAGNOSIS:  Severe mitral valve regurgitation.      POSTOPERATIVE DIAGNOSIS:  Severe mitral valve regurgitation.      PROCEDURE:  Mitral valve replacement with a 33 mm St. Servando [place] mechanical valve, intraoperative MITCHELL.      SURGEON:  Ivana Marie MD      ASSISTANT:  TOSHIA ESPAÑA MD      ANESTHESIA:  General orotracheal.      INDICATIONS FOR PROCEDURE:  Mr. 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 Ross for quite some time.  He also went into atrial fibrillation recently and was started on rate control therapy and Coumadin.  He also developed shortness of breath, which is new for him.  Recent MITCHELL demonstrated severe mitral valve regurgitation with mild to moderate tricuspid valve regurgitation, mild aortic valve insufficiency.  His LV function was preserved.  Coronary angiogram demonstrated no coronary artery disease.  Due to the severity of his left atrial dilatation, we decided not to perform the MAZE procedure concomitantly.  He was taken to the operating today for mitral valve replacement.      OPERATIVE FINDINGS:  The patient had an overall normal LV systolic size and function.  His left atrium was severely dilated.  His mitral valve leaflets were quite thickened, more so on anterior leaflet.  The opening was not restricted but did have an appearance of rheumatic valve.  We elected not to try to repair this.  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      DESCRIPTION OF PROCEDURE:  After informed consent was obtained, the patient was brought down to the operating room and was placed on the OR table in a supine position.  Intravenous and intra-arterial  lines were begun.  While monitoring his blood pressure and EKG tracing he was anesthetized and intubated using a single lumen endotracheal tube.  A [place] catheter was also placed.  His entire chest, abdomen, both groins and legs were prepped down to the knees using multiple layers of DuraPrep.  He was draped in a sterile field.  A median sternotomy was performed and the patient was fully heparinized.  The sternal edges were retracted laterally and the pericardium was opened to suspend the heart in a pericardial cradle.  The ascending aorta and the SVC and IVC were cannulated.  A retrograde cardioplegia catheter was placed into the coronary sinus without difficulty.  An antegrade needle/aortic root was placed in the ascending aorta as well.  After appropriate ACT level was achieved, cardiopulmonary bypass was established and the patient was kept normothermic during entire operation.  Carbon dioxide was flooded into mediastinal wound to prevent air embolism.  The interatrial groove was dissected out.  Note, the patient had significant pericardial adhesions which were dissected meticulously using electrocautery and sharply using Metzenbaum scissors.  The aorta was then crossclamped and antegrade cold blood cardioplegia was given to arrest the heart.  The patient went into good diastolic arrest without any LV distention.  Following this, intermittent retrograde cardioplegia doses were given on average every 15 minutes for myocardial protection while the aorta was crossclamped.      A standard left atriotomy was made and the mitral valve was exposed.  Findings are noted above.  The anterior leaflet was excised along with the cords to the papillary muscles.  These were quite thickened.  The posterior leaflet was entirely preserved.  The annulus was sized to a 33 mm St. Servando mechanical valve.  Annular sutures were placed using horizontal mattress sutures of 2-0 Ethibond with supra-annular pledgets.  The valve was  brought to the field and all sutures were brought through the sewing ring annulus valve was seated down without difficulty.  All sutures were tied down securely using the Cor-Knot device.  The left atrium was de-aired and the atriotomy was closed in 2 layers of running 4-0 Prolene.  A liter of warm blood cardioplegia was given as a retrograde hot shot and the aortic crossclamp was removed.  Aortic crossclamp time was 91 minutes.  The ascending aorta was continuously vented to deair the heart.  He was eventually weaned off cardiopulmonary bypass with epinephrine and Jean Claude-Synephrine support.  The valve was seated down well and there was no paravalvular leak.  Once the patient remained stable off bypass, the venous cannula was removed and protamine was administered.  The aortic cannula was subsequently removed as well.  Total cardiopulmonary bypass time was 144 minutes.  Temporary ventricular pacing wires placed in the RV muscle.  32-Cypriot angled and straight chest tubes placed in mediastinum as well.  These were all brought out percutaneously below the sternotomy incision, secured to skin using 2-0 Ethibond.  The mediastinum was irrigated with antibiotic saline and hemostasis was achieved.  Both spaces were slightly open.  The sternum was reapproximated using multiple interrupted single and double wires.  The incision was closed in layers of running Vicryl suture.  Skin was closed using 3-0 Vicryl and was sealed using Dermabond.      There were no intraoperative complications and the patient tolerated the operation well.  Two units of FFP and platelets were given intraoperatively for coagulopathy.  All sponge counts, needle counts and instrument counts were correct x2 at the end the operation.  EBL unknown.        SPECIMEN REMOVED:  Anterior leaflet of the mitral valve.        The patient was brought to the ICU in hemodynamically stable condition on low dose epinephrine and Jean Claude-Synephrine drips and remained intubated.          TAN EUGENE MD             D: 2017 14:12   T: 2017 20:41   MT: [id number]      Name:     HAKEEM BORJAS   MRN:      [id number]        Account:        [id number]   :      1960           Procedure Date: 2017      Document: [id number]       cc: Oni Ross MD

## 2017-09-13 NOTE — PROGRESS NOTES
Crit care consult:    S:  No complaints this morning.  Chest pain appropriate to procedure; controlled by analgesia.    O:  /63  Temp 99.1  F (37.3  C)  Resp 23  Ht 1.829 m (6')  Wt 125.7 kg (277 lb 1.9 oz)  SpO2 95%  BMI 37.58 kg/m2 on 4L nc.  BP stable off vaso-actives.  General:  Well-appearing, NAD, pleasant  Pulm:  Extubated, breathing comfortably.  Lungs clear  Neuro:  Awake, alert, fluent speech, normal tone, moves limbs x4.    Labs (personally reviewed): lytes acceptable. Creatinine improved to 1.13.  Leukocytosis improved to 17.  hgb stable.  pltlts stable at 106.     CXR (personally reviewed): swan and chest tubes in good position.  Lungs clear.    EKG (personally reviewed): sinus 76.  Pr 224 otherwise intervals ok. Nl axis.  No ischemia to my eye.    A/P:  72M pod 1 s/p mechanical mvr.  Overall doing well.  Cardiac:  Shock:  resolved.  Now stable off vaso-actives.         MVR:  Initiate anticoagulation when cleared by CV surgery  Pulm:  Breathing comfortably extubated.        Hypoxemia:  Requiring low level nasal cannula.  Likely due to atelectasis and/or mild   acute pulmonary edema which would be expected after a procedure of this   magnitude.  Wean nasal cannula as tolerated.  Neuro: Analgesia:  Continue prn tylenol/opiates for post-op pain.  GI:  Advance diet as tolerated.  Endo:  Hyperglycemia:  Stress-related post-op as would be expected after a procedure of this magnitude.  Continue prn insulin per protocol.  Prophylaxis:  Continue subcutaneous heparin until therapeutic AC started.      D/w Dr. Marie of CV surgery service.    Intensivist service will sign off.  Please re-consult as needed.

## 2017-09-13 NOTE — PLAN OF CARE
Problem: Goal Outcome Summary  Goal: Goal Outcome Summary  OT/CR: Order received, eval completed and treatment initiated. Pt is a 56 year old male s/p MVR, sternal precautions. Pt lives with spouse in house, stairs to enter/within, tub/shower, standard height toilet. Pt reports indep in all ADLs, IADLs and mobility tasks with no AD at baseline. Works as , spouse also works full-time.     Discharge Planner OT   Patient plan for discharge: TCU followed by OP CR (pt states spouse is not able to be home with him due to working full-time)  Current status: Pt completed sit > stand from chair with CGA for safety, completed stand pivot transfer to EOB with CGA, assist needed for line management. Pt required min A for bed mobility sit > supine using log roll technique to maintain sternal precautions. Pt required max A for LE dressing. Stable CV response to activity.   Barriers to return to prior living situation: Stairs  Recommendations for discharge: Home with OP CR, however due to lack of support at home during the day, pt may benefit from short TCU stay followed by OP CR ([place])  Rationale for recommendations: Pt would benefit from continued OP CR for CV strengthening, monitoring and education.        Entered by: Jana Lo 09/13/2017 4:05 PM

## 2017-09-13 NOTE — PHARMACY-ANTICOAGULATION SERVICE
Clinical Pharmacy - Warfarin Dosing Consult     Pharmacy has been consulted to manage this patient s warfarin therapy.  Indication: Mechanical Mitral Valve Replacement  Therapy Goal: INR 2.5-3.5  Warfarin Prior to Admission: Yes  Warfarin PTA Regimen: 5 mg on Monday and Friday, 2.5mg ROW  Significant drug interactions: ASA, Amiodarone    INR   Date Value Ref Range Status   09/12/2017 1.30 (H) 0.86 - 1.14 Final   09/12/2017 1.39 (H) 0.86 - 1.14 Final       Recommend warfarin 5 mg today.  Pharmacy will monitor Derek Stover daily and order warfarin doses to achieve specified goal.      Please contact pharmacy as soon as possible if the warfarin needs to be held for a procedure or if the warfarin goals change.

## 2017-09-13 NOTE — PROGRESS NOTES
09/13/17 1529   Quick Adds   Type of Visit Initial Occupational Therapy Evaluation   Living Environment   Lives With spouse   Living Arrangements house   Home Accessibility stairs to enter home;stairs within home;tub/shower is not walk in   Number of Stairs to Enter Home 3   Number of Stairs Within Home 15  (landing half way)   Stair Railings at Home inside, present on right side   Transportation Available car;family or friend will provide   Living Environment Comment Pt lives with spouse in house, stairs to enter, full flight to 2nd story bedroom, landing detention, tub/shower, standard height toilet   Self-Care   Usual Activity Tolerance good   Current Activity Tolerance moderate   Equipment Currently Used at Home none   Functional Level Prior   Ambulation 0-->independent   Transferring 0-->independent   Toileting 0-->independent   Bathing 0-->independent   Dressing 0-->independent   Eating 0-->independent   Communication 0-->understands/communicates without difficulty   Swallowing 0-->swallows foods/liquids without difficulty   Cognition 0 - no cognition issues reported   Fall history within last six months no   Which of the above functional risks had a recent onset or change? transferring;toileting;bathing;dressing;ambulation   Prior Functional Level Comment Pt reports indep in all ADLs, IADLs and mobility tasks with no AD at baseline. Works as . Wife works full-time.    General Information   Onset of Illness/Injury or Date of Surgery - Date 09/12/17   Referring Physician Sly Post MD   Patient/Family Goals Statement Pt's goal is to d/c to rehab followed by OP CR   Additional Occupational Profile Info/Pertinent History of Current Problem Per chart: Pt is a 56 year old male s/p MVR   Precautions/Limitations fall precautions;sternal precautions   General Info Comments BP 95/75, HR 80 at rest   Cognitive Status Examination   Orientation orientation to person, place and time   Level of  Consciousness alert   Able to Follow Commands WNL/WFL   Personal Safety (Cognitive) WNL/WFL   Memory intact   Sensory Examination   Sensory Comments Pt denies numbness/tingling in BUEs   Pain Assessment   Patient Currently in Pain Yes, see Vital Sign flowsheet  (3/10 pain at incision site)   Range of Motion (ROM)   ROM Comment B shoulders NT due to sternal precautions, all other joints WFL   Strength   Strength Comments NT due to sternal precautions   Hand Strength   Hand Strength Comments WFL   Coordination   Upper Extremity Coordination No deficits were identified   Bed Mobility Skill: Supine to Sit   Level of [place]: Supine/Sit minimum assist (75% patients effort)   Physical Assist/Nonphysical Assist: Supine/Sit 1 person assist   Transfer Skill: Bed to Chair/Chair to Bed   Level of [place]: Bed to Chair contact guard   Physical Assist/Nonphysical Assist: Bed to Chair 1 person assist   Transfer Skill: Sit to Stand   Level of [place]: Sit/Stand contact guard   Physical Assist/Nonphysical Assist: Sit/Stand 1 person assist   Balance   Balance Comments CGA while in stance   Upper Body Dressing   Level of [place]: Dress Upper Body maximum assist (25% patients effort)   Physical Assist/Nonphysical Assist: Dress Upper Body 1 person assist   Lower Body Dressing   Level of [place]: Dress Lower Body maximum assist (25% patients effort)   Physical Assist/Nonphysical Assist: Dress Lower Body 1 person assist   Toileting   Level of [place]: Toilet maximum assist (25% patients effort)   Physical Assist/Nonphysical Assist: Toilet 1 person assist   Grooming   Level of [place]: Grooming contact guard   Physical Assist/Nonphysical Assist: Grooming 1 person assist   Instrumental Activities of Daily Living (IADL)   Previous Responsibilities meal prep;housekeeping;shopping;laundry;yardwork;finances;medication management;driving;work   IADL Comments Pt will have support for IADLs from spouse upon  "return home, however spouse does work full-time   Activities of Daily Living Analysis   Impairments Contributing to Impaired Activities of Daily Living balance impaired;pain;post surgical precautions;strength decreased   ADL Comments Pt presents to OT below baseline level of functioning in all areas of self cares including bathing, dressing, grooming and toileting   General Therapy Interventions   Planned Therapy Interventions ADL retraining;IADL retraining;strengthening;transfer training;home program guidelines;progressive activity/exercise;risk factor education   Clinical Impression   Criteria for Skilled Therapeutic Interventions Met yes, treatment indicated   OT Diagnosis Impaired ADLs, IADLs and mobility tasks   Influenced by the following impairments Decreased strength and functional activity tolerance, impaired balance, pain, post-surgical precautions   Assessment of Occupational Performance 5 or more Performance Deficits   Identified Performance Deficits Bathing, dressing, grooming, toileting, homemaking, transfers   Clinical Decision Making (Complexity) Moderate complexity   Therapy Frequency 2 times/day   Predicted Duration of Therapy Intervention (days/wks) 5 days   Anticipated Discharge Disposition Transitional Care Facility;Home with Outpatient Therapy;Home with Assist   Risks and Benefits of Treatment have been explained. Yes   Patient, Family & other staff in agreement with plan of care Yes   Clinical Impression Comments Pt would benefit from skilled OT/CR to maximize safety and indep in all ADLs, IADLs and mobility tasks as well as CV strengthening and monitoring.    [facility] AM-PAC  \"6 Clicks\" Daily Activity Inpatient Short Form   1. Putting on and taking off regular lower body clothing? 2 - A Lot   2. Bathing (including washing, rinsing, drying)? 2 - A Lot   3. Toileting, which includes using toilet, bedpan or urinal? 3 - A Little   4. Putting on and taking off regular upper body clothing? " 3 - A Little   5. Taking care of personal grooming such as brushing teeth? 3 - A Little   6. Eating meals? 4 - None   Daily Activity Raw Score (Score out of 24.Lower scores equate to lower levels of function) 17   Total Evaluation Time   Total Evaluation Time (Minutes) 10

## 2017-09-13 NOTE — PROGRESS NOTES
Patient extubated at 2112 per MD order into BiPAP 10/5 40% tolerating well. There was no stridor noted. BS equal bilateral. SpO2 95%. Will continue to monitor  9/12/2017  Carrol Reid

## 2017-09-13 NOTE — PROGRESS NOTES
Pt on bedrest until 0900, after R femoral artery line pulled. Pt dangled and to chair at 1115, delayed by unavailability of RN, as was with other patient at the time.   Pt UTC x45 minutes, slight dizziness initially with dangle, but improved. Transferred with assist of 2, tolerated well. Will continue to monitor.

## 2017-09-13 NOTE — PLAN OF CARE
Problem: Cardiac Surgery (Adult)  Goal: Signs and Symptoms of Listed Potential Problems Will be Absent or Manageable (Cardiac Surgery)  Signs and symptoms of listed potential problems will be absent or manageable by discharge/transition of care (reference Cardiac Surgery (Adult) CPG).   Outcome: Improving  Pt a/o, mild c/o pain at incision with movement and coughing. PO oxy for pain. SR with 1AVB, rare pvc, BP stable. LS clear, dim in bases. 4L NC with adequate sats. Tolerating OJ, jello, water and Dt Mt Dew, but no appetite for food yet, but encouraged small meals. Hypoactive BS, great response to 20mg Lasix x1. Skin CDI, ex chest incision with bruising. OOB x3 with 2 assist. CT output 150ml for shift, thin-serosang. Family updated on progress, questions answered.

## 2017-09-13 NOTE — PROVIDER NOTIFICATION
Pt on vent weaning since 1930, ABGs collected per protocol, results shared with intensivist service, ok to extubate pt to bipap, continue to monitor, RT notified.

## 2017-09-14 ENCOUNTER — APPOINTMENT (OUTPATIENT)
Dept: OCCUPATIONAL THERAPY | Facility: CLINIC | Age: 57
DRG: 219 | End: 2017-09-14
Attending: SURGERY
Payer: COMMERCIAL

## 2017-09-14 LAB
ANION GAP SERPL CALCULATED.3IONS-SCNC: 7 MMOL/L (ref 3–14)
BLD PROD TYP BPU: NORMAL
BLD PROD TYP BPU: NORMAL
BLD UNIT ID BPU: 0
BLOOD PRODUCT CODE: NORMAL
BPU ID: NORMAL
BUN SERPL-MCNC: 15 MG/DL (ref 7–30)
CALCIUM SERPL-MCNC: 8.2 MG/DL (ref 8.5–10.1)
CHLORIDE SERPL-SCNC: 103 MMOL/L (ref 94–109)
CO2 SERPL-SCNC: 25 MMOL/L (ref 20–32)
CREAT SERPL-MCNC: 1.02 MG/DL (ref 0.66–1.25)
ERYTHROCYTE [DISTWIDTH] IN BLOOD BY AUTOMATED COUNT: 14.7 % (ref 10–15)
ERYTHROCYTE [DISTWIDTH] IN BLOOD BY AUTOMATED COUNT: 15 % (ref 10–15)
GFR SERPL CREATININE-BSD FRML MDRD: 76 ML/MIN/1.7M2
GLUCOSE BLDC GLUCOMTR-MCNC: 111 MG/DL (ref 70–99)
GLUCOSE BLDC GLUCOMTR-MCNC: 114 MG/DL (ref 70–99)
GLUCOSE BLDC GLUCOMTR-MCNC: 115 MG/DL (ref 70–99)
GLUCOSE BLDC GLUCOMTR-MCNC: 124 MG/DL (ref 70–99)
GLUCOSE BLDC GLUCOMTR-MCNC: 130 MG/DL (ref 70–99)
GLUCOSE BLDC GLUCOMTR-MCNC: 131 MG/DL (ref 70–99)
GLUCOSE BLDC GLUCOMTR-MCNC: 149 MG/DL (ref 70–99)
GLUCOSE BLDC GLUCOMTR-MCNC: 93 MG/DL (ref 70–99)
GLUCOSE BLDC GLUCOMTR-MCNC: 94 MG/DL (ref 70–99)
GLUCOSE BLDC GLUCOMTR-MCNC: 98 MG/DL (ref 70–99)
GLUCOSE BLDC GLUCOMTR-MCNC: 98 MG/DL (ref 70–99)
GLUCOSE SERPL-MCNC: 93 MG/DL (ref 70–99)
HCT VFR BLD AUTO: 30 % (ref 40–53)
HCT VFR BLD AUTO: 32.3 % (ref 40–53)
HGB BLD-MCNC: 10.1 G/DL (ref 13.3–17.7)
HGB BLD-MCNC: 10.7 G/DL (ref 13.3–17.7)
INR PPP: 1.31 (ref 0.86–1.14)
INTERPRETATION ECG - MUSE: NORMAL
INTERPRETATION ECG - MUSE: NORMAL
MCH RBC QN AUTO: 30.7 PG (ref 26.5–33)
MCH RBC QN AUTO: 30.9 PG (ref 26.5–33)
MCHC RBC AUTO-ENTMCNC: 33.1 G/DL (ref 31.5–36.5)
MCHC RBC AUTO-ENTMCNC: 33.7 G/DL (ref 31.5–36.5)
MCV RBC AUTO: 92 FL (ref 78–100)
MCV RBC AUTO: 93 FL (ref 78–100)
NUM BPU REQUESTED: 1
PLATELET # BLD AUTO: 95 10E9/L (ref 150–450)
PLATELET # BLD AUTO: 98 10E9/L (ref 150–450)
POTASSIUM SERPL-SCNC: 4.4 MMOL/L (ref 3.4–5.3)
RBC # BLD AUTO: 3.27 10E12/L (ref 4.4–5.9)
RBC # BLD AUTO: 3.48 10E12/L (ref 4.4–5.9)
SODIUM SERPL-SCNC: 135 MMOL/L (ref 133–144)
TRANSFUSION STATUS PATIENT QL: NORMAL
TRANSFUSION STATUS PATIENT QL: NORMAL
WBC # BLD AUTO: 17.4 10E9/L (ref 4–11)
WBC # BLD AUTO: 17.5 10E9/L (ref 4–11)

## 2017-09-14 PROCEDURE — 40000275 ZZH STATISTIC RCP TIME EA 10 MIN

## 2017-09-14 PROCEDURE — 40000884 ZZH STATISTIC STEP DOWN HRS NIGHT

## 2017-09-14 PROCEDURE — 25000128 H RX IP 250 OP 636: Performed by: SURGERY

## 2017-09-14 PROCEDURE — 85027 COMPLETE CBC AUTOMATED: CPT | Performed by: PHYSICIAN ASSISTANT

## 2017-09-14 PROCEDURE — 25000132 ZZH RX MED GY IP 250 OP 250 PS 637: Performed by: INTERNAL MEDICINE

## 2017-09-14 PROCEDURE — 97110 THERAPEUTIC EXERCISES: CPT | Mod: GO | Performed by: OCCUPATIONAL THERAPIST

## 2017-09-14 PROCEDURE — 40000133 ZZH STATISTIC OT WARD VISIT: Performed by: OCCUPATIONAL THERAPIST

## 2017-09-14 PROCEDURE — 93010 ELECTROCARDIOGRAM REPORT: CPT | Performed by: INTERNAL MEDICINE

## 2017-09-14 PROCEDURE — 25000132 ZZH RX MED GY IP 250 OP 250 PS 637: Performed by: THORACIC SURGERY (CARDIOTHORACIC VASCULAR SURGERY)

## 2017-09-14 PROCEDURE — 97535 SELF CARE MNGMENT TRAINING: CPT | Mod: GO | Performed by: OCCUPATIONAL THERAPIST

## 2017-09-14 PROCEDURE — 80048 BASIC METABOLIC PNL TOTAL CA: CPT | Performed by: PHYSICIAN ASSISTANT

## 2017-09-14 PROCEDURE — 36415 COLL VENOUS BLD VENIPUNCTURE: CPT | Performed by: PHYSICIAN ASSISTANT

## 2017-09-14 PROCEDURE — 93005 ELECTROCARDIOGRAM TRACING: CPT

## 2017-09-14 PROCEDURE — 99222 1ST HOSP IP/OBS MODERATE 55: CPT | Performed by: INTERNAL MEDICINE

## 2017-09-14 PROCEDURE — 85610 PROTHROMBIN TIME: CPT | Performed by: PHYSICIAN ASSISTANT

## 2017-09-14 PROCEDURE — 00000146 ZZHCL STATISTIC GLUCOSE BY METER IP

## 2017-09-14 PROCEDURE — 25000132 ZZH RX MED GY IP 250 OP 250 PS 637

## 2017-09-14 PROCEDURE — 25000128 H RX IP 250 OP 636: Performed by: PHYSICIAN ASSISTANT

## 2017-09-14 PROCEDURE — 25000125 ZZHC RX 250: Performed by: THORACIC SURGERY (CARDIOTHORACIC VASCULAR SURGERY)

## 2017-09-14 PROCEDURE — 25000132 ZZH RX MED GY IP 250 OP 250 PS 637: Performed by: PHYSICIAN ASSISTANT

## 2017-09-14 PROCEDURE — 12000007 ZZH R&B INTERMEDIATE

## 2017-09-14 RX ORDER — WARFARIN SODIUM 5 MG/1
5 TABLET ORAL
Status: COMPLETED | OUTPATIENT
Start: 2017-09-14 | End: 2017-09-14

## 2017-09-14 RX ORDER — FUROSEMIDE 10 MG/ML
20 INJECTION INTRAMUSCULAR; INTRAVENOUS ONCE
Status: COMPLETED | OUTPATIENT
Start: 2017-09-14 | End: 2017-09-14

## 2017-09-14 RX ORDER — METOPROLOL TARTRATE 50 MG
50 TABLET ORAL EVERY 12 HOURS
Status: DISCONTINUED | OUTPATIENT
Start: 2017-09-14 | End: 2017-09-17 | Stop reason: HOSPADM

## 2017-09-14 RX ADMIN — PANTOPRAZOLE SODIUM 40 MG: 40 TABLET, DELAYED RELEASE ORAL at 09:09

## 2017-09-14 RX ADMIN — MUPIROCIN 0.5 G: 20 OINTMENT TOPICAL at 12:31

## 2017-09-14 RX ADMIN — OXYCODONE HYDROCHLORIDE 10 MG: 5 TABLET ORAL at 21:42

## 2017-09-14 RX ADMIN — OXYCODONE HYDROCHLORIDE 10 MG: 5 TABLET ORAL at 15:35

## 2017-09-14 RX ADMIN — METOPROLOL TARTRATE 25 MG: 25 TABLET, FILM COATED ORAL at 12:27

## 2017-09-14 RX ADMIN — WARFARIN SODIUM 5 MG: 5 TABLET ORAL at 17:35

## 2017-09-14 RX ADMIN — VARENICLINE TARTRATE 1 MG: 1 TABLET, FILM COATED ORAL at 20:27

## 2017-09-14 RX ADMIN — OXYCODONE HYDROCHLORIDE 10 MG: 5 TABLET ORAL at 12:30

## 2017-09-14 RX ADMIN — OXYCODONE HYDROCHLORIDE 10 MG: 5 TABLET ORAL at 02:16

## 2017-09-14 RX ADMIN — SENNOSIDES AND DOCUSATE SODIUM 2 TABLET: 8.6; 5 TABLET ORAL at 20:27

## 2017-09-14 RX ADMIN — SENNOSIDES AND DOCUSATE SODIUM 1 TABLET: 8.6; 5 TABLET ORAL at 09:09

## 2017-09-14 RX ADMIN — FUROSEMIDE 20 MG: 10 INJECTION, SOLUTION INTRAVENOUS at 12:47

## 2017-09-14 RX ADMIN — ASPIRIN 81 MG: 81 TABLET, COATED ORAL at 09:09

## 2017-09-14 RX ADMIN — ACETAMINOPHEN 975 MG: 325 TABLET, FILM COATED ORAL at 14:02

## 2017-09-14 RX ADMIN — VARENICLINE TARTRATE 1 MG: 1 TABLET, FILM COATED ORAL at 09:09

## 2017-09-14 RX ADMIN — METOPROLOL TARTRATE 50 MG: 50 TABLET, FILM COATED ORAL at 20:27

## 2017-09-14 RX ADMIN — HEPARIN SODIUM 1150 UNITS/HR: 10000 INJECTION, SOLUTION INTRAVENOUS at 17:36

## 2017-09-14 RX ADMIN — ACETAMINOPHEN 975 MG: 325 TABLET, FILM COATED ORAL at 21:42

## 2017-09-14 RX ADMIN — MUPIROCIN 0.5 G: 20 OINTMENT TOPICAL at 20:28

## 2017-09-14 RX ADMIN — OXYCODONE HYDROCHLORIDE 10 MG: 5 TABLET ORAL at 18:43

## 2017-09-14 RX ADMIN — OXYCODONE HYDROCHLORIDE 10 MG: 5 TABLET ORAL at 09:08

## 2017-09-14 NOTE — PLAN OF CARE
Problem: Cardiac Surgery (Adult)  Goal: Signs and Symptoms of Listed Potential Problems Will be Absent or Manageable (Cardiac Surgery)  Signs and symptoms of listed potential problems will be absent or manageable by discharge/transition of care (reference Cardiac Surgery (Adult) CPG).   Outcome: Improving  Patient is alert and orientated X 4, vital signs stable, chest tube output 90 ML, Tele: SR with 1st degree AV block, BG at 07:11 98 insulin drip stopped.

## 2017-09-14 NOTE — PROGRESS NOTES
[facility]  Cardiovascular and Thoracic Surgery Daily Note          Assessment and Plan:   POD#2 s/p Mitral valve replacement with a 33 mm St. Servando Callands mechanical valve, intraoperative MITCHELL by Dr. Ivana Marie.  -CVS: HR: 80s-90s. SBP: 100s-120s. ASA, BB. No statin, no CAD. Pacer wires capped. Chest tubes with decreasing output- will remove today following therapy. ASA, BB. No statin, no CAD. ON coumadin for mechanical valve, hx of atrial fibrillation, on coumadin. INR: 1.31. Will start heparin gtt tonight once chest tubes and pacer wires are removed.  -Resp: Extubated within protocol. Saturating well on 2L. Continue to encourage IS, cough, deep breathing, ambulation.  -Neuro:  Grossly intact. Pain controlled.  -Renal: good UOP. Up about 3kg from preoperative weight. Cr: 1.02. Repeat Lasix 20mg IV once today.  -GI:  Tolerating diet. +flatus, no BM. Continue bowel regimen.  -:  Guerra in place d/t limited mobility and need for accurate I&Os. Will remove today.  -Endo: pre op A1c: 6.1. Completed insulin gtt per protocol. Transition to SSI.  -FEN: replete electrolytes as needed. Na: 135. K: 4.4    Active Diet Order      Advance Diet as Tolerated: Regular Diet Adult; Regular Diet Adult  -ID: Temp (24hrs), Av.5  F (36.9  C), Min:98.1  F (36.7  C), Max:98.8  F (37.1  C)  WBC: 17.5<17.0<18.9. Completed perioperative abx. Will continue to monitor.   -Heme: Hgb: 10.7. Plt: 98. Acute blood loss anemia and thrombocytopenia related to surgery. Will continue to follow.   -Proph: PCD, ASA, BB, no statin-no cad. PPI, coumadin  -Dispo: St. 33. Continue therapies. Chest tubes removed today. Will repeat CXR tomorrow.           Interval History:   No acute events overnight. Pain this am but controlled with meds. Tolerating diet, +flatus, no BM. Saturating well on 2L         Medications:       warfarin  5 mg Oral ONCE at 18:00     varenicline (CHANTIX) tablet 1 mg  1 mg Oral BID     pantoprazole  40 mg Oral  QAM     sodium chloride (PF)  3 mL Intracatheter Q8H     insulin aspart   Subcutaneous TID w/meals     metoprolol  25 mg Oral Q12H    Or     metoprolol  25 mg Per NG tube Q12H     mupirocin  0.5 g Both Nostrils BID     aspirin  81 mg Oral Daily     acetaminophen  975 mg Oral Q8H     senna-docusate  1-2 tablet Oral BID     Warfarin Therapy Reminder, lidocaine (buffered or not buffered), lidocaine 4%, sodium chloride (PF), hydrALAZINE, IV fluid REPLACEMENT ONLY, glucose **OR** dextrose **OR** glucagon, naloxone, potassium chloride, potassium chloride, potassium chloride, potassium chloride with lidocaine, potassium chloride, magnesium sulfate, magnesium sulfate, sodium phosphate (NaPHOS) intermittent infusion, sodium phosphate (NaPHOS) intermittent infusion, sodium phosphate (NaPHOS) intermittent infusion, sodium phosphate (NaPHOS) intermittent infusion, albuterol, ipratropium - albuterol 0.5 mg/2.5 mg/3 mL, HYDROmorphone, [START ON 9/15/2017] acetaminophen, oxyCODONE, ondansetron **OR** ondansetron          Physical Exam:   Vitals were reviewed  Blood pressure 100/67, temperature 98.4  F (36.9  C), temperature source Oral, resp. rate 16, height 1.829 m (6'), weight 125.3 kg (276 lb 3.8 oz), SpO2 98 %.  Rhythm: a flutter    Lungs: diminished bases    Cardiovascular: irregular    Abdomen: soft NTND    Extremeties: minimal edema    Incision: CDI    CT: n/a    Weight:   Vitals:    09/12/17 0618 09/13/17 0600 09/14/17 0600   Weight: 122 kg (269 lb) 125.7 kg (277 lb 1.9 oz) 125.3 kg (276 lb 3.8 oz)            Data:   Labs:   Lab Results   Component Value Date    WBC 17.0 09/13/2017     Lab Results   Component Value Date    RBC 3.73 09/13/2017     Lab Results   Component Value Date    HGB 11.4 09/13/2017     Lab Results   Component Value Date    HCT 34.0 09/13/2017     No components found for: MCT  Lab Results   Component Value Date    MCV 91 09/13/2017     Lab Results   Component Value Date    MCH 30.6 09/13/2017     Lab  Results   Component Value Date    MCHC 33.5 09/13/2017     Lab Results   Component Value Date    RDW 14.6 09/13/2017     Lab Results   Component Value Date     09/13/2017       Last Basic Metabolic Panel:  Lab Results   Component Value Date     09/13/2017      Lab Results   Component Value Date    POTASSIUM 5.0 09/13/2017     Lab Results   Component Value Date    CHLORIDE 110 09/13/2017     Lab Results   Component Value Date    FIORELLA 7.9 09/13/2017     Lab Results   Component Value Date    CO2 24 09/13/2017     Lab Results   Component Value Date    BUN 20 09/13/2017     Lab Results   Component Value Date    CR 1.13 09/13/2017     Lab Results   Component Value Date     09/13/2017       CXR: 9/13/17    FINDINGS: Sternotomy. Mediastinal drainage tubes. SG catheter from  right jugular approach with tip in the main pulmonary artery. No  pneumothorax. Minimal atelectasis both bases, unchanged in interval.  The ET tube has been removed since yesterday.         IMPRESSION: ET tube has been removed since yesterday. Otherwise no  change.    Liza Kenyon PA-C  CV Surgery  Pager # [phone]

## 2017-09-14 NOTE — PROGRESS NOTES
Pt was on BiPAP 10/5 50% throughout the night, SpO2 >92%. Gel pad and mepilex on for skin protection. RT will continue to follow.  9/14/2017  Zohra Diaz

## 2017-09-14 NOTE — PLAN OF CARE
Problem: Goal Outcome Summary  Goal: Goal Outcome Summary  Outcome: Improving  VSS on 2L O2. Prn oxy and scheduled tylenol for pain. Insulin gtt discontinued at noon, off IMC, transitioned to SSI. Fair appetite. Chest incision NAVA. CT pulled @1500. Guerra adequate UOP. Pt went into a flutter around noon. EKG obtained, MD notified, metoprolol given, EP consulted, rate controlled. Ambulating SBA.

## 2017-09-14 NOTE — PLAN OF CARE
Problem: Goal Outcome Summary  Goal: Goal Outcome Summary  Outcome: Therapy, progress toward functional goals as expected  Discharge Planner OT   Patient plan for discharge: home   Current status: Pt needs mid assist wt bed mobility independent to transfer into bed reminders of sternal precautions. Ambulated  Behind WW x 2  Up to 15 min with stable cardiac response and  Symptoms of fatigue. Pt is progressing well, motivated.   Barriers to return to prior living situation: none   Recommendations for discharge: Discussed with OTR pt is moving very well recommending home with wife and OP CR  Rationale for recommendations:  Will benefit from monitoring  progressive exercise and risk factor modification.        Entered by: Nory De La Torre 09/14/2017 2:58 PM       Occupational Therapy Discharge Summary    Reason for therapy discharge:    Discharged to home.    Progress towards therapy goal(s). See goals on Care Plan in [facility] electronic health record for goal details.  Goals partially met.  Barriers to achieving goals:   discharge from facility.    Therapy recommendation(s):    Requiring assist with bed mob, cues for sternal prec. Spouse to provide assist.

## 2017-09-14 NOTE — CONSULTS
CARDIOLOGY CONSULTATION      REFERRING HEALTH CARE PROVIDER:  Liza Kenyon PA-C      INDICATION FOR THE CONSULTATION:  Atrial flutter.      HISTORY OF PRESENT ILLNESS:  It is my pleasure to see your patient, Derek Stover, in followup.  This is a patient who has been followed by my partner, Dr. Oni Ross, in Cardiology Clinic.  The patient was seen in [facility] where he was noted to have  mitral regurgitation.  The patient was also noted to be in atrial fibrillation.  At that time, it was decided that a rate controlling strategy would be employed for the control of his atrial fibrillation rather than cardioversion.  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  As it turns out, the mitral valve disease with severe.  The patient underwent mitral valve replacement 2 days ago.  The patient was in atrial fibrillation prior to his surgery.  After his surgery, once his heart was restarted, he was back in sinus rhythm, but has gone into atrial flutter with borderline rate control.  The patient had his mitral valve replaced with a 33 mm St. Servando Ephraim mechanical valve.  The patient has done well postoperatively.  He is in the step-down unit on the third floor 2 days post-surgery.  The patient's electrolytes were normal this morning with a potassium of 4.4 and a sodium 135.  The patient has been anticoagulated with warfarin.  His INR is 1.31 this morning.  He does have a raised white cell count at 17.5.  His hemoglobin is borderline low at 10.7.  He does have some discomfort in his chest in the morning, but does not feel particularly short of breath.  The patient is 1.889 net up since admission.      PAST MEDICAL HISTORY:   1.  Chronic atrial fibrillation diagnosed in 06/2017 due to mitral valvular heart disease.   2.  Severe mitral regurgitation due to rheumatic fever as a child.   3.  Erectile dysfunction.   4.  Hyperlipidemia.   5.  Tobacco abuse.      PAST SURGICAL  HISTORY:  Mitral valve replacement with a 33 mm St. Servando valve 2 days ago, right knee arthroscopy and colonoscopy in 2014.      FAMILY HISTORY:  Mother and father both had diabetes.      SOCIAL HISTORY:  He is .  Smokes 2 packs a day, stopped smoking a month and a half ago.  Drinks 2.4 ounces of alcohol a week.      ALLERGIES:  He has no known drug allergies.      MEDICATIONS:   1.  Aspirin 81 mg per day.   2.  Lasix 20 mg intravenously once this morning.   3.  Insulin sliding scale.   4.  Metoprolol tartrate 25 mg twice a day.   5.  Chantix 1 mg orally 2 times a day.   6.  Warfarin.      REVIEW OF SYSTEMS:   CONSTITUTIONAL:  Tiredness.   EYES:  Wears spectacles.   ENT:  Negative.   CARDIOVASCULAR:  As above.   RESPIRATORY:  As above.   GASTROINTESTINAL:  Nil.   GENITOURINARY:  Nil.   MUSCULOSKELETAL:  Nil.   NEUROLOGIC:  Nil.   PSYCHIATRIC:  Nil.   ENDOCRINE:  Nil.   HEMATOLYMPHATIC:  Nil.   ALLERGY, IMMUNOLOGY:  Nil.      PHYSICAL EXAMINATION:   GENERAL:  He is a pleasant man and he is in no apparent distress.   VITAL SIGNS:  His pulse rate is 98 beats per minute, atrial flutter with variable block.  Blood pressure is 127/106.   HEAD, EYES, EARS, NECK, NOSE AND THROAT:  Unremarkable.  He is wearing spectacles.  He has normal facial symmetry.  Dentition is good.   NECK:  Jugular venous pulse is not raised.  Carotids are normal with no bruits.   HEART:  [place] beat is not displaced.  Mechanical heart sounds noted.  Heart sound 1 is variable, heart sound 2 is normal.   CHEST:  Revealed some coarse crackles at both bases.  The remainder of the chest appears clear.  He has a median sternotomy scar present.  There is no evidence of infection, oozing or bleeding from the sternal wound.   ABDOMEN:  Unremarkable with grossly no organomegaly or masses or bruits.   EXTREMITIES:  Pedal pulses are 1-2+.  No peripheral edema noted.   NEUROLOGIC:  He moves all 4 limbs appropriately with no obvious sensory or motor loss and  he is fully oriented to time, place and person, with a normal affect.      LABORATORY INVESTIGATIONS:  This morning, sodium was 135, potassium is 4.4, BUN is 15, creatinine is 1.02.  GFR 76.  The white cell count is raised at 17.5 and the hemoglobin is mildly low at 10.7.  The platelet count is mildly low at 98,000.  INR is 1.31.      IMPRESSION:   1.  Atrial flutter with variable response with borderline rapid ventricular heart rate.  The patient presented with chronic atrial fibrillation.  He is essentially in the related rhythm of atrial flutter.  It has already been decided by Dr. Ross at the time he saw the patient 3 months ago that a rate controlling strategy would be employed.  It will be extremely difficult to keep this patient in sinus rhythm given that he has severely dilated atria bilaterally due to his rheumatic heart disease.   2.  Status post mitral valve replacement with a mechanical St. Servando valve 2 days ago.   3.  Leukocytosis.   4.  Mild anemia.   5.  Thrombocytopenia.   6.  History of tobacco abuse.      PLAN:  It was inevitable that the patient was going to go back into an atrial dysrhythmia, and indeed, a rate controlling strategy had already been decided upon by Dr. Ross correctly.  We will continue the rate controlling strategy with metoprolol.  I will increase the dose to 37.5 mg twice a day for better rate control.  Hopefully, his blood pressure will allow us to go to the higher dose.  If not, we can add in digoxin in the meantime.  We will continue to anticoagulate him with warfarin.  I do not think that it would be worthwhile or prudent to try and keep him in sinus rhythm given the size of his atria, and as I mentioned, it already had been decided 3 months ago that a rate controlling strategy would be the best choice given that he is asymptomatic with his atrial dysrhythmias.      Many thanks for allowing me to see this patient.         GABY LOJA MD, FACC             D:  2017 14:11   T: 2017 16:06   MT: TS      Name:     HAKEEM BORJAS   MRN:      [id number]        Account:       [id number]   :      1960           Consult Date:  2017      Document: [id number]       cc: GEOVANNY LOJA PA-C

## 2017-09-14 NOTE — CONSULTS
"NUTRITION ASSESSMENT      REASON FOR ASSESSMENT:  Cardiac Surgery Nutrition Consult    CURRENT DIET / INTAKE:  Diet advanced to regular this morning  Pt had cream of wheat and OJ for breakfast  Reports poor/fair appetite  Is receptive to trying a nutrition supplement between meals    NUTRITION HISTORY:  Pt follows a regular diet at home  No food allergies  Tolerates dairy      ANTHROPOMETRICS:   Ht: 6'0\"  Wt: (9/12 - admit) 122 kg  BMI: 36.5  IBW: 80.9 kg  Weight Status: Obesity Grade II BMI 35-39.9  %IBW: 151%  Dosing Weight:  91 kg (adjusted wt for overwt)    ASSESSED NUTRITION NEEDS PER APPROVED PRACTICE GUIDELINES:    Estimated Energy Needs [phone] kcals/day (20-25 kcals/kg)  -  Post-op, obese       Estimated Protein Needs 109-137 gms/day (1.2-1.5 gms/kg)  -  Post-op healing         MALNUTRITION:  Patient does not meet two of the following criteria necessary for diagnosing malnutrition:  significant weight loss, reduced intake, subcutaneous fat loss, muscle loss or fluid retention    NUTRITION DIAGNOSIS:   Inadequate oral intake R/t decreased appetite post-op AEB pt eating small amounts of liquids    INTERVENTIONS:    Nutrition Prescription:  Regular diet    Implementation:  Assessed learning needs, learning preferences, and willingness to learn.  Nutrition Education (Content):  Discussed the importance of adequate nutrition post-op for healing and energy, emphasizing protein foods - encouraged pt to order a protein food with each meal      Medical Food Supplements:  Chocolate PLUS2 shake at 10am and 2pm (Each provides 625 kike and 19 gm pro)    Goals:  Patient to consume ~75% at meals in the next 3 - 5 days  Patient verbalizes understanding of diet by listing two reasons why adequate nutrition is important post op.  Goals met during the education session    Follow Up/Monitoring (InPatient):  Food and Fluid intake -  Monitor for adequacy    Follow Up/Monitoring (OutPatient):  Patient will participate in " out-patient cardiac rehab at [facility] and attend nutrition classes during the program

## 2017-09-14 NOTE — PROVIDER NOTIFICATION
Liza Hodges PA-C @ 16:55 on 9/14/17--Okay to start Heparin drip with PLT = 98,000.   CV Surgery will monitor.  Kesha Thomas RP

## 2017-09-14 NOTE — PROGRESS NOTES
Cardiology consult dictated. Atrial flutter post-op. Persistent atrial fibrillation pre-op with a rate control strategy rather than rhythm controlling employed given that he was asymptomatic with afib and his atria are severely enlarged due to rheumatic mitral valvular disease. Will increase the dose of metoprolol to 37.5 mg twice a day and will titrate up as BP allows. Thx.

## 2017-09-15 ENCOUNTER — APPOINTMENT (OUTPATIENT)
Dept: OCCUPATIONAL THERAPY | Facility: CLINIC | Age: 57
DRG: 219 | End: 2017-09-15
Attending: SURGERY
Payer: COMMERCIAL

## 2017-09-15 ENCOUNTER — APPOINTMENT (OUTPATIENT)
Dept: GENERAL RADIOLOGY | Facility: CLINIC | Age: 57
DRG: 219 | End: 2017-09-15
Attending: SURGERY
Payer: COMMERCIAL

## 2017-09-15 LAB
ANION GAP SERPL CALCULATED.3IONS-SCNC: 6 MMOL/L (ref 3–14)
BUN SERPL-MCNC: 14 MG/DL (ref 7–30)
CALCIUM SERPL-MCNC: 8.6 MG/DL (ref 8.5–10.1)
CHLORIDE SERPL-SCNC: 98 MMOL/L (ref 94–109)
CO2 SERPL-SCNC: 29 MMOL/L (ref 20–32)
CREAT SERPL-MCNC: 0.86 MG/DL (ref 0.66–1.25)
ERYTHROCYTE [DISTWIDTH] IN BLOOD BY AUTOMATED COUNT: 14.7 % (ref 10–15)
GFR SERPL CREATININE-BSD FRML MDRD: >90 ML/MIN/1.7M2
GLUCOSE BLDC GLUCOMTR-MCNC: 116 MG/DL (ref 70–99)
GLUCOSE BLDC GLUCOMTR-MCNC: 130 MG/DL (ref 70–99)
GLUCOSE BLDC GLUCOMTR-MCNC: 131 MG/DL (ref 70–99)
GLUCOSE BLDC GLUCOMTR-MCNC: 148 MG/DL (ref 70–99)
GLUCOSE BLDC GLUCOMTR-MCNC: 99 MG/DL (ref 70–99)
GLUCOSE SERPL-MCNC: 110 MG/DL (ref 70–99)
HCT VFR BLD AUTO: 30.5 % (ref 40–53)
HGB BLD-MCNC: 10.3 G/DL (ref 13.3–17.7)
INR PPP: 1.58 (ref 0.86–1.14)
LMWH PPP CHRO-ACNC: 0.2 IU/ML
LMWH PPP CHRO-ACNC: <0.1 IU/ML
MCH RBC QN AUTO: 31 PG (ref 26.5–33)
MCHC RBC AUTO-ENTMCNC: 33.8 G/DL (ref 31.5–36.5)
MCV RBC AUTO: 92 FL (ref 78–100)
PLATELET # BLD AUTO: 99 10E9/L (ref 150–450)
POTASSIUM SERPL-SCNC: 4.2 MMOL/L (ref 3.4–5.3)
RBC # BLD AUTO: 3.32 10E12/L (ref 4.4–5.9)
SODIUM SERPL-SCNC: 133 MMOL/L (ref 133–144)
WBC # BLD AUTO: 13.8 10E9/L (ref 4–11)

## 2017-09-15 PROCEDURE — 97530 THERAPEUTIC ACTIVITIES: CPT | Mod: GO | Performed by: OCCUPATIONAL THERAPIST

## 2017-09-15 PROCEDURE — 85027 COMPLETE CBC AUTOMATED: CPT | Performed by: PHYSICIAN ASSISTANT

## 2017-09-15 PROCEDURE — 85610 PROTHROMBIN TIME: CPT | Performed by: PHYSICIAN ASSISTANT

## 2017-09-15 PROCEDURE — 25000132 ZZH RX MED GY IP 250 OP 250 PS 637: Performed by: THORACIC SURGERY (CARDIOTHORACIC VASCULAR SURGERY)

## 2017-09-15 PROCEDURE — 25000128 H RX IP 250 OP 636: Performed by: SURGERY

## 2017-09-15 PROCEDURE — 25000132 ZZH RX MED GY IP 250 OP 250 PS 637: Performed by: PHYSICIAN ASSISTANT

## 2017-09-15 PROCEDURE — 71020 XR CHEST 2 VW: CPT

## 2017-09-15 PROCEDURE — 80048 BASIC METABOLIC PNL TOTAL CA: CPT | Performed by: PHYSICIAN ASSISTANT

## 2017-09-15 PROCEDURE — 12000007 ZZH R&B INTERMEDIATE

## 2017-09-15 PROCEDURE — 85520 HEPARIN ASSAY: CPT | Performed by: SURGERY

## 2017-09-15 PROCEDURE — 36415 COLL VENOUS BLD VENIPUNCTURE: CPT | Performed by: PHYSICIAN ASSISTANT

## 2017-09-15 PROCEDURE — 97110 THERAPEUTIC EXERCISES: CPT | Mod: GO | Performed by: OCCUPATIONAL THERAPIST

## 2017-09-15 PROCEDURE — 25000132 ZZH RX MED GY IP 250 OP 250 PS 637

## 2017-09-15 PROCEDURE — 99232 SBSQ HOSP IP/OBS MODERATE 35: CPT | Performed by: INTERNAL MEDICINE

## 2017-09-15 PROCEDURE — 00000146 ZZHCL STATISTIC GLUCOSE BY METER IP

## 2017-09-15 PROCEDURE — 85520 HEPARIN ASSAY: CPT | Performed by: PHYSICIAN ASSISTANT

## 2017-09-15 PROCEDURE — 25000132 ZZH RX MED GY IP 250 OP 250 PS 637: Performed by: INTERNAL MEDICINE

## 2017-09-15 PROCEDURE — 40000133 ZZH STATISTIC OT WARD VISIT: Performed by: OCCUPATIONAL THERAPIST

## 2017-09-15 PROCEDURE — 36415 COLL VENOUS BLD VENIPUNCTURE: CPT | Performed by: SURGERY

## 2017-09-15 RX ORDER — WARFARIN SODIUM 5 MG/1
5 TABLET ORAL
Status: COMPLETED | OUTPATIENT
Start: 2017-09-15 | End: 2017-09-15

## 2017-09-15 RX ORDER — MAGNESIUM CARB/ALUMINUM HYDROX 105-160MG
148 TABLET,CHEWABLE ORAL ONCE
Status: COMPLETED | OUTPATIENT
Start: 2017-09-15 | End: 2017-09-15

## 2017-09-15 RX ADMIN — METOPROLOL TARTRATE 50 MG: 50 TABLET, FILM COATED ORAL at 21:13

## 2017-09-15 RX ADMIN — HEPARIN SODIUM 1300 UNITS/HR: 10000 INJECTION, SOLUTION INTRAVENOUS at 08:02

## 2017-09-15 RX ADMIN — OXYCODONE HYDROCHLORIDE 5 MG: 5 TABLET ORAL at 10:41

## 2017-09-15 RX ADMIN — OXYCODONE HYDROCHLORIDE 10 MG: 5 TABLET ORAL at 04:13

## 2017-09-15 RX ADMIN — MUPIROCIN 0.5 G: 20 OINTMENT TOPICAL at 08:05

## 2017-09-15 RX ADMIN — OXYCODONE HYDROCHLORIDE 5 MG: 5 TABLET ORAL at 17:31

## 2017-09-15 RX ADMIN — VARENICLINE TARTRATE 1 MG: 1 TABLET, FILM COATED ORAL at 08:03

## 2017-09-15 RX ADMIN — Medication 5000 UNITS: at 01:08

## 2017-09-15 RX ADMIN — OXYCODONE HYDROCHLORIDE 5 MG: 5 TABLET ORAL at 21:14

## 2017-09-15 RX ADMIN — OXYCODONE HYDROCHLORIDE 10 MG: 5 TABLET ORAL at 01:04

## 2017-09-15 RX ADMIN — ACETAMINOPHEN 975 MG: 325 TABLET, FILM COATED ORAL at 13:40

## 2017-09-15 RX ADMIN — VARENICLINE TARTRATE 1 MG: 1 TABLET, FILM COATED ORAL at 21:14

## 2017-09-15 RX ADMIN — SENNOSIDES AND DOCUSATE SODIUM 2 TABLET: 8.6; 5 TABLET ORAL at 08:03

## 2017-09-15 RX ADMIN — OXYCODONE HYDROCHLORIDE 5 MG: 5 TABLET ORAL at 13:40

## 2017-09-15 RX ADMIN — MAGESIUM CITRATE 148 ML: 1.75 LIQUID ORAL at 10:32

## 2017-09-15 RX ADMIN — ACETAMINOPHEN 975 MG: 325 TABLET, FILM COATED ORAL at 06:34

## 2017-09-15 RX ADMIN — METOPROLOL TARTRATE 50 MG: 50 TABLET, FILM COATED ORAL at 08:03

## 2017-09-15 RX ADMIN — OXYCODONE HYDROCHLORIDE 5 MG: 5 TABLET ORAL at 07:28

## 2017-09-15 RX ADMIN — MUPIROCIN 0.5 G: 20 OINTMENT TOPICAL at 21:18

## 2017-09-15 RX ADMIN — WARFARIN SODIUM 5 MG: 5 TABLET ORAL at 17:31

## 2017-09-15 RX ADMIN — PANTOPRAZOLE SODIUM 40 MG: 40 TABLET, DELAYED RELEASE ORAL at 08:03

## 2017-09-15 RX ADMIN — ASPIRIN 81 MG: 81 TABLET, COATED ORAL at 08:02

## 2017-09-15 NOTE — PROGRESS NOTES
Care Transition Initial Assessment - RN        Met with: Patient and wife    DATA   Active Problems:    Severe mitral regurgitation       Cognitive Status: awake, alert and oriented.      Contact information and PCP information verified: Yes    Lives With: spouse  Living Arrangements: house     Insurance concerns: No Insurance issues identified    ASSESSMENT  Patient currently receives the following services:  none        Identified issues/concerns regarding health management: Wife questions about the need for the requirement of having someone there 24/7 the first 2 weeks post op.      PLAN  Financial costs for the patient include per insurance plan  Patient given options and choices for discharge being explored .  Patient/family is agreeable to the plan?  With questions yet  Patient anticipates discharging to originally had thought patient would go to TCU given wife works and need for someone with patient in immediate post op period. I explained I would discuss with surgery team as patient is doing well enough to go home it appears with the therapy here.  I explained that with his commercial insurance the TCU's would likely need to get insurance authorization to go to TCU and that patient would need to demonstrate a need for TCU. We did discuss some options to help problem solve around going home.  Patient explained that he could stay at a friend's house where everything is on one level. Patient and spouse also agree that patient's father could potentially do checks on him during the day. Will further discuss with the CV surgery team.         Patient anticipates needs for home equipment: No  Discharge Planner   Discharge Plans in progress: yes  Barriers to discharge plan: insurance authorization of a TCU discharge  Plan/Disposition: likely Home will discuss with [facility]  Appointments:       Care  (CTS) will continue to follow as needed.

## 2017-09-15 NOTE — PLAN OF CARE
Problem: Goal Outcome Summary  Goal: Goal Outcome Summary  Outcome: No Change  Pt A/O, VSS on 2L O2 overnight. Tele:  A fib w/cvr. Pain managed with prn oxy and scheduled tylenol.  Heparin gtt 13 mL/hr.  Chest incision NAVA, CDI.  Old CT site dressing CDI.  LS dim, IS to 1500, encouraged.  BS active, no bm overnight, + flatus, voiding well.  Up w/Asst x1, maintaining sternal precautions.

## 2017-09-15 NOTE — PLAN OF CARE
Problem: Goal Outcome Summary  Goal: Goal Outcome Summary  Discharge Planner OT   Patient plan for discharge: deciding between TCU or home, pt's wife has to go back to work.  Current status: pt requires CGA/SBA for sit <> stand and ambulation and sit to supine. Pt ambualted appprox 100 ft x 2 and completed TDM with gradual increase to 1.2 mph for 8.5 mins with fatigue and slight SOB and seated rest break after TDM due to fatigue. Cues for PLB due to SOB, O2 sats WFL on 2 L O2. BP did drop after return to room and lying down from at rest prior to exercise /53, during /54, after return to room lying in bed BP dropped to 91/86 but with cont'd rest increased to 105/67, denies dizziness.   Barriers to return to prior living situation: stairs, wife returning to work, sternal precautions.   Recommendations for discharge: pt appears to be making good progress for return home with home OT/PT or OP CR at [place] vs TCU pending progress.  Rationale for recommendations: OP CR for monitored progressive exercise and risk factor eduation and modficaition, [facility], etc. Will cont to monitor.        Entered by: Rosario Coello 09/15/2017 11:15 AM       2nd session: discussed discharge plan and pt and this therapist agree that return home with OP CR in [place] and wife A with ADL/IADLs' as needed will be the plan. Sw notified.

## 2017-09-15 NOTE — PROGRESS NOTES
Met with patient and his wife in anticipation of discharge. Discharge instructions reviewed. Contact information given. Patient and wife requesting Care Coordinator for possible TCU placement. Rosario Lara informed.

## 2017-09-15 NOTE — PROGRESS NOTES
[facility]    Cardiology Progress Note    Date of Service: 09/15/2017     Assessment & Plan   Derek Stover is a 56 year old male with past medical history of chronic atrial fibrillation since 6-2017 due to valvular heart disease, rheumatic fever, ED, dyslipidemia, tobacco abuse, mitral valve disease.  This patient was admitted on 9/12/2017 for mitral valve replacement.    1. Chronic atrial fibrillation/atrial flutter post op  Rate control strategy  Metoprolol increased yesterday to 50mg bid--> heart rate 65-85  Will be on long term warfarin (valve)  Large LA so rhythm restoration would be difficult    2. Mitral valve disease   S/p MVR #33mm [employer] [place] mechanical valve  Wt at preop  Normal renal function  INR 1.59--on IV Heparin/warfarin      3. No significant CAD    4. Tobacco abuse-quit 1 1 /2 months ago    5. Thrombocytopenia  plts 99,000 today  Pre-op 125 in August, 2017      Piedad Godinez, MSN, APRN, CNP  [facility]    Interval History   Feels good; up walking and doing treadmill ;no BM yet. No palpitations  Review of Systems:  The Review of Systems is negative other than noted in the HPI    Physical Exam   Temp: 98.3  F (36.8  C) Temp src: Oral BP: 111/67 Pulse: 72 Heart Rate: 65 Resp: 16 SpO2: 95 % O2 Device: Nasal cannula Oxygen Delivery: 2 LPM  Vitals:    09/13/17 0600 09/14/17 0600 09/15/17 0600   Weight: 125.7 kg (277 lb 1.9 oz) 125.3 kg (276 lb 3.8 oz) 122.4 kg (269 lb 13.5 oz)     Vital Signs with Ranges  Temp:  [97.7  F (36.5  C)-99.8  F (37.7  C)] 98.3  F (36.8  C)  Pulse:  [72-76] 72  Heart Rate:  [61-80] 65  Resp:  [16-20] 16  BP: ()/(53-67) 111/67  SpO2:  [87 %-96 %] 95 %  I/O last 3 completed shifts:  In: 1568.5 [P.O.:1440; I.V.:128.5]  Out: 3155 [Urine:3105; Chest Tube:50]    Constitutional     alert and oriented, in no acute distress.     Skin     warm and dry to touch    ENT     +pallor; no cyanosis    Neck    Supple, JVP normal, no carotid  bruit    Chest     no tenderness to palpation     Lungs  clear to auscultation -decreased at left base    Cardiac  regular rhythm, S1 normal, S2 normal, No S3 or S4, crisp valve click    Abdomen     abdomen semifirm, bowel sounds normoactive, no hepatosplenomegaly    Extremities and Back     no clubbing, cyanosis. Trace leg edema observed.        Neurological     no gross motor deficits noted, affect appropriate, oriented to time, person and place.        Medications     HEParin 1,300 Units/hr (09/15/17 0802)     Warfarin Therapy Reminder       IV fluid REPLACEMENT ONLY       NaCl Stopped (09/14/17 1403)       warfarin  5 mg Oral ONCE at 18:00     insulin aspart  1-7 Units Subcutaneous TID AC     insulin aspart  1-5 Units Subcutaneous At Bedtime     metoprolol  50 mg Oral Q12H    Or     metoprolol  50 mg Per NG tube Q12H     varenicline (CHANTIX) tablet 1 mg  1 mg Oral BID     pantoprazole  40 mg Oral QAM     sodium chloride (PF)  3 mL Intracatheter Q8H     mupirocin  0.5 g Both Nostrils BID     aspirin  81 mg Oral Daily     acetaminophen  975 mg Oral Q8H     senna-docusate  1-2 tablet Oral BID          Data:     ROUTINE IP LABS (Last four results)  BMP  Recent Labs  Lab 09/15/17  0815 09/14/17  0810 09/13/17  0407 09/12/17  1345    135 140 142   POTASSIUM 4.2 4.4 5.0 4.3   CHLORIDE 98 103 110* 109   FIORELLA 8.6 8.2* 7.9* 8.1*   CO2 29 25 24 24   BUN 14 15 20 16   CR 0.86 1.02 1.13 1.21   * 93 130* 185*     CHOLESTEROL/HEPATIC  Recent Labs  Lab 09/12/17  1253   ALT 25   AST 52*     CBC  Recent Labs  Lab 09/15/17  0815 09/14/17  1711 09/14/17  0810 09/13/17  0407   WBC 13.8* 17.4* 17.5* 17.0*   RBC 3.32* 3.27* 3.48* 3.73*   HGB 10.3* 10.1* 10.7* 11.4*   HCT 30.5* 30.0* 32.3* 34.0*   MCV 92 92 93 91   MCH 31.0 30.9 30.7 30.6   MCHC 33.8 33.7 33.1 33.5   RDW 14.7 14.7 15.0 14.6   PLT 99* 95* 98* 106*     TROP: No lab results found in last 7 days.   BNP:  No results for input(s): NTBNPI in the last 168  hours.  INR  Recent Labs  Lab 09/15/17  0815 09/14/17  0810 09/12/17  1345 09/12/17  1253   INR 1.58* 1.31* 1.30* 1.39*     TSH   Date Value Ref Range Status   05/31/2017 2.92 0.40 - 4.00 mU/L Final       EKG results:  Reviewed if available     Imaging:  Recent Results (from the past 24 hour(s))   XR Chest 2 Views    Narrative    CHEST TWO VIEWS  9/15/2017 8:40 AM     HISTORY: 56-year-old patient with chest tube removal.       Impression    IMPRESSION: Since September 13, 2017, [place]-Kira catheter has been  removed. Prosthetic heart valve is unchanged. Mediastinal chest tube  has been removed. Heart size remains enlarged. Scattered bibasilar  opacities are comparable to previous exam. No definite pneumothorax.  Suspect minimal left pleural effusion.    JIM SCOTT MD     Telemetry:  afib--controlled VR

## 2017-09-15 NOTE — PLAN OF CARE
Problem: Goal Outcome Summary  Goal: Goal Outcome Summary  Outcome: Improving  VSS on 1-2L O2. Weaning as possible and pt using IS often to 2000. Tele: A fib CVR. Hep gtt @13. +Gas, no BM yet, had mag citrate. Voiding via urinal. Good appetite. PRN oxy for pain. Chest incision NAVA. Ambulating SBA. Pt and wife watched all of the heart education videos.

## 2017-09-15 NOTE — PROGRESS NOTES
[facility]  Cardiovascular and Thoracic Surgery Daily Note          Assessment and Plan:   POD#3 s/p Mitral valve replacement with a 33 mm [employer]. [employer] [place] mechanical valve, intraoperative MITCHELL by Dr. Ivana Marie.  -CVS: HR: 70s-80s. SBP: 100s. ASA, BB. No statin as no CAD. Chest tubes and pacer wires removed yesterday, CXR stable. Hx of atrial fibrillation, went into atrial flutter yesterday, cardiology saw metoprolol increased, a fib vs a flutter since. On coumadin for mechanical valve. INR: 1.58. On heparin gtt until therapeutic at 2.5-3.5.  -Resp: Extubated within protocol. Saturating well on 1L. Continue to encourage IS, cough, deep breathing, ambulation.   -Neuro:  Grossly intact. Pain controlled.  -Renal: good UOP. At baseline weight. Cr: 0.86. No further diuresis today.   -GI:  Tolerating diet. +flatus, no BM. Continue bowel regimen. Add mag citrate today.  -: voiding well on own.   -Endo: pre op A1c: 6.1. Completed insulin gtt per protocol. Continue SSI.  -FEN: replete electrolytes as needed. Na: 133. K: 4.2    Active Diet Order      Advance Diet as Tolerated: Regular Diet Adult; Regular Diet Adult  -ID: Temp (24hrs), Av.4  F (36.9  C), Min:97.7  F (36.5  C), Max:99.8  F (37.7  C)  WBC: 13.8<17.5<17.0<18.9. Completed perioperative abx. Leukocytosis related to surgery resolving. Will continue to monitor.   -Heme: Hgb: 10.3. Plt: 99. Acute blood loss anemia and thrombocytopenia related to surgery. On coumadin and heparin gtt for mechanical valve. INR: 1.58  -Proph: PCD, ASA, BB, no statin-no cad. PPI. Coumadin, heparin gtt.  -Dispo: St. 33 Continue therapies. Plan for discharge to home once INR therapeutic, has had BM and is off oxygen-likely 1-2 days.          Interval History:   No acute events overnight. Pain controlled, sitting comfortably in chair. Tolerating diet, no BM. Lots of gas. Saturating well on 1L.         Medications:       warfarin  5 mg Oral ONCE at 18:00     insulin  aspart  1-7 Units Subcutaneous TID AC     insulin aspart  1-5 Units Subcutaneous At Bedtime     metoprolol  50 mg Oral Q12H    Or     metoprolol  50 mg Per NG tube Q12H     varenicline (CHANTIX) tablet 1 mg  1 mg Oral BID     pantoprazole  40 mg Oral QAM     sodium chloride (PF)  3 mL Intracatheter Q8H     mupirocin  0.5 g Both Nostrils BID     aspirin  81 mg Oral Daily     acetaminophen  975 mg Oral Q8H     senna-docusate  1-2 tablet Oral BID     Warfarin Therapy Reminder, lidocaine (buffered or not buffered), lidocaine 4%, sodium chloride (PF), hydrALAZINE, IV fluid REPLACEMENT ONLY, glucose **OR** dextrose **OR** glucagon, naloxone, potassium chloride, potassium chloride, potassium chloride, potassium chloride with lidocaine, potassium chloride, magnesium sulfate, magnesium sulfate, sodium phosphate (NaPHOS) intermittent infusion, sodium phosphate (NaPHOS) intermittent infusion, sodium phosphate (NaPHOS) intermittent infusion, sodium phosphate (NaPHOS) intermittent infusion, albuterol, ipratropium - albuterol 0.5 mg/2.5 mg/3 mL, HYDROmorphone, acetaminophen, oxyCODONE, ondansetron **OR** ondansetron          Physical Exam:   Vitals were reviewed  Blood pressure 103/60, pulse 72, temperature 97.7  F (36.5  C), temperature source Oral, resp. rate 18, height 1.829 m (6'), weight 122.4 kg (269 lb 13.5 oz), SpO2 96 %.  Rhythm: a flutter    Lungs: diminished bilateral bases     Cardiovascular: irregular    Abdomen: soft NTND    Extremeties: minimal edema    Incision: CDI    CT: n/a    Weight:   Vitals:    09/12/17 0618 09/13/17 0600 09/14/17 0600 09/15/17 0600   Weight: 122 kg (269 lb) 125.7 kg (277 lb 1.9 oz) 125.3 kg (276 lb 3.8 oz) 122.4 kg (269 lb 13.5 oz)            Data:   Labs:   Lab Results   Component Value Date    WBC 13.8 09/15/2017     Lab Results   Component Value Date    RBC 3.32 09/15/2017     Lab Results   Component Value Date    HGB 10.3 09/15/2017     Lab Results   Component Value Date    HCT 30.5  09/15/2017     No components found for: MCT  Lab Results   Component Value Date    MCV 92 09/15/2017     Lab Results   Component Value Date    MCH 31.0 09/15/2017     Lab Results   Component Value Date    MCHC 33.8 09/15/2017     Lab Results   Component Value Date    RDW 14.7 09/15/2017     Lab Results   Component Value Date    PLT 99 09/15/2017       Last Basic Metabolic Panel:  Lab Results   Component Value Date     09/15/2017      Lab Results   Component Value Date    POTASSIUM 4.2 09/15/2017     Lab Results   Component Value Date    CHLORIDE 98 09/15/2017     Lab Results   Component Value Date    FIORELLA 8.6 09/15/2017     Lab Results   Component Value Date    CO2 29 09/15/2017     Lab Results   Component Value Date    BUN 14 09/15/2017     Lab Results   Component Value Date    CR 0.86 09/15/2017     Lab Results   Component Value Date     09/15/2017       CXR: 9/15/17    IMPRESSION: Since September 13, 2017, [place]-Kira catheter has been  removed. Prosthetic heart valve is unchanged. Mediastinal chest tube  has been removed. Heart size remains enlarged. Scattered bibasilar  opacities are comparable to previous exam. No definite pneumothorax.  Suspect minimal left pleural effusion.       Liza Kenyon PA-C  [facility]  Pager # [phone]

## 2017-09-15 NOTE — PLAN OF CARE
Problem: Goal Outcome Summary  Goal: Goal Outcome Summary  Outcome: Improving  VSS, pt hypotesive at times, last /58, tele Afib w/CVR, increased dose of metoprolol given this evening, O2 87& on RA, currently low 90's on 1L NC, +bowels, +flatus, -BM, lungs diminished, IS encouraged, CTs and pacer wires removed at 1500, +2 LE edema, incision intact with dermabond, Oxycodone for pain, heparin infusing, mann removed this evening, pt DTV.

## 2017-09-16 ENCOUNTER — APPOINTMENT (OUTPATIENT)
Dept: OCCUPATIONAL THERAPY | Facility: CLINIC | Age: 57
DRG: 219 | End: 2017-09-16
Attending: SURGERY
Payer: COMMERCIAL

## 2017-09-16 LAB
GLUCOSE BLDC GLUCOMTR-MCNC: 103 MG/DL (ref 70–99)
GLUCOSE BLDC GLUCOMTR-MCNC: 111 MG/DL (ref 70–99)
GLUCOSE BLDC GLUCOMTR-MCNC: 113 MG/DL (ref 70–99)
GLUCOSE BLDC GLUCOMTR-MCNC: 119 MG/DL (ref 70–99)
INR PPP: 1.96 (ref 0.86–1.14)
INTERPRETATION ECG - MUSE: NORMAL
LMWH PPP CHRO-ACNC: 0.12 IU/ML
LMWH PPP CHRO-ACNC: 0.24 IU/ML

## 2017-09-16 PROCEDURE — 25000128 H RX IP 250 OP 636: Performed by: PHYSICIAN ASSISTANT

## 2017-09-16 PROCEDURE — 25000132 ZZH RX MED GY IP 250 OP 250 PS 637: Performed by: INTERNAL MEDICINE

## 2017-09-16 PROCEDURE — 85520 HEPARIN ASSAY: CPT | Performed by: SURGERY

## 2017-09-16 PROCEDURE — 25000128 H RX IP 250 OP 636

## 2017-09-16 PROCEDURE — 25000132 ZZH RX MED GY IP 250 OP 250 PS 637: Performed by: THORACIC SURGERY (CARDIOTHORACIC VASCULAR SURGERY)

## 2017-09-16 PROCEDURE — 00000146 ZZHCL STATISTIC GLUCOSE BY METER IP

## 2017-09-16 PROCEDURE — 25000132 ZZH RX MED GY IP 250 OP 250 PS 637: Performed by: PHYSICIAN ASSISTANT

## 2017-09-16 PROCEDURE — 36415 COLL VENOUS BLD VENIPUNCTURE: CPT | Performed by: PHYSICIAN ASSISTANT

## 2017-09-16 PROCEDURE — 85520 HEPARIN ASSAY: CPT | Performed by: PHYSICIAN ASSISTANT

## 2017-09-16 PROCEDURE — 85610 PROTHROMBIN TIME: CPT | Performed by: PHYSICIAN ASSISTANT

## 2017-09-16 PROCEDURE — 25000132 ZZH RX MED GY IP 250 OP 250 PS 637

## 2017-09-16 PROCEDURE — 12000007 ZZH R&B INTERMEDIATE

## 2017-09-16 PROCEDURE — 97110 THERAPEUTIC EXERCISES: CPT | Mod: GO | Performed by: OCCUPATIONAL THERAPIST

## 2017-09-16 PROCEDURE — 36415 COLL VENOUS BLD VENIPUNCTURE: CPT | Performed by: SURGERY

## 2017-09-16 PROCEDURE — 25000128 H RX IP 250 OP 636: Performed by: SURGERY

## 2017-09-16 PROCEDURE — 40000133 ZZH STATISTIC OT WARD VISIT: Performed by: OCCUPATIONAL THERAPIST

## 2017-09-16 RX ORDER — WARFARIN SODIUM 5 MG/1
5 TABLET ORAL
Status: COMPLETED | OUTPATIENT
Start: 2017-09-16 | End: 2017-09-16

## 2017-09-16 RX ORDER — FUROSEMIDE 10 MG/ML
20 INJECTION INTRAMUSCULAR; INTRAVENOUS ONCE
Status: COMPLETED | OUTPATIENT
Start: 2017-09-16 | End: 2017-09-16

## 2017-09-16 RX ADMIN — HEPARIN SODIUM 1500 UNITS/HR: 10000 INJECTION, SOLUTION INTRAVENOUS at 20:04

## 2017-09-16 RX ADMIN — MUPIROCIN 0.5 G: 20 OINTMENT TOPICAL at 20:08

## 2017-09-16 RX ADMIN — OXYCODONE HYDROCHLORIDE 5 MG: 5 TABLET ORAL at 11:18

## 2017-09-16 RX ADMIN — OXYCODONE HYDROCHLORIDE 5 MG: 5 TABLET ORAL at 07:43

## 2017-09-16 RX ADMIN — OXYCODONE HYDROCHLORIDE 5 MG: 5 TABLET ORAL at 03:48

## 2017-09-16 RX ADMIN — METOPROLOL TARTRATE 50 MG: 50 TABLET, FILM COATED ORAL at 09:40

## 2017-09-16 RX ADMIN — OXYCODONE HYDROCHLORIDE 5 MG: 5 TABLET ORAL at 01:03

## 2017-09-16 RX ADMIN — HEPARIN SODIUM 1300 UNITS/HR: 10000 INJECTION, SOLUTION INTRAVENOUS at 03:37

## 2017-09-16 RX ADMIN — METOPROLOL TARTRATE 50 MG: 50 TABLET, FILM COATED ORAL at 20:05

## 2017-09-16 RX ADMIN — WARFARIN SODIUM 5 MG: 5 TABLET ORAL at 19:00

## 2017-09-16 RX ADMIN — VARENICLINE TARTRATE 1 MG: 1 TABLET, FILM COATED ORAL at 20:05

## 2017-09-16 RX ADMIN — PANTOPRAZOLE SODIUM 40 MG: 40 TABLET, DELAYED RELEASE ORAL at 09:40

## 2017-09-16 RX ADMIN — OXYCODONE HYDROCHLORIDE 5 MG: 5 TABLET ORAL at 20:05

## 2017-09-16 RX ADMIN — ASPIRIN 81 MG: 81 TABLET, COATED ORAL at 09:40

## 2017-09-16 RX ADMIN — ACETAMINOPHEN 650 MG: 325 TABLET, FILM COATED ORAL at 15:57

## 2017-09-16 RX ADMIN — SENNOSIDES AND DOCUSATE SODIUM 1 TABLET: 8.6; 5 TABLET ORAL at 20:05

## 2017-09-16 RX ADMIN — MUPIROCIN 0.5 G: 20 OINTMENT TOPICAL at 09:49

## 2017-09-16 RX ADMIN — FUROSEMIDE 20 MG: 10 INJECTION, SOLUTION INTRAVENOUS at 07:43

## 2017-09-16 RX ADMIN — Medication 2800 UNITS: at 09:42

## 2017-09-16 RX ADMIN — VARENICLINE TARTRATE 1 MG: 1 TABLET, FILM COATED ORAL at 09:40

## 2017-09-16 NOTE — PLAN OF CARE
Problem: Goal Outcome Summary  Goal: Goal Outcome Summary  Outcome: Therapy, progress toward functional goals as expected  Discharge Planner OT   Patient plan for discharge: Home with assist vs TCU.  PT to investigate options for assist person to stay with him during the day as his wife unable to take time off work.  Current status: PT SBA to transfer supine to sit with HOB elevated.  CGA from chair.  Uses good sternal precautions.  CV responses WNL's.  O2 sats 90% at rest on 1 L to 88-91% on RA with light activity.     Barriers to return to prior living situation: Post surgical fatigue, calf cramping and u/e precautions limiting ability to use arms for pushing/pulling.  Recommendations for discharge: Home with assist if PT can arrange for this.  He states that his father who is healthy may be able to assist in daytime when wife is working.  His father does care for his significant other who is limited in activity but able to be home for bouts of time.  TCU recommended if PT unable to arrange assist.  He would benefit from Out PT cardiac rehab at [facility] when he returns home.  Rationale for recommendations: PT with post surgical fatigue and activity limitations.  Progressing well in cardiac rehab thus far, however would need assist person at d/c for safe return home.        Entered by: Fidencio Vazquez 09/16/2017 10:45 AM     Attempted to see PT for afternoon treatment, however PT was with another care provider.

## 2017-09-16 NOTE — PROGRESS NOTES
[facility]    S: Sitting up in chair, breathing fine, tolerating diet, +BM    O: B/P: 111/61, T: 97.8, P: 61, R: 16  General: NAD  Heart: s1/s2, no m/r/g  Lungs: clear  Abd: s/nt/nd  Sternum: cdi  Extremities: no LE edema    Lab Results   Component Value Date    WBC 13.8 09/15/2017     Lab Results   Component Value Date    RBC 3.32 09/15/2017     Lab Results   Component Value Date    HGB 10.3 09/15/2017     Lab Results   Component Value Date    HCT 30.5 09/15/2017     No components found for: MCT  Lab Results   Component Value Date    MCV 92 09/15/2017     Lab Results   Component Value Date    MCH 31.0 09/15/2017     Lab Results   Component Value Date    MCHC 33.8 09/15/2017     Lab Results   Component Value Date    RDW 14.7 09/15/2017     Lab Results   Component Value Date    PLT 99 09/15/2017       Last Basic Metabolic Panel:  Lab Results   Component Value Date     09/15/2017      Lab Results   Component Value Date    POTASSIUM 4.2 09/15/2017     Lab Results   Component Value Date    CHLORIDE 98 09/15/2017     Lab Results   Component Value Date    FIORELLA 8.6 09/15/2017     Lab Results   Component Value Date    CO2 29 09/15/2017     Lab Results   Component Value Date    BUN 14 09/15/2017     Lab Results   Component Value Date    CR 0.86 09/15/2017     Lab Results   Component Value Date     09/15/2017       A/P: POD #4 s/p Mitral valve replacement with a 33 mm [employer]. Servando [place] mechanical valve, intraoperative MITCHELL by Dr. Ivana Marie.    Sitting up in chair, awaiting therapeutic INR, continue heparin gtt, a.fib/flutter: rate control with metoprolol per cards    Aminta Devlin PA-C  Pager [phone]

## 2017-09-16 NOTE — PLAN OF CARE
Problem: Goal Outcome Summary  Goal: Goal Outcome Summary  Outcome: Improving  A&O. VSS. RA. Tele A.flutter with CVR. Heparin at 1500. Sternal incision CDI, pt to shower again tonight. Pain managed with oxycodone. Up with Ax1, ambulated x2. Voiding. BM today. D/c pending therapeutic INR, possible tomorrow or Monday.

## 2017-09-17 ENCOUNTER — APPOINTMENT (OUTPATIENT)
Dept: OCCUPATIONAL THERAPY | Facility: CLINIC | Age: 57
DRG: 219 | End: 2017-09-17
Attending: SURGERY
Payer: COMMERCIAL

## 2017-09-17 ENCOUNTER — APPOINTMENT (OUTPATIENT)
Dept: ULTRASOUND IMAGING | Facility: CLINIC | Age: 57
DRG: 219 | End: 2017-09-17
Attending: PHYSICIAN ASSISTANT
Payer: COMMERCIAL

## 2017-09-17 VITALS
WEIGHT: 226.85 LBS | HEIGHT: 72 IN | SYSTOLIC BLOOD PRESSURE: 107 MMHG | DIASTOLIC BLOOD PRESSURE: 57 MMHG | RESPIRATION RATE: 16 BRPM | TEMPERATURE: 97.9 F | HEART RATE: 72 BPM | BODY MASS INDEX: 30.73 KG/M2 | OXYGEN SATURATION: 96 %

## 2017-09-17 LAB
ERYTHROCYTE [DISTWIDTH] IN BLOOD BY AUTOMATED COUNT: 14.7 % (ref 10–15)
GLUCOSE BLDC GLUCOMTR-MCNC: 95 MG/DL (ref 70–99)
HCT VFR BLD AUTO: 27.5 % (ref 40–53)
HGB BLD-MCNC: 9.2 G/DL (ref 13.3–17.7)
INR PPP: 2.1 (ref 0.86–1.14)
LMWH PPP CHRO-ACNC: 0.16 IU/ML
MCH RBC QN AUTO: 30.7 PG (ref 26.5–33)
MCHC RBC AUTO-ENTMCNC: 33.5 G/DL (ref 31.5–36.5)
MCV RBC AUTO: 92 FL (ref 78–100)
PLATELET # BLD AUTO: 161 10E9/L (ref 150–450)
RBC # BLD AUTO: 3 10E12/L (ref 4.4–5.9)
WBC # BLD AUTO: 10.7 10E9/L (ref 4–11)

## 2017-09-17 PROCEDURE — 25000132 ZZH RX MED GY IP 250 OP 250 PS 637: Performed by: INTERNAL MEDICINE

## 2017-09-17 PROCEDURE — 25000132 ZZH RX MED GY IP 250 OP 250 PS 637: Performed by: PHYSICIAN ASSISTANT

## 2017-09-17 PROCEDURE — 85610 PROTHROMBIN TIME: CPT | Performed by: PHYSICIAN ASSISTANT

## 2017-09-17 PROCEDURE — 25000132 ZZH RX MED GY IP 250 OP 250 PS 637: Performed by: THORACIC SURGERY (CARDIOTHORACIC VASCULAR SURGERY)

## 2017-09-17 PROCEDURE — 40000133 ZZH STATISTIC OT WARD VISIT: Performed by: OCCUPATIONAL THERAPIST

## 2017-09-17 PROCEDURE — 85520 HEPARIN ASSAY: CPT | Performed by: PHYSICIAN ASSISTANT

## 2017-09-17 PROCEDURE — 97535 SELF CARE MNGMENT TRAINING: CPT | Mod: GO | Performed by: OCCUPATIONAL THERAPIST

## 2017-09-17 PROCEDURE — 85027 COMPLETE CBC AUTOMATED: CPT | Performed by: PHYSICIAN ASSISTANT

## 2017-09-17 PROCEDURE — 36415 COLL VENOUS BLD VENIPUNCTURE: CPT | Performed by: PHYSICIAN ASSISTANT

## 2017-09-17 PROCEDURE — 93971 EXTREMITY STUDY: CPT | Mod: LT

## 2017-09-17 PROCEDURE — 00000146 ZZHCL STATISTIC GLUCOSE BY METER IP

## 2017-09-17 PROCEDURE — 97110 THERAPEUTIC EXERCISES: CPT | Mod: GO | Performed by: OCCUPATIONAL THERAPIST

## 2017-09-17 RX ORDER — METOPROLOL TARTRATE 50 MG
50 TABLET ORAL EVERY 12 HOURS
Qty: 60 TABLET | Refills: 3 | Status: SHIPPED | OUTPATIENT
Start: 2017-09-17 | End: 2017-10-10

## 2017-09-17 RX ORDER — WARFARIN SODIUM 5 MG/1
5 TABLET ORAL DAILY
Qty: 30 TABLET | Refills: 3 | Status: SHIPPED | OUTPATIENT
Start: 2017-09-17 | End: 2017-09-17

## 2017-09-17 RX ORDER — OXYCODONE HYDROCHLORIDE 5 MG/1
5-10 TABLET ORAL
Qty: 60 TABLET | Refills: 0 | Status: SHIPPED | OUTPATIENT
Start: 2017-09-17 | End: 2017-11-14

## 2017-09-17 RX ORDER — ACETAMINOPHEN 325 MG/1
650 TABLET ORAL EVERY 4 HOURS PRN
Qty: 100 TABLET | Refills: 0 | Status: SHIPPED | OUTPATIENT
Start: 2017-09-17

## 2017-09-17 RX ORDER — AMOXICILLIN 250 MG
1-2 CAPSULE ORAL 2 TIMES DAILY
Qty: 100 TABLET | Refills: 0 | Status: SHIPPED | OUTPATIENT
Start: 2017-09-17 | End: 2017-11-14

## 2017-09-17 RX ORDER — PANTOPRAZOLE SODIUM 40 MG/1
40 TABLET, DELAYED RELEASE ORAL EVERY MORNING
Qty: 14 TABLET | Refills: 0 | Status: SHIPPED | OUTPATIENT
Start: 2017-09-17 | End: 2017-10-01

## 2017-09-17 RX ORDER — WARFARIN SODIUM 7.5 MG/1
7.5 TABLET ORAL ONCE
Qty: 1 TABLET | Refills: 0 | Status: SHIPPED | OUTPATIENT
Start: 2017-09-17 | End: 2017-09-17

## 2017-09-17 RX ORDER — WARFARIN SODIUM 7.5 MG/1
7.5 TABLET ORAL
Status: DISCONTINUED | OUTPATIENT
Start: 2017-09-17 | End: 2017-09-17 | Stop reason: HOSPADM

## 2017-09-17 RX ADMIN — VARENICLINE TARTRATE 1 MG: 1 TABLET, FILM COATED ORAL at 10:43

## 2017-09-17 RX ADMIN — ASPIRIN 81 MG: 81 TABLET, COATED ORAL at 10:42

## 2017-09-17 RX ADMIN — METOPROLOL TARTRATE 50 MG: 50 TABLET, FILM COATED ORAL at 10:42

## 2017-09-17 RX ADMIN — PANTOPRAZOLE SODIUM 40 MG: 40 TABLET, DELAYED RELEASE ORAL at 10:42

## 2017-09-17 RX ADMIN — ACETAMINOPHEN 650 MG: 325 TABLET, FILM COATED ORAL at 10:48

## 2017-09-17 RX ADMIN — MUPIROCIN 0.5 G: 20 OINTMENT TOPICAL at 10:43

## 2017-09-17 NOTE — DISCHARGE SUMMARY
Discharge Summary    Derek Stover MRN# [id number]   YOB: 1960 Age: 56 year old     Date of Admission:  9/12/2017  Date of Discharge:  9/17/2017  Admitting Physician:  Ivana Marie MD  Discharge Physician:  Ivana Marie,*  Discharging Service:  Cardiovascular and Thoracic Surgery     Home clinic: [facility]  Primary Provider: Osbaldo Renee          Admission Diagnoses:   S/P mitral valve replacement with metallic valve [Z95.4]          Discharge Diagnosis:   Patient Active Problem List   Diagnosis     Smoker     Tinea versicolor     Erectile dysfunction     Hyperlipidemia with target LDL less than 130     Morbid obesity (H)     Long-term (current) use of anticoagulants [Z79.01]     Mitral regurgitation     Atrial fibrillation (H)     Severe mitral regurgitation     S/P MVR (mitral valve replacement)     Fluid overload     Transient hyperglycemia post procedure     Anticoagulated on Coumadin                Discharge Disposition:   Discharged to home           Condition on Discharge:   Discharge condition: Stable   Discharge vitals: Blood pressure 108/57, pulse 72, temperature 97.9  F (36.6  C), temperature source Oral, resp. rate 16, height 1.829 m (6'), weight 102.9 kg (226 lb 13.7 oz), SpO2 98 %.     Code status on discharge: Full Code           Procedures:   On September 12, 2017 Mr. Stover successfully underwent MITRAL VALVE REPLACEMENT  [facility] Masters Series Mechanical Heart Valve 33mm, Ref, [id number]-[id number] Serial [id number] by Dr. Ivana Marie.          Medications Prior to Admission:     Prescriptions Prior to Admission   Medication Sig Dispense Refill Last Dose     Amoxicillin (AMOXIL PO) Take 1,500-3,000 mg by mouth daily as needed (patient takes 6 X 500 = 3,000 mg dose 1 hour before dental appointment and 3 X 500 = 1,500 mg dose 6 hours after dental appointment.)   Over 2 weeks ago at am     Varenicline Tartrate (CHANTIX PO) Take 1 mg by mouth 2 times daily    9/12/2017 at 0300     [DISCONTINUED] Carvedilol (COREG PO) Take 3.125 mg by mouth 2 times daily (with meals)   9/12/2017 at 0300     [DISCONTINUED] Warfarin Sodium (COUMADIN PO) Take 2.5-5 mg by mouth At Bedtime Take 5 mg on Monday and Friday.   2.5 mg on Tuesday,Wednesday, Thursday, Saturday and Sunday, or as directed by the coumadin clinic.   9/7/2017 at hs     [DISCONTINUED] Acetaminophen (TYLENOL PO) Take 1,000 mg by mouth daily as needed for mild pain or fever   Over 1 week ago at prn             Discharge Medications:     Current Discharge Medication List      START taking these medications    Details   oxyCODONE (ROXICODONE) 5 MG IR tablet Take 1-2 tablets (5-10 mg) by mouth every 3 hours as needed for moderate to severe pain  Qty: 60 tablet, Refills: 0    Associated Diagnoses: S/P mitral valve replacement with metallic valve      aspirin EC 81 MG EC tablet Take 1 tablet (81 mg) by mouth daily  Qty: 30 tablet, Refills: 0    Associated Diagnoses: S/P mitral valve replacement with metallic valve      metoprolol (LOPRESSOR) 50 MG tablet Take 1 tablet (50 mg) by mouth every 12 hours  Qty: 60 tablet, Refills: 3    Associated Diagnoses: S/P mitral valve replacement with metallic valve      senna-docusate (SENOKOT-S;PERICOLACE) 8.6-50 MG per tablet Take 1-2 tablets by mouth 2 times daily  Qty: 100 tablet, Refills: 0    Associated Diagnoses: S/P mitral valve replacement with metallic valve      pantoprazole (PROTONIX) 40 MG EC tablet Take 1 tablet (40 mg) by mouth every morning for 14 days  Qty: 14 tablet, Refills: 0    Associated Diagnoses: S/P mitral valve replacement with metallic valve         CONTINUE these medications which have CHANGED    Details   acetaminophen (TYLENOL) 325 MG tablet Take 2 tablets (650 mg) by mouth every 4 hours as needed for other (surgical pain)  Qty: 100 tablet, Refills: 0    Associated Diagnoses: S/P mitral valve replacement with metallic valve      warfarin (COUMADIN) 5 MG tablet Take  1 tablet (5 mg) by mouth daily  Qty: 30 tablet, Refills: 3    Comments: INR goal 2.5-3.5 for your mechanical valve.  Associated Diagnoses: S/P mitral valve replacement with metallic valve         CONTINUE these medications which have NOT CHANGED    Details   Amoxicillin (AMOXIL PO) Take 1,500-3,000 mg by mouth daily as needed (patient takes 6 X 500 = 3,000 mg dose 1 hour before dental appointment and 3 X 500 = 1,500 mg dose 6 hours after dental appointment.)      Varenicline Tartrate (CHANTIX PO) Take 1 mg by mouth 2 times daily         STOP taking these medications       Carvedilol (COREG PO) Comments:   Reason for Stopping:                     Consultations:   Cardiology, Nutrition             Brief History of Illness:   Mr. 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 Ross for quite some time.  He also went into atrial fibrillation recently and was started on rate control therapy and Coumadin.  He also developed shortness of breath, which is new for him.  Recent MITCHELL demonstrated severe mitral valve regurgitation with mild to moderate tricuspid valve regurgitation, mild aortic valve insufficiency.  His LV function was preserved.  Coronary angiogram demonstrated no coronary artery disease.  Due to the severity of his left atrial dilatation, we decided not to perform the MAZE procedure concomitantly.  He was taken to the operating today for mitral valve replacement.           Hospital Course:   On September 12, 2017 Mr. Stover successfully underwent MITRAL VALVE REPLACEMENT  [facility] Masters Series Mechanical Heart Valve 33mm, Ref, [id number] Serial [id number] by Dr. Ivana Marie.  He was extubated within 24 hours of surgery. Once he was weaned off hemodynamic stabilizing gtt's, transferred to the surgical floor.  Had some fluid overload treated with diuretics. At  discharge he is below his pre-op weight and is not sent with any further diuretic medication.  Had some transient hyperglycemia treated with an insulin gtt, transitioned to SSI and he has no need at discharge.  He had some a.fib/flutter that cardiology saw him for and was titrated up on his metoprolol as his SBP would allow.   He progressed well with cardiac rehab and is discharged to his home on pod# 5 once his INR was greater than 2.0. He understands that he will need to be on coumadin the rest of his life for his valve and will follow closely with an INR clinic.              Significant Results:   Lab Results   Component Value Date    WBC 10.7 09/17/2017     Lab Results   Component Value Date    RBC 3.00 09/17/2017     Lab Results   Component Value Date    HGB 9.2 09/17/2017     Lab Results   Component Value Date    HCT 27.5 09/17/2017     No components found for: MCT  Lab Results   Component Value Date    MCV 92 09/17/2017     Lab Results   Component Value Date    MCH 30.7 09/17/2017     Lab Results   Component Value Date    MCHC 33.5 09/17/2017     Lab Results   Component Value Date    RDW 14.7 09/17/2017     Lab Results   Component Value Date     09/17/2017       Last Basic Metabolic Panel:  Lab Results   Component Value Date     09/15/2017      Lab Results   Component Value Date    POTASSIUM 4.2 09/15/2017     Lab Results   Component Value Date    CHLORIDE 98 09/15/2017     Lab Results   Component Value Date    FIORELLA 8.6 09/15/2017     Lab Results   Component Value Date    CO2 29 09/15/2017     Lab Results   Component Value Date    BUN 14 09/15/2017     Lab Results   Component Value Date    CR 0.86 09/15/2017     Lab Results   Component Value Date     09/15/2017                  Pending Results:   None           Discharge Instructions and Follow-Up:   Discharge diet: Regular   Discharge activity: Daily weights: Call if weight gain 2-3 lbs over 24 hours or if greater than 5 lbs in 1 week.  No  lifting more than 10 lbs for 8-12 weeks.  No driving for 1 month.  Call for pain medication refill.  Call for temperature greater than 101.0  Daily shower with antibacterial soap.   Discharge follow-up: *Take 7.5 mg coumadin tonight 9/17/17 and have your INR drawn tomorrow. Your INR goal is 2.5-3.5 for your mechanical mitral valve   *Follow up primary care provider in 7-10 days after discharge in order to review your medication, vital signs, obtain any necessary lab work your doctor may want, and to update them on your hospitalization and medical issues.   *Follow up with Aminta/Liza Adame with Dr Marie, heart surgeon, at [facility] at [facility] [address] on October 2, 2017 at 2:00 PM. If any questions or concerns call [phone].  You will see us once at this visit and then if everything is going well you will not need to see us again.  You will follow long term with your cardiologist.   *Follow up with Danny JOHNSTON with cardiology on 10/10/17 at 0987 to discuss rhythm issues and medication   *Follow up with Dr Ross, cardiologist, on November 16, 2017 at 0930. This is who you will follow with long term about your heart issues. [phone].   *For [place] outpatient cardiac rehab, call [phone].    Outpatient therapy: Cardiac rehab, INR clinic   Home Care agency: None    Supplies and equipment: None   Lines and drains: None    Wound care: Wash incision daily with antibacterial soap   Other instructions: None

## 2017-09-17 NOTE — PLAN OF CARE
Problem: Goal Outcome Summary  Goal: Goal Outcome Summary  Outcome: Improving  A/Ox4, AVSS tele A-flutter CVR, valve click present. tolerating reg diet, IND. Sternal incision CDI/NAVA. LS diminished, IS to 2000. BS active +BM. Adequate UOP. Pt discharging to home this PM with wife.

## 2017-09-17 NOTE — PLAN OF CARE
Problem: Goal Outcome Summary  Goal: Goal Outcome Summary  A/O X4.VSS.Tele A- flutter withCVR.Sternal incision is ecchymotic, CDI/NAVA. LS diminished, IS 2000. Hep gtt @15ml/hr, re-check hep10a in the AM. Showered today. Adequate UOP in the urinal.Tolerating regular diet with good appetite. Pain managed with Tylenol and oxycodone.

## 2017-09-17 NOTE — PLAN OF CARE
Problem: Goal Outcome Summary  Goal: Goal Outcome Summary  Outcome: No Change  VSS, LS clear, +BS.  Minimal pain.  Hep @ 1500, remains Aflutter, CVR in 60's.  Hopeful for d/c today if INR therapeutic.  Up SBA, voiding well.

## 2017-09-17 NOTE — PROGRESS NOTES
"CV Surgery    S: Sitting up in chair, denies pain, tolerating diet, breathing fine, \"Charley hoarse\" in L calf    O: B/P: 105/56, T: 98.7, P: 72, R: 16  General: NAD  Heart: s1/s2, no m/r/g  Lungs: clear  Abd: s/nt/nd  Sternum: cdi  Extremities: no LE edema    Lab Results   Component Value Date    WBC 10.7 09/17/2017     Lab Results   Component Value Date    RBC 3.00 09/17/2017     Lab Results   Component Value Date    HGB 9.2 09/17/2017     Lab Results   Component Value Date    HCT 27.5 09/17/2017     No components found for: MCT  Lab Results   Component Value Date    MCV 92 09/17/2017     Lab Results   Component Value Date    MCH 30.7 09/17/2017     Lab Results   Component Value Date    MCHC 33.5 09/17/2017     Lab Results   Component Value Date    RDW 14.7 09/17/2017     Lab Results   Component Value Date     09/17/2017       Last Basic Metabolic Panel:  Lab Results   Component Value Date     09/15/2017      Lab Results   Component Value Date    POTASSIUM 4.2 09/15/2017     Lab Results   Component Value Date    CHLORIDE 98 09/15/2017     Lab Results   Component Value Date    FIORELLA 8.6 09/15/2017     Lab Results   Component Value Date    CO2 29 09/15/2017     Lab Results   Component Value Date    BUN 14 09/15/2017     Lab Results   Component Value Date    CR 0.86 09/15/2017     Lab Results   Component Value Date     09/15/2017       A/P: POD#5 s/p Mitral valve replacement with a 33 mm [employer] [place] mechanical valve, intraoperative MITCHELL by Dr. Ivana Marie.     Sitting up in chair, INR 2.10, US LE as calf pain/cramping r/o DVT, if stable may d/c home with INR tomorrow, a.fib/flutter: rate control with metoprolol per cards    Aminta Devlin PA-C  Pager [phone]    "

## 2017-09-17 NOTE — PLAN OF CARE
Problem: Goal Outcome Summary  Goal: Goal Outcome Summary  Pt d/c'd home accompanied by pt's wife at 16hrs in stable condition. AVS and education done by AM Nurse. Pt has no question to ask the writer.

## 2017-09-17 NOTE — PLAN OF CARE
Problem: Goal Outcome Summary  Goal: Goal Outcome Summary  Outcome: Therapy, progress towards functional goals is fair  Discharge Planner OT   Patient plan for discharge:  Home with family assist   Current status: PT performs all transfers and 12 steps with CGA/ SBA.  Steady ambulating with SBA, however TM walking limited due continued c/o of calf cramping and PT needs to sit for rest.  BP WNL's.  EKG shows a-flutter with controlled ventricular response.   O2 sats WNL's on RA.  Barriers to return to prior living situation: Bedroom/Bathroom not on same levels.  Recommendations for discharge: Home with assist from family and Out PT cardiac rehab [facility].  Rationale for recommendations: PT progressing well with independence in transfers and mobility.  He would benefit from Out PT cardiac rehab for gradually progressive monitored exercise to increase strength and endurance.       Entered by: Fidencio Vazquez 09/17/2017 8:42 AM

## 2017-09-17 NOTE — DISCHARGE INSTRUCTIONS
YOUR CARE AFTER HEART SURGERY   DISCHARGE INTRUCTIONS      Weight - Weigh yourself daily and record on daily weight sheet provided in this packet.     Incentive Spirometer - Continue to use 3 to 4 times a day for 10 days; follow use of spirometer with coughing. Use attached sheet to report progress.     Daily shower - Wash all surgical incisions daily with liquid antibacterial soap, such as Dial.   No tub baths until incisions are healed. The sticky glue used to close your incisions may peel off slowly.    Incisions - Avoid all lotions, oils, ointments or sunscreen on incisions for 8 weeks. Avoid sunlight on incision sites for 8 weeks and then use sunscreen on incisions for one year.   You may have numbness, tingling, and increased sensitivity around your incision lines for several weeks/months as the nerves grow back. Some drainage from the sites where your chest tubes were is normal and can persist for a while until it has healed. It is best to keep this open to air to allow scab formation and healing, you can cover it with a gauze pad or band-aid if needed.     Diet - Eat a well balanced diet to promote healing. It can be normal to have a decreased appetite for a while after surgery. You can eat whatever it takes to keep up a normal food/calorie intake in the immediate post-operative period for about a month. Try to limit high sodium (salt) foods to prevent fluid retention.  If you are a diabetic, continue to balance your carbohydrate intake with your medicine. Eventually you will need to adopt a heart healthy diet, especially if you have coronary artery disease.     Daily exercise/activity precautions - Cardiac rehab therapist will review your restrictions with you.  No lifting, pushing or pulling anything over 10 pounds for 12 weeks if you are a diabetic or 8 weeks if you are not a diabetic (reference: a gallon of milk weighs 8 pounds).    Positioning -Keep your legs elevated above the level of your heart when  you are in bed. You may lie on your side and stomach if it s comfortable and you have no sternal click (popping in breastbone).    Pain medications - Use as directed. Call surgeon for pain medication refill if needed. Other medications should be filled by your primary doctor.     Depression - It is not uncommon after surgery.  If it lasts longer than two weeks or you feel you need to talk to someone, contact your primary physician.    Driving -No driving for 4 weeks from the day of surgery (or until your surgeon has approved it).    Work and Travel - Consult with your physician about specific work and travel restrictions    Cardiac Rehabilitation - Usually begins approximately one week after discharge and lasts up to 6 weeks. In the meantime, continue to work on the rehab activities you learned in the hospital and maintain your physical activity. Aerobic activity, especially walking, is a great way to regain your physical endurance.     Checking your Blood sugar (glucose) - If you have been instructed to begin checking your blood sugars at home, record them on the back page of this packet and take the packet with you to your follow appointment with you primary physician (usually about 1 week after surgery).          CALL YOUR SURGEON IF ANY OF THE FOLLOWING OCCURS:      Fever - If you feel chills, shaking, or your temperature is over 101 degrees    Sternal Click - Some popping or clicking sensations can be normal as the chest has been stretched open. If you notice a significant change or if pain becomes bothersome, please call.    Angina/Chest Pain - Or symptoms similar to those you experienced before surgery    Infection - If incisions look infected (increasing redness, tenderness, swelling, warmth, odor, drainage, or change in color if drainage).    Weight gain - Please call if weight gain of 2-3pounds over 24 hours or if greater than 5 pounds in 1 week as this may indicate fluid overload and could signify a  problem with your heart.     Swelling - If you notice worsening swelling in your legs. A certain amount of swelling is expected, especially if you had a vein taken out of your leg for bypass surgery.      Shortness of breath - Not relieved by rest    Persistent heart palpitations - Rapid, pounding heartbeat    Dizziness/lightheadedness - While sitting or at rest    Pain - Not relieved by pain medications      Your Daily Weight  Weigh yourself every morning in the same clothes after urinating and before breakfast.* Call your physician if your weight increases 2-3 lbs in one day or 3-5 lbs in a week**  My baseline weight (first morning home):____________   Date Weight   Date Weight   1.    17.     2.    18.     3.    19.     4.    20.     5.    21.     6.    22.     7.    23.     8.    24.     9.    25.     10.    26.     11.    27.     12.    28.     13.    29.     14.    30.     15.    31.     16.    32.           Incentive Spirometer- After Surgery Progress Record    Discharge Volume_______________ml    As you recover from your surgery it is important to continue the use of your incentive spirometer at home.  We recommend that you use your spirometer at least 3-4 times per day.  You should take 5 slow deep breaths with each use. Inhale slowly to raise the piston in the chamber as high as you are able.  Hold breath to count of 3 and then exhale normally.  Allow piston to return to bottom of chamber, rest and repeat 4 more times. It is helpful to see your progress by recording your average volume in the spaces provided below.    Day  1       Day  2       Day  3       Day  4       Day  5       Day  6       Day  7       Day  8       Day  9       Day  10

## 2017-09-17 NOTE — PLAN OF CARE
Problem: Goal Outcome Summary  Goal: Goal Outcome Summary  Occupational Therapy Discharge Summary     Reason for therapy discharge:    Discharged to home with outpatient therapy.     Progress towards therapy goal(s). See goals on Care Plan in [facility] health record for goal details.  Goals partially met.  Barriers to achieving goals:   discharge from facility.     Therapy recommendation(s):    Continued therapy is recommended.  Rationale/Recommendations:  Out PT cardiac rehab for monitored exercise progression to increase strength and endurance..

## 2017-09-18 ENCOUNTER — ANTICOAGULATION THERAPY VISIT (OUTPATIENT)
Dept: ANTICOAGULATION | Facility: CLINIC | Age: 57
End: 2017-09-18
Payer: COMMERCIAL

## 2017-09-18 DIAGNOSIS — Z79.01 LONG-TERM (CURRENT) USE OF ANTICOAGULANTS: ICD-10-CM

## 2017-09-18 DIAGNOSIS — I48.91 ATRIAL FIBRILLATION (H): ICD-10-CM

## 2017-09-18 LAB — INR POINT OF CARE: 3.4 (ref 0.86–1.14)

## 2017-09-18 PROCEDURE — 85610 PROTHROMBIN TIME: CPT | Mod: QW

## 2017-09-18 PROCEDURE — 36416 COLLJ CAPILLARY BLOOD SPEC: CPT

## 2017-09-18 PROCEDURE — 99207 ZZC NO CHARGE NURSE ONLY: CPT

## 2017-09-18 NOTE — MR AVS SNAPSHOT
Derek SHERMAN Stover   9/18/2017 10:30 AM   Anticoagulation Therapy Visit    Description:  56 year old male   Provider:  PH ANTI COAG   Department:  [facility]           INR as of 9/18/2017     Today's INR 3.4!      Anticoagulation Summary as of 9/18/2017     INR goal 2.0-3.0   Today's INR 3.4!   Full instructions 9/18: 2.5 mg; 9/19: 5 mg; Otherwise 5 mg on Mon, Fri; 2.5 mg all other days   Next INR check 9/20/2017    Indications   Atrial fibrillation (H) [I48.91]  Atrial fibrillation (H) [I48.91] (Resolved) [I48.91]  Long-term (current) use of anticoagulants [Z79.01] [Z79.01]         Your next Anticoagulation Clinic appointment(s)     Sep 20, 2017  2:30 PM CDT   Anticoagulation Visit with PH ANTI COAG   [facility]    [address] [place] [id number] [phone]              Contact Numbers     Clinic Number:         September 2017 Details    Sun Mon Tue Wed Thu Fri Sat          1               2                 3               4               5               6               7               8               9                 10               11               12               13               14               15               16                 17               18      2.5 mg   See details      19      5 mg         20            21               22               23                 24               25               26               27               28               29               30                Date Details   09/18 This INR check       Date of next INR:  9/20/2017         How to take your warfarin dose     To take:  2.5 mg Take 0.5 of a 5 mg tablet.    To take:  5 mg Take 1 of the 5 mg tablets.

## 2017-09-18 NOTE — PROGRESS NOTES
ANTICOAGULATION FOLLOW-UP CLINIC VISIT    Patient Name:  Derek Stover  Date:  9/18/2017  Contact Type:  Face to Face    SUBJECTIVE:     Patient Findings     Positives Change in medications (81 mg asa daily, Tylenol 625 mg daily. )           OBJECTIVE    INR Protime   Date Value Ref Range Status   09/18/2017 3.4 (A) 0.86 - 1.14 Final       ASSESSMENT / PLAN  INR assessment THER    Recheck INR In: 2 DAYS    INR Location Clinic      Anticoagulation Summary as of 9/18/2017     INR goal 2.0-3.0   Today's INR 3.4!   Maintenance plan 5 mg (5 mg x 1) on Mon, Fri; 2.5 mg (5 mg x 0.5) all other days   Full instructions 9/18: 2.5 mg; 9/19: 5 mg; Otherwise 5 mg on Mon, Fri; 2.5 mg all other days   Weekly total 22.5 mg   Plan last modified Perla Walden RN (9/5/2017)   Next INR check 9/20/2017   Target end date     Indications   Atrial fibrillation (H) [I48.91]  Atrial fibrillation (H) [I48.91] (Resolved) [I48.91]  Long-term (current) use of anticoagulants [Z79.01] [Z79.01]         Anticoagulation Episode Summary     INR check location     Preferred lab     Send INR reminders to [facility]    Comments       Anticoagulation Care Providers     Provider Role Specialty Phone number    Carlos Archibald DO [facility] [phone]            See the Encounter Report to view Anticoagulation Flowsheet and Dosing Calendar (Go to Encounters tab in chart review, and find the Anticoagulation Therapy Visit)    Dosage adjustment made based on physician directed care plan.    2.5 mg Mon, 5 mg Tues = 35 mg    Perla Walden, LUL

## 2017-09-19 ENCOUNTER — TELEPHONE (OUTPATIENT)
Dept: OTHER | Facility: CLINIC | Age: 57
End: 2017-09-19

## 2017-09-19 NOTE — TELEPHONE ENCOUNTER
48 hour post op call    ACTIVITY  How are you doing with activity?  I am trying to do as much as I can. I get SOB with activity still.   Are you continuing to use your IS 3-4 times a day. Are you improving on your numbers?  Yes 2200 ml. I have a rare cough with brown tinged phlegm.   Are you weighing yourself daily and has it been stable?.  Yes, I am down 0.8 lbs. I do not feel like I am retaining fluid.     PAIN  How is your pain on a 0-10 scale, what are you doing for your pain.  Good. Taking tylenol occassionally.     Are you still doing sternal precautions?  Do you hear any clicking when you are moving or taking a deep breath? My sternum feels good. No clicking or popping.       SIGNS AND SYMPTOMS OF INFECTION    1. FEVER No  2. DRAINAGE  I had a little bit on the dressing when I first took it off but today I haven't had any and I've kept the dressing off.          3. COLOR  pink  4. SWELLING no    ASSISTANCE  Do you have someone at home to help you with your daily activities and transportation needs?  Yes my wife      MEDICATIONS  I would like to review your discharge medications and answer any questions you may have.        Are you on a blood thinner/Coumadin  Yes     Who is managing your Coumadin dosing/INR.  [facility]. I had it checked yesterday and I go back tomorrow.      FOLLOW UP     Are you scheduled for cardiac rehab? 9/20  You are scheduled to see our PA or Surgeon? 10/2  You are scheduled to see your cardiologist ?Vats 11/16 , EP/NP 10/10  You are scheduled to see your primary care physician? Dr Renee 9/22.   Do you have our contact information?  Yes I do    STOPLIGHT TOOL    Green    Is there anything about your hospital course we could of helped you with or done better. No I think it all when really well.

## 2017-09-20 ENCOUNTER — ANTICOAGULATION THERAPY VISIT (OUTPATIENT)
Dept: ANTICOAGULATION | Facility: CLINIC | Age: 57
End: 2017-09-20
Payer: COMMERCIAL

## 2017-09-20 ENCOUNTER — HOSPITAL ENCOUNTER (OUTPATIENT)
Dept: CARDIAC REHAB | Facility: CLINIC | Age: 57
End: 2017-09-20
Attending: SURGERY
Payer: COMMERCIAL

## 2017-09-20 VITALS — HEIGHT: 72 IN | BODY MASS INDEX: 37.19 KG/M2 | WEIGHT: 274.6 LBS

## 2017-09-20 DIAGNOSIS — Z79.01 LONG-TERM (CURRENT) USE OF ANTICOAGULANTS: ICD-10-CM

## 2017-09-20 DIAGNOSIS — I05.1 RHEUMATIC MITRAL REGURGITATION: ICD-10-CM

## 2017-09-20 DIAGNOSIS — I48.91 ATRIAL FIBRILLATION (H): ICD-10-CM

## 2017-09-20 LAB
INR POINT OF CARE: 3.5 (ref 0.86–1.14)
INR POINT OF CARE: 3.5 (ref 0.86–1.14)

## 2017-09-20 PROCEDURE — 36416 COLLJ CAPILLARY BLOOD SPEC: CPT

## 2017-09-20 PROCEDURE — 93797 PHYS/QHP OP CAR RHAB WO ECG: CPT | Performed by: REHABILITATION PRACTITIONER

## 2017-09-20 PROCEDURE — 93798 PHYS/QHP OP CAR RHAB W/ECG: CPT | Performed by: REHABILITATION PRACTITIONER

## 2017-09-20 PROCEDURE — 40000116 ZZH STATISTIC OP CR VISIT: Performed by: REHABILITATION PRACTITIONER

## 2017-09-20 PROCEDURE — 85610 PROTHROMBIN TIME: CPT | Mod: QW

## 2017-09-20 PROCEDURE — 99207 ZZC NO CHARGE NURSE ONLY: CPT

## 2017-09-20 PROCEDURE — 40000575 ZZH STATISTIC OP CARDIAC VISIT #2: Performed by: REHABILITATION PRACTITIONER

## 2017-09-20 ASSESSMENT — 6 MINUTE WALK TEST (6MWT)
GENDER SELECTION: MALE
TOTAL DISTANCE WALKED (FT): 1080
MALE CALC: 1918.13
PREDICTED: 1929.82
FEMALE CALC: 1492.29

## 2017-09-20 NOTE — MR AVS SNAPSHOT
Derek SHERMAN Stover   9/20/2017 2:30 PM   Anticoagulation Therapy Visit    Description:  56 year old male   Provider:  PH ANTI COAG   Department:  [facility]           INR as of 9/20/2017     Today's INR 3.5!      Anticoagulation Summary as of 9/20/2017     INR goal 2.0-3.0   Today's INR 3.5!   Full instructions 9/20: 5 mg; 9/21: 2.5 mg; 9/22: 5 mg; 9/23: 5 mg; 9/24: 5 mg   Next INR check 9/25/2017    Indications   Atrial fibrillation (H) [I48.91]  Atrial fibrillation (H) [I48.91] (Resolved) [I48.91]  Long-term (current) use of anticoagulants [Z79.01] [Z79.01]         Your next Anticoagulation Clinic appointment(s)     Sep 25, 2017  3:15 PM CDT   Anticoagulation Visit with GUILHER[facility] ANTI BERNARDO   [facility]    [address] [place] [id number] [phone]              Contact Numbers     Clinic Number:         September 2017 Details    Sun Mon Tue Wed Thu Fri Sat          1               2                 3               4               5               6               7               8               9                 10               11               12               13               14               15               16                 17               18               19               20      5 mg   See details      21      2.5 mg         22      5 mg         23      5 mg           24      5 mg         25            26               27               28               29               30                Date Details   09/20 This INR check       Date of next INR:  9/25/2017         How to take your warfarin dose     To take:  2.5 mg Take 0.5 of a 5 mg tablet.    To take:  5 mg Take 1 of the 5 mg tablets.

## 2017-09-20 NOTE — PROGRESS NOTES
09/20/17 1200   Session   Session Initial Evaluation and Exercise Prescription   Certified through this date 10/19/17   Cardiac Rehab Assessment   Cardiac Rehab Assessment Derek is a very pleasant 57 yo gentleman with a history of rheumatic fever here today after mitral valve replacement. He reports today a progression in dyspnea on exertion and feelings of irregular heart beat prior to valve replacement. Today he states improvement in dyspnea and has had no palpitation type feelings since surgery. Of note he had atrial fibrillation prior to surgery and atrial flutter since surgery. He does report an increase in lower extremity muscular pain since surgery, occurring while patient is active and relieved with rest. Ultrasound was performed prior to discharge with no DVT indicated. Patient had f/u with PCP on Friday and will discuss possible etiology of this discomfort. He did develop lower extremity discomfort about 2 minutes into his 6 minute walk but did not need to stop walk due to discomfort. He has been successful with smoking cessation since 8/1/2017 with the use of Chantix, which he continues currently. He is a  and plans to return to work in full capacity once lifting restrictions are discontinued.   The patient's history and clinical status including hemodynamics and ECG were evaluated. The patient was assessed to be stable and appropriate to begin exercise. The patient's functional capacity and exercise prescription were determined by the completion of the 6 minute walk test, see results below, CV response was WNL. The patient was oriented to the program. Risk factor profile was completed. Goals and objectives were discussed. Good prognosis for reaching goals below. Skilled therapy is necessary in order to monitor CV response to exercise, to provide education on risk factors and behavior change counseling needed to achieve patient's goals.  Plan to progress to 40-45 minutes of exercise prior  "to discharge from cardiac rehab.  Initial THR of 20-30 beats above RHR; Effort rating of 4-6. Initiate muscle conditioning as appropriate. Provide risk factor education and behavior change counseling.    General Information   Treatment Diagnosis Valve Replacement   Date of Treatment Diagnosis 09/12/17   Significant Past CV History Chronic AF   Comorbidities None   Lead up symptoms dyspnea, irregular HR   Hospital Location [facility]   Hospital Discharge Date 09/17/17   Signs and Symptoms Post Hospital Discharge SOB;Lightheadedness;Appetite   Outpatient Cardiac Rehab Start Date 09/20/17   Primary Physician Dr. Renee   Primary Physician Follow Up Scheduled   Surgeon Dr. Marie   Surgeon Follow Up Scheduled   Cardiologist Dr. Ross   Cardiologist Follow Up Scheduled   Ejection Fraction 55%   Risk Stratification Low   Summary of Cath Report   Summary of Cath Report No Significant CAD   Living and Work Status    Living Arrangements and Social Status house;spouse   Support System Live with an adult   Return to Employment Yes   Occupation    Preventative Medications   CMS recommended medications Anticoagulants;Beta Blocker   Falls Screen   Have you fallen two or more times in the past year? No   Have you fallen and had an injury in the past year? No   Referral Initiated to Physical Therapy No   Pain   Patient Currently in Pain Yes   Pain Location chest incisions   Pain Rating 1/10   Pain Description Ache;Discomfort   Physical Assessments   Incisions WNL   Edema None   Right Lung Sounds normal   Left Lung Sounds normal   Individualized Treatment Plan   Monitored Sessions Scheduled 24   Monitored Sessions Attended 1   Oxygen   Supplemental Oxygen needed No   Nutrition Management - Weight Management   Assessment Initial Assessment   Age 56   Weight 124.6 kg (274 lb 9.6 oz)   Height 1.829 m (6' 0.01\")   BMI (Calculated) 37.31   Goal Weight 108.9 kg (240 lb)   Initial Rate Your Plate Score. Dietary tool to " assess eating patterns. Scores range from 24 to 72. The higher the score the healthier the eating pattern. 34   Nutrition Management - Lipids   Lipids Labs Not Available   Nutrition Management - Diabetes   Diabetes No   Nutrition Management Summary   Dietary Recommendations None   Stages of Change for Diet Compliance Pre-Contemplation   Interventions Planned Attend Nutrition Education Class(es);Educate on Weight Management Principles;Educate on Benefits of Exercise   Patient Goals Goal #1   Goal #1 Description Patient will lose 1# per week   Goal #1 Target Date 11/01/17   Goal #1 Progress Towards Goal 9/20 Patient will combine calorie reduction and increased activity to achieve a 500 kcal/day calorie deficit   Psychosocial Management   Psychosocial Assessment Initial   Is there history of clinical depression or increased risk of depression? No previous history   Current Level of Stress per Patient Report Denies   Current Coping Skills Has Positive Support System   Initial Patient Health Questionnaire -9 Score (PHQ-9) for depression. 5-9 Minimal symptoms, 10-14 Minor depression, 15-19 Major depression, moderately severe, > 20 Major depression, severe  7   Initial [facility] Survey score.  Quality of Life:   If total score > 25 review individual areas where patient rated a 4 or 5.  Consider patients current medical condition and what role that plays on the score.   Adjust treatment protocol to improve areas of concern.  Consider the following:  PHQ9 score, DASI, and re-assessment within the next 30 days to assist with developing treatments.  26   Other Core Components - Hypertension   History of or Diagnosis of Hypertension No   Currently taking Anti-Hypertensives Yes;Beta blocker   Other Core Components - Tobacco   History of Tobacco Use Yes   Quit Date or Planned Quit Date 08/01/17   Tobacco Use Status Recent (Quit < 6 mo ago)   Tobacco Habit Cigarettes   Tobacco Use per Day (average) 2   Years of Tobacco Use 40    Stages of Change Preparation   Tobacco Comments using chantix   Other Core Components Summary   Interventions Planned List benefits of weight management;Continue to assess readiness to change and implement appropriate process(es) of change;Develop strategies to increase confidence to quit smoking;Check-in regularly, offer support to prevent risk of relapse   Patient Goals Yes   Goal #1 Description Patient will be successfull with complete smoking cessation   Goal #1 Target Date 10/20/17   Goal #1 Progress Towards Goal 9/20 will complete Chantix in 3 weeks and remain successfull with smoking cessation after that   Activity/Exercise History   Activity/Exercise Assessment Initial   Activity/Exercise Status prior to event? Was Physically Active   Number of Days Currently participating in Moderate Physical Activity? 0   Number of Days Currently performing  Aerobic Exercise (including rehab)? 0   Number of Minutes per Session Currently of Aerobic Exercise (average)? 0   Current Stage of Change (Physical Activity) Preparation   Current Stage of Change (Aerobic Exercise) Contemplation   Patient Goals Goal #1;Goal #2   Goal #1 Description Patient will be able to return to work as a , Estimated work: 3-4 METs and lifting up to 20#   Goal #1 Target Date 12/06/17   Goal #1 Progress Towards Goal 9/20 Will attend cardiac rehab 2-3 times weekly to increase aerobic capacity and maintain muscle mass with low weight resistance training   Goal #2 Description Patient will be able to participate in recreational activities, including hunting (no shooting this year) and bowling   Goal #2 Target Date 12/06/17   Goal #2 Progress Towards Goal 9/20 Will attend cardiac rehab 2-3 times weekly to increase aerobic capacity and maintain muscle mass with low weight resistance training   Exercise Assessment   6 Minute Walk Predicted - Gender Selection Male   6 Minute Walk Predicted (Male) 1918.13   6 Minute Walk Predicted (Female)  1492.29   Initial 6 Minute Walk Distance (Feet) 1080 ft   Resting HR 67 bpm   Exercise HR 90 bpm   Post Exercise HR 55 bpm   Resting /64   Exercise /64   Post Exercise /62   Pre SpO2 97   While Exercising SpO2 100   Effort Rating 7   Current MET Level 2.6   MET Level Goal 6+   ECG Rhythm Atrial flutter   Ectopy None   Current Symptoms Dyspnea   Limitations/Restrictions Sternal Precautions   Exercise Prescription   Mode Treadmill;Nustep;Weights   Duration/Time 15-30 min;30-45 min   Frequency 3 daysweek   THR (85% of age predicted max HR) 141.1   OMNI Effort Rating (0-10 Scale) 4-6/10   Progression Continuous bouts;Total exercise time of 30-45 minutes;Aerobic exercise to OMNI rating of 5-7, and heart rate at or below target;Progress peak intensity by 1/2 MET per week   Recommended Home Exercise   Type of Exercise Walking   Frequency (days per week) daily   Duration (minutes per session) 15-30 min   Effort Rating Recommended 4-6/10   30 Day Exercise Plan initiate a home walking program   Current Home Exercise   Type of Exercise None   Follow-up/On-going Support   Provider follow-up needed on the following Other (see comments)  (leg cramping)   Learning Assessment   Learner Patient   Primary Language English   Preferred Learning Style Listening;Reading   Barriers to Learning No barriers noted   Patient Education   Education recommended Anatomy and Physiology of the Heart;Exercise Principles     Physician cosignature/electronic signature indicates approval of this ITP document. I have established, reviewed and made necessary changes to the individualized treatment plan and exercise prescription for this patient.

## 2017-09-20 NOTE — PROGRESS NOTES
ANTICOAGULATION FOLLOW-UP CLINIC VISIT    Patient Name:  Derek Stover  Date:  9/20/2017  Contact Type:  Face to Face    SUBJECTIVE:     Patient Findings     Positives No Problem Findings           OBJECTIVE    INR Protime   Date Value Ref Range Status   09/20/2017 3.5 (A) 0.86 - 1.14 Final   09/20/2017 3.5 (A) 0.86 - 1.14 Final       ASSESSMENT / PLAN  INR assessment THER    Recheck INR In: 5 DAYS    INR Location Clinic      Anticoagulation Summary as of 9/20/2017     INR goal 2.0-3.0   Today's INR 3.5!   Maintenance plan No maintenance plan   Full instructions 9/20: 5 mg; 9/21: 2.5 mg; 9/22: 5 mg; 9/23: 5 mg; 9/24: 5 mg   Plan last modified Perla Walden RN (9/20/2017)   Next INR check 9/25/2017   Target end date     Indications   Atrial fibrillation (H) [I48.91]  Atrial fibrillation (H) [I48.91] (Resolved) [I48.91]  Long-term (current) use of anticoagulants [Z79.01] [Z79.01]         Anticoagulation Episode Summary     INR check location     Preferred lab     Send INR reminders to [facility]    Comments       Anticoagulation Care Providers     Provider Role Specialty Phone number    Carlos Archibald DO [facility] Internal Medicine [phone]            See the Encounter Report to view Anticoagulation Flowsheet and Dosing Calendar (Go to Encounters tab in chart review, and find the Anticoagulation Therapy Visit)    Dosage adjustment made based on physician directed care plan.    2.5 mg Mon, thurs and 5 mg ROW = 30 mg       Pt asked about the sites on his chest that had tubes from his procedure. The areas are slightly red on the outside and there is yellow/fluid. I have advised him to monitor his temp and and watch the red areas. If they get bigger he should be seen. He does have an appt on Friday 9/22 to see PCP  Perla Walden RN

## 2017-09-21 PROBLEM — R73.9 TRANSIENT HYPERGLYCEMIA POST PROCEDURE: Status: ACTIVE | Noted: 2017-09-21

## 2017-09-21 PROBLEM — E87.70 FLUID OVERLOAD: Status: ACTIVE | Noted: 2017-09-21

## 2017-09-21 PROBLEM — Z79.01 ANTICOAGULATED ON COUMADIN: Status: ACTIVE | Noted: 2017-09-21

## 2017-09-22 ENCOUNTER — OFFICE VISIT (OUTPATIENT)
Dept: FAMILY MEDICINE | Facility: OTHER | Age: 57
End: 2017-09-22
Payer: COMMERCIAL

## 2017-09-22 VITALS
WEIGHT: 268.4 LBS | OXYGEN SATURATION: 96 % | RESPIRATION RATE: 22 BRPM | BODY MASS INDEX: 36.39 KG/M2 | HEART RATE: 82 BPM | TEMPERATURE: 98 F | SYSTOLIC BLOOD PRESSURE: 94 MMHG | DIASTOLIC BLOOD PRESSURE: 82 MMHG

## 2017-09-22 DIAGNOSIS — Z95.2 S/P MVR (MITRAL VALVE REPLACEMENT): Primary | ICD-10-CM

## 2017-09-22 DIAGNOSIS — Z79.01 ANTICOAGULATED ON COUMADIN: ICD-10-CM

## 2017-09-22 DIAGNOSIS — I48.20 CHRONIC ATRIAL FIBRILLATION (H): ICD-10-CM

## 2017-09-22 DIAGNOSIS — D62 ANEMIA DUE TO BLOOD LOSS, ACUTE: ICD-10-CM

## 2017-09-22 DIAGNOSIS — Z79.01 LONG-TERM (CURRENT) USE OF ANTICOAGULANTS: ICD-10-CM

## 2017-09-22 PROCEDURE — 85027 COMPLETE CBC AUTOMATED: CPT | Performed by: FAMILY MEDICINE

## 2017-09-22 PROCEDURE — 36415 COLL VENOUS BLD VENIPUNCTURE: CPT | Performed by: FAMILY MEDICINE

## 2017-09-22 PROCEDURE — 99495 TRANSJ CARE MGMT MOD F2F 14D: CPT | Performed by: FAMILY MEDICINE

## 2017-09-22 RX ORDER — WARFARIN SODIUM 7.5 MG/1
7.5 TABLET ORAL ONCE
Qty: 1 TABLET | Refills: 0 | Status: CANCELLED | OUTPATIENT
Start: 2017-09-22 | End: 2017-09-22

## 2017-09-22 RX ORDER — WARFARIN SODIUM 5 MG/1
TABLET ORAL
COMMUNITY
Start: 2017-08-25 | End: 2017-09-22

## 2017-09-22 RX ORDER — WARFARIN SODIUM 5 MG/1
TABLET ORAL
Qty: 60 TABLET | Refills: 1 | Status: SHIPPED | OUTPATIENT
Start: 2017-09-22 | End: 2017-10-06

## 2017-09-22 ASSESSMENT — PAIN SCALES - GENERAL: PAINLEVEL: NO PAIN (0)

## 2017-09-22 NOTE — PROGRESS NOTES
SUBJECTIVE:   Derek Stover is a 56 year old male who presents to clinic today for the following health issues:      Hospital Follow-up Visit:    Hospital/Nursing Home/ Rehab Facility: [facility]  Date of Admission: 9/12/17  Date of Discharge: 9/17/17  Reason(s) for Admission: Mitral valve replacement             Problems taking medications regularly:  None       Medication changes since discharge: Metoprolol, pantoprazole        Problems adhering to non-medication therapy:  None    Summary of hospitalization:  [facility] discharge summary reviewed  Diagnostic Tests/Treatments reviewed.  Follow up needed: Recheck hemoglobin today  Other Healthcare Providers Involved in Patient s Care:         Specialist appointment - with cardiac sugery and cardiology and Coumadin Clinic already made.  Update since discharge: improved. Still slightly dyspneic with exertion but recovers very quickly. Starts cardiac rehab next week. Hemoglobin low at discharge.    Post Discharge Medication Reconciliation: discharge medications reconciled, continue medications without change.  Plan of care communicated with patient and family        Discharge Summary     Derek Stover MRN# [id number]   YOB: 1960 Age: 56 year old      Date of Admission:                                      9/12/2017  Date of Discharge:                                      9/17/2017  Admitting Physician:                                   Ivana Marie MD  Discharge Physician:                                  Ivana Marie,*  Discharging Service:                                   Cardiovascular and Thoracic Surgery      Home clinic: [facility]  Primary Provider: Osbaldo Renee           Admission Diagnoses:   S/P mitral valve replacement with metallic valve [Z95.4]               Discharge Diagnosis:        Patient Active Problem List   Diagnosis     Smoker     Tinea versicolor     Erectile  dysfunction     Hyperlipidemia with target LDL less than 130     Morbid obesity (H)     Long-term (current) use of anticoagulants [Z79.01]     Mitral regurgitation     Atrial fibrillation (H)     Severe mitral regurgitation     S/P MVR (mitral valve replacement)     Fluid overload     Transient hyperglycemia post procedure     Anticoagulated on Coumadin                     Discharge Disposition:    Discharged to home                Condition on Discharge:    Discharge condition: Stable   Discharge vitals: Blood pressure 108/57, pulse 72, temperature 97.9  F (36.6  C), temperature source Oral, resp. rate 16, height 1.829 m (6'), weight 102.9 kg (226 lb 13.7 oz), SpO2 98 %.      Code status on discharge: Full Code           Procedures:   On September 12, 2017 Mr. Stover successfully underwent MITRAL VALVE REPLACEMENT  [facility] Masters Series Mechanical Heart Valve 33mm, Ref, 33MJ-501 Serial [id number] by Dr. Ivana Marie.           Medications Prior to Admission:       Prescriptions Prior to Admission    Prescriptions Prior to Admission   Medication Sig Dispense Refill Last Dose     Amoxicillin (AMOXIL PO) Take 1,500-3,000 mg by mouth daily as needed (patient takes 6 X 500 = 3,000 mg dose 1 hour before dental appointment and 3 X 500 = 1,500 mg dose 6 hours after dental appointment.)     Over 2 weeks ago at am     Varenicline Tartrate (CHANTIX PO) Take 1 mg by mouth 2 times daily     9/12/2017 at 0300     [DISCONTINUED] Carvedilol (COREG PO) Take 3.125 mg by mouth 2 times daily (with meals)     9/12/2017 at 0300     [DISCONTINUED] Warfarin Sodium (COUMADIN PO) Take 2.5-5 mg by mouth At Bedtime Take 5 mg on Monday and Friday.   2.5 mg on Tuesday,Wednesday, Thursday, Saturday and Sunday, or as directed by the coumadin clinic.     9/7/2017 at hs     [DISCONTINUED] Acetaminophen (TYLENOL PO) Take 1,000 mg by mouth daily as needed for mild pain or fever     Over 1 week ago at prn                  Discharge Medications:             Current Discharge Medication List             START taking these medications     Details   oxyCODONE (ROXICODONE) 5 MG IR tablet Take 1-2 tablets (5-10 mg) by mouth every 3 hours as needed for moderate to severe pain  Qty: 60 tablet, Refills: 0     Associated Diagnoses: S/P mitral valve replacement with metallic valve       aspirin EC 81 MG EC tablet Take 1 tablet (81 mg) by mouth daily  Qty: 30 tablet, Refills: 0     Associated Diagnoses: S/P mitral valve replacement with metallic valve       metoprolol (LOPRESSOR) 50 MG tablet Take 1 tablet (50 mg) by mouth every 12 hours  Qty: 60 tablet, Refills: 3     Associated Diagnoses: S/P mitral valve replacement with metallic valve       senna-docusate (SENOKOT-S;PERICOLACE) 8.6-50 MG per tablet Take 1-2 tablets by mouth 2 times daily  Qty: 100 tablet, Refills: 0     Associated Diagnoses: S/P mitral valve replacement with metallic valve       pantoprazole (PROTONIX) 40 MG EC tablet Take 1 tablet (40 mg) by mouth every morning for 14 days  Qty: 14 tablet, Refills: 0     Associated Diagnoses: S/P mitral valve replacement with metallic valve                 CONTINUE these medications which have CHANGED     Details   acetaminophen (TYLENOL) 325 MG tablet Take 2 tablets (650 mg) by mouth every 4 hours as needed for other (surgical pain)  Qty: 100 tablet, Refills: 0     Associated Diagnoses: S/P mitral valve replacement with metallic valve       warfarin (COUMADIN) 5 MG tablet Take 1 tablet (5 mg) by mouth daily  Qty: 30 tablet, Refills: 3     Comments: INR goal 2.5-3.5 for your mechanical valve.  Associated Diagnoses: S/P mitral valve replacement with metallic valve                 CONTINUE these medications which have NOT CHANGED     Details   Amoxicillin (AMOXIL PO) Take 1,500-3,000 mg by mouth daily as needed (patient takes 6 X 500 = 3,000 mg dose 1 hour before dental appointment and 3 X 500 = 1,500 mg dose 6 hours after dental appointment.)       Varenicline Tartrate  (CHANTIX PO) Take 1 mg by mouth 2 times daily                STOP taking these medications         Carvedilol (COREG PO) Comments:   Reason for Stopping:                              Consultations:    Cardiology, Nutrition             Brief History of Illness:   Mr. 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 Ross for quite some time.  He also went into atrial fibrillation recently and was started on rate control therapy and Coumadin.  He also developed shortness of breath, which is new for him.  Recent MITCHELL demonstrated severe mitral valve regurgitation with mild to moderate tricuspid valve regurgitation, mild aortic valve insufficiency.  His LV function was preserved.  Coronary angiogram demonstrated no coronary artery disease.  Due to the severity of his left atrial dilatation, we decided not to perform the MAZE procedure concomitantly.  He was taken to the operating today for mitral valve replacement.                Hospital Course:    On September 12, 2017 Mr. Stover successfully underwent MITRAL VALVE REPLACEMENT  [facility] Masters Series Mechanical Heart Valve 33mm, Ref, 33MJ-501 Serial [id number] by Dr. Ivana Marie.  He was extubated within 24 hours of surgery. Once he was weaned off hemodynamic stabilizing gtt's, transferred to the surgical floor.  Had some fluid overload treated with diuretics. At discharge he is below his pre-op weight and is not sent with any further diuretic medication.  Had some transient hyperglycemia treated with an insulin gtt, transitioned to SSI and he has no need at discharge.  He had some a.fib/flutter that cardiology saw him for and was titrated up on his metoprolol as his SBP would allow.   He progressed well with cardiac rehab and is discharged to his home on pod[address] 5 once his INR was greater than 2.0. He understands that he will  need to be on coumadin the rest of his life for his valve and will follow closely with an INR clinic.                  Significant Results:          Lab Results   Component Value Date     WBC 10.7 09/17/2017         Lab Results   Component Value Date     RBC 3.00 09/17/2017         Lab Results   Component Value Date     HGB 9.2 09/17/2017         Lab Results   Component Value Date     HCT 27.5 09/17/2017   No components found for: MCT        Lab Results   Component Value Date     MCV 92 09/17/2017            Lab Results   Component Value Date     MCH 30.7 09/17/2017            Lab Results   Component Value Date     MCHC 33.5 09/17/2017            Lab Results   Component Value Date     RDW 14.7 09/17/2017            Lab Results   Component Value Date      09/17/2017         Last Basic Metabolic Panel:        Lab Results   Component Value Date      09/15/2017             Lab Results   Component Value Date     POTASSIUM 4.2 09/15/2017            Lab Results   Component Value Date     CHLORIDE 98 09/15/2017            Lab Results   Component Value Date     FIORELLA 8.6 09/15/2017            Lab Results   Component Value Date     CO2 29 09/15/2017            Lab Results   Component Value Date     BUN 14 09/15/2017            Lab Results   Component Value Date     CR 0.86 09/15/2017            Lab Results   Component Value Date      09/15/2017                    Pending Results:    None                 Discharge Instructions and Follow-Up:    Discharge diet: Regular   Discharge activity: Daily weights: Call if weight gain 2-3 lbs over 24 hours or if greater than 5 lbs in 1 week.  No lifting more than 10 lbs for 8-12 weeks.  No driving for 1 month.  Call for pain medication refill.  Call for temperature greater than 101.0  Daily shower with antibacterial soap.   Discharge follow-up: *Take 7.5 mg coumadin tonight 9/17/17 and have your INR drawn tomorrow. Your INR goal is 2.5-3.5 for your mechanical mitral  valve   *Follow up primary care provider in 7-10 days after discharge in order to review your medication, vital signs, obtain any necessary lab work your doctor may want, and to update them on your hospitalization and medical issues.   *Follow up with Aminta/Liza Adame with Dr Marie, heart surgeon, at [facility] at [facility] [address] on October 2, 2017 at 2:00 PM. If any questions or concerns call [phone].  You will see us once at this visit and then if everything is going well you will not need to see us again.  You will follow long term with your cardiologist.   *Follow up with Danny JOHNSTON with cardiology on 10/10/17 at 0945 to discuss rhythm issues and medication   *Follow up with Dr Ross, cardiologist, on November 16, 2017 at 0930. This is who you will follow with long term about your heart issues. [phone].   *For [place] outpatient cardiac rehab, call [phone].    Outpatient therapy: Cardiac rehab, [facility] clinic   Home Care agency: None    Supplies and equipment: None   Lines and drains: None    Wound care: Wash incision daily with antibacterial soap   Other instructions: None               Electronically signed by Ivana Marie MD at 9/21/2017  9:41 PM         Problem list and histories reviewed & adjusted, as indicated.  Additional history: as documented    Patient Active Problem List   Diagnosis     Smoker     Tinea versicolor     Erectile dysfunction     Hyperlipidemia with target LDL less than 130     Morbid obesity (H)     Long-term (current) use of anticoagulants [Z79.01]     Mitral regurgitation     Atrial fibrillation (H)     Severe mitral regurgitation     S/P MVR (mitral valve replacement)     Fluid overload     Transient hyperglycemia post procedure     Anticoagulated on Coumadin     Past Surgical History:   Procedure Laterality Date     COLONOSCOPY N/A 9/8/2014    Procedure: COMBINED COLONOSCOPY, SINGLE BIOPSY/POLYPECTOMY BY BIOPSY;  Surgeon: Lynn  Kai AUGUSTIN MD;  Location:  GI     ORTHOPEDIC SURGERY      RIght knee scope     REPLACE VALVE MITRAL N/A 9/12/2017    Procedure: REPLACE VALVE MITRAL;  MITRAL VALVE REPLACEMENT  [facility] Masters Series Mechanical Heart Valve 33mm  Ref, 33MJ-501 Serial [id number]   ON PUMP, MITCHELL;  Surgeon: Ivana Marie MD;  Location:  OR       Social History   Substance Use Topics     Smoking status: Former Smoker     Packs/day: 2.00     Years: 39.00     Types: Cigarettes     Quit date: 8/1/2017     Smokeless tobacco: Never Used     Alcohol use 2.4 oz/week     4 Standard drinks or equivalent per week      Comment: occasional     Family History   Problem Relation Age of Onset     DIABETES Mother      Obesity Mother      DIABETES Father          Current Outpatient Prescriptions   Medication Sig Dispense Refill     warfarin (COUMADIN) 5 MG tablet Per Coumadin Clinic dosing 60 tablet 1     oxyCODONE (ROXICODONE) 5 MG IR tablet Take 1-2 tablets (5-10 mg) by mouth every 3 hours as needed for moderate to severe pain 60 tablet 0     acetaminophen (TYLENOL) 325 MG tablet Take 2 tablets (650 mg) by mouth every 4 hours as needed for other (surgical pain) 100 tablet 0     aspirin EC 81 MG EC tablet Take 1 tablet (81 mg) by mouth daily 30 tablet 0     metoprolol (LOPRESSOR) 50 MG tablet Take 1 tablet (50 mg) by mouth every 12 hours 60 tablet 3     pantoprazole (PROTONIX) 40 MG EC tablet Take 1 tablet (40 mg) by mouth every morning for 14 days 14 tablet 0     Varenicline Tartrate (CHANTIX PO) Take 1 mg by mouth 2 times daily       senna-docusate (SENOKOT-S;PERICOLACE) 8.6-50 MG per tablet Take 1-2 tablets by mouth 2 times daily (Patient not taking: Reported on 9/22/2017) 100 tablet 0     Amoxicillin (AMOXIL PO) Take 1,500-3,000 mg by mouth daily as needed (patient takes 6 X 500 = 3,000 mg dose 1 hour before dental appointment and 3 X 500 = 1,500 mg dose 6 hours after dental appointment.)       Allergies   Allergen Reactions     No Known Drug  Allergy      Recent Labs   Lab Test  09/15/17   0815  09/14/17   0810  09/13/17   0407   09/12/17   1253   05/31/17   1811  08/13/14   0846  01/05/12   1157  01/07/11   0758   A1C   --    --   6.1*   --    --    --    --    --    --    --    LDL   --    --    --    --    --    --    --   143*  166*  135*   HDL   --    --    --    --    --    --    --   39*  48  42   TRIG   --    --    --    --    --    --    --   124  160*  144   ALT   --    --    --    --   25   --    --   20  17  18   CR  0.86  1.02  1.13   < >  1.17   < >  0.97  1.06  1.02  1.00   GFRESTIMATED  >90  76  67   < >  65   < >  80  73  78  80   GFRESTBLACK  >90  >90  82   < >  78   < >  >90   GFR Calc    89  >90  >90   POTASSIUM  4.2  4.4  5.0   < >  4.2   < >  4.1  4.5  4.9  4.5   TSH   --    --    --    --    --    --   2.92   --    --   2.07    < > = values in this interval not displayed.      BP Readings from Last 3 Encounters:   09/22/17 94/82   09/17/17 107/57   09/05/17 104/70    Wt Readings from Last 3 Encounters:   09/22/17 268 lb 6.4 oz (121.7 kg)   09/20/17 274 lb 9.6 oz (124.6 kg)   09/17/17 226 lb 13.7 oz (102.9 kg)                          Reviewed and updated as needed this visit by clinical staffTobacco  Allergies  Meds  Med Hx  Surg Hx  Fam Hx  Soc Hx      Reviewed and updated as needed this visit by Provider         ROS:  C: NEGATIVE for fever, chills, change in weight  CONSTITUTIONAL:NEGATIVE for fever, chills, change in weight  E/M: NEGATIVE for ear, mouth and throat problems  R: NEGATIVE for significant cough or SOB  CV: POSITIVE for dyspnea on exertion and irregular heart beat and NEGATIVE for cyanosis, diaphoresis, lower extremity edema, orthopnea, paroxysmal nocturnal dyspnea, syncope or near-syncope and tachycardia  GI: NEGATIVE for nausea, abdominal pain, heartburn, or change in bowel habits  HEME/ALLERGY/IMMUNE: NEGATIVE for bleeding problems  ROS otherwise negative    OBJECTIVE:     BP 94/82 (BP  Location: Left arm, Patient Position: Chair, Cuff Size: Adult Large)  Pulse 82  Temp 98  F (36.7  C) (Tympanic)  Resp 22  Wt 268 lb 6.4 oz (121.7 kg)  SpO2 96%  BMI 36.39 kg/m2  Body mass index is 36.39 kg/(m^2).  GENERAL: alert, no distress and pale  NECK: no adenopathy, no asymmetry, masses, or scars and thyroid normal to palpation  RESP: lungs clear to auscultation - no rales, rhonchi or wheezes  CV: irregularly irregular rhythm, normal S1 S2, no S3 or S4, no murmur, click or rub, peripheral pulses strong, no peripheral edema and crisp MV closure.  ABDOMEN: soft, nontender, no hepatosplenomegaly, no masses and bowel sounds normal  MS: no gross musculoskeletal defects noted, no edema    Diagnostic Test Results:  Results for orders placed or performed in visit on 09/20/17   INR point of care   Result Value Ref Range    INR Protime 3.5 (A) 0.86 - 1.14   INR point of care   Result Value Ref Range    INR Protime 3.5 (A) 0.86 - 1.14       ASSESSMENT/PLAN:   1. S/P MVR (mitral valve replacement)  Mr. 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 Ross for quite some time.  He also went into atrial fibrillation recently and was started on rate control therapy and Coumadin.  He also developed shortness of breath, which is new for him.  Recent MITCHELL demonstrated severe mitral valve regurgitation with mild to moderate tricuspid valve regurgitation, mild aortic valve insufficiency.  His LV function was preserved.  Coronary angiogram demonstrated no coronary artery disease.  Due to the severity of his left atrial dilatation, we decided not to perform the MAZE procedure concomitantly.  He was taken to the operating today for mitral valve replacement.   He did very well post operatively and was discharged to home 5 days post op rather than to transitional care. He continues to  do well at home. He is eager to start cardiac rehab. He is appropriately anticoagulated and rate is controlled with metoprolol. He has follow up appointments set with cardiothoracic surgery, cardiology, cardiac rehab and with coumadin clinic. The current medical regimen is effective;  continue present plan and medications.     2. Chronic atrial fibrillation (H)  Mr. 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 Ross for quite some time.  He also went into atrial fibrillation recently and was started on rate control therapy and Coumadin.  He also developed shortness of breath, which is new for him.  Recent MITCHELL demonstrated severe mitral valve regurgitation with mild to moderate tricuspid valve regurgitation, mild aortic valve insufficiency.  His LV function was preserved.  Coronary angiogram demonstrated no coronary artery disease.  Due to the severity of his left atrial dilatation, we decided not to perform the MAZE procedure concomitantly.  He was taken to the operating today for mitral valve replacement.   He did very well post operatively and was discharged to home 5 days post op rather than to transitional care. He continues to do well at home. He is eager to start cardiac rehab. He is appropriately anticoagulated and rate is controlled with metoprolol. He remains in atrial fibrillation.  He has follow up appointments set with cardiothoracic surgery, cardiology, cardiac rehab and with coumadin clinic. The current medical regimen is effective;  continue present plan and medications.   - warfarin (COUMADIN) 5 MG tablet; Per Coumadin Clinic dosing  Dispense: 60 tablet; Refill: 1      4. Anticoagulated on Coumadin  Mr. Derek Stover is a very pleasant 56-year-old gentleman with a history of rheumatic fever as a child with rheumatic mitral valve disease, significant  charla who has been followed with Dr. Ross for quite some time.  He also went into atrial fibrillation recently and was started on rate control therapy and Coumadin.  He also developed shortness of breath, which is new for him.  Recent MITCHELL demonstrated severe mitral valve regurgitation with mild to moderate tricuspid valve regurgitation, mild aortic valve insufficiency.  His LV function was preserved.  Coronary angiogram demonstrated no coronary artery disease.  Due to the severity of his left atrial dilatation, we decided not to perform the MAZE procedure concomitantly.  He was taken to the operating today for mitral valve replacement.   He did very well post operatively and was discharged to home 5 days post op rather than to transitional care. He continues to do well at home. He is eager to start cardiac rehab. He is appropriately anticoagulated and rate is controlled with metoprolol. He remains in atrial fibrillation.  He has follow up appointments set with cardiothoracic surgery, cardiology, cardiac rehab and with coumadin clinic. The current medical regimen is effective;  continue present plan and medications.   - warfarin (COUMADIN) 5 MG tablet; Per Coumadin Clinic dosing  Dispense: 60 tablet; Refill: 1    5. Anemia due to blood loss, acute  Discharge hemoglobin was 9.2 which may contribute to his ongoing ROMAN and fatigue.  Hemoglobin   Date Value Ref Range Status   09/17/2017 9.2 (L) 13.3 - 17.7 g/dL Final   09/15/2017 10.3 (L) 13.3 - 17.7 g/dL Final   ]  Will recheck hemoglobin today.  - CBC with platelets    FUTURE APPOINTMENTS:       - Follow-up visit in 2 months.       -  He has follow up appointments set with cardiothoracic surgery, cardiology, cardiac rehab and with coumadin clinic. The current medical regimen is effective;  continue present plan and medications.     Osbaldo Renee MD  [facility]

## 2017-09-22 NOTE — MR AVS SNAPSHOT
After Visit Summary   9/22/2017    Derek Stover    MRN: [id number]           Patient Information     Date Of Birth          1960        Visit Information        Provider Department      9/22/2017 2:10 PM Osbaldo Renee MD [facility]        Today's Diagnoses     Long-term (current) use of anticoagulants [Z79.01]    -  1    Chronic atrial fibrillation (H)        Anticoagulated on Coumadin        Anemia due to blood loss, acute           Follow-ups after your visit        Follow-up notes from your care team     Return in about 2 months (around 11/22/2017) for BP Recheck, Lab Work.      Your next 10 appointments already scheduled     Sep 25, 2017  2:00 PM CDT   Cardiac Treatment with ANUM Seaman   [facility] ([facility])    [address] [place] [address]   [phone]            Sep 25, 2017  3:15 PM CDT   Anticoagulation Visit with PH ANTI COAG   [facility] ([facility])    [address]  [place] [address]   [phone]            Sep 27, 2017  2:00 PM CDT   Cardiac Treatment with SHITAL Noble   [facility] ([facility])    [address] [place] [address] [address] [place] [address]   [phone]            Sep 29, 2017  2:00 PM CDT   Cardiac Treatment with SHITAL Noble   [facility] ([facility])    [address] [place] [address] [address] [place] [address]   [phone]            Oct 02, 2017  2:00 PM CDT   Cardiac Treatment with ANUM Seaman   [facility] ([facility])    [address] [place] [address]   [phone]            Oct 02, 2017  3:00 PM CDT   Post-Op with [facility] CARDIOTHORACIC SURGERY, [facility]   [facility] ([facility])    [address] [place] [address]   [phone]            Oct 04, 2017  2:00 PM CDT   Cardiac Treatment with Traci REESE  SHITAL Bryan   [facility] ([facility])    [address]   [place] [address]   [phone]            Oct 06, 2017  2:00 PM CDT   Cardiac Treatment with SHITAL Noble   [facility] ([facility])    [address]   [place] [address]   [phone]            Oct 09, 2017  2:00 PM CDT   Cardiac Treatment with ANUM Seaman   [facility] ([facility])    [address] [place] [address]   [phone]            Oct 10, 2017  9:45 AM CDT   [facility] with Danny Mariee [facility]-C   [facility]    [address] [place] [address]   [phone]              Who to contact     If you have questions or need follow up information about today's clinic visit or your schedule please contact [facility] directly at [phone].  Normal or non-critical lab and imaging results will be communicated to you by BlackbookHRhart, letter or phone within 4 business days after the clinic has received the results. If you do not hear from us within 7 days, please contact the clinic through Parrablet or phone. If you have a critical or abnormal lab result, we will notify you by phone as soon as possible.  Submit refill requests through 7write or call your pharmacy and they will forward the refill request to us. Please allow 3 business days for your refill to be completed.          Additional Information About Your Visit        MyChart Information     7write gives you secure access to your electronic health record. If you see a primary care provider, you can also send messages to your care team and make appointments. If you have questions, please call your primary care clinic.  If you do not have a primary care provider, please call [phone] and they will assist you.        Care EveryWhere ID     This is your Care EveryWhere  ID. This could be used by other organizations to access your [facility] medical records  [id number]        Your Vitals Were     Pulse Temperature Respirations Pulse Oximetry BMI (Body Mass Index)       82 98  F (36.7  C) (Tympanic) 22 96% 36.39 kg/m2        Blood Pressure from Last 3 Encounters:   09/22/17 94/82   09/17/17 107/57   09/05/17 104/70    Weight from Last 3 Encounters:   09/22/17 268 lb 6.4 oz (121.7 kg)   09/20/17 274 lb 9.6 oz (124.6 kg)   09/17/17 226 lb 13.7 oz (102.9 kg)              We Performed the Following     CBC with platelets          Today's Medication Changes          These changes are accurate as of: 9/22/17  2:58 PM.  If you have any questions, ask your nurse or doctor.               These medicines have changed or have updated prescriptions.        Dose/Directions    warfarin 5 MG tablet   Commonly known as:  COUMADIN   This may have changed:  additional instructions   Used for:  Long-term (current) use of anticoagulants, Chronic atrial fibrillation (H), Anticoagulated on Coumadin   Changed by:  Osbaldo Renee MD        Per Coumadin Clinic dosing   Quantity:  60 tablet   Refills:  1            Where to get your medicines      These medications were sent to [id number] - [place] - [address]   [address] [facility] [address]     Phone:  [phone]     warfarin 5 MG tablet                Primary Care Provider Office Phone # Fax #    Osbaldo Renee [phone]       [address] [facility] [address]        Equal Access to Services     TREY SPAIN AH: Hadii kendrick ku hadasho Soomaali, waaxda luqadaha, qaybta kaalmada adeegyada, malissa eugene. So [facility] [phone].    ATENCIÓN: Si habla español, tiene a macias disposición servicios gratuitos de asistencia lingüística. Llame al [phone].    We comply with applicable federal civil rights laws and [place] laws. We do not discriminate on the basis of race, color, national  origin, age, disability sex, sexual orientation or gender identity.            Thank you!     Thank you for choosing [facility]  for your care. Our goal is always to provide you with excellent care. Hearing back from our patients is one way we can continue to improve our services. Please take a few minutes to complete the written survey that you may receive in the mail after your visit with us. Thank you!             Your Updated Medication List - Protect others around you: Learn how to safely use, store and throw away your medicines at www.disposemymeds.org.          This list is accurate as of: 9/22/17  2:58 PM.  Always use your most recent med list.                   Brand Name Dispense Instructions for use Diagnosis    acetaminophen 325 MG tablet    TYLENOL    100 tablet    Take 2 tablets (650 mg) by mouth every 4 hours as needed for other (surgical pain)    S/P mitral valve replacement with metallic valve       AMOXIL PO      Take 1,500-3,000 mg by mouth daily as needed (patient takes 6 X 500 = 3,000 mg dose 1 hour before dental appointment and 3 X 500 = 1,500 mg dose 6 hours after dental appointment.)        aspirin 81 MG EC tablet     30 tablet    Take 1 tablet (81 mg) by mouth daily    S/P mitral valve replacement with metallic valve       CHANTIX PO      Take 1 mg by mouth 2 times daily        metoprolol 50 MG tablet    LOPRESSOR    60 tablet    Take 1 tablet (50 mg) by mouth every 12 hours    S/P mitral valve replacement with metallic valve       oxyCODONE 5 MG IR tablet    ROXICODONE    60 tablet    Take 1-2 tablets (5-10 mg) by mouth every 3 hours as needed for moderate to severe pain    S/P mitral valve replacement with metallic valve       pantoprazole 40 MG EC tablet    PROTONIX    14 tablet    Take 1 tablet (40 mg) by mouth every morning for 14 days    S/P mitral valve replacement with metallic valve       senna-docusate 8.6-50 MG per tablet    SENOKOT-S;PERICOLACE    100 tablet    Take  1-2 tablets by mouth 2 times daily    S/P mitral valve replacement with metallic valve       warfarin 5 MG tablet    COUMADIN    60 tablet    Per Coumadin Clinic dosing    Long-term (current) use of anticoagulants, Chronic atrial fibrillation (H), Anticoagulated on Coumadin

## 2017-09-25 ENCOUNTER — ANTICOAGULATION THERAPY VISIT (OUTPATIENT)
Dept: ANTICOAGULATION | Facility: CLINIC | Age: 57
End: 2017-09-25
Payer: COMMERCIAL

## 2017-09-25 ENCOUNTER — HOSPITAL ENCOUNTER (OUTPATIENT)
Dept: CARDIAC REHAB | Facility: CLINIC | Age: 57
End: 2017-09-25
Attending: SURGERY
Payer: COMMERCIAL

## 2017-09-25 DIAGNOSIS — Z79.01 LONG-TERM (CURRENT) USE OF ANTICOAGULANTS: ICD-10-CM

## 2017-09-25 DIAGNOSIS — I48.20 CHRONIC ATRIAL FIBRILLATION (H): ICD-10-CM

## 2017-09-25 LAB
ERYTHROCYTE [DISTWIDTH] IN BLOOD BY AUTOMATED COUNT: 15.7 % (ref 10–15)
HCT VFR BLD AUTO: 32.1 % (ref 40–53)
HGB BLD-MCNC: 10 G/DL (ref 13.3–17.7)
INR POINT OF CARE: 3.4 (ref 0.86–1.14)
MCH RBC QN AUTO: 30 PG (ref 26.5–33)
MCHC RBC AUTO-ENTMCNC: 31.2 G/DL (ref 31.5–36.5)
MCV RBC AUTO: 96 FL (ref 78–100)
PLATELET # BLD AUTO: 286 10E9/L (ref 150–450)
RBC # BLD AUTO: 3.33 10E12/L (ref 4.4–5.9)
WBC # BLD AUTO: 10 10E9/L (ref 4–11)

## 2017-09-25 PROCEDURE — 93798 PHYS/QHP OP CAR RHAB W/ECG: CPT | Performed by: REHABILITATION PRACTITIONER

## 2017-09-25 PROCEDURE — 85610 PROTHROMBIN TIME: CPT | Mod: QW

## 2017-09-25 PROCEDURE — 99207 ZZC NO CHARGE NURSE ONLY: CPT

## 2017-09-25 PROCEDURE — 36416 COLLJ CAPILLARY BLOOD SPEC: CPT

## 2017-09-25 PROCEDURE — 40000116 ZZH STATISTIC OP CR VISIT: Performed by: REHABILITATION PRACTITIONER

## 2017-09-25 NOTE — MR AVS SNAPSHOT
Derek SHERMAN Stover   9/25/2017 3:15 PM   Anticoagulation Therapy Visit    Description:  56 year old male   Provider:  GUILHERME ANTI COAG   Department:  Ph Anticoag           INR as of 9/25/2017     Today's INR 3.4!      Anticoagulation Summary as of 9/25/2017     INR goal 2.0-3.0   Today's INR 3.4!   Full instructions 2.5 mg on Mon, Thu; 5 mg all other days   Next INR check 10/6/2017    Indications   Atrial fibrillation (H) [I48.91]  Atrial fibrillation (H) [I48.91] (Resolved) [I48.91]  Long-term (current) use of anticoagulants [Z79.01] [Z79.01]         Your next Anticoagulation Clinic appointment(s)     Sep 25, 2017  3:15 PM CDT   Anticoagulation Visit with PH ANTI COAG   [facility] ([address] [address]   [phone]            Oct 06, 2017  3:00 PM CDT   Anticoagulation Visit with PH ANTI COAG   [facility] ([address] [address]   [phone]              Contact Numbers     Clinic Number:         September 2017 Details    Sun Mon Tue Wed Thu Fri Sat          1               2                 3               4               5               6               7               8               9                 10               11               12               13               14               15               16                 17               18               19               20               21               22               23                 24               25      2.5 mg   See details      26      5 mg         27      5 mg         28      2.5 mg         29      5 mg         30      5 mg          Date Details   09/25 This INR check               How to take your warfarin dose     To take:  2.5 mg Take 0.5 of a 5 mg tablet.    To take:  5 mg Take 1 of the 5 mg tablets.           October 2017 Details    Sun Mon Tue Wed Thu Fri Sat     1      5 mg         2      2.5 mg          3      5 mg         4      5 mg         5      2.5 mg         6            7                 8               9               10               11               12               13               14                 15               16               17               18               19               20               21                 22               23               24               25               26               27               28                 29               30               31                    Date Details   No additional details    Date of next INR:  10/6/2017         How to take your warfarin dose     To take:  2.5 mg Take 0.5 of a 5 mg tablet.    To take:  5 mg Take 1 of the 5 mg tablets.

## 2017-09-25 NOTE — PROGRESS NOTES
ANTICOAGULATION FOLLOW-UP CLINIC VISIT    Patient Name:  Derek Stover  Date:  9/25/2017  Contact Type:  Face to Face    SUBJECTIVE:     Patient Findings     Positives No Problem Findings           OBJECTIVE    INR Protime   Date Value Ref Range Status   09/25/2017 3.4 (A) 0.86 - 1.14 Final       ASSESSMENT / PLAN  INR assessment THER    Recheck INR In: 10 DAYS    INR Location Clinic      Anticoagulation Summary as of 9/25/2017     INR goal 2.0-3.0   Today's INR 3.4!   Maintenance plan 2.5 mg (5 mg x 0.5) on Mon, Thu; 5 mg (5 mg x 1) all other days   Full instructions 2.5 mg on Mon, Thu; 5 mg all other days   Weekly total 30 mg   Plan last modified Perla Walden RN (9/25/2017)   Next INR check 10/6/2017   Target end date     Indications   Atrial fibrillation (H) [I48.91]  Atrial fibrillation (H) [I48.91] (Resolved) [I48.91]  Long-term (current) use of anticoagulants [Z79.01] [Z79.01]         Anticoagulation Episode Summary     INR check location     Preferred lab     Send INR reminders to [facility]    Comments       Anticoagulation Care Providers     Provider Role Specialty Phone number    Carlos Archibald DO [facility] Internal Medicine [phone]            See the Encounter Report to view Anticoagulation Flowsheet and Dosing Calendar (Go to Encounters tab in chart review, and find the Anticoagulation Therapy Visit)    Dosage adjustment made based on physician directed care plan.      Perla Walden RN

## 2017-09-27 ENCOUNTER — HOSPITAL ENCOUNTER (OUTPATIENT)
Dept: CARDIAC REHAB | Facility: CLINIC | Age: 57
End: 2017-09-27
Attending: SURGERY
Payer: COMMERCIAL

## 2017-09-27 PROCEDURE — 93798 PHYS/QHP OP CAR RHAB W/ECG: CPT

## 2017-09-27 PROCEDURE — 40000116 ZZH STATISTIC OP CR VISIT

## 2017-09-29 ENCOUNTER — HOSPITAL ENCOUNTER (OUTPATIENT)
Dept: CARDIAC REHAB | Facility: CLINIC | Age: 57
End: 2017-09-29
Attending: SURGERY
Payer: COMMERCIAL

## 2017-09-29 PROCEDURE — 40000116 ZZH STATISTIC OP CR VISIT

## 2017-09-29 PROCEDURE — 93798 PHYS/QHP OP CAR RHAB W/ECG: CPT

## 2017-10-02 ENCOUNTER — HOSPITAL ENCOUNTER (OUTPATIENT)
Dept: CARDIAC REHAB | Facility: CLINIC | Age: 57
End: 2017-10-02
Attending: SURGERY
Payer: COMMERCIAL

## 2017-10-02 PROCEDURE — 40000116 ZZH STATISTIC OP CR VISIT: Performed by: REHABILITATION PRACTITIONER

## 2017-10-02 PROCEDURE — 93798 PHYS/QHP OP CAR RHAB W/ECG: CPT | Performed by: REHABILITATION PRACTITIONER

## 2017-10-04 ENCOUNTER — HOSPITAL ENCOUNTER (OUTPATIENT)
Dept: CARDIAC REHAB | Facility: CLINIC | Age: 57
End: 2017-10-04
Attending: SURGERY
Payer: COMMERCIAL

## 2017-10-04 PROCEDURE — 40000116 ZZH STATISTIC OP CR VISIT

## 2017-10-04 PROCEDURE — 93798 PHYS/QHP OP CAR RHAB W/ECG: CPT

## 2017-10-06 ENCOUNTER — ANTICOAGULATION THERAPY VISIT (OUTPATIENT)
Dept: ANTICOAGULATION | Facility: CLINIC | Age: 57
End: 2017-10-06
Payer: COMMERCIAL

## 2017-10-06 ENCOUNTER — HOSPITAL ENCOUNTER (OUTPATIENT)
Dept: CARDIAC REHAB | Facility: CLINIC | Age: 57
End: 2017-10-06
Attending: SURGERY
Payer: COMMERCIAL

## 2017-10-06 DIAGNOSIS — I48.91 ATRIAL FIBRILLATION (H): ICD-10-CM

## 2017-10-06 DIAGNOSIS — Z79.01 LONG-TERM (CURRENT) USE OF ANTICOAGULANTS: ICD-10-CM

## 2017-10-06 DIAGNOSIS — Z79.01 ANTICOAGULATED ON COUMADIN: ICD-10-CM

## 2017-10-06 DIAGNOSIS — I48.20 CHRONIC ATRIAL FIBRILLATION (H): ICD-10-CM

## 2017-10-06 LAB — INR POINT OF CARE: 4.9 (ref 0.86–1.14)

## 2017-10-06 PROCEDURE — 40000116 ZZH STATISTIC OP CR VISIT

## 2017-10-06 PROCEDURE — 85610 PROTHROMBIN TIME: CPT | Mod: QW

## 2017-10-06 PROCEDURE — 99207 ZZC NO CHARGE NURSE ONLY: CPT

## 2017-10-06 PROCEDURE — 93798 PHYS/QHP OP CAR RHAB W/ECG: CPT

## 2017-10-06 PROCEDURE — 36416 COLLJ CAPILLARY BLOOD SPEC: CPT

## 2017-10-06 RX ORDER — WARFARIN SODIUM 5 MG/1
TABLET ORAL
Qty: 60 TABLET | Refills: 1 | COMMUNITY
Start: 2017-10-06 | End: 2017-10-18

## 2017-10-06 NOTE — PROGRESS NOTES
ANTICOAGULATION FOLLOW-UP CLINIC VISIT    Patient Name:  Derek Stover  Date:  10/6/2017  Contact Type:  Face to Face    SUBJECTIVE:     Patient Findings     Positives OTC meds (taking about 2500 mg of Tylenol/day)           OBJECTIVE    INR Protime   Date Value Ref Range Status   10/06/2017 4.9 (A) 0.86 - 1.14 Final       ASSESSMENT / PLAN  INR assessment SUPRA    Recheck INR In: 10 DAYS    INR Location Clinic      Anticoagulation Summary as of 10/6/2017     INR goal 2.0-3.0   Today's INR 4.9!   Maintenance plan 2.5 mg (5 mg x 0.5) on Mon, Wed, Fri; 5 mg (5 mg x 1) all other days   Full instructions 2.5 mg on Mon, Wed, Fri; 5 mg all other days   Weekly total 27.5 mg   Plan last modified Perla Walden RN (10/6/2017)   Next INR check 10/16/2017   Target end date     Indications   Atrial fibrillation (H) [I48.91]  Atrial fibrillation (H) [I48.91] (Resolved) [I48.91]  Long-term (current) use of anticoagulants [Z79.01] [Z79.01]         Anticoagulation Episode Summary     INR check location     Preferred lab     Send INR reminders to [facility]    Comments       Anticoagulation Care Providers     Provider Role Specialty Phone number    Carlos Archibald DO [facility] Internal Medicine [phone]            See the Encounter Report to view Anticoagulation Flowsheet and Dosing Calendar (Go to Encounters tab in chart review, and find the Anticoagulation Therapy Visit)    Dosage adjustment made based on physician directed care plan.        Perla Walden RN

## 2017-10-06 NOTE — MR AVS SNAPSHOT
Derek SHERMAN Stover   10/6/2017 3:00 PM   Anticoagulation Therapy Visit    Description:  56 year old male   Provider:  PH ANTI COAG   Department:  [facility]           INR as of 10/6/2017     Today's INR 4.9!      Anticoagulation Summary as of 10/6/2017     INR goal 2.0-3.0   Today's INR 4.9!   Full instructions 2.5 mg on Mon, Wed, Fri; 5 mg all other days   Next INR check 10/16/2017    Indications   Atrial fibrillation (H) [I48.91]  Atrial fibrillation (H) [I48.91] (Resolved) [I48.91]  Long-term (current) use of anticoagulants [Z79.01] [Z79.01]         Your next Anticoagulation Clinic appointment(s)     Oct 16, 2017  3:00 PM CDT   Anticoagulation Visit with PH ANTI COAG   [facility]    [address] [place] [address]   [phone]              Contact Numbers     Clinic Number:         October 2017 Details    Sun Mon Tue Wed Thu Fri Sat     1               2               3               4               5               6      2.5 mg   See details      7      5 mg           8      5 mg         9      2.5 mg         10      5 mg         11      2.5 mg         12      5 mg         13      2.5 mg         14      5 mg           15      5 mg         16            17               18               19               20               21                 22               23               24               25               26               27               28                 29               30               31                    Date Details   10/06 This INR check       Date of next INR:  10/16/2017         How to take your warfarin dose     To take:  2.5 mg Take 0.5 of a 5 mg tablet.    To take:  5 mg Take 1 of the 5 mg tablets.

## 2017-10-09 ENCOUNTER — HOSPITAL ENCOUNTER (OUTPATIENT)
Dept: CARDIAC REHAB | Facility: CLINIC | Age: 57
End: 2017-10-09
Attending: SURGERY
Payer: COMMERCIAL

## 2017-10-09 PROCEDURE — 93798 PHYS/QHP OP CAR RHAB W/ECG: CPT | Performed by: REHABILITATION PRACTITIONER

## 2017-10-09 PROCEDURE — 40000116 ZZH STATISTIC OP CR VISIT: Performed by: REHABILITATION PRACTITIONER

## 2017-10-10 ENCOUNTER — OFFICE VISIT (OUTPATIENT)
Dept: CARDIOLOGY | Facility: CLINIC | Age: 57
End: 2017-10-10
Payer: COMMERCIAL

## 2017-10-10 VITALS
HEART RATE: 70 BPM | WEIGHT: 266.5 LBS | BODY MASS INDEX: 38.15 KG/M2 | DIASTOLIC BLOOD PRESSURE: 50 MMHG | HEIGHT: 70 IN | SYSTOLIC BLOOD PRESSURE: 88 MMHG | OXYGEN SATURATION: 96 %

## 2017-10-10 DIAGNOSIS — E78.5 HYPERLIPIDEMIA LDL GOAL <100: ICD-10-CM

## 2017-10-10 DIAGNOSIS — I48.91 ATRIAL FIBRILLATION, UNSPECIFIED TYPE (H): ICD-10-CM

## 2017-10-10 DIAGNOSIS — Z87.891 PERSONAL HISTORY OF TOBACCO USE, PRESENTING HAZARDS TO HEALTH: ICD-10-CM

## 2017-10-10 DIAGNOSIS — Z95.2 S/P MVR (MITRAL VALVE REPLACEMENT): Primary | ICD-10-CM

## 2017-10-10 PROCEDURE — 99214 OFFICE O/P EST MOD 30 MIN: CPT | Performed by: PHYSICIAN ASSISTANT

## 2017-10-10 RX ORDER — METOPROLOL TARTRATE 50 MG
TABLET ORAL
Qty: 60 TABLET | Refills: 3 | COMMUNITY
Start: 2017-10-10 | End: 2017-11-14

## 2017-10-10 NOTE — PROGRESS NOTES
History of Present Illness:   This is a very pleasant 56-year-old gentleman who presents to [facility] for follow-up after recent mitral valve replacement.  He underwent mitral valve replacement on 9/12/2017 receiving a 33 mm [employer] mechanical valve.  His postoperative course was without complication.  He was slightly hypervolemic requiring IV diuresis.  His metoprolol was also increased to 50 mg b.i.d. for more optimal ventricular rate control.  He was discharge in stable condition with outpatient cardiac rehabilitation referral.    His past medical history includes tobacco use (quit one month ago), chronic atrial fibrillation, and hyperlipidemia.    Derek states that he is doing well.  He continues with cardiac rehabilitation.  He describes that he had 2 episodes right after his discharge where he woke up with right-sided chest discomfort.  He hasn't had recurrence of these symptoms since then.  He denies any symptoms when he exercises cardiac rehabilitation center.  He also had intermittent episodes of right lower extremity cramping.  He states that he doesn't have this issue anymore as he does stretches prior to his exercises.    He denies any recurrent chest discomfort, shortness of breath, palpitations, near syncope, syncope, PND, orthopnea, or peripheral edema.  He follows with his primary clinic for INR checks.  His most recent INR was elevated at 4.9 which was felt to be due to recent Tylenol use.  He denies any bleeding difficulties.  He can taste to be on daily aspirin.    Physical examination:  General-NAD  Neck normal JVP no carotid bruit  Respiratory-clear to auscultation bilaterally  Cardiac-irregularly irregular rhythm but good rate control.  There is crisp valve click auscultated.  No murmur or rub.  Extremities-no edema    Assessment and plan:  This is a very pleasant 56-year-old gentleman who presents to [facility] for follow-up after recent mitral  replacement. He underwent mitral valve replacement on 9/12/2017 receiving a 33 mm [employer] mechanical valve. His past medical history includes tobacco use (quit one month ago), chronic atrial fibrillation, and hyperlipidemia.    His blood pressure is low today.  We will decrease metoprolol to 25 mg b.i.d.  He'll continue with the rest of his medications same.  We will have him follow-up with us in one month with repeat echocardiogram, fasting lab work, and ECG prior.    We discussed SBE prophylaxis and patient verbalized understanding.    I congratulated him on quitting smoking.  Educated patient on the importance of healthy diet.  Encourage him to continue with cardiac rehabilitation.    We will have him follow-up with Dr. Ross in 6 months.    Thank you for allowing me to participate in the care of this patient today.        This note was completed in part using Dragon voice recognition software. Although reviewed after completion, some word and grammatical errors may occur.    Orders this Visit:  Orders Placed This Encounter   Procedures     Basic metabolic panel     Lipid Profile     ALT     Follow-Up with Cardiac Advanced Practice Provider     Follow-Up with Cardiologist     EKG 12-lead complete w/read - Clinics (to be scheduled)     Echocardiogram     Orders Placed This Encounter   Medications     metoprolol (LOPRESSOR) 50 MG tablet     Sig: Take half tablet (25 mg) two times daily.     Dispense:  60 tablet     Refill:  3     Medications Discontinued During This Encounter   Medication Reason     metoprolol (LOPRESSOR) 50 MG tablet Reorder         Encounter Diagnoses   Name Primary?     S/P MVR (mitral valve replacement) Yes     Hyperlipidemia LDL goal <100      Personal history of tobacco use, presenting hazards to health      S/P mitral valve replacement with metallic valve        CURRENT MEDICATIONS:  Current Outpatient Prescriptions   Medication Sig Dispense Refill     metoprolol (LOPRESSOR) 50 MG  tablet Take half tablet (25 mg) two times daily. 60 tablet 3     warfarin (COUMADIN) 5 MG tablet Take 2.5 mg on Monday, Wednesday, Friday and 5 mg all other days, or as directed by the coumadin clinic 60 tablet 1     oxyCODONE (ROXICODONE) 5 MG IR tablet Take 1-2 tablets (5-10 mg) by mouth every 3 hours as needed for moderate to severe pain 60 tablet 0     acetaminophen (TYLENOL) 325 MG tablet Take 2 tablets (650 mg) by mouth every 4 hours as needed for other (surgical pain) 100 tablet 0     aspirin EC 81 MG EC tablet Take 1 tablet (81 mg) by mouth daily 30 tablet 0     Varenicline Tartrate (CHANTIX PO) Take 1 mg by mouth 2 times daily       senna-docusate (SENOKOT-S;PERICOLACE) 8.6-50 MG per tablet Take 1-2 tablets by mouth 2 times daily (Patient not taking: Reported on 9/22/2017) 100 tablet 0     [DISCONTINUED] metoprolol (LOPRESSOR) 50 MG tablet Take 1 tablet (50 mg) by mouth every 12 hours 60 tablet 3     Amoxicillin (AMOXIL PO) Take 1,500-3,000 mg by mouth daily as needed (patient takes 6 X 500 = 3,000 mg dose 1 hour before dental appointment and 3 X 500 = 1,500 mg dose 6 hours after dental appointment.)         ALLERGIES     Allergies   Allergen Reactions     No Known Drug Allergy        PAST MEDICAL HISTORY:  Past Medical History:   Diagnosis Date     Atrial fibrillation (H)     Valvular Afib.  Diagnosed 06/2017. Pt initiated on coumadin 7/18/17     Erectile dysfunction      Heart murmur     rheumatic fever as a child     Hyperlipidemia      Mitral regurgitation     rheumatic fever as a child. 3-4+ per MITCHELL 7/25/17     Tobacco abuse        PAST SURGICAL HISTORY:  Past Surgical History:   Procedure Laterality Date     COLONOSCOPY N/A 9/8/2014    Procedure: COMBINED COLONOSCOPY, SINGLE BIOPSY/POLYPECTOMY BY BIOPSY;  Surgeon: Kai Bailey MD;  Location:  GI     ORTHOPEDIC SURGERY      RIght knee scope     REPLACE VALVE MITRAL N/A 9/12/2017    Procedure: REPLACE VALVE MITRAL;  MITRAL VALVE REPLACEMENT  [facility]  "Masters Series Mechanical Heart Valve 33mm  Ref, [id number]-[id number] Serial [id number]   ON PUMP, MITCHELL;  Surgeon: Ivana Marie MD;  Location:  OR       FAMILY HISTORY:  Family History   Problem Relation Age of Onset     DIABETES Mother      Obesity Mother      DIABETES Father        SOCIAL HISTORY:  Social History     Social History     Marital status:      Spouse name: N/A     Number of children: N/A     Years of education: N/A     Social History Main Topics     Smoking status: Former Smoker     Packs/day: 2.00     Years: 39.00     Types: Cigarettes     Quit date: 8/1/2017     Smokeless tobacco: Never Used     Alcohol use 2.4 oz/week     4 Standard drinks or equivalent per week      Comment: occasional     Drug use: No     Sexual activity: Yes     Partners: Female     Other Topics Concern     Parent/Sibling W/ Cabg, Mi Or Angioplasty Before 65f 55m? No     Social History Narrative       Review of Systems:  Skin:  Positive for     Eyes:  Positive for glasses  ENT:  Negative    Respiratory:  Positive for    Cardiovascular:  Negative for;palpitations;lightheadedness;dizziness;edema chest pain  Gastroenterology: Negative    Genitourinary:  Negative    Musculoskeletal:  Positive for back pain  Neurologic:  Positive for headaches  Psychiatric:  Positive for sleep disturbances  Heme/Lymph/Imm:  Negative    Endocrine:  Negative      Physical Exam:  Vitals: BP (!) 88/50 (BP Location: Right arm, Patient Position: Fowlers, Cuff Size: Adult Large)  Pulse 70  Ht 1.778 m (5' 10\")  Wt 120.9 kg (266 lb 8 oz)  SpO2 96%  BMI 38.24 kg/m2   Please refer to dictation for physical exam    Recent Lab Results:  LIPID RESULTS:  Lab Results   Component Value Date    CHOL 207 (H) 08/13/2014    HDL 39 (L) 08/13/2014     (H) 08/13/2014    TRIG 124 08/13/2014    CHOLHDLRATIO 5.3 (H) 08/13/2014       LIVER ENZYME RESULTS:  Lab Results   Component Value Date    AST 52 (H) 09/12/2017    ALT 25 09/12/2017       CBC RESULTS:  Lab " Results   Component Value Date    WBC 10.0 09/22/2017    RBC 3.33 (L) 09/22/2017    HGB 10.0 (L) 09/22/2017    HCT 32.1 (L) 09/22/2017    MCV 96 09/22/2017    MCH 30.0 09/22/2017    MCHC 31.2 (L) 09/22/2017    RDW 15.7 (H) 09/22/2017     09/22/2017       BMP RESULTS:  Lab Results   Component Value Date     09/15/2017    POTASSIUM 4.2 09/15/2017    CHLORIDE 98 09/15/2017    CO2 29 09/15/2017    ANIONGAP 6 09/15/2017     (H) 09/15/2017    BUN 14 09/15/2017    CR 0.86 09/15/2017    GFRESTIMATED >90 09/15/2017    GFRESTBLACK >90 09/15/2017    FIORELLA 8.6 09/15/2017        A1C RESULTS:  Lab Results   Component Value Date    A1C 6.1 (H) 09/13/2017       INR RESULTS:  Lab Results   Component Value Date    INR 4.9 (A) 10/06/2017    INR 3.4 (A) 09/25/2017    INR 2.10 (H) 09/17/2017    INR 1.96 (H) 09/16/2017           Danny Mariee PA-C   October 10, 2017

## 2017-10-10 NOTE — MR AVS SNAPSHOT
After Visit Summary   10/10/2017    Derek Stover    MRN: [id number]           Patient Information     Date Of Birth          1960        Visit Information        Provider Department      10/10/2017 9:45 AM Danny Mariee PA-C [facility]        Today's Diagnoses     S/P MVR (mitral valve replacement)    -  1    Hyperlipidemia LDL goal <100        Personal history of tobacco use, presenting hazards to health        S/P mitral valve replacement with metallic valve          Care Instructions    Today's Plan:   1) Decrease Metoprolol to 25 (half tablet) two times daily.   2) One month follow up with fasting labs and heart ultrasound (echocardiogram) prior.   3) Continue with cardiac rehab.   4) Follow up with Dr. Ross in 6 months.   5) Congratulations on quitting smoking!       If you have questions or concerns please call clinic at [phone].    Please call [phone] for scheduling.       It was a pleasure seeing you today!   Reminder: Please bring in all current medications, over the counter supplements and vitamin bottles to your next appointment.    Danny Mariee PA-C  10/10/2017            Follow-ups after your visit        Additional Services     Follow-Up with Cardiac Advanced Practice Provider           Follow-Up with Cardiologist                 Your next 10 appointments already scheduled     Oct 11, 2017  2:00 PM CDT   Cardiac Treatment with SHITAL Noble   [facility] ([facility])    [address] [place] [address]   [phone]            Oct 13, 2017  2:00 PM CDT   Cardiac Treatment with SHITAL Noble   [facility] ([facility])    [address] [place] [address]   [phone]            Oct 16, 2017  2:00 PM CDT   Cardiac Treatment with ANUM Seaman   [facility] ([facility])    [address] [place] [address]   [phone]            Oct 16, 2017  3:00 PM CDT   [facility]    [address] [address]   [phone]            Oct 18, 2017  2:00 PM CDT   Cardiac Treatment with SHITAL Noble   [facility] ([facility])    [address] [place] [address] [place] [address]   [phone]            Oct 20, 2017  2:00 PM CDT   Cardiac Treatment with Traci Bryan TH   [facility] ([facility])    [address] [place] [address]   [phone]            Nov 14, 2017  1:30 PM CST   [facility]   [facility]    [address] [place] [address]   [phone]            Nov 22, 2017  2:30 PM CST   Office Visit with Osbaldo Renee MD   [facility]    [address] [facility] [id number] [phone]           Bring a current list of meds and any records pertaining to this visit. For Physicals, please bring immunization records and any forms needing to be filled out. Please arrive 10 minutes early to complete paperwork.              Future tests that were ordered for you today     Open Future Orders        Priority Expected Expires Ordered    Follow-Up with Cardiologist Routine 4/8/2018 10/10/2018 10/10/2017    Basic metabolic panel Routine 11/9/2017 10/10/2018 10/10/2017    Lipid Profile Routine 11/9/2017 10/10/2018 10/10/2017    ALT Routine 11/9/2017 10/10/2018 10/10/2017    Follow-Up with Cardiac Advanced Practice Provider Routine 11/9/2017 10/10/2018 10/10/2017    Echocardiogram Routine 11/9/2017 10/10/2018 10/10/2017    EKG 12-lead complete w/read - Clinics (to be scheduled) Routine 11/9/2017 10/10/2018 10/10/2017            Who to contact     If you have questions or need  "follow up information about today's clinic visit or your schedule please contact [facility] directly at [phone].  Normal or non-critical lab and imaging results will be communicated to you by [employer], letter or phone within 4 business days after the clinic has received the results. If you do not hear from us within 7 days, please contact the clinic through [employer] or phone. If you have a critical or abnormal lab result, we will notify you by phone as soon as possible.  Submit refill requests through [employer] or call your pharmacy and they will forward the refill request to us. Please allow 3 business days for your refill to be completed.          Additional Information About Your Visit        [employer] Information     [employer] gives you secure access to your electronic health record. If you see a primary care provider, you can also send messages to your care team and make appointments. If you have questions, please call your primary care clinic.  If you do not have a primary care provider, please call [phone] and they will assist you.        Care EveryWhere ID     This is your Care EveryWhere ID. This could be used by other organizations to access your [facility] medical records  [id number]        Your Vitals Were     Pulse Height Pulse Oximetry BMI (Body Mass Index)          70 1.778 m (5' 10\") 96% 38.24 kg/m2         Blood Pressure from Last 3 Encounters:   10/10/17 (!) 88/50   10/02/17 103/67   09/22/17 94/82    Weight from Last 3 Encounters:   10/10/17 120.9 kg (266 lb 8 oz)   10/02/17 120.9 kg (266 lb 9.6 oz)   09/22/17 121.7 kg (268 lb 6.4 oz)                 Today's Medication Changes          These changes are accurate as of: 10/10/17 10:26 AM.  If you have any questions, ask your nurse or doctor.               These medicines have changed or have updated prescriptions.        Dose/Directions    metoprolol 50 MG tablet   Commonly known as:  LOPRESSOR   This may have changed:    - how " much to take  - how to take this  - when to take this  - additional instructions   Used for:  S/P mitral valve replacement with metallic valve        Take half tablet (25 mg) two times daily.   Quantity:  60 tablet   Refills:  3                Primary Care Provider Office Phone # Fax #    Osbaldo Renee [phone]       [address] [facility] [address]        Equal Access to Services     [facility] ANN : Hadii aad ku hadasho Soomaali, waaxda luqadaha, qaybta kaalmada adeegyada, waxay idiin hayaan adeeg khrohinijamila latorinn ah. So [facility] [phone].    ATENCIÓN: Si habla espaneta, tiene a macias disposición servicios gratuitos de asistencia lingüística. Llame al [phone].    We comply with applicable federal civil rights laws and [place] laws. We do not discriminate on the basis of race, color, national origin, age, disability, sex, sexual orientation, or gender identity.            Thank you!     Thank you for choosing [facility]  for your care. Our goal is always to provide you with excellent care. Hearing back from our patients is one way we can continue to improve our services. Please take a few minutes to complete the written survey that you may receive in the mail after your visit with us. Thank you!             Your Updated Medication List - Protect others around you: Learn how to safely use, store and throw away your medicines at www.disposemymeds.org.          This list is accurate as of: 10/10/17 10:26 AM.  Always use your most recent med list.                   Brand Name Dispense Instructions for use Diagnosis    acetaminophen 325 MG tablet    TYLENOL    100 tablet    Take 2 tablets (650 mg) by mouth every 4 hours as needed for other (surgical pain)    S/P mitral valve replacement with metallic valve       AMOXIL PO      Take 1,500-3,000 mg by mouth daily as needed (patient takes 6 X 500 = 3,000 mg dose 1 hour before dental appointment and 3 X 500 = 1,500 mg dose 6 hours after  dental appointment.)        aspirin 81 MG EC tablet     30 tablet    Take 1 tablet (81 mg) by mouth daily    S/P mitral valve replacement with metallic valve       CHANTIX PO      Take 1 mg by mouth 2 times daily        metoprolol 50 MG tablet    LOPRESSOR    60 tablet    Take half tablet (25 mg) two times daily.    S/P mitral valve replacement with metallic valve       oxyCODONE 5 MG IR tablet    ROXICODONE    60 tablet    Take 1-2 tablets (5-10 mg) by mouth every 3 hours as needed for moderate to severe pain    S/P mitral valve replacement with metallic valve       senna-docusate 8.6-50 MG per tablet    SENOKOT-S;PERICOLACE    100 tablet    Take 1-2 tablets by mouth 2 times daily    S/P mitral valve replacement with metallic valve       warfarin 5 MG tablet    COUMADIN    60 tablet    Take 2.5 mg on Monday, Wednesday, Friday and 5 mg all other days, or as directed by the coumadin clinic    Long-term (current) use of anticoagulants, Chronic atrial fibrillation (H), Anticoagulated on Coumadin

## 2017-10-10 NOTE — PATIENT INSTRUCTIONS
Today's Plan:   1) Decrease Metoprolol to 25 (half tablet) two times daily.   2) One month follow up with fasting labs and heart ultrasound (echocardiogram) prior.   3) Continue with cardiac rehab.   4) Follow up with Dr. Ross in 6 months.   5) Congratulations on quitting smoking!       If you have questions or concerns please call clinic at [phone].    Please call [phone] for scheduling.       It was a pleasure seeing you today!   Reminder: Please bring in all current medications, over the counter supplements and vitamin bottles to your next appointment.    Danny Mariee PA-C  10/10/2017

## 2017-10-10 NOTE — LETTER
10/10/2017      RE: Derek Stover  [address] [facility] [address]       Dear Colleague,    Thank you for the opportunity to participate in the care of your patient, Derek Stover, at the [facility]. Please see a copy of my visit note below.    History of Present Illness:   This is a very pleasant 56-year-old gentleman who presents to [place] cardiology clinic for follow-up after recent mitral valve replacement.  He underwent mitral valve replacement on 9/12/2017 receiving a 33 mm [employer] [place] mechanical valve.  His postoperative course was without complication.  He was slightly hypervolemic requiring IV diuresis.  His metoprolol was also increased to 50 mg b.i.d. for more optimal ventricular rate control.  He was discharge in stable condition with outpatient cardiac rehabilitation referral.    His past medical history includes tobacco use (quit one month ago), chronic atrial fibrillation, and hyperlipidemia.    Derek states that he is doing well.  He continues with cardiac rehabilitation.  He describes that he had 2 episodes right after his discharge where he woke up with right-sided chest discomfort.  He hasn't had recurrence of these symptoms since then.  He denies any symptoms when he exercises cardiac rehabilitation center.  He also had intermittent episodes of right lower extremity cramping.  He states that he doesn't have this issue anymore as he does stretches prior to his exercises.    He denies any recurrent chest discomfort, shortness of breath, palpitations, near syncope, syncope, PND, orthopnea, or peripheral edema.  He follows with his primary clinic for INR checks.  His most recent INR was elevated at 4.9 which was felt to be due to recent Tylenol use.  He denies any bleeding difficulties.  He can taste to be on daily aspirin.    Physical examination:  General-NAD  Neck normal JVP no carotid  bruit  Respiratory-clear to auscultation bilaterally  Cardiac-irregularly irregular rhythm but good rate control.  There is crisp valve click auscultated.  No murmur or rub.  Extremities-no edema    Assessment and plan:  This is a very pleasant 56-year-old gentleman who presents to [place] cardiology clinic for follow-up after recent mitral replacement. He underwent mitral valve replacement on 9/12/2017 receiving a 33 mm [employer] [place] mechanical valve. His past medical history includes tobacco use (quit one month ago), chronic atrial fibrillation, and hyperlipidemia.    His blood pressure is low today.  We will decrease metoprolol to 25 mg b.i.d.  He'll continue with the rest of his medications same.  We will have him follow-up with us in one month with repeat echocardiogram, fasting lab work, and ECG prior.    We discussed SBE prophylaxis and patient verbalized understanding.    I congratulated him on quitting smoking.  Educated patient on the importance of healthy diet.  Encourage him to continue with cardiac rehabilitation.    We will have him follow-up with Dr. Ross in 6 months.    Thank you for allowing me to participate in the care of this patient today.        This note was completed in part using Dragon voice recognition software. Although reviewed after completion, some word and grammatical errors may occur.    Orders this Visit:  Orders Placed This Encounter   Procedures     Basic metabolic panel     Lipid Profile     ALT     Follow-Up with Cardiac Advanced Practice Provider     Follow-Up with Cardiologist     EKG 12-lead complete w/read - Clinics (to be scheduled)     Echocardiogram     Orders Placed This Encounter   Medications     metoprolol (LOPRESSOR) 50 MG tablet     Sig: Take half tablet (25 mg) two times daily.     Dispense:  60 tablet     Refill:  3     Medications Discontinued During This Encounter   Medication Reason     metoprolol (LOPRESSOR) 50 MG tablet Reorder         Encounter Diagnoses    Name Primary?     S/P MVR (mitral valve replacement) Yes     Hyperlipidemia LDL goal <100      Personal history of tobacco use, presenting hazards to health      S/P mitral valve replacement with metallic valve        CURRENT MEDICATIONS:  Current Outpatient Prescriptions   Medication Sig Dispense Refill     metoprolol (LOPRESSOR) 50 MG tablet Take half tablet (25 mg) two times daily. 60 tablet 3     warfarin (COUMADIN) 5 MG tablet Take 2.5 mg on Monday, Wednesday, Friday and 5 mg all other days, or as directed by the coumadin clinic 60 tablet 1     oxyCODONE (ROXICODONE) 5 MG IR tablet Take 1-2 tablets (5-10 mg) by mouth every 3 hours as needed for moderate to severe pain 60 tablet 0     acetaminophen (TYLENOL) 325 MG tablet Take 2 tablets (650 mg) by mouth every 4 hours as needed for other (surgical pain) 100 tablet 0     aspirin EC 81 MG EC tablet Take 1 tablet (81 mg) by mouth daily 30 tablet 0     Varenicline Tartrate (CHANTIX PO) Take 1 mg by mouth 2 times daily       senna-docusate (SENOKOT-S;PERICOLACE) 8.6-50 MG per tablet Take 1-2 tablets by mouth 2 times daily (Patient not taking: Reported on 9/22/2017) 100 tablet 0     [DISCONTINUED] metoprolol (LOPRESSOR) 50 MG tablet Take 1 tablet (50 mg) by mouth every 12 hours 60 tablet 3     Amoxicillin (AMOXIL PO) Take 1,500-3,000 mg by mouth daily as needed (patient takes 6 X 500 = 3,000 mg dose 1 hour before dental appointment and 3 X 500 = 1,500 mg dose 6 hours after dental appointment.)         ALLERGIES     Allergies   Allergen Reactions     No Known Drug Allergy        PAST MEDICAL HISTORY:  Past Medical History:   Diagnosis Date     Atrial fibrillation (H)     Valvular Afib.  Diagnosed 06/2017. Pt initiated on coumadin 7/18/17     Erectile dysfunction      Heart murmur     rheumatic fever as a child     Hyperlipidemia      Mitral regurgitation     rheumatic fever as a child. 3-4+ per MITCHELL 7/25/17     Tobacco abuse        PAST SURGICAL HISTORY:  Past  "Surgical History:   Procedure Laterality Date     COLONOSCOPY N/A 9/8/2014    Procedure: COMBINED COLONOSCOPY, SINGLE BIOPSY/POLYPECTOMY BY BIOPSY;  Surgeon: Kai Bailey MD;  Location:  GI     ORTHOPEDIC SURGERY      RIght knee scope     REPLACE VALVE MITRAL N/A 9/12/2017    Procedure: REPLACE VALVE MITRAL;  MITRAL VALVE REPLACEMENT  [facility] Mechanical Heart Valve 33mm  Ref, [id number]-[id number] Serial [id number]   ON PUMP, MITCHELL;  Surgeon: Ivana Marie MD;  Location:  OR       FAMILY HISTORY:  Family History   Problem Relation Age of Onset     DIABETES Mother      Obesity Mother      DIABETES Father        SOCIAL HISTORY:  Social History     Social History     Marital status:      Spouse name: N/A     Number of children: N/A     Years of education: N/A     Social History Main Topics     Smoking status: Former Smoker     Packs/day: 2.00     Years: 39.00     Types: Cigarettes     Quit date: 8/1/2017     Smokeless tobacco: Never Used     Alcohol use 2.4 oz/week     4 Standard drinks or equivalent per week      Comment: occasional     Drug use: No     Sexual activity: Yes     Partners: Female     Other Topics Concern     Parent/Sibling W/ Cabg, Mi Or Angioplasty Before 65f 55m? No     Social History Narrative       Review of Systems:  Skin:  Positive for     Eyes:  Positive for glasses  ENT:  Negative    Respiratory:  Positive for    Cardiovascular:  Negative for;palpitations;lightheadedness;dizziness;edema chest pain  Gastroenterology: Negative    Genitourinary:  Negative    Musculoskeletal:  Positive for back pain  Neurologic:  Positive for headaches  Psychiatric:  Positive for sleep disturbances  Heme/Lymph/Imm:  Negative    Endocrine:  Negative      Physical Exam:  Vitals: BP (!) 88/50 (BP Location: Right arm, Patient Position: Fowlers, Cuff Size: Adult Large)  Pulse 70  Ht 1.778 m (5' 10\")  Wt 120.9 kg (266 lb 8 oz)  SpO2 96%  BMI 38.24 kg/m2   Please refer to dictation for physical " exam    Recent Lab Results:  LIPID RESULTS:  Lab Results   Component Value Date    CHOL 207 (H) 08/13/2014    HDL 39 (L) 08/13/2014     (H) 08/13/2014    TRIG 124 08/13/2014    CHOLHDLRATIO 5.3 (H) 08/13/2014       LIVER ENZYME RESULTS:  Lab Results   Component Value Date    AST 52 (H) 09/12/2017    ALT 25 09/12/2017       CBC RESULTS:  Lab Results   Component Value Date    WBC 10.0 09/22/2017    RBC 3.33 (L) 09/22/2017    HGB 10.0 (L) 09/22/2017    HCT 32.1 (L) 09/22/2017    MCV 96 09/22/2017    MCH 30.0 09/22/2017    MCHC 31.2 (L) 09/22/2017    RDW 15.7 (H) 09/22/2017     09/22/2017       BMP RESULTS:  Lab Results   Component Value Date     09/15/2017    POTASSIUM 4.2 09/15/2017    CHLORIDE 98 09/15/2017    CO2 29 09/15/2017    ANIONGAP 6 09/15/2017     (H) 09/15/2017    BUN 14 09/15/2017    CR 0.86 09/15/2017    GFRESTIMATED >90 09/15/2017    GFRESTBLACK >90 09/15/2017    FIORELLA 8.6 09/15/2017        A1C RESULTS:  Lab Results   Component Value Date    A1C 6.1 (H) 09/13/2017       INR RESULTS:  Lab Results   Component Value Date    INR 4.9 (A) 10/06/2017    INR 3.4 (A) 09/25/2017    INR 2.10 (H) 09/17/2017    INR 1.96 (H) 09/16/2017           Danny Mariee PA-C   October 10, 2017

## 2017-10-11 ENCOUNTER — HOSPITAL ENCOUNTER (OUTPATIENT)
Dept: CARDIAC REHAB | Facility: CLINIC | Age: 57
End: 2017-10-11
Attending: SURGERY
Payer: COMMERCIAL

## 2017-10-11 PROCEDURE — 40000116 ZZH STATISTIC OP CR VISIT

## 2017-10-11 PROCEDURE — 93798 PHYS/QHP OP CAR RHAB W/ECG: CPT

## 2017-10-13 ENCOUNTER — HOSPITAL ENCOUNTER (OUTPATIENT)
Dept: CARDIAC REHAB | Facility: CLINIC | Age: 57
End: 2017-10-13
Attending: SURGERY
Payer: COMMERCIAL

## 2017-10-13 PROCEDURE — 40000116 ZZH STATISTIC OP CR VISIT

## 2017-10-13 PROCEDURE — 93798 PHYS/QHP OP CAR RHAB W/ECG: CPT

## 2017-10-16 ENCOUNTER — ANTICOAGULATION THERAPY VISIT (OUTPATIENT)
Dept: ANTICOAGULATION | Facility: CLINIC | Age: 57
End: 2017-10-16
Payer: COMMERCIAL

## 2017-10-16 ENCOUNTER — HOSPITAL ENCOUNTER (OUTPATIENT)
Dept: CARDIAC REHAB | Facility: CLINIC | Age: 57
End: 2017-10-16
Attending: SURGERY
Payer: COMMERCIAL

## 2017-10-16 DIAGNOSIS — I48.91 ATRIAL FIBRILLATION (H): ICD-10-CM

## 2017-10-16 DIAGNOSIS — Z79.01 LONG-TERM (CURRENT) USE OF ANTICOAGULANTS: ICD-10-CM

## 2017-10-16 LAB — INR POINT OF CARE: 5.2 (ref 0.86–1.14)

## 2017-10-16 PROCEDURE — 93798 PHYS/QHP OP CAR RHAB W/ECG: CPT | Performed by: REHABILITATION PRACTITIONER

## 2017-10-16 PROCEDURE — 85610 PROTHROMBIN TIME: CPT | Mod: QW

## 2017-10-16 PROCEDURE — 40000116 ZZH STATISTIC OP CR VISIT: Performed by: REHABILITATION PRACTITIONER

## 2017-10-16 PROCEDURE — 99207 ZZC NO CHARGE NURSE ONLY: CPT

## 2017-10-16 PROCEDURE — 36416 COLLJ CAPILLARY BLOOD SPEC: CPT

## 2017-10-16 NOTE — PROGRESS NOTES
ANTICOAGULATION FOLLOW-UP CLINIC VISIT    Patient Name:  Derek Stover  Date:  10/16/2017  Contact Type:  Face to Face    SUBJECTIVE:     Patient Findings     Positives OTC meds (Tylenol for pain)           OBJECTIVE    INR Protime   Date Value Ref Range Status   10/16/2017 5.2 (A) 0.86 - 1.14 Final       ASSESSMENT / PLAN  INR assessment SUPRA    Recheck INR In: 2 DAYS    INR Location Clinic      Anticoagulation Summary as of 10/16/2017     INR goal 2.0-3.0   Today's INR 5.2!   Maintenance plan 2.5 mg (5 mg x 0.5) on Mon, Wed, Fri; 5 mg (5 mg x 1) all other days   Full instructions 10/16: Hold; Otherwise 2.5 mg on Mon, Wed, Fri; 5 mg all other days   Weekly total 27.5 mg   Plan last modified Perla Walden RN (10/6/2017)   Next INR check 10/18/2017   Target end date     Indications   Atrial fibrillation (H) [I48.91]  Atrial fibrillation (H) [I48.91] (Resolved) [I48.91]  Long-term (current) use of anticoagulants [Z79.01] [Z79.01]         Anticoagulation Episode Summary     INR check location     Preferred lab     Send INR reminders to [facility]    Comments       Anticoagulation Care Providers     Provider Role Specialty Phone number    Carlos Archibald DO [facility] Internal Medicine [phone]            See the Encounter Report to view Anticoagulation Flowsheet and Dosing Calendar (Go to Encounters tab in chart review, and find the Anticoagulation Therapy Visit)    Dosage adjustment made based on physician directed care plan.    Hold Mon, 5 mg Tues = 25 mg     Perla Wadlen RN

## 2017-10-16 NOTE — MR AVS SNAPSHOT
Derek WHITAKER Ck   10/16/2017 3:00 PM   Anticoagulation Therapy Visit    Description:  56 year old male   Provider:  PH ANTI COAG   Department:  [facility]           INR as of 10/16/2017     Today's INR 5.2!      Anticoagulation Summary as of 10/16/2017     INR goal 2.0-3.0   Today's INR 5.2!   Full instructions 10/16: Hold; Otherwise 2.5 mg on Mon, Wed, Fri; 5 mg all other days   Next INR check 10/18/2017    Indications   Atrial fibrillation (H) [I48.91]  Atrial fibrillation (H) [I48.91] (Resolved) [I48.91]  Long-term (current) use of anticoagulants [Z79.01] [Z79.01]         Your next Anticoagulation Clinic appointment(s)     Oct 18, 2017  1:30 PM CDT   Anticoagulation Visit with [facility] ANTI COAG   [facility]    [address] [place] [id number]   [phone]              Contact Numbers     Clinic Number:         October 2017 Details    Sun Mon Tue Wed Thu Fri Sat     1               2               3               4               5               6               7                 8               9               10               11               12               13               14                 15               16      Hold   See details      17      5 mg         18            19               20               21                 22               23               24               25               26               27               28                 29               30               31                    Date Details   10/16 This INR check       Date of next INR:  10/18/2017         How to take your warfarin dose     To take:  2.5 mg Take 0.5 of a 5 mg tablet.    To take:  5 mg Take 1 of the 5 mg tablets.    Hold Do not take your warfarin dose. See the Details table to the right for additional instructions.

## 2017-10-18 ENCOUNTER — ANTICOAGULATION THERAPY VISIT (OUTPATIENT)
Dept: ANTICOAGULATION | Facility: CLINIC | Age: 57
End: 2017-10-18
Payer: COMMERCIAL

## 2017-10-18 ENCOUNTER — HOSPITAL ENCOUNTER (OUTPATIENT)
Dept: CARDIAC REHAB | Facility: CLINIC | Age: 57
End: 2017-10-18
Attending: SURGERY
Payer: COMMERCIAL

## 2017-10-18 VITALS — WEIGHT: 264 LBS | BODY MASS INDEX: 37.8 KG/M2 | HEIGHT: 70 IN

## 2017-10-18 DIAGNOSIS — I48.20 CHRONIC ATRIAL FIBRILLATION (H): ICD-10-CM

## 2017-10-18 DIAGNOSIS — Z79.01 ANTICOAGULATED ON COUMADIN: ICD-10-CM

## 2017-10-18 DIAGNOSIS — I48.91 ATRIAL FIBRILLATION (H): ICD-10-CM

## 2017-10-18 DIAGNOSIS — Z79.01 LONG-TERM (CURRENT) USE OF ANTICOAGULANTS: ICD-10-CM

## 2017-10-18 LAB — INR POINT OF CARE: 3.9 (ref 0.86–1.14)

## 2017-10-18 PROCEDURE — 36416 COLLJ CAPILLARY BLOOD SPEC: CPT

## 2017-10-18 PROCEDURE — 85610 PROTHROMBIN TIME: CPT | Mod: QW

## 2017-10-18 PROCEDURE — 99207 ZZC NO CHARGE NURSE ONLY: CPT

## 2017-10-18 PROCEDURE — 40000116 ZZH STATISTIC OP CR VISIT

## 2017-10-18 PROCEDURE — 93798 PHYS/QHP OP CAR RHAB W/ECG: CPT

## 2017-10-18 RX ORDER — WARFARIN SODIUM 5 MG/1
TABLET ORAL
Qty: 60 TABLET | Refills: 1 | COMMUNITY
Start: 2017-10-18 | End: 2017-11-28

## 2017-10-18 ASSESSMENT — 6 MINUTE WALK TEST (6MWT)
FEMALE CALC: 1493.18
TOTAL DISTANCE WALKED (FT): 1080
PREDICTED: 1830.4
GENDER SELECTION: MALE
MALE CALC: 1819.3

## 2017-10-18 NOTE — MR AVS SNAPSHOT
Derek SHERMAN Stover   10/18/2017 1:30 PM   Anticoagulation Therapy Visit    Description:  56 year old male   Provider:  [facility]   Department:  [facility] Antico[facility]           INR as of 10/18/2017     Today's INR 3.9!      Anticoagulation Summary as of 10/18/2017     INR goal 2.0-3.0   Today's INR 3.9!   Full instructions 5 mg on Mon, Fri; 2.5 mg all other days   Next INR check 10/25/2017    Indications   Atrial fibrillation (H) [I48.91]  Atrial fibrillation (H) [I48.91] (Resolved) [I48.91]  Long-term (current) use of anticoagulants [Z79.01] [Z79.01]         Your next Anticoagulation Clinic appointment(s)     Oct 18, 2017  1:30 PM CDT   Anticoagulation Visit with PH ANTI COAG   [facility] ([address] [address]   [phone]            Oct 25, 2017  1:30 PM CDT   Anticoagulation Visit with PH ANTI COAG   [facility] ([address] [address]   [phone]              Contact Numbers     Clinic Number:         October 2017 Details    Sun Mon Tue Wed Thu Fri Sat     1               2               3               4               5               6               7                 8               9               10               11               12               13               14                 15               16               17               18      2.5 mg   See details      19      2.5 mg         20      5 mg         21      2.5 mg           22      2.5 mg         23      5 mg         24      2.5 mg         25            26               27               28                 29               30               31                    Date Details   10/18 This INR check       Date of next INR:  10/25/2017         How to take your warfarin dose     To take:  2.5 mg Take 0.5 of a 5 mg tablet.    To take:  5 mg Take 1 of the 5 mg tablets.

## 2017-10-18 NOTE — PROGRESS NOTES
ANTICOAGULATION FOLLOW-UP CLINIC VISIT    Patient Name:  Derek Stover  Date:  10/18/2017  Contact Type:  Face to Face    SUBJECTIVE:     Patient Findings     Positives Intentional hold of therapy (held on Monday due to elevated INR), Unexplained INR or factor level change           OBJECTIVE    INR Protime   Date Value Ref Range Status   10/18/2017 3.9 (A) 0.86 - 1.14 Final       ASSESSMENT / PLAN  INR assessment SUPRA    Recheck INR In: 1 WEEK    INR Location Clinic      Anticoagulation Summary as of 10/18/2017     INR goal 2.0-3.0   Today's INR 3.9!   Maintenance plan 5 mg (5 mg x 1) on Mon, Fri; 2.5 mg (5 mg x 0.5) all other days   Full instructions 5 mg on Mon, Fri; 2.5 mg all other days   Weekly total 22.5 mg   Plan last modified Perla Walden, RN (10/18/2017)   Next INR check 10/25/2017   Target end date     Indications   Atrial fibrillation (H) [I48.91]  Atrial fibrillation (H) [I48.91] (Resolved) [I48.91]  Long-term (current) use of anticoagulants [Z79.01] [Z79.01]         Anticoagulation Episode Summary     INR check location     Preferred lab     Send INR reminders to [facility]    Comments       Anticoagulation Care Providers     Provider Role Specialty Phone number    Carlos Archibald DO [facility] Internal Medicine [phone]            See the Encounter Report to view Anticoagulation Flowsheet and Dosing Calendar (Go to Encounters tab in chart review, and find the Anticoagulation Therapy Visit)    Dosage adjustment made based on physician directed care plan.    5 mg mon, Fri and 2.5 mg ROW.     Perla Walden, RN

## 2017-10-18 NOTE — PROGRESS NOTES
10/18/17 1300   Session   Session 30 Day Individualized Treatment Plan   Certified through this date 11/16/17   Cardiac Rehab Assessment   Cardiac Rehab Assessment  Derek Stover  1960  YONY Derek is a very pleasant 57 yo gentleman with a history of rheumatic fever here today after mitral valve replacement. He reports today a progression in dyspnea on exertion and feelings of irregular heart beat prior to valve replacement. Today he states improvement in dyspnea and has had no palpitation type feelings since surgery. Of note he had atrial fibrillation prior to surgery and atrial flutter since surgery. 10/18 Roberto has improved over the last 30 days in cardiac rehab and has sustained his smoke free status. Roberto recently has his metoprolol decreased to 25mg b.i.d due to low BP which we will continue to watch closely. He is currently at 4.1 METs for 45 minutes of continuous aerobic activity in addition to resistance training.  Further more Roberto is down a total of 10# from if discharge from the hospital and optimist that he will be able to maintain his weight.  Over the next 30 days Roberto will start HIIT and focus on aerobic exercise outside of cardiac rehab.  At this time skilled intervention and monitored exercise remain crucial to Roberto success outside the rehab setting, we will also continue to encourage him to participate in education.     General Information   Treatment Diagnosis Valve Replacement   Date of Treatment Diagnosis 09/12/17   Significant Past CV History Chronic AF  Past Medical History:   Diagnosis Date     Atrial fibrillation (H)     Valvular Afib.  Diagnosed 06/2017. Pt initiated on coumadin 7/18/17     Erectile dysfunction      Heart murmur     rheumatic fever as a child     Hyperlipidemia      Mitral regurgitation     rheumatic fever as a child. 3-4+ per MITCHELL 7/25/17     Tobacco abuse         Comorbidities None   Lead up symptoms dyspnea, irregular HR   Hospital Location [facility]  "Discharge Date 09/17/17   Signs and Symptoms Post Hospital Discharge SOB;Lightheadedness;Appetite   Outpatient Cardiac Rehab Start Date 09/20/17   Primary Physician Dr. Renee   Primary Physician Follow Up Scheduled   Surgeon Dr. Marie   Surgeon Follow Up Scheduled   Cardiologist Dr. Ross   Cardiologist Follow Up Scheduled   Ejection Fraction 55%   Risk Stratification Low   Summary of Cath Report   Summary of Cath Report No Significant CAD   Living and Work Status    Living Arrangements and Social Status house;spouse   Support System Live with an adult   Return to Employment Yes   Occupation    Preventative Medications   CMS recommended medications Anticoagulants;Beta Blocker   Falls Screen   Have you fallen two or more times in the past year? No   Have you fallen and had an injury in the past year? No   Referral Initiated to Physical Therapy No   Pain   Patient Currently in Pain No   Physical Assessments   Incisions Not assessed   Edema None   Right Lung Sounds normal   Left Lung Sounds normal   Individualized Treatment Plan   Monitored Sessions Scheduled 24   Monitored Sessions Attended 14   Oxygen   Supplemental Oxygen needed No   Nutrition Management - Weight Management   Assessment Re-assessment   Age 56   Weight 119.7 kg (264 lb)   Height 1.778 m (5' 10\")   BMI (Calculated) 37.96   Goal Weight 108.9 kg (240 lb)   Initial Rate Your Plate Score. Dietary tool to assess eating patterns. Scores range from 24 to 72. The higher the score the healthier the eating pattern. 34   Nutrition Management - Lipids   Lipids Labs Not Available   Nutrition Management - Diabetes   Diabetes No   Nutrition Management Summary   Dietary Recommendations None   Stages of Change for Diet Compliance Pre-Contemplation   Interventions Planned Attend Nutrition Education Class(es);Educate on Weight Management Principles;Educate on Benefits of Exercise   Patient Goals Goal #1   Goal #1 Description Patient will lose 1# per " week.  10/18 Pt will maintain current weight of 264#   Goal #1 Target Date 11/01/17   Goal #1 Date Met 10/18/17   Goal #1 Progress Towards Goal 9/20 Patient will combine calorie reduction and increased activity to achieve a 500 kcal/day calorie deficit.  10/18 Pt has lost 10# from being DC from the hospital   Psychosocial Management   Psychosocial Assessment Re-assessment   Is there history of clinical depression or increased risk of depression? No previous history   Current Level of Stress per Patient Report Denies   Current Coping Skills Has Positive Support System   Initial Patient Health Questionnaire -9 Score (PHQ-9) for depression. 5-9 Minimal symptoms, 10-14 Minor depression, 15-19 Major depression, moderately severe, > 20 Major depression, severe  7   Initial [facility] Survey score.  Quality of Life:   If total score > 25 review individual areas where patient rated a 4 or 5.  Consider patients current medical condition and what role that plays on the score.   Adjust treatment protocol to improve areas of concern.  Consider the following:  PHQ9 score, DASI, and re-assessment within the next 30 days to assist with developing treatments.  26   Stages of Change Action   Psychosocial Comments Pt continues to deny having stress    Other Core Components - Hypertension   History of or Diagnosis of Hypertension No   Currently taking Anti-Hypertensives Yes;Beta blocker   Other Core Components - Tobacco   History of Tobacco Use Yes   Quit Date or Planned Quit Date 08/01/17   Tobacco Use Status Recent (Quit < 6 mo ago)   Tobacco Habit Cigarettes   Tobacco Use per Day (average) 2   Years of Tobacco Use 40   Stages of Change Preparation   Tobacco Comments using chantix   Other Core Components Summary   Interventions Planned List benefits of weight management;Continue to assess readiness to change and implement appropriate process(es) of change;Develop strategies to increase confidence to quit smoking;Check-in  regularly, offer support to prevent risk of relapse   Patient Goals Yes   Goal #1 Description Patient will be successful with complete smoking cessation   Goal #1 Target Date 10/20/17   Goal #1 Progress Towards Goal 9/20 will complete Chantix in 3 weeks and remain successful with smoking cessation after that.  10/18 Pt continues to remain smoke free   Activity/Exercise History   Activity/Exercise Assessment Re-assessment   Activity/Exercise Status prior to event? Was Physically Active   Number of Days Currently participating in Moderate Physical Activity? 3-4   Number of Days Currently performing  Aerobic Exercise (including rehab)? 3  (3)   Number of Minutes per Session Currently of Aerobic Exercise (average)? 0   Current Stage of Change (Physical Activity) Preparation   Current Stage of Change (Aerobic Exercise) Contemplation   Patient Goals Goal #1;Goal #2   Goal #1 Description Patient will be able to return to work as a , Estimated work: 3-4 METs and lifting up to 20#   Goal #1 Target Date 12/06/17   Goal #1 Progress Towards Goal 9/20 Will attend cardiac rehab 2-3 times weekly to increase aerobic capacity and maintain muscle mass with low weight resistance training.  Pt is current at 4.1 METs and will be starting HIIT next week.  He has not returned to work at this time.   Goal #2 Description Patient will be able to participate in recreational activities, including hunting (no shooting this year) and bowling   Goal #2 Target Date 12/06/17   Goal #2 Progress Towards Goal 9/20 Will attend cardiac rehab 2-3 times weekly to increase aerobic capacity and maintain muscle mass with low weight resistance training.  10/18 Pt has been engaging in RT and is at 4.1 METs he has not went bowling as of yet.   Exercise Assessment   6 Minute Walk Predicted - Gender Selection Male   6 Minute Walk Predicted (Male) 1819.3   6 Minute Walk Predicted (Female) 1493.18   Initial 6 Minute Walk Distance (Feet) 1080 ft    Resting HR 88 bpm   Exercise  bpm   Resting BP 96/70   Exercise /60   Effort Rating 6   Current MET Level 4.1   MET Level Goal 6+   ECG Rhythm Atrial flutter   Ectopy None   Current Symptoms Denies symptoms   Limitations/Restrictions Sternal Precautions   Exercise Prescription   Mode Treadmill;Nustep;Weights   Duration/Time 30-45 min;15-30 min   Frequency 3 daysweek   THR (85% of age predicted max HR) 141.1   OMNI Effort Rating (0-10 Scale) 4-6/10   Progression Continuous bouts;Total exercise time of 30-45 minutes;Aerobic exercise to OMNI rating of 5-7, and heart rate at or below target;Progress peak intensity by 1/2 MET per week;Other (see comments)   Comments We will begin HIIT training on this patient. Protocol calls for 5-8 minute warm up period, followed by bouts of high intensity exercise (OMNI scale 8-9) for 1-2 minutes; and bouts of low to moderate intensity exercise (OMNI scale 3-5) for 3 minutes. Repeat 4 to 6 times, followed by 5 minute cool down. It is expected that patient stay within the hemodynamic guidelines set forth in the exercise prescription policy. Initial exercise workload increase can be above policy guidelines but any subsequent increases will continue to be within guidelines outlined by our exercise prescription procedure. Any requests to exceed target heart rate guidelines will continue to go through patient s care team. Program medical director will review and sign off prior to initiating protocol.     Recommended Home Exercise   Type of Exercise Walking   Frequency (days per week) daily   Duration (minutes per session) 15-30 min;30-45 min   Effort Rating Recommended 4-6/10   30 Day Exercise Plan initiate a home walking program   Current Home Exercise   Type of Exercise None   Follow-up/On-going Support   Provider follow-up needed on the following Other (see comments)  (leg cramping)   Learning Assessment   Learner Patient   Primary Language English   Preferred Learning  Style Listening;Reading   Barriers to Learning No barriers noted   Patient Education   Education recommended Anatomy and Physiology of the Heart;Exercise Principles   Education Comments Pt has not attended education but has been encouraged   Physician cosignature/electronic signature indicates approval of this ITP document. I have established, reviewed and made necessary changes to the individualized treatment plan and exercise prescription for this patient.

## 2017-10-23 ENCOUNTER — HOSPITAL ENCOUNTER (OUTPATIENT)
Dept: CARDIAC REHAB | Facility: CLINIC | Age: 57
End: 2017-10-23
Attending: SURGERY
Payer: COMMERCIAL

## 2017-10-23 PROCEDURE — 93798 PHYS/QHP OP CAR RHAB W/ECG: CPT | Performed by: REHABILITATION PRACTITIONER

## 2017-10-23 PROCEDURE — 40000116 ZZH STATISTIC OP CR VISIT: Performed by: REHABILITATION PRACTITIONER

## 2017-10-25 ENCOUNTER — TELEPHONE (OUTPATIENT)
Dept: CARDIOLOGY | Facility: CLINIC | Age: 57
End: 2017-10-25

## 2017-10-25 ENCOUNTER — TELEPHONE (OUTPATIENT)
Dept: ANTICOAGULATION | Facility: OTHER | Age: 57
End: 2017-10-25

## 2017-10-25 ENCOUNTER — HOSPITAL ENCOUNTER (OUTPATIENT)
Dept: CARDIAC REHAB | Facility: CLINIC | Age: 57
End: 2017-10-25
Attending: SURGERY
Payer: COMMERCIAL

## 2017-10-25 ENCOUNTER — ANTICOAGULATION THERAPY VISIT (OUTPATIENT)
Dept: ANTICOAGULATION | Facility: CLINIC | Age: 57
End: 2017-10-25
Payer: COMMERCIAL

## 2017-10-25 ENCOUNTER — DOCUMENTATION ONLY (OUTPATIENT)
Dept: CARDIOLOGY | Facility: CLINIC | Age: 57
End: 2017-10-25

## 2017-10-25 DIAGNOSIS — I48.20 CHRONIC ATRIAL FIBRILLATION (H): Primary | ICD-10-CM

## 2017-10-25 DIAGNOSIS — Z79.01 LONG-TERM (CURRENT) USE OF ANTICOAGULANTS: ICD-10-CM

## 2017-10-25 DIAGNOSIS — I48.91 ATRIAL FIBRILLATION (H): ICD-10-CM

## 2017-10-25 DIAGNOSIS — Z79.01 ANTICOAGULATED ON COUMADIN: ICD-10-CM

## 2017-10-25 DIAGNOSIS — I48.20 CHRONIC ATRIAL FIBRILLATION (H): ICD-10-CM

## 2017-10-25 DIAGNOSIS — Z95.2 S/P MVR (MITRAL VALVE REPLACEMENT): Primary | ICD-10-CM

## 2017-10-25 LAB — INR POINT OF CARE: 3.3 (ref 0.86–1.14)

## 2017-10-25 PROCEDURE — 85610 PROTHROMBIN TIME: CPT | Mod: QW

## 2017-10-25 PROCEDURE — 93798 PHYS/QHP OP CAR RHAB W/ECG: CPT

## 2017-10-25 PROCEDURE — 99207 ZZC NO CHARGE NURSE ONLY: CPT

## 2017-10-25 PROCEDURE — 36416 COLLJ CAPILLARY BLOOD SPEC: CPT

## 2017-10-25 PROCEDURE — 40000116 ZZH STATISTIC OP CR VISIT

## 2017-10-25 NOTE — PROGRESS NOTES
ANTICOAGULATION FOLLOW-UP CLINIC VISIT    Patient Name:  Derek Stover  Date:  10/25/2017  Contact Type:  Face to Face    SUBJECTIVE:     Patient Findings     Positives No Problem Findings           OBJECTIVE    INR Protime   Date Value Ref Range Status   10/25/2017 3.3 (A) 0.86 - 1.14 Final       ASSESSMENT / PLAN  INR assessment THER    Recheck INR In: 2 WEEKS    INR Location Clinic      Anticoagulation Summary as of 10/25/2017     INR goal 2.0-3.0   Today's INR 3.3!   Maintenance plan 5 mg (5 mg x 1) on Mon, Fri; 2.5 mg (5 mg x 0.5) all other days   Full instructions 5 mg on Mon, Fri; 2.5 mg all other days   Weekly total 22.5 mg   No change documented Perla Walden RN   Plan last modified Perla Walden RN (10/18/2017)   Next INR check 11/8/2017   Target end date     Indications   Atrial fibrillation (H) [I48.91]  Atrial fibrillation (H) [I48.91] (Resolved) [I48.91]  Long-term (current) use of anticoagulants [Z79.01] [Z79.01]         Anticoagulation Episode Summary     INR check location     Preferred lab     Send INR reminders to [facility]    Comments       Anticoagulation Care Providers     Provider Role Specialty Phone number    Carlos Archibald DO [facility] Internal Medicine [phone]            See the Encounter Report to view Anticoagulation Flowsheet and Dosing Calendar (Go to Encounters tab in chart review, and find the Anticoagulation Therapy Visit)    Dosage adjustment made based on physician directed care plan.        Perla Walden RN

## 2017-10-25 NOTE — TELEPHONE ENCOUNTER
Pt reports having low blood pressure and at times feeling lightheaded. On 10/10 when he was on therapy his BP beforehand was around 95-65, during therapy it was 104-112/68, and after it dropped down to 82/48. They made him drink two glasses of water, checked it again and it eventually went up to 91. Metoprolol was decreased a couple weeks ago but BP is still low. Please call patient if you want to make any changes to the current medical regimen.,   Perla Walden, RN

## 2017-10-25 NOTE — MR AVS SNAPSHOT
Derek SHERMAN Stover   10/25/2017 1:30 PM   Anticoagulation Therapy Visit    Description:  56 year old male   Provider:  [facility]   Department:  Ph Anticoag           INR as of 10/25/2017     Today's INR 3.3!      Anticoagulation Summary as of 10/25/2017     INR goal 2.0-3.0   Today's INR 3.3!   Full instructions 5 mg on Mon, Fri; 2.5 mg all other days   Next INR check 11/8/2017    Indications   Atrial fibrillation (H) [I48.91]  Atrial fibrillation (H) [I48.91] (Resolved) [I48.91]  Long-term (current) use of anticoagulants [Z79.01] [Z79.01]         Your next Anticoagulation Clinic appointment(s)     Oct 25, 2017  1:30 PM CDT   Anticoagulation Visit with [facility]   [facility] ([address] [address]   [phone]            Nov 08, 2017  1:30 PM CST   Anticoagulation Visit with PH ANTI COAG   [facility] ([address] [address]   [phone]              Contact Numbers     Clinic Number:         October 2017 Details    Sun Mon Tue Wed Thu Fri Sat     1               2               3               4               5               6               7                 8               9               10               11               12               13               14                 15               16               17               18               19               20               21                 22               23               24               25      2.5 mg   See details      26      2.5 mg         27      5 mg         28      2.5 mg           29      2.5 mg         30      5 mg         31      2.5 mg              Date Details   10/25 This INR check               How to take your warfarin dose     To take:  2.5 mg Take 0.5 of a 5 mg tablet.    To take:  5 mg Take 1 of the 5 mg tablets.           November 2017 Details    Sun Mon Tue Wed Thu Fri Sat        1      2.5  mg         2      2.5 mg         3      5 mg         4      2.5 mg           5      2.5 mg         6      5 mg         7      2.5 mg         8            9               10               11                 12               13               14               15               16               17               18                 19               20               21               22               23               24               25                 26               27               28               29               30                  Date Details   No additional details    Date of next INR:  11/8/2017         How to take your warfarin dose     To take:  2.5 mg Take 0.5 of a 5 mg tablet.    To take:  5 mg Take 1 of the 5 mg tablets.

## 2017-10-25 NOTE — TELEPHONE ENCOUNTER
Discussed patient's case with Dr. Leal who recommends discontinuing Lopressor and obtaining holter monitor for evaluation of ventricular rate control. Patient is updated who is in agreement. Will place order for holter monitor. Patient will contact [facility] tomorrow.     Danny Mariee PA-C   10/25/2017  Pager: [phone]

## 2017-10-25 NOTE — PROGRESS NOTES
Dr. Ross,     I saw your patient recently for post-MVR follow up. He received mechanical valve. He also has recent history of chronic afib and is on metoprolol for rate control. When I saw him his BP was low and he had lightheadedness. We decreased metoprolol tartrate to 25 mg BID.    I received a message from cardiac rehab staff yesterday stating the patient continues to have lightheadedness and his BP is 80-90/60-70 with HR's up to 150 on few occasions during peak exercise. I am trying to clarify with the cardiac team to see if patient is still in a fib and if he's had any bleeding issues as his recent INR's have been above 3.5.    I am having patient stop by lab this week to get CBC and BMP for baseline evaluation and having him increase hydration. He is on the lowest dose of metoprolol, so I can't decrease this anymore. He is not any other antihypertensives. Any recommendations? Do we obtain ambulatory monitor and try rhythm control? He has no CAD and large LA.     Thanks!     Danny Mariee PA-C   10/25/2017  Pager: [phone]

## 2017-10-27 ENCOUNTER — HOSPITAL ENCOUNTER (OUTPATIENT)
Dept: CARDIOLOGY | Facility: CLINIC | Age: 57
Discharge: HOME | End: 2017-10-27
Attending: PHYSICIAN ASSISTANT | Admitting: PHYSICIAN ASSISTANT
Payer: COMMERCIAL

## 2017-10-27 ENCOUNTER — HOSPITAL ENCOUNTER (OUTPATIENT)
Dept: CARDIAC REHAB | Facility: CLINIC | Age: 57
End: 2017-10-27
Attending: SURGERY
Payer: COMMERCIAL

## 2017-10-27 DIAGNOSIS — I48.20 CHRONIC ATRIAL FIBRILLATION (H): ICD-10-CM

## 2017-10-27 PROCEDURE — 93227 XTRNL ECG REC<48 HR R&I: CPT | Performed by: INTERNAL MEDICINE

## 2017-10-27 PROCEDURE — 93798 PHYS/QHP OP CAR RHAB W/ECG: CPT

## 2017-10-27 PROCEDURE — 93225 XTRNL ECG REC<48 HRS REC: CPT

## 2017-10-27 PROCEDURE — 40000116 ZZH STATISTIC OP CR VISIT

## 2017-10-30 ENCOUNTER — HOSPITAL ENCOUNTER (OUTPATIENT)
Dept: CARDIAC REHAB | Facility: CLINIC | Age: 57
End: 2017-10-30
Attending: SURGERY
Payer: COMMERCIAL

## 2017-10-30 PROCEDURE — 93798 PHYS/QHP OP CAR RHAB W/ECG: CPT | Performed by: REHABILITATION PRACTITIONER

## 2017-10-30 PROCEDURE — 40000116 ZZH STATISTIC OP CR VISIT: Performed by: REHABILITATION PRACTITIONER

## 2017-10-30 NOTE — PROGRESS NOTES
One option is to d/c metoprolol completely and do a 24 hr holter 2-3 days after stopping metoprolol to see overall ventricular rate. I also recommend getting a post MVR echo.    Thanks  Oni

## 2017-11-01 ENCOUNTER — HOSPITAL ENCOUNTER (OUTPATIENT)
Dept: CARDIAC REHAB | Facility: CLINIC | Age: 57
End: 2017-11-01
Attending: SURGERY
Payer: COMMERCIAL

## 2017-11-01 PROCEDURE — 40000116 ZZH STATISTIC OP CR VISIT

## 2017-11-01 PROCEDURE — 93798 PHYS/QHP OP CAR RHAB W/ECG: CPT

## 2017-11-06 ENCOUNTER — HOSPITAL ENCOUNTER (OUTPATIENT)
Dept: CARDIAC REHAB | Facility: CLINIC | Age: 57
End: 2017-11-06
Attending: SURGERY
Payer: COMMERCIAL

## 2017-11-06 PROCEDURE — 93798 PHYS/QHP OP CAR RHAB W/ECG: CPT | Performed by: REHABILITATION PRACTITIONER

## 2017-11-06 PROCEDURE — 40000116 ZZH STATISTIC OP CR VISIT: Performed by: REHABILITATION PRACTITIONER

## 2017-11-07 ENCOUNTER — HOSPITAL ENCOUNTER (OUTPATIENT)
Dept: CARDIOLOGY | Facility: CLINIC | Age: 57
Discharge: HOME | End: 2017-11-07
Attending: PHYSICIAN ASSISTANT | Admitting: PHYSICIAN ASSISTANT
Payer: COMMERCIAL

## 2017-11-07 DIAGNOSIS — E78.5 HYPERLIPIDEMIA LDL GOAL <100: ICD-10-CM

## 2017-11-07 DIAGNOSIS — Z95.2 S/P MVR (MITRAL VALVE REPLACEMENT): ICD-10-CM

## 2017-11-07 DIAGNOSIS — Z87.891 PERSONAL HISTORY OF TOBACCO USE, PRESENTING HAZARDS TO HEALTH: ICD-10-CM

## 2017-11-07 LAB
ALT SERPL W P-5'-P-CCNC: 69 U/L (ref 0–70)
ANION GAP SERPL CALCULATED.3IONS-SCNC: 7 MMOL/L (ref 3–14)
BUN SERPL-MCNC: 19 MG/DL (ref 7–30)
CALCIUM SERPL-MCNC: 8.4 MG/DL (ref 8.5–10.1)
CHLORIDE SERPL-SCNC: 105 MMOL/L (ref 94–109)
CHOLEST SERPL-MCNC: 234 MG/DL
CO2 SERPL-SCNC: 28 MMOL/L (ref 20–32)
CREAT SERPL-MCNC: 1.03 MG/DL (ref 0.66–1.25)
ERYTHROCYTE [DISTWIDTH] IN BLOOD BY AUTOMATED COUNT: 15.2 % (ref 10–15)
GFR SERPL CREATININE-BSD FRML MDRD: 75 ML/MIN/1.7M2
GLUCOSE SERPL-MCNC: 101 MG/DL (ref 70–99)
HCT VFR BLD AUTO: 40.3 % (ref 40–53)
HDLC SERPL-MCNC: 56 MG/DL
HGB BLD-MCNC: 12.6 G/DL (ref 13.3–17.7)
LDLC SERPL CALC-MCNC: 129 MG/DL
MCH RBC QN AUTO: 29.4 PG (ref 26.5–33)
MCHC RBC AUTO-ENTMCNC: 31.3 G/DL (ref 31.5–36.5)
MCV RBC AUTO: 94 FL (ref 78–100)
NONHDLC SERPL-MCNC: 178 MG/DL
PLATELET # BLD AUTO: 181 10E9/L (ref 150–450)
POTASSIUM SERPL-SCNC: 4 MMOL/L (ref 3.4–5.3)
RBC # BLD AUTO: 4.28 10E12/L (ref 4.4–5.9)
SODIUM SERPL-SCNC: 140 MMOL/L (ref 133–144)
TRIGL SERPL-MCNC: 246 MG/DL
WBC # BLD AUTO: 8.7 10E9/L (ref 4–11)

## 2017-11-07 PROCEDURE — 80061 LIPID PANEL: CPT | Performed by: PHYSICIAN ASSISTANT

## 2017-11-07 PROCEDURE — 84460 ALANINE AMINO (ALT) (SGPT): CPT | Performed by: PHYSICIAN ASSISTANT

## 2017-11-07 PROCEDURE — 36415 COLL VENOUS BLD VENIPUNCTURE: CPT | Performed by: PHYSICIAN ASSISTANT

## 2017-11-07 PROCEDURE — 93306 TTE W/DOPPLER COMPLETE: CPT | Mod: 26 | Performed by: INTERNAL MEDICINE

## 2017-11-07 PROCEDURE — 25500064 ZZH RX 255 OP 636: Performed by: PHYSICIAN ASSISTANT

## 2017-11-07 PROCEDURE — 80048 BASIC METABOLIC PNL TOTAL CA: CPT | Performed by: PHYSICIAN ASSISTANT

## 2017-11-07 PROCEDURE — 85027 COMPLETE CBC AUTOMATED: CPT | Performed by: PHYSICIAN ASSISTANT

## 2017-11-07 PROCEDURE — 40000264 ECHO COMPLETE WITH OPTISON

## 2017-11-07 RX ADMIN — HUMAN ALBUMIN MICROSPHERES AND PERFLUTREN 3 ML: 10; .22 INJECTION, SOLUTION INTRAVENOUS at 10:43

## 2017-11-08 ENCOUNTER — HOSPITAL ENCOUNTER (OUTPATIENT)
Dept: CARDIAC REHAB | Facility: CLINIC | Age: 57
End: 2017-11-08
Attending: SURGERY
Payer: COMMERCIAL

## 2017-11-08 ENCOUNTER — ANTICOAGULATION THERAPY VISIT (OUTPATIENT)
Dept: ANTICOAGULATION | Facility: CLINIC | Age: 57
End: 2017-11-08
Payer: COMMERCIAL

## 2017-11-08 DIAGNOSIS — I48.91 ATRIAL FIBRILLATION (H): ICD-10-CM

## 2017-11-08 DIAGNOSIS — Z79.01 LONG-TERM (CURRENT) USE OF ANTICOAGULANTS: ICD-10-CM

## 2017-11-08 LAB — INR POINT OF CARE: 3.2 (ref 0.86–1.14)

## 2017-11-08 PROCEDURE — 93798 PHYS/QHP OP CAR RHAB W/ECG: CPT

## 2017-11-08 PROCEDURE — 99207 ZZC NO CHARGE NURSE ONLY: CPT

## 2017-11-08 PROCEDURE — 85610 PROTHROMBIN TIME: CPT | Mod: QW

## 2017-11-08 PROCEDURE — 36416 COLLJ CAPILLARY BLOOD SPEC: CPT

## 2017-11-08 PROCEDURE — 40000116 ZZH STATISTIC OP CR VISIT

## 2017-11-08 NOTE — PROGRESS NOTES
ANTICOAGULATION FOLLOW-UP CLINIC VISIT    Patient Name:  Derek Stover  Date:  11/8/2017  Contact Type:  Face to Face    SUBJECTIVE:     Patient Findings     Positives No Problem Findings           OBJECTIVE    INR Protime   Date Value Ref Range Status   11/08/2017 3.2 (A) 0.86 - 1.14 Final       ASSESSMENT / PLAN  INR assessment THER    Recheck INR In: 2 WEEKS    INR Location Clinic      Anticoagulation Summary as of 11/8/2017     INR goal 2.0-3.0   Today's INR 3.2!   Maintenance plan 5 mg (5 mg x 1) on Mon, Fri; 2.5 mg (5 mg x 0.5) all other days   Full instructions 5 mg on Mon, Fri; 2.5 mg all other days   Weekly total 22.5 mg   No change documented Perla Walden RN   Plan last modified Perla Walden RN (10/18/2017)   Next INR check 11/24/2017   Target end date     Indications   Atrial fibrillation (H) [I48.91]  Atrial fibrillation (H) [I48.91] (Resolved) [I48.91]  Long-term (current) use of anticoagulants [Z79.01] [Z79.01]         Anticoagulation Episode Summary     INR check location     Preferred lab     Send INR reminders to [facility]    Comments       Anticoagulation Care Providers     Provider Role Specialty Phone number    Carlos Archibald DO [facility] Internal Medicine [phone]            See the Encounter Report to view Anticoagulation Flowsheet and Dosing Calendar (Go to Encounters tab in chart review, and find the Anticoagulation Therapy Visit)    Dosage adjustment made based on physician directed care plan.        Perla Walden RN

## 2017-11-08 NOTE — MR AVS SNAPSHOT
Derek SHERMAN Stover   11/8/2017 1:30 PM   Anticoagulation Therapy Visit    Description:  56 year old male   Provider:  [facility]   Department:  [facility]           INR as of 11/8/2017     Today's INR 3.2!      Anticoagulation Summary as of 11/8/2017     INR goal 2.0-3.0   Today's INR 3.2!   Full instructions 5 mg on Mon, Fri; 2.5 mg all other days   Next INR check 11/24/2017    Indications   Atrial fibrillation (H) [I48.91]  Atrial fibrillation (H) [I48.91] (Resolved) [I48.91]  Long-term (current) use of anticoagulants [Z79.01] [Z79.01]         Your next Anticoagulation Clinic appointment(s)     Nov 24, 2017  1:30 PM CST   [facility]    [address] [place] [address]   [phone]              Contact Numbers     Clinic Number:         November 2017 Details    Sun Mon Tue Wed Thu Fri Sat        1               2               3               4                 5               6               7               8      2.5 mg   See details      9      2.5 mg         10      5 mg         11      2.5 mg           12      2.5 mg         13      5 mg         14      2.5 mg         15      2.5 mg         16      2.5 mg         17      5 mg         18      2.5 mg           19      2.5 mg         20      5 mg         21      2.5 mg         22      2.5 mg         23      2.5 mg         24            25                 26               27               28               29               30                  Date Details   11/08 This INR check       Date of next INR:  11/24/2017         How to take your warfarin dose     To take:  2.5 mg Take 0.5 of a 5 mg tablet.    To take:  5 mg Take 1 of the 5 mg tablets.

## 2017-11-09 ENCOUNTER — TELEPHONE (OUTPATIENT)
Dept: CARDIOLOGY | Facility: CLINIC | Age: 57
End: 2017-11-09

## 2017-11-09 NOTE — TELEPHONE ENCOUNTER
----- Message from Danny Mariee PA-C sent at 11/8/2017  2:48 PM CST -----  Regarding: FW: BP and HR  Traci,     He was instructed to stop his metoprolol recently. Did he do this? He was supposed to call [facility] for a holter monitor as well. I will cc my nurse team, so they can help arrange this.    He should stop metoprolol and wear holter monitor in 2-3 days after stopping the metoprolol.     Thanks,     Danny Mariee PA-C   11/8/2017  Pager: [phone]        ----- Message -----     From: Traci Bryan TH     Sent: 11/8/2017   2:01 PM       To: Danny Mariee PA-C  Subject: RE: BP and HR                                    Good Afternoon, I just wanted to touch base with you and let you know that Roberto continues to experience dizziness and lightheaded and you can tell this has been bothering him. He reported that last Wednesday he felt like he was going to pass out at [facility].  BP has seemed to improve and today at rest was 100/64.      Thank you for your time  Traci Bryan  ----- Message -----     From: Danny Mariee PA-C     Sent: 10/25/2017  11:13 AM       To: SHITAL Noble  Subject: FW: BP and HR                                    Is he in atrial fibrillation the whole time? Is he having any bleeding issues (blood in stool, urine, nosebleeds, black stool)? We should have him come into our [facility] for some lab work this week. I will place the orders. Please have him call [facility] scheduling.     I will discuss with Dr. Ross to see if we can adjust his medications further. Given his chronic atrial fibrillation he should be on a rate controlling agent such as metoprolol. I will see if Dr. Ross have any ideas. Thanks!     Danny Mariee PA-C   10/25/2017  Pager: [phone]        ----- Message -----     From: Traci Bryan TH     Sent: 10/24/2017  10:29 AM       To: Danny Mariee PA-C, Cynthia  ANUM Arenas  Subject: BP and HR                                        Good monring, I just wanted to touch base with you regarding Roberto.    He has had a few episode on the TDM where is heart rate increased to 150 minutes on the TDM and returned to normal shortly after and has been better since the change to his metpprolol.  I  notify Dr. Renee of this last week but should of contacted you instead.  He continues to complain of lightheadedness and has had low BP at  rest.  Mondays session was 92/64 and 10/18 was 96/70 with a post exercise of 88/58

## 2017-11-09 NOTE — TELEPHONE ENCOUNTER
Attempted to call pt with recommendations from Danny Mariee PA-C, left message for pt to call back. [place] RN

## 2017-11-09 NOTE — TELEPHONE ENCOUNTER
----- Message from Danny Mariee PA-C sent at 11/8/2017  2:48 PM CST -----  Regarding: FW: BP and HR  Traci,     He was instructed to stop his metoprolol recently. Did he do this? He was supposed to call [place] [facility] clinic for a holter monitor as well. I will cc my nurse team, so they can help arrange this.    He should stop metoprolol and wear holter monitor in 2-3 days after stopping the metoprolol.     Thanks,     Danny Mariee PA-C   11/8/2017  Pager: [phone]        ----- Message -----     From: Traci Bryan TH     Sent: 11/8/2017   2:01 PM       To: Danny Mariee PA-C  Subject: RE: BP and HR                                    Good Afternoon, I just wanted to touch base with you and let you know that Roberto continues to experience dizziness and lightheaded and you can tell this has been bothering him. He reported that last Wednesday he felt like he was going to pass out at [facility].  BP has seemed to improve and today at rest was 100/64.      Thank you for your time  Traci Bryan  ----- Message -----     From: Danny Mariee PA-C     Sent: 10/25/2017  11:13 AM       To: SHITAL Noble  Subject: FW: BP and HR                                    Is he in atrial fibrillation the whole time? Is he having any bleeding issues (blood in stool, urine, nosebleeds, black stool)? We should have him come into our [place] [facility] clinic for some lab work this week. I will place the orders. Please have him call [place] scheduling.     I will discuss with Dr. Ross to see if we can adjust his medications further. Given his chronic atrial fibrillation he should be on a rate controlling agent such as metoprolol. I will see if Dr. Ross have any ideas. Thanks!     Danny Mariee PA-C   10/25/2017  Pager: [phone]        ----- Message -----     From: Traci Bryan TH     Sent: 10/24/2017  10:29 AM       To: Danny Mariee PA-C, Cynthia  ANUM Arenas  Subject: BP and HR                                        Good monring, I just wanted to touch base with you regarding Roberto.    He has had a few episode on the TDM where is heart rate increased to 150 minutes on the TDM and returned to normal shortly after and has been better since the change to his metpprolol.  I  notify Dr. Renee of this last week but should of contacted you instead.  He continues to complain of lightheadedness and has had low BP at  rest.  Mondays session was 92/64 and 10/18 was 96/70 with a post exercise of 88/58

## 2017-11-09 NOTE — TELEPHONE ENCOUNTER
Pt called back, informed of recommendations from Danny Mariee PA-C. Pt states he did stop his metoprolol 2 weeks ago. He just finished his Chantix prescription which he states lists lightheadedness as a possible side effect. He states he is only taking a baby ASA and coumadin now.     He did get a Holter monitor showing 100% afib, controlled rate.     Pt states he has apt to see Danny Mariee PA-C 11/22 & sees PCP 11/22.   Will let Danny Mariee PA-C know. LPenfield RN

## 2017-11-10 ENCOUNTER — HOSPITAL ENCOUNTER (OUTPATIENT)
Dept: CARDIAC REHAB | Facility: CLINIC | Age: 57
End: 2017-11-10
Attending: SURGERY
Payer: COMMERCIAL

## 2017-11-10 PROCEDURE — 93798 PHYS/QHP OP CAR RHAB W/ECG: CPT

## 2017-11-10 PROCEDURE — 40000116 ZZH STATISTIC OP CR VISIT

## 2017-11-13 ENCOUNTER — HOSPITAL ENCOUNTER (OUTPATIENT)
Dept: CARDIAC REHAB | Facility: CLINIC | Age: 57
End: 2017-11-13
Attending: SURGERY
Payer: COMMERCIAL

## 2017-11-13 VITALS — WEIGHT: 278 LBS | HEIGHT: 70 IN | BODY MASS INDEX: 39.8 KG/M2

## 2017-11-13 PROCEDURE — 93798 PHYS/QHP OP CAR RHAB W/ECG: CPT | Performed by: REHABILITATION PRACTITIONER

## 2017-11-13 PROCEDURE — 40000116 ZZH STATISTIC OP CR VISIT: Performed by: REHABILITATION PRACTITIONER

## 2017-11-13 ASSESSMENT — 6 MINUTE WALK TEST (6MWT)
TOTAL DISTANCE WALKED (FT): 1080
GENDER SELECTION: MALE
PREDICTED: 1793.44
FEMALE CALC: 1445.38
MALE CALC: 1782.57

## 2017-11-13 NOTE — PROGRESS NOTES
11/13/17 1400   Session   Session 60 Day Individualized Treatment Plan   Certified through this date 12/12/17   Cardiac Rehab Assessment   Cardiac Rehab Assessment Roberto has made good progress in cardiac rehab over the past month. He has c/o lightheadedness with blood pressures remaining low. All hypertensive medications were stopped with minimal change in BP readings or symptoms. Patient finished his Chantix one week ago with relief in symptoms and BP readings. With his successful smoking cessation he has gained weight lover this past month. He discussed this with cardiology who prefers smoking cessation over weight loss.  He has successfully initiated this HIIT protocol with HR reaching the upper limit of age adjusted THR range for this patient in afib. He is planning on returning to work on 12/5/17 and requests discharge from cardiac rehab next week. He is near achieving all goals, with the exception of weight loss, and discharge in appropriate for this patient at this time.    General Information   Treatment Diagnosis Valve Replacement   Date of Treatment Diagnosis 09/12/17   Significant Past CV History Chronic AF   Comorbidities None   Lead up symptoms dyspnea, irregular HR   Hospital Location [facility]   Hospital Discharge Date 09/17/17   Signs and Symptoms Post Hospital Discharge SOB;Lightheadedness;Appetite   Outpatient Cardiac Rehab Start Date 09/20/17   Primary Physician Dr. Renee   Primary Physician Follow Up Scheduled   Surgeon Dr. Marie   Surgeon Follow Up Scheduled   Cardiologist Dr. Ross   Cardiologist Follow Up Scheduled   Ejection Fraction 55%   Risk Stratification Low   Summary of Cath Report   Summary of Cath Report No Significant CAD   Living and Work Status    Living Arrangements and Social Status house;spouse   Support System Live with an adult   Return to Employment Yes   Occupation    Preventative Medications   CMS recommended medications Anticoagulants;Beta Blocker   Falls  "Screen   Have you fallen two or more times in the past year? No   Have you fallen and had an injury in the past year? No   Referral Initiated to Physical Therapy No   Pain   Patient Currently in Pain No   Pain Location chest incisions   Pain Rating 1/10   Pain Description Ache;Discomfort   Physical Assessments   Incisions Not assessed   Edema None   Right Lung Sounds normal   Left Lung Sounds normal   Individualized Treatment Plan   Monitored Sessions Scheduled 24   Monitored Sessions Attended 21   Oxygen   Supplemental Oxygen needed No   Nutrition Management - Weight Management   Assessment Re-assessment   Age 56   Weight 126.1 kg (278 lb)   Height 1.778 m (5' 10\")   BMI (Calculated) 39.97   Goal Weight 108.9 kg (240 lb)   Initial Rate Your Plate Score. Dietary tool to assess eating patterns. Scores range from 24 to 72. The higher the score the healthier the eating pattern. 34   Nutrition Management - Lipids   Lipids Labs Not Available   Nutrition Management - Diabetes   Diabetes No   Nutrition Management Summary   Dietary Recommendations None   Stages of Change for Diet Compliance Pre-Contemplation   Interventions Planned Attend Nutrition Education Class(es);Educate on Weight Management Principles;Educate on Benefits of Exercise   Patient Goals Goal #1   Goal #1 Description Patient will lose 1# per week.  10/18 Pt will maintain current weight of 264#   Goal #1 Target Date 11/01/17   Goal #1 Progress Towards Goal 9/20 Patient will combine calorie reduction and increased activity to achieve a 500 kcal/day calorie deficit.  10/18 Pt has lost 10# frm being DC from the hospital 11/13 Weight gain over last month because of successful smoking cessation. Cardiologist prefers smoking cessation to weight loss.   Psychosocial Management   Psychosocial Assessment Re-assessment   Is there history of clinical depression or increased risk of depression? No previous history   Current Level of Stress per Patient Report Denies "   Current Coping Skills Has Positive Support System   Initial Patient Health Questionnaire -9 Score (PHQ-9) for depression. 5-9 Minimal symptoms, 10-14 Minor depression, 15-19 Major depression, moderately severe, > 20 Major depression, severe  7   Initial [facility] Survey score.  Quality of Life:   If total score > 25 review individual areas where patient rated a 4 or 5.  Consider patients current medical condition and what role that plays on the score.   Adjust treatment protocol to improve areas of concern.  Consider the following:  PHQ9 score, DASI, and re-assessment within the next 30 days to assist with developing treatments.  26   Stages of Change Action   Psychosocial Comments Pt continues to deny having stress    Other Core Components - Hypertension   History of or Diagnosis of Hypertension No   Currently taking Anti-Hypertensives Yes;Beta blocker   Other Core Components - Tobacco   History of Tobacco Use Yes   Quit Date or Planned Quit Date 08/01/17   Tobacco Use Status Recent (Quit < 6 mo ago)   Tobacco Habit Cigarettes   Tobacco Use per Day (average) 2   Years of Tobacco Use 40   Stages of Change Preparation   Tobacco Comments using chantix   Other Core Components Summary   Interventions Planned List benefits of weight management;Continue to assess readiness to change and implement appropriate process(es) of change;Develop strategies to increase confidence to quit smoking;Check-in regularly, offer support to prevent risk of relapse   Patient Goals Yes   Goal #1 Description Patient will be successfull with complete smoking cessation   Goal #1 Target Date 10/20/17   Goal #1 Date Met 11/13/17   Goal #1 Progress Towards Goal 9/20 will complete Chantix in 3 weeks and remain successfull with smoking cessation after that.  10/18 Pt continues to remain smoke free 11/13 smoke free for 1 month   Activity/Exercise History   Activity/Exercise Assessment Re-assessment   Activity/Exercise Status prior to event?  Was Physically Active   Number of Days Currently participating in Moderate Physical Activity? 3-4   Number of Days Currently performing  Aerobic Exercise (including rehab)? 3  (3)   Number of Minutes per Session Currently of Aerobic Exercise (average)? 0   Current Stage of Change (Physical Activity) Preparation   Current Stage of Change (Aerobic Exercise) Contemplation   Patient Goals Goal #1;Goal #2   Goal #1 Description Patient will be able to return to work as a , Estimated work: 3-4 METs and lifting up to 20#   Goal #1 Target Date 12/06/17   Goal #1 Progress Towards Goal 9/20 Will attend cardiac rehab 2-3 times weekly to increase aerobic capacity and maintain muscle mass with low weight resistance training.  Pt is currenlt at 4.1 METs and will be starting HIIT next week.  He has not returned to work at this time. 11/13 planning on returning to work on 12/5 when lifting restrictions are done completely. Is currently lifting 15# each.    Goal #2 Description Patient will be able to participate in recreational activities, including hunting (no shooting this year) and bowling   Goal #2 Target Date 12/06/17   Goal #2 Date Met 11/13/17   Goal #2 Progress Towards Goal 9/20 Will attend cardiac rehab 2-3 times weekly to increase aerobic capacity and maintain muscle mass with low weight resistance training.  10/18 Pt has been engaging in RT and is at 4.1 METs he has not went bowling as of yet.   Exercise Assessment   6 Minute Walk Predicted - Gender Selection Male   6 Minute Walk Predicted (Male) 1782.57   6 Minute Walk Predicted (Female) 1445.38   Initial 6 Minute Walk Distance (Feet) 1080 ft   Resting HR 83 bpm   Exercise  bpm   Resting /62   Exercise /64   Effort Rating 8   Current MET Level 6.1   MET Level Goal 6+   ECG Rhythm Atrial flutter   Ectopy None   Current Symptoms Denies symptoms   Limitations/Restrictions Sternal Precautions   Exercise Prescription   Mode  Treadmill;Nustep;Weights   Duration/Time 30-45 min;15-30 min   Frequency 3 daysweek   THR (85% of age predicted max HR) 141.1   OMNI Effort Rating (0-10 Scale) 4-6/10   Progression Continuous bouts;Total exercise time of 30-45 minutes;Aerobic exercise to OMNI rating of 5-7, and heart rate at or below target;Progress peak intensity by 1/2 MET per week;Progress per high intensity interval training (HIIT) program   Recommended Home Exercise   Type of Exercise Walking   Frequency (days per week) daily   Duration (minutes per session) 30-45 min   Effort Rating Recommended 4-6/10   Current Home Exercise   Type of Exercise None   Follow-up/On-going Support   Provider follow-up needed on the following Other (see comments)  (leg cramping)   Learning Assessment   Learner Patient   Primary Language English   Preferred Learning Style Listening;Reading   Barriers to Learning No barriers noted   Patient Education   Education recommended Anatomy and Physiology of the Heart;Exercise Principles   Education classes attended Nutrition;Anatomy and Physiology of the Heart     Physician cosignature/electronic signature indicates approval of this ITP document. I have established, reviewed and made necessary changes to the individualized treatment plan and exercise prescription for this patient.

## 2017-11-14 ENCOUNTER — OFFICE VISIT (OUTPATIENT)
Dept: CARDIOLOGY | Facility: CLINIC | Age: 57
End: 2017-11-14
Payer: COMMERCIAL

## 2017-11-14 ENCOUNTER — HOSPITAL ENCOUNTER (OUTPATIENT)
Dept: CARDIOLOGY | Facility: CLINIC | Age: 57
Discharge: HOME | End: 2017-11-14
Attending: PHYSICIAN ASSISTANT | Admitting: PHYSICIAN ASSISTANT
Payer: COMMERCIAL

## 2017-11-14 VITALS
SYSTOLIC BLOOD PRESSURE: 100 MMHG | DIASTOLIC BLOOD PRESSURE: 72 MMHG | HEART RATE: 88 BPM | OXYGEN SATURATION: 95 % | BODY MASS INDEX: 40.29 KG/M2 | WEIGHT: 281.4 LBS | HEIGHT: 70 IN

## 2017-11-14 DIAGNOSIS — E78.5 HYPERLIPIDEMIA LDL GOAL <100: ICD-10-CM

## 2017-11-14 DIAGNOSIS — I48.91 ATRIAL FIBRILLATION, UNSPECIFIED TYPE (H): Primary | ICD-10-CM

## 2017-11-14 DIAGNOSIS — Z87.891 PERSONAL HISTORY OF TOBACCO USE, PRESENTING HAZARDS TO HEALTH: ICD-10-CM

## 2017-11-14 DIAGNOSIS — Z95.2 S/P MVR (MITRAL VALVE REPLACEMENT): ICD-10-CM

## 2017-11-14 PROCEDURE — 93005 ELECTROCARDIOGRAM TRACING: CPT

## 2017-11-14 PROCEDURE — 93010 ELECTROCARDIOGRAM REPORT: CPT | Performed by: PHYSICIAN ASSISTANT

## 2017-11-14 PROCEDURE — 99214 OFFICE O/P EST MOD 30 MIN: CPT | Performed by: PHYSICIAN ASSISTANT

## 2017-11-14 RX ORDER — ATORVASTATIN CALCIUM 20 MG/1
20 TABLET, FILM COATED ORAL DAILY
Qty: 30 TABLET | Refills: 3 | Status: SHIPPED | OUTPATIENT
Start: 2017-11-14 | End: 2018-02-16

## 2017-11-14 NOTE — MR AVS SNAPSHOT
After Visit Summary   11/14/2017    Derek Stover    MRN: [id number]           Patient Information     Date Of Birth          1960        Visit Information        Provider Department      11/14/2017 10:45 AM Danny Mariee PA-C [facility]        Today's Diagnoses     Atrial fibrillation, unspecified type (H)    -  1    Personal history of tobacco use, presenting hazards to health        Hyperlipidemia LDL goal <100        S/P MVR (mitral valve replacement)          Care Instructions    Today's Plan:   1) Start Atorvastatin- take one tablet every evening.   2) Come back for fasting blood work in 2 months.   3) I will discuss your case with Dr. Ross to see if you can return to work on December 9th.     If you have questions or concerns please call clinic at [phone].    Please call [phone] for scheduling.       It was a pleasure seeing you today!     Reminder: Please bring in all current medications, over the counter supplements and vitamin bottles to your next appointment.    Danny Mariee  11/14/2017            Follow-ups after your visit        Your next 10 appointments already scheduled     Nov 14, 2017 12:00 PM CST   Follow Up with NL STRESS TEST, PMC RM1   [facility] ([facility])    [address] [place] [id number] [phone]            Nov 15, 2017  2:00 PM CST   Cardiac Treatment with Traci Bryan [facility] ([facility])    [address] [place] [address] Dr Fabiola BURNETT [id number] [phone]            Nov 17, 2017  2:00 PM CST   Cardiac Treatment with Traci Bryan [facility] ([facility])    [address] [place] [address] Dr Fabiola BURNETT [address] [phone]            Nov 20, 2017  2:00 PM CST   Cardiac Treatment with ANUM Seaman   [facility] ([place]  [facility])    [address] [place] [address]   [phone]            Nov 22, 2017  2:30 PM CST   Office Visit with Osbaldo Renee MD   [facility]    [address] [facility] [id number] [phone]           Bring a current list of meds and any records pertaining to this visit. For Physicals, please bring immunization records and any forms needing to be filled out. Please arrive 10 minutes early to complete paperwork.            Nov 22, 2017  3:00 PM CST   Cardiac Discharge with SHITAL Noble   [facility] ([facility])    [address] [place] [address]   [phone]            Nov 24, 2017  1:30 PM CST   Anticoagulation Visit with PH ANTI COASAMEER   [facility]    [address] [address] [phone]              Future tests that were ordered for you today     Open Future Orders        Priority Expected Expires Ordered    Lipid Profile Routine 2/12/2018 11/14/2018 11/14/2017    ALT Routine 2/12/2018 11/14/2018 11/14/2017            Who to contact     If you have questions or need follow up information about today's clinic visit or your schedule please contact [facility] directly at [phone].  Normal or non-critical lab and imaging results will be communicated to you by MyChart, letter or phone within 4 business days after the clinic has received the results. If you do not hear from us within 7 days, please contact the clinic through [facility] or phone. If you have a critical or abnormal lab result, we will notify you by phone as soon as possible.  Submit refill requests through [email or web address] or call your pharmacy and they will forward the refill request to us. Please allow 3 business days for your refill to be completed.          Additional Information About Your Visit        [facility]  "Information     [facility] gives you secure access to your electronic health record. If you see a primary care provider, you can also send messages to your care team and make appointments. If you have questions, please call your primary care clinic.  If you do not have a primary care provider, please call [phone] and they will assist you.        Care EveryWhere ID     This is your Care EveryWhere ID. This could be used by other organizations to access your [facility] medical records  [id number]        Your Vitals Were     Pulse Height Pulse Oximetry BMI (Body Mass Index)          88 1.778 m (5' 10\") 95% 40.38 kg/m2         Blood Pressure from Last 3 Encounters:   11/14/17 100/72   10/10/17 (!) 88/50   10/02/17 103/67    Weight from Last 3 Encounters:   11/14/17 127.6 kg (281 lb 6.4 oz)   11/13/17 126.1 kg (278 lb)   10/18/17 119.7 kg (264 lb)              We Performed the Following     EKG 12-LEAD CLINIC READ     Follow-Up with Cardiac Advanced Practice Provider          Today's Medication Changes          These changes are accurate as of: 11/14/17 11:14 AM.  If you have any questions, ask your nurse or doctor.               Start taking these medicines.        Dose/Directions    atorvastatin 20 MG tablet   Commonly known as:  LIPITOR   Used for:  Personal history of tobacco use, presenting hazards to health, Hyperlipidemia LDL goal <100, S/P MVR (mitral valve replacement), Atrial fibrillation, unspecified type (H)        Dose:  20 mg   Take 1 tablet (20 mg) by mouth daily   Quantity:  30 tablet   Refills:  3            Where to get your medicines      These medications were sent to [id number] - [place], [place] - [address] [place] [address]   [address] , [facility] [address]     Phone:  [phone]     atorvastatin 20 MG tablet                Primary Care Provider Office Phone # Fax #    Osbaldo Renee [phone]       [address] [facility] [address]        Equal Access to Services     [facility] " GAAR : Hadii aad ku hadhaileeo Sosravaniali, waaxda luqadaha, qaybta kaalmada adealex, malissa jeannein hayaafaustino ramsey karijamila ramsey . So [facility] [phone].    ATENCIÓN: Si habla español, tiene a macias disposición servicios gratuitos de asistencia lingüística. Llame al [phone].    We comply with applicable federal civil rights laws and [place] laws. We do not discriminate on the basis of race, color, national origin, age, disability, sex, sexual orientation, or gender identity.            Thank you!     Thank you for choosing [facility]  for your care. Our goal is always to provide you with excellent care. Hearing back from our patients is one way we can continue to improve our services. Please take a few minutes to complete the written survey that you may receive in the mail after your visit with us. Thank you!             Your Updated Medication List - Protect others around you: Learn how to safely use, store and throw away your medicines at www.disposemymeds.org.          This list is accurate as of: 11/14/17 11:14 AM.  Always use your most recent med list.                   Brand Name Dispense Instructions for use Diagnosis    acetaminophen 325 MG tablet    TYLENOL    100 tablet    Take 2 tablets (650 mg) by mouth every 4 hours as needed for other (surgical pain)    S/P mitral valve replacement with metallic valve       AMOXIL PO      Take 1,500-3,000 mg by mouth daily as needed (patient takes 6 X 500 = 3,000 mg dose 1 hour before dental appointment and 3 X 500 = 1,500 mg dose 6 hours after dental appointment.)        aspirin 81 MG EC tablet     30 tablet    Take 1 tablet (81 mg) by mouth daily    S/P mitral valve replacement with metallic valve       atorvastatin 20 MG tablet    LIPITOR    30 tablet    Take 1 tablet (20 mg) by mouth daily    Personal history of tobacco use, presenting hazards to health, Hyperlipidemia LDL goal <100, S/P MVR (mitral valve replacement),  Atrial fibrillation, unspecified type (H)       IBUPROFEN PO           warfarin 5 MG tablet    COUMADIN    60 tablet    Take 5 mg Mon, Fri and 2.5 mg all other days, or as directed by the coumadin clinic    Long-term (current) use of anticoagulants, Chronic atrial fibrillation (H), Anticoagulated on Coumadin

## 2017-11-14 NOTE — LETTER
11/14/2017      RE: Derek Stover  [id number] [facility] [id number]       Dear Colleague,    Thank you for the opportunity to participate in the care of your patient, Derek Stover, at the [facility]. Please see a copy of my visit note below.    History of Present Illness:   This is a very pleasant 56-year-old gentleman who presents at [place] [facility] for 1 month follow-up.  His past medical history is notable for recent mitral valve replacement (9/12/2017), receiving a 33 mm [employer] mechanical valve.  He had a smooth postoperative course without any complications.    Rest of his cardiac history includes chronic atrial fibrillation, hyperlipidemia, and recent tobacco abuse (quit 2 months ago).  He was on metoprolol for management of his chronic atrial fibrillation but this was discontinued as patient had significant lightheadedness.  This was discussed with EP team as well as with Dr. Ross and patient was recommended to continue with without AV node blocking agent.  He was placed on a 24-hour Holter monitor subsequently which showed chronic atrial fibrillation with controlled ventricular rates.  The patient states that he obtained his Holter monitor before having to cardiac rehabilitation as he wanted to make sure we document his heart rates during exercise.  He was also set up for post MVR echocardiogram for evaluation of the gradients. His lightheadedness improved with the discontinuation of metoprolol and resolved when he was done with Chantix.     He returns to clinic feeling well. He has no concerns or symptoms. He denies palpitations, lightheadedness, SOB, CP, PND, orthopnea, or peripheral edema. He denies bleeding issues. He continues to be on Coumadin and Aspirin.     He wonders if he can return to work on 12/9/2017.  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  Crisp valve sounds.  No murmur rub or gallop.  Abdomen-soft nontender  Extremities-no edema      Assessment and plan:  This is a pleasant 56-year-old gentleman who presents to [place] [facility] for follow-up.  He underwent mitral valve replacement on 9/12/2017 receiving a mechanical valve.  His postoperative echocardiogram shows normal gradient across the mitral valve.  There is no regurgitation.  He continues to be on Coumadin.  He gets his INR levels checked frequently.      He is on appropriate medication regimen with the exception of statin therapy.  His pre-surgery coronary angiogram did not show coronary artery disease but he does have cardiac risk factors.  I discussed this with Dr. Ross who was in agreement.  We will start him on atorvastatin 20 mg daily and repeat lipid panel and ALT in 2 months.    His Holter monitor demonstrates good ventricular rate control.  I also discussed this result with Dr. ROSS who did not recommend addition of an alternative AV node blocking agent.  The patient appears to be asymptomatic from this.  We will continue with the current regimen.    His weight is noted to be up by almost 20 pounds since our last office visit.  The patient states that since he quit smoking his been eating a lot.  We talked about importance of healthy diet and exercise.  He denies symptoms suggests CHF.    His basic metabolic panel and hemoglobin are normal.  His lipid panel demonstrates LDL of 129, total cholesterol 234, and triglycerides 246.  We will recheck these in 2 months.    I reiterated importance of SBE prophylaxis.    In regards to him returning work I will discuss this with Dr. ROSS.  I will send a work letter.    Thank you for allowing me to participate in the care of this patient today.    This note was  completed in part using Dragon voice recognition software. Although reviewed after completion, some word and grammatical errors may occur.    Orders this Visit:  Orders Placed This Encounter   Procedures     Lipid Profile     ALT     EKG 12-LEAD CLINIC READ     Orders Placed This Encounter   Medications     IBUPROFEN PO     atorvastatin (LIPITOR) 20 MG tablet     Sig: Take 1 tablet (20 mg) by mouth daily     Dispense:  30 tablet     Refill:  3     Medications Discontinued During This Encounter   Medication Reason     Varenicline Tartrate (CHANTIX PO) Therapy completed     senna-docusate (SENOKOT-S;PERICOLACE) 8.6-50 MG per tablet Therapy completed     oxyCODONE (ROXICODONE) 5 MG IR tablet Therapy completed     metoprolol (LOPRESSOR) 50 MG tablet Stopped by Patient         Encounter Diagnoses   Name Primary?     Personal history of tobacco use, presenting hazards to health      Hyperlipidemia LDL goal <100      S/P MVR (mitral valve replacement)      Atrial fibrillation, unspecified type (H) Yes       CURRENT MEDICATIONS:  Current Outpatient Prescriptions   Medication Sig Dispense Refill     IBUPROFEN PO        atorvastatin (LIPITOR) 20 MG tablet Take 1 tablet (20 mg) by mouth daily 30 tablet 3     warfarin (COUMADIN) 5 MG tablet Take 5 mg Mon, Fri and 2.5 mg all other days, or as directed by the coumadin clinic 60 tablet 1     acetaminophen (TYLENOL) 325 MG tablet Take 2 tablets (650 mg) by mouth every 4 hours as needed for other (surgical pain) 100 tablet 0     aspirin EC 81 MG EC tablet Take 1 tablet (81 mg) by mouth daily 30 tablet 0     Amoxicillin (AMOXIL PO) Take 1,500-3,000 mg by mouth daily as needed (patient takes 6 X 500 = 3,000 mg dose 1 hour before dental appointment and 3 X 500 = 1,500 mg dose 6 hours after dental appointment.)         ALLERGIES     Allergies   Allergen Reactions     No Known Drug Allergy        PAST MEDICAL HISTORY:  Past Medical History:   Diagnosis Date     Atrial fibrillation (H)      Valvular Afib.  Diagnosed 06/2017. Pt initiated on coumadin 7/18/17     Erectile dysfunction      Heart murmur     rheumatic fever as a child     Hyperlipidemia      Mitral regurgitation     rheumatic fever as a child. 3-4+ per MITCHELL 7/25/17     Tobacco abuse        PAST SURGICAL HISTORY:  Past Surgical History:   Procedure Laterality Date     COLONOSCOPY N/A 9/8/2014    Procedure: COMBINED COLONOSCOPY, SINGLE BIOPSY/POLYPECTOMY BY BIOPSY;  Surgeon: Kai Bailey MD;  Location:  GI     ORTHOPEDIC SURGERY      RIght knee scope     REPLACE VALVE MITRAL N/A 9/12/2017    Procedure: REPLACE VALVE MITRAL;  MITRAL VALVE REPLACEMENT  [facility] Masters Series Mechanical Heart Valve 33mm  Ref, 33MJ-501 Serial [id number]   ON PUMP, MITCHELL;  Surgeon: Ivana Marie MD;  Location:  OR       FAMILY HISTORY:  Family History   Problem Relation Age of Onset     DIABETES Mother      Obesity Mother      DIABETES Father        SOCIAL HISTORY:  Social History     Social History     Marital status:      Spouse name: N/A     Number of children: N/A     Years of education: N/A     Social History Main Topics     Smoking status: Former Smoker     Packs/day: 2.00     Years: 39.00     Types: Cigarettes     Quit date: 8/1/2017     Smokeless tobacco: Never Used     Alcohol use 2.4 oz/week     4 Standard drinks or equivalent per week      Comment: occasional     Drug use: No     Sexual activity: Yes     Partners: Female     Other Topics Concern     Parent/Sibling W/ Cabg, Mi Or Angioplasty Before 65f 55m? No     Social History Narrative       Review of Systems:  Skin:        Eyes:  Positive for glasses  ENT:  Negative    Respiratory:  Negative    Cardiovascular:  Negative for;palpitations;lightheadedness;dizziness;edema Positive for  Gastroenterology: Negative    Genitourinary:  Negative    Musculoskeletal:  Positive for back pain  Neurologic:  Negative    Psychiatric:  Positive for sleep disturbances  Heme/Lymph/Imm:  Negative   "  Endocrine:  Negative      Physical Exam:  Vitals: /72 (BP Location: Right arm, Patient Position: Fowlers, Cuff Size: Adult Large)  Pulse 88  Ht 1.778 m (5' 10\")  Wt 127.6 kg (281 lb 6.4 oz)  SpO2 95%  BMI 40.38 kg/m2   Please refer to dictation for physical exam    Recent Lab Results:  LIPID RESULTS:  Lab Results   Component Value Date    CHOL 234 (H) 11/07/2017    HDL 56 11/07/2017     (H) 11/07/2017    TRIG 246 (H) 11/07/2017    CHOLHDLRATIO 5.3 (H) 08/13/2014       LIVER ENZYME RESULTS:  Lab Results   Component Value Date    AST 52 (H) 09/12/2017    ALT 69 11/07/2017       CBC RESULTS:  Lab Results   Component Value Date    WBC 8.7 11/07/2017    RBC 4.28 (L) 11/07/2017    HGB 12.6 (L) 11/07/2017    HCT 40.3 11/07/2017    MCV 94 11/07/2017    MCH 29.4 11/07/2017    MCHC 31.3 (L) 11/07/2017    RDW 15.2 (H) 11/07/2017     11/07/2017       BMP RESULTS:  Lab Results   Component Value Date     11/07/2017    POTASSIUM 4.0 11/07/2017    CHLORIDE 105 11/07/2017    CO2 28 11/07/2017    ANIONGAP 7 11/07/2017     (H) 11/07/2017    BUN 19 11/07/2017    CR 1.03 11/07/2017    GFRESTIMATED 75 11/07/2017    GFRESTBLACK >90 11/07/2017    FIORELLA 8.4 (L) 11/07/2017        A1C RESULTS:  Lab Results   Component Value Date    A1C 6.1 (H) 09/13/2017       INR RESULTS:  Lab Results   Component Value Date    INR 3.2 (A) 11/08/2017    INR 3.3 (A) 10/25/2017    INR 2.10 (H) 09/17/2017    INR 1.96 (H) 09/16/2017           Danny Mariee PA-C   November 14, 2017       Please do not hesitate to contact me if you have any questions/concerns.     Sincerely,     Danny Mariee PA-C    "

## 2017-11-14 NOTE — PATIENT INSTRUCTIONS
Today's Plan:   1) Start Atorvastatin- take one tablet every evening.   2) Come back for fasting blood work in 2 months.   3) I will discuss your case with Dr. Ross to see if you can return to work on December 9th.     If you have questions or concerns please call clinic at [phone].    Please call [phone] for scheduling.       It was a pleasure seeing you today!     Reminder: Please bring in all current medications, over the counter supplements and vitamin bottles to your next appointment.    Danny Mariee  11/14/2017

## 2017-11-14 NOTE — PROGRESS NOTES
History of Present Illness:   This is a very pleasant 56-year-old gentleman who presents at [place] [facility] for 1 month follow-up.  His past medical history is notable for recent mitral valve replacement (9/12/2017), receiving a 33 mm [employer] [place] mechanical valve.  He had a smooth postoperative course without any complications.    Rest of his cardiac history includes chronic atrial fibrillation, hyperlipidemia, and recent tobacco abuse (quit 2 months ago).  He was on metoprolol for management of his chronic atrial fibrillation but this was discontinued as patient had significant lightheadedness.  This was discussed with EP team as well as with Dr. Ross and patient was recommended to continue with without AV node blocking agent.  He was placed on a 24-hour Holter monitor subsequently which showed chronic atrial fibrillation with controlled ventricular rates.  The patient states that he obtained his Holter monitor before having to cardiac rehabilitation as he wanted to make sure we document his heart rates during exercise.  He was also set up for post MVR echocardiogram for evaluation of the gradients. His lightheadedness improved with the discontinuation of metoprolol and resolved when he was done with Chantix.     He returns to clinic feeling well. He has no concerns or symptoms. He denies palpitations, lightheadedness, SOB, CP, PND, orthopnea, or peripheral edema. He denies bleeding issues. He continues to be on Coumadin and Aspirin.     He wonders if he can return to work on 12/9/2017.  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  Crisp valve sounds.  No murmur rub or gallop.  Abdomen-soft nontender  Extremities-no edema      Assessment and plan:  This is a pleasant 56-year-old gentleman who presents to [place]  [facility] for follow-up.  He underwent mitral valve replacement on 9/12/2017 receiving a mechanical valve.  His postoperative echocardiogram shows normal gradient across the mitral valve.  There is no regurgitation.  He continues to be on Coumadin.  He gets his INR levels checked frequently.      He is on appropriate medication regimen with the exception of statin therapy.  His pre-surgery coronary angiogram did not show coronary artery disease but he does have cardiac risk factors.  I discussed this with Dr. Ross who was in agreement.  We will start him on atorvastatin 20 mg daily and repeat lipid panel and ALT in 2 months.    His Holter monitor demonstrates good ventricular rate control.  I also discussed this result with Dr. ROSS who did not recommend addition of an alternative AV node blocking agent.  The patient appears to be asymptomatic from this.  We will continue with the current regimen.    His weight is noted to be up by almost 20 pounds since our last office visit.  The patient states that since he quit smoking his been eating a lot.  We talked about importance of healthy diet and exercise.  He denies symptoms suggests CHF.    His basic metabolic panel and hemoglobin are normal.  His lipid panel demonstrates LDL of 129, total cholesterol 234, and triglycerides 246.  We will recheck these in 2 months.    I reiterated importance of SBE prophylaxis.    In regards to him returning work I will discuss this with Dr. ROSS.  I will send a work letter.    Thank you for allowing me to participate in the care of this patient today.    This note was completed in part using Dragon voice recognition software. Although reviewed after completion, some word and grammatical errors may occur.    Orders this Visit:  Orders Placed This Encounter   Procedures     Lipid Profile     ALT     EKG 12-LEAD CLINIC READ     Orders Placed This Encounter   Medications     IBUPROFEN PO     atorvastatin (LIPITOR) 20 MG tablet      Sig: Take 1 tablet (20 mg) by mouth daily     Dispense:  30 tablet     Refill:  3     Medications Discontinued During This Encounter   Medication Reason     Varenicline Tartrate (CHANTIX PO) Therapy completed     senna-docusate (SENOKOT-S;PERICOLACE) 8.6-50 MG per tablet Therapy completed     oxyCODONE (ROXICODONE) 5 MG IR tablet Therapy completed     metoprolol (LOPRESSOR) 50 MG tablet Stopped by Patient         Encounter Diagnoses   Name Primary?     Personal history of tobacco use, presenting hazards to health      Hyperlipidemia LDL goal <100      S/P MVR (mitral valve replacement)      Atrial fibrillation, unspecified type (H) Yes       CURRENT MEDICATIONS:  Current Outpatient Prescriptions   Medication Sig Dispense Refill     IBUPROFEN PO        atorvastatin (LIPITOR) 20 MG tablet Take 1 tablet (20 mg) by mouth daily 30 tablet 3     warfarin (COUMADIN) 5 MG tablet Take 5 mg Mon, Fri and 2.5 mg all other days, or as directed by the coumadin clinic 60 tablet 1     acetaminophen (TYLENOL) 325 MG tablet Take 2 tablets (650 mg) by mouth every 4 hours as needed for other (surgical pain) 100 tablet 0     aspirin EC 81 MG EC tablet Take 1 tablet (81 mg) by mouth daily 30 tablet 0     Amoxicillin (AMOXIL PO) Take 1,500-3,000 mg by mouth daily as needed (patient takes 6 X 500 = 3,000 mg dose 1 hour before dental appointment and 3 X 500 = 1,500 mg dose 6 hours after dental appointment.)         ALLERGIES     Allergies   Allergen Reactions     No Known Drug Allergy        PAST MEDICAL HISTORY:  Past Medical History:   Diagnosis Date     Atrial fibrillation (H)     Valvular Afib.  Diagnosed 06/2017. Pt initiated on coumadin 7/18/17     Erectile dysfunction      Heart murmur     rheumatic fever as a child     Hyperlipidemia      Mitral regurgitation     rheumatic fever as a child. 3-4+ per [facility]LL 7/25/17     Tobacco abuse        PAST SURGICAL HISTORY:  Past Surgical History:   Procedure Laterality Date     COLONOSCOPY N/A  "9/8/2014    Procedure: COMBINED COLONOSCOPY, SINGLE BIOPSY/POLYPECTOMY BY BIOPSY;  Surgeon: Kai Bailey MD;  Location:  GI     ORTHOPEDIC SURGERY      RIght knee scope     REPLACE VALVE MITRAL N/A 9/12/2017    Procedure: REPLACE VALVE MITRAL;  MITRAL VALVE REPLACEMENT  [facility] Masters Series Mechanical Heart Valve 33mm  Ref, 33MJ-501 Serial [id number]   ON PUMP, MITCHELL;  Surgeon: Ivana Marie MD;  Location:  OR       FAMILY HISTORY:  Family History   Problem Relation Age of Onset     DIABETES Mother      Obesity Mother      DIABETES Father        SOCIAL HISTORY:  Social History     Social History     Marital status:      Spouse name: N/A     Number of children: N/A     Years of education: N/A     Social History Main Topics     Smoking status: Former Smoker     Packs/day: 2.00     Years: 39.00     Types: Cigarettes     Quit date: 8/1/2017     Smokeless tobacco: Never Used     Alcohol use 2.4 oz/week     4 Standard drinks or equivalent per week      Comment: occasional     Drug use: No     Sexual activity: Yes     Partners: Female     Other Topics Concern     Parent/Sibling W/ Cabg, Mi Or Angioplasty Before 65f 55m? No     Social History Narrative       Review of Systems:  Skin:        Eyes:  Positive for glasses  ENT:  Negative    Respiratory:  Negative    Cardiovascular:  Negative for;palpitations;lightheadedness;dizziness;edema Positive for  Gastroenterology: Negative    Genitourinary:  Negative    Musculoskeletal:  Positive for back pain  Neurologic:  Negative    Psychiatric:  Positive for sleep disturbances  Heme/Lymph/Imm:  Negative    Endocrine:  Negative      Physical Exam:  Vitals: /72 (BP Location: Right arm, Patient Position: Fowlers, Cuff Size: Adult Large)  Pulse 88  Ht 1.778 m (5' 10\")  Wt 127.6 kg (281 lb 6.4 oz)  SpO2 95%  BMI 40.38 kg/m2   Please refer to dictation for physical exam    Recent Lab Results:  LIPID RESULTS:  Lab Results   Component Value Date    CHOL 234 (H) " 11/07/2017    HDL 56 11/07/2017     (H) 11/07/2017    TRIG 246 (H) 11/07/2017    CHOLHDLRATIO 5.3 (H) 08/13/2014       LIVER ENZYME RESULTS:  Lab Results   Component Value Date    AST 52 (H) 09/12/2017    ALT 69 11/07/2017       CBC RESULTS:  Lab Results   Component Value Date    WBC 8.7 11/07/2017    RBC 4.28 (L) 11/07/2017    HGB 12.6 (L) 11/07/2017    HCT 40.3 11/07/2017    MCV 94 11/07/2017    MCH 29.4 11/07/2017    MCHC 31.3 (L) 11/07/2017    RDW 15.2 (H) 11/07/2017     11/07/2017       BMP RESULTS:  Lab Results   Component Value Date     11/07/2017    POTASSIUM 4.0 11/07/2017    CHLORIDE 105 11/07/2017    CO2 28 11/07/2017    ANIONGAP 7 11/07/2017     (H) 11/07/2017    BUN 19 11/07/2017    CR 1.03 11/07/2017    GFRESTIMATED 75 11/07/2017    GFRESTBLACK >90 11/07/2017    FIORELLA 8.4 (L) 11/07/2017        A1C RESULTS:  Lab Results   Component Value Date    A1C 6.1 (H) 09/13/2017       INR RESULTS:  Lab Results   Component Value Date    INR 3.2 (A) 11/08/2017    INR 3.3 (A) 10/25/2017    INR 2.10 (H) 09/17/2017    INR 1.96 (H) 09/16/2017           Danny Mariee PA-C   November 14, 2017

## 2017-11-14 NOTE — LETTER
11/14/2017    Osbaldo Renee MD  [address] [facility] [address]    RE: Derek Stover       Dear Colleague,    I had the pleasure of seeing Derek WHITAKER Ck in the [facility].    History of Present Illness:   This is a very pleasant 56-year-old gentleman who presents at [place] [facility] clinic for 1 month follow-up.  His past medical history is notable for recent mitral valve replacement (9/12/2017), receiving a 33 mm [employer] [place] mechanical valve.  He had a smooth postoperative course without any complications.    Rest of his cardiac history includes chronic atrial fibrillation, hyperlipidemia, and recent tobacco abuse (quit 2 months ago).  He was on metoprolol for management of his chronic atrial fibrillation but this was discontinued as patient had significant lightheadedness.  This was discussed with EP team as well as with Dr. Ross and patient was recommended to continue with without AV node blocking agent.  He was placed on a 24-hour Holter monitor subsequently which showed chronic atrial fibrillation with controlled ventricular rates.  The patient states that he obtained his Holter monitor before having to cardiac rehabilitation as he wanted to make sure we document his heart rates during exercise.  He was also set up for post MVR echocardiogram for evaluation of the gradients. His lightheadedness improved with the discontinuation of metoprolol and resolved when he was done with Chantix.     He returns to clinic feeling well. He has no concerns or symptoms. He denies palpitations, lightheadedness, SOB, CP, PND, orthopnea, or peripheral edema. He denies bleeding issues. He continues to be on Coumadin and Aspirin.     He wonders if he can return to work on 12/9/2017.  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  Crisp valve sounds.  No murmur rub or gallop.  Abdomen-soft nontender  Extremities-no edema      Assessment and plan:  This is a pleasant 56-year-old gentleman who presents to [place] [facility] clinic for follow-up.  He underwent mitral valve replacement on 9/12/2017 receiving a mechanical valve.  His postoperative echocardiogram shows normal gradient across the mitral valve.  There is no regurgitation.  He continues to be on Coumadin.  He gets his INR levels checked frequently.      He is on appropriate medication regimen with the exception of statin therapy.  His pre-surgery coronary angiogram did not show coronary artery disease but he does have cardiac risk factors.  I discussed this with Dr. Ross who was in agreement.  We will start him on atorvastatin 20 mg daily and repeat lipid panel and ALT in 2 months.    His Holter monitor demonstrates good ventricular rate control.  I also discussed this result with Dr. ROSS who did not recommend addition of an alternative AV node blocking agent.  The patient appears to be asymptomatic from this.  We will continue with the current regimen.    His weight is noted to be up by almost 20 pounds since our last office visit.  The patient states that since he quit smoking his been eating a lot.  We talked about importance of healthy diet and exercise.  He denies symptoms suggests CHF.    His basic metabolic panel and hemoglobin are normal.  His lipid panel demonstrates LDL of 129, total cholesterol 234, and triglycerides 246.  We will recheck these in 2 months.    I reiterated importance of SBE prophylaxis.    In regards to him returning work I will discuss this with Dr. ROSS.  I will send a work letter.    Thank you for allowing me to participate in the care of this patient today.    This note was completed in part using Dragon voice recognition software. Although reviewed after completion, some word and grammatical errors may occur.    Orders  this Visit:  Orders Placed This Encounter   Procedures     Lipid Profile     ALT     EKG 12-LEAD CLINIC READ     Orders Placed This Encounter   Medications     IBUPROFEN PO     atorvastatin (LIPITOR) 20 MG tablet     Sig: Take 1 tablet (20 mg) by mouth daily     Dispense:  30 tablet     Refill:  3     Medications Discontinued During This Encounter   Medication Reason     Varenicline Tartrate (CHANTIX PO) Therapy completed     senna-docusate (SENOKOT-S;PERICOLACE) 8.6-50 MG per tablet Therapy completed     oxyCODONE (ROXICODONE) 5 MG IR tablet Therapy completed     metoprolol (LOPRESSOR) 50 MG tablet Stopped by Patient         Encounter Diagnoses   Name Primary?     Personal history of tobacco use, presenting hazards to health      Hyperlipidemia LDL goal <100      S/P MVR (mitral valve replacement)      Atrial fibrillation, unspecified type (H) Yes       CURRENT MEDICATIONS:  Current Outpatient Prescriptions   Medication Sig Dispense Refill     IBUPROFEN PO        atorvastatin (LIPITOR) 20 MG tablet Take 1 tablet (20 mg) by mouth daily 30 tablet 3     warfarin (COUMADIN) 5 MG tablet Take 5 mg Mon, Fri and 2.5 mg all other days, or as directed by the coumadin clinic 60 tablet 1     acetaminophen (TYLENOL) 325 MG tablet Take 2 tablets (650 mg) by mouth every 4 hours as needed for other (surgical pain) 100 tablet 0     aspirin EC 81 MG EC tablet Take 1 tablet (81 mg) by mouth daily 30 tablet 0     Amoxicillin (AMOXIL PO) Take 1,500-3,000 mg by mouth daily as needed (patient takes 6 X 500 = 3,000 mg dose 1 hour before dental appointment and 3 X 500 = 1,500 mg dose 6 hours after dental appointment.)         ALLERGIES     Allergies   Allergen Reactions     No Known Drug Allergy        PAST MEDICAL HISTORY:  Past Medical History:   Diagnosis Date     Atrial fibrillation (H)     Valvular Afib.  Diagnosed 06/2017. Pt initiated on coumadin 7/18/17     Erectile dysfunction      Heart murmur     rheumatic fever as a child      Hyperlipidemia      Mitral regurgitation     rheumatic fever as a child. 3-4+ per MITCHELL 7/25/17     Tobacco abuse        PAST SURGICAL HISTORY:  Past Surgical History:   Procedure Laterality Date     COLONOSCOPY N/A 9/8/2014    Procedure: COMBINED COLONOSCOPY, SINGLE BIOPSY/POLYPECTOMY BY BIOPSY;  Surgeon: Kai Bailey MD;  Location:  GI     ORTHOPEDIC SURGERY      RIght knee scope     REPLACE VALVE MITRAL N/A 9/12/2017    Procedure: REPLACE VALVE MITRAL;  MITRAL VALVE REPLACEMENT  S[employer] Mechanical Heart Valve 33mm  Ref, [id number]-[id number] Serial [id number]   ON PUMP, MITCHELL;  Surgeon: Ivana Marie MD;  Location:  OR       FAMILY HISTORY:  Family History   Problem Relation Age of Onset     DIABETES Mother      Obesity Mother      DIABETES Father        SOCIAL HISTORY:  Social History     Social History     Marital status:      Spouse name: N/A     Number of children: N/A     Years of education: N/A     Social History Main Topics     Smoking status: Former Smoker     Packs/day: 2.00     Years: 39.00     Types: Cigarettes     Quit date: 8/1/2017     Smokeless tobacco: Never Used     Alcohol use 2.4 oz/week     4 Standard drinks or equivalent per week      Comment: occasional     Drug use: No     Sexual activity: Yes     Partners: Female     Other Topics Concern     Parent/Sibling W/ Cabg, Mi Or Angioplasty Before 65f 55m? No     Social History Narrative       Review of Systems:  Skin:        Eyes:  Positive for glasses  ENT:  Negative    Respiratory:  Negative    Cardiovascular:  Negative for;palpitations;lightheadedness;dizziness;edema Positive for  Gastroenterology: Negative    Genitourinary:  Negative    Musculoskeletal:  Positive for back pain  Neurologic:  Negative    Psychiatric:  Positive for sleep disturbances  Heme/Lymph/Imm:  Negative    Endocrine:  Negative      Physical Exam:  Vitals: /72 (BP Location: Right arm, Patient Position: Fowlers, Cuff Size: Adult Large)  Pulse 88  Ht  "1.778 m (5' 10\")  Wt 127.6 kg (281 lb 6.4 oz)  SpO2 95%  BMI 40.38 kg/m2   Please refer to dictation for physical exam    Recent Lab Results:  LIPID RESULTS:  Lab Results   Component Value Date    CHOL 234 (H) 11/07/2017    HDL 56 11/07/2017     (H) 11/07/2017    TRIG 246 (H) 11/07/2017    CHOLHDLRATIO 5.3 (H) 08/13/2014       LIVER ENZYME RESULTS:  Lab Results   Component Value Date    AST 52 (H) 09/12/2017    ALT 69 11/07/2017       CBC RESULTS:  Lab Results   Component Value Date    WBC 8.7 11/07/2017    RBC 4.28 (L) 11/07/2017    HGB 12.6 (L) 11/07/2017    HCT 40.3 11/07/2017    MCV 94 11/07/2017    MCH 29.4 11/07/2017    MCHC 31.3 (L) 11/07/2017    RDW 15.2 (H) 11/07/2017     11/07/2017       BMP RESULTS:  Lab Results   Component Value Date     11/07/2017    POTASSIUM 4.0 11/07/2017    CHLORIDE 105 11/07/2017    CO2 28 11/07/2017    ANIONGAP 7 11/07/2017     (H) 11/07/2017    BUN 19 11/07/2017    CR 1.03 11/07/2017    GFRESTIMATED 75 11/07/2017    GFRESTBLACK >90 11/07/2017    FIORELLA 8.4 (L) 11/07/2017        A1C RESULTS:  Lab Results   Component Value Date    A1C 6.1 (H) 09/13/2017       INR RESULTS:  Lab Results   Component Value Date    INR 3.2 (A) 11/08/2017    INR 3.3 (A) 10/25/2017    INR 2.10 (H) 09/17/2017    INR 1.96 (H) 09/16/2017     Thank you for allowing me to participate in the care of your patient.    Sincerely,     Danny Mariee PA-C     [facility]    "

## 2017-11-16 NOTE — TELEPHONE ENCOUNTER
Called pt with recommendations from Danny Mariee PA-C. Pt states he finished the Chantix over a week ago & has been feeling less lightheaded since. [place] RN

## 2017-11-20 ENCOUNTER — HOSPITAL ENCOUNTER (OUTPATIENT)
Dept: CARDIAC REHAB | Facility: CLINIC | Age: 57
End: 2017-11-20
Attending: SURGERY
Payer: COMMERCIAL

## 2017-11-20 VITALS — HEIGHT: 70 IN | WEIGHT: 277 LBS | BODY MASS INDEX: 39.65 KG/M2

## 2017-11-20 PROCEDURE — 93798 PHYS/QHP OP CAR RHAB W/ECG: CPT | Performed by: REHABILITATION PRACTITIONER

## 2017-11-20 PROCEDURE — 40000116 ZZH STATISTIC OP CR VISIT: Performed by: REHABILITATION PRACTITIONER

## 2017-11-20 ASSESSMENT — 6 MINUTE WALK TEST (6MWT)
MALE CALC: 1785.19
GENDER SELECTION: MALE
TOTAL DISTANCE WALKED (FT): 1830
FEMALE CALC: 1448.8
PREDICTED: 1796.08
TOTAL DISTANCE WALKED (FT): 1080

## 2017-11-20 NOTE — PROGRESS NOTES
11/20/17 1400   Session   Session Discharge Note   Cardiac Rehab Assessment   Cardiac Rehab Assessment You patient was discharged from the cardiac rehab program today because he will be returning to work after Thanksgiving holiday. During cardiac rehab this patient made significant gains in exercise tolerance. Initially patient tolerated 30 minutes at a peak of 3.6 METs, and is now tolerating 45 minutes at a peak of 6.2 METs. Patient also increased 6-minute walk test by 74% (an increase of 780 feet). The patient improved his PHQ-9 depression screening by 86%, [facility] quality of life screening by 50% and Rate your Plate dietary screening by 24%. Please see progress that was made towards goals at the bottom of this treatment plan.  Barriers to progression were hypotenstion. Today the patient was given instructions on frequency, intensity, and duration for continued home exercise.  Your patient plans to walk for continued aerobic home exercise.     General Information   Treatment Diagnosis Valve Replacement   Date of Treatment Diagnosis 09/12/17   Significant Past CV History Chronic AF   Comorbidities None   Lead up symptoms dyspnea, irregular HR   Hospital Location [facility]   Hospital Discharge Date 09/17/17   Signs and Symptoms Post Hospital Discharge SOB;Lightheadedness;Appetite   Outpatient Cardiac Rehab Start Date 09/20/17   Primary Physician Dr. Renee   Primary Physician Follow Up Scheduled   Surgeon Dr. Marie   Surgeon Follow Up Scheduled   Cardiologist Dr. Ross   Cardiologist Follow Up Scheduled   Ejection Fraction 55%   Risk Stratification Low   Summary of Cath Report   Summary of Cath Report No Significant CAD   Living and Work Status    Living Arrangements and Social Status house;spouse   Support System Live with an adult   Return to Employment Yes   Occupation    Preventative Medications   CMS recommended medications Anticoagulants;Beta Blocker   Fall Risk Screen   Fall screen  "completed by Cardiac Rehab   Have you fallen 2 or more times in the past year? No   Have you fallen and had an injury in the past year? No   Is patient a fall risk? No   Pain   Patient Currently in Pain No   Physical Assessments   Incisions Not assessed   Edema Not assessed   Right Lung Sounds not assessed   Left Lung Sounds not assessed   Individualized Treatment Plan   Monitored Sessions Scheduled 24   Monitored Sessions Attended 22'   Oxygen   Supplemental Oxygen needed No   Nutrition Management - Weight Management   Assessment Discharge   Age 56   Weight 125.6 kg (277 lb)   Height 1.778 m (5' 10\")   BMI (Calculated) 39.83   Goal Weight 108.9 kg (240 lb)   Initial Rate Your Plate Score. Dietary tool to assess eating patterns. Scores range from 24 to 72. The higher the score the healthier the eating pattern. 34   Discharge Rate Your Plate Score 42   Nutrition Management - Lipids   Lipids Labs Not Available   Nutrition Management - Diabetes   Diabetes No   Nutrition Management Summary   Dietary Recommendations None   Stages of Change for Diet Compliance Pre-Contemplation   Interventions Planned Attend Nutrition Education Class(es);Educate on Weight Management Principles;Educate on Benefits of Exercise   Patient Goals Goal #1   Goal #1 Description Patient will lose 1# per week.  10/18 Pt will maintain current weight of 264#   Goal #1 Target Date 11/01/17   Goal #1 Date Met 10/18/17   Goal #1 Progress Towards Goal 9/20 Patient will combine calorie reduction and increased activity to achieve a 500 kcal/day calorie deficit.  10/18 Pt has lost 10# frm being DC from the hospital 11/13 Weight gain over last month because of successful smoking cessation. Cardiologist prefers smoking cessation to weight loss.   Psychosocial Management   Psychosocial Assessment Re-assessment   Is there history of clinical depression or increased risk of depression? No previous history   Current Level of Stress per Patient Report Denies "   Current Coping Skills Has Positive Support System   Initial Patient Health Questionnaire -9 Score (PHQ-9) for depression. 5-9 Minimal symptoms, 10-14 Minor depression, 15-19 Major depression, moderately severe, > 20 Major depression, severe  7   Discharge PHQ-9 Score for Depression 1   Initial [facility] Survey score.  Quality of Life:   If total score > 25 review individual areas where patient rated a 4 or 5.  Consider patients current medical condition and what role that plays on the score.   Adjust treatment protocol to improve areas of concern.  Consider the following:  PHQ9 score, DASI, and re-assessment within the next 30 days to assist with developing treatments.  26   [facility] 13   Stages of Change Action   Psychosocial Comments Pt continues to deny having stress    Other Core Components - Hypertension   History of or Diagnosis of Hypertension No   Currently taking Anti-Hypertensives Yes;Beta blocker   Other Core Components - Tobacco   History of Tobacco Use Yes   Quit Date or Planned Quit Date 08/01/17   Tobacco Use Status Recent (Quit < 6 mo ago)   Tobacco Habit Cigarettes   Tobacco Use per Day (average) 2   Years of Tobacco Use 40   Stages of Change Action   Tobacco Comments using chantix   Other Core Components Summary   Interventions Planned List benefits of weight management;Continue to assess readiness to change and implement appropriate process(es) of change;Develop strategies to increase confidence to quit smoking;Check-in regularly, offer support to prevent risk of relapse   Patient Goals Yes   Goal #1 Description Patient will be successfull with complete smoking cessation   Goal #1 Target Date 10/20/17   Goal #1 Date Met 11/13/17   Goal #1 Progress Towards Goal 9/20 will complete Chantix in 3 weeks and remain successfull with smoking cessation after that.  10/18 Pt continues to remain smoke free 11/13 smoke free for 1 month   Activity/Exercise History    Activity/Exercise Assessment Re-assessment   Activity/Exercise Status prior to event? Was Physically Active   Number of Days Currently participating in Moderate Physical Activity? 3-4   Number of Days Currently performing  Aerobic Exercise (including rehab)? 3  (3)   Number of Minutes per Session Currently of Aerobic Exercise (average)? 45   Current Stage of Change (Physical Activity) Preparation   Current Stage of Change (Aerobic Exercise) Contemplation   Patient Goals Goal #1;Goal #2   Goal #1 Description Patient will be able to return to work as a , Estimated work: 3-4 METs and lifting up to 20#   Goal #1 Target Date 12/06/17   Goal #1 Date Met 12/09/17   Goal #1 Progress Towards Goal 9/20 Will attend cardiac rehab 2-3 times weekly to increase aerobic capacity and maintain muscle mass with low weight resistance training.  Pt is currenlt at 4.1 METs and will be starting HIIT next week.  He has not returned to work at this time. 11/13 planning on returning to work on 12/5 when lifting restrictions are done completely. Is currently lifting 15# each.    Goal #2 Description Patient will be able to participate in recreational activities, including hunting (no shooting this year) and bowling   Goal #2 Target Date 12/06/17   Goal #2 Date Met 11/13/17   Goal #2 Progress Towards Goal 9/20 Will attend cardiac rehab 2-3 times weekly to increase aerobic capacity and maintain muscle mass with low weight resistance training.  10/18 Pt has been engaging in RT and is at 4.1 METs he has not went bowling as of yet.   Exercise Assessment   6 Minute Walk Predicted - Gender Selection Male   6 Minute Walk Predicted (Male) 1785.19   6 Minute Walk Predicted (Female) 1448.8   Initial 6 Minute Walk Distance (Feet) 1080 ft   Discharge 6 Minute Walk Distance (Feet) 1830   Resting HR 80 bpm   Exercise  bpm   Resting /62   Exercise /62   Effort Rating 8   Current MET Level 6.1   MET Level Goal 6+   ECG Rhythm  Atrial flutter   Ectopy None   Current Symptoms Denies symptoms   Limitations/Restrictions Sternal Precautions   Exercise Prescription   Mode Treadmill;Nustep;Weights   Duration/Time 30-45 min;15-30 min   Frequency 3 daysweek   THR (85% of age predicted max HR) 141.1   OMNI Effort Rating (0-10 Scale) 4-6/10   Progression Continuous bouts;Total exercise time of 30-45 minutes;Aerobic exercise to OMNI rating of 5-7, and heart rate at or below target;Progress peak intensity by 1/2 MET per week;Progress per high intensity interval training (HIIT) program   Recommended Home Exercise   Type of Exercise Walking   Frequency (days per week) daily   Duration (minutes per session) 30-45 min   Effort Rating Recommended 4-6/10   30 Day Exercise Plan initiate a home walking program   Current Home Exercise   Type of Exercise None   Follow-up/On-going Support   Provider follow-up needed on the following Other (see comments)  (leg cramping)   Learning Assessment   Learner Patient   Primary Language English   Preferred Learning Style Listening;Reading   Barriers to Learning No barriers noted   Patient Education   Education recommended Anatomy and Physiology of the Heart;Exercise Principles   Education classes attended Nutrition;Anatomy and Physiology of the Heart   Education Comments Pt has not attended education but has been encouraged

## 2017-11-22 ENCOUNTER — OFFICE VISIT (OUTPATIENT)
Dept: FAMILY MEDICINE | Facility: OTHER | Age: 57
End: 2017-11-22
Payer: COMMERCIAL

## 2017-11-22 VITALS
BODY MASS INDEX: 40.38 KG/M2 | TEMPERATURE: 97.7 F | SYSTOLIC BLOOD PRESSURE: 96 MMHG | DIASTOLIC BLOOD PRESSURE: 60 MMHG | RESPIRATION RATE: 14 BRPM | HEART RATE: 94 BPM | OXYGEN SATURATION: 96 % | WEIGHT: 281.4 LBS

## 2017-11-22 DIAGNOSIS — I05.1 RHEUMATIC MITRAL REGURGITATION: Primary | ICD-10-CM

## 2017-11-22 DIAGNOSIS — E78.5 HYPERLIPIDEMIA WITH TARGET LDL LESS THAN 130: ICD-10-CM

## 2017-11-22 DIAGNOSIS — E66.01 MORBID OBESITY (H): ICD-10-CM

## 2017-11-22 DIAGNOSIS — Z95.2 S/P MVR (MITRAL VALVE REPLACEMENT): ICD-10-CM

## 2017-11-22 PROCEDURE — 99214 OFFICE O/P EST MOD 30 MIN: CPT | Performed by: FAMILY MEDICINE

## 2017-11-22 ASSESSMENT — PAIN SCALES - GENERAL: PAINLEVEL: NO PAIN (0)

## 2017-11-22 NOTE — MR AVS SNAPSHOT
After Visit Summary   11/22/2017    Derek Stover    MRN: [id number]           Patient Information     Date Of Birth          1960        Visit Information        Provider Department      11/22/2017 2:30 PM Osbaldo Renee MD [facility]         Follow-ups after your visit        Follow-up notes from your care team     Return in about 6 months (around 5/22/2018) for Routine Visit.      Your next 10 appointments already scheduled     Nov 24, 2017  1:30 PM CST   Anticoagulation Visit with PH ANTI COAG   [facility]    [address] [place] [id number]   [phone]            Chase 15, 2018  8:00 AM CST   LAB with [facility]   [address] [address]   [phone]           Please do not eat 10-12 hours before your appointment if you are coming in fasting for labs on lipids, cholesterol, or glucose (sugar). This does not apply to pregnant women. Water, hot tea and black coffee (with nothing added) are okay. Do not drink other fluids, diet soda or chew gum.              Who to contact     If you have questions or need follow up information about today's clinic visit or your schedule please contact [facility] directly at [phone].  Normal or non-critical lab and imaging results will be communicated to you by MyChart, letter or phone within 4 business days after the clinic has received the results. If you do not hear from us within 7 days, please contact the clinic through MyChart or phone. If you have a critical or abnormal lab result, we will notify you by phone as soon as possible.  Submit refill requests through [employer] or call your pharmacy and they will forward the refill request to us. Please allow 3 business days for your refill to be completed.          Additional Information About Your Visit        MyChart Information      [employer] gives you secure access to your electronic health record. If you see a primary care provider, you can also send messages to your care team and make appointments. If you have questions, please call your primary care clinic.  If you do not have a primary care provider, please call [phone] and they will assist you.        Care EveryWhere ID     This is your Care EveryWhere ID. This could be used by other organizations to access your [place] medical records  [id number]        Your Vitals Were     Pulse Temperature Respirations Pulse Oximetry BMI (Body Mass Index)       94 97.7  F (36.5  C) (Temporal) 14 96% 40.38 kg/m2        Blood Pressure from Last 3 Encounters:   11/22/17 96/60   11/14/17 100/72   10/10/17 (!) 88/50    Weight from Last 3 Encounters:   11/22/17 281 lb 6.4 oz (127.6 kg)   11/20/17 277 lb (125.6 kg)   11/14/17 281 lb 6.4 oz (127.6 kg)              Today, you had the following     No orders found for display       Primary Care Provider Office Phone # Fax #    Osbaldo Renee [phone]       [address] [facility] [address]        Equal Access to Services     HOMER SPAIN : Hadii kendrick riverao Sosravaniali, waaxda luqadaha, qaybta kaalmada adeegyada, malissa eugene. So [facility] [phone].    ATENCIÓN: Si habla español, tiene a macias disposición servicios gratuitos de asistencia lingüística. Llame al [phone].    We comply with applicable federal civil rights laws and [place] laws. We do not discriminate on the basis of race, color, national origin, age, disability, sex, sexual orientation, or gender identity.            Thank you!     Thank you for choosing [facility]  for your care. Our goal is always to provide you with excellent care. Hearing back from our patients is one way we can continue to improve our services. Please take a few minutes to complete the written survey that you may receive in the mail after your visit with us.  Thank you!             Your Updated Medication List - Protect others around you: Learn how to safely use, store and throw away your medicines at www.disposemymeds.org.          This list is accurate as of: 11/22/17  3:18 PM.  Always use your most recent med list.                   Brand Name Dispense Instructions for use Diagnosis    acetaminophen 325 MG tablet    TYLENOL    100 tablet    Take 2 tablets (650 mg) by mouth every 4 hours as needed for other (surgical pain)    S/P mitral valve replacement with metallic valve       AMOXIL PO      Take 1,500-3,000 mg by mouth daily as needed (patient takes 6 X 500 = 3,000 mg dose 1 hour before dental appointment and 3 X 500 = 1,500 mg dose 6 hours after dental appointment.)        aspirin 81 MG EC tablet     30 tablet    Take 1 tablet (81 mg) by mouth daily    S/P mitral valve replacement with metallic valve       atorvastatin 20 MG tablet    LIPITOR    30 tablet    Take 1 tablet (20 mg) by mouth daily    Personal history of tobacco use, presenting hazards to health, Hyperlipidemia LDL goal <100, S/P MVR (mitral valve replacement), Atrial fibrillation, unspecified type (H)       IBUPROFEN PO           warfarin 5 MG tablet    COUMADIN    60 tablet    Take 5 mg Mon, Fri and 2.5 mg all other days, or as directed by the coumadin clinic    Long-term (current) use of anticoagulants, Chronic atrial fibrillation (H), Anticoagulated on Coumadin

## 2017-11-22 NOTE — PROGRESS NOTES
SUBJECTIVE:   Derek Stover is a 57 year old male who presents to clinic today for the following health issues:        Follow up with Mitral valve replacement.         Hyperlipidemia Follow-Up      Rate your low fat/cholesterol diet?: good    Taking statin?  Yes, no muscle aches from statin    Other lipid medications/supplements?:  none    Vascular Disease Follow-up:  MVR      Chest pain or pressure, left side neck or arm pain: No    Shortness of breath/increased sweats/nausea with exertion: No    Pain in calves walking 1-2 blocks: No    Worsened or new symptoms since last visit: No    Nitroglycerin use: no    Daily aspirin use: Yes            Problem list and histories reviewed & adjusted, as indicated.  Additional history: as documented    Patient Active Problem List   Diagnosis     Smoker     Tinea versicolor     Erectile dysfunction     Hyperlipidemia with target LDL less than 130     Morbid obesity (H)     Long-term (current) use of anticoagulants [Z79.01]     Mitral regurgitation     Atrial fibrillation (H)     Severe mitral regurgitation     S/P MVR (mitral valve replacement)     Fluid overload     Transient hyperglycemia post procedure     Anticoagulated on Coumadin     Past Surgical History:   Procedure Laterality Date     COLONOSCOPY N/A 9/8/2014    Procedure: COMBINED COLONOSCOPY, SINGLE BIOPSY/POLYPECTOMY BY BIOPSY;  Surgeon: Kai Bailey MD;  Location:  GI     ORTHOPEDIC SURGERY      RIght knee scope     REPLACE VALVE MITRAL N/A 9/12/2017    Procedure: REPLACE VALVE MITRAL;  MITRAL VALVE REPLACEMENT  [facility] Masters Series Mechanical Heart Valve 33mm  Ref, [id number] Serial [id number]   ON PUMP, MITCHELL;  Surgeon: Ivana Marie MD;  Location:  OR       Social History   Substance Use Topics     Smoking status: Current Every Day Smoker     Years: 39.00     Types: Pipe, Cigars     Smokeless tobacco: Never Used     Alcohol use 2.4 oz/week     4 Standard drinks or equivalent per week       Comment: occasional     Family History   Problem Relation Age of Onset     DIABETES Mother      Obesity Mother      DIABETES Father          Current Outpatient Prescriptions   Medication Sig Dispense Refill     IBUPROFEN PO        atorvastatin (LIPITOR) 20 MG tablet Take 1 tablet (20 mg) by mouth daily 30 tablet 3     warfarin (COUMADIN) 5 MG tablet Take 5 mg Mon, Fri and 2.5 mg all other days, or as directed by the coumadin clinic 60 tablet 1     acetaminophen (TYLENOL) 325 MG tablet Take 2 tablets (650 mg) by mouth every 4 hours as needed for other (surgical pain) 100 tablet 0     aspirin EC 81 MG EC tablet Take 1 tablet (81 mg) by mouth daily 30 tablet 0     Amoxicillin (AMOXIL PO) Take 1,500-3,000 mg by mouth daily as needed (patient takes 6 X 500 = 3,000 mg dose 1 hour before dental appointment and 3 X 500 = 1,500 mg dose 6 hours after dental appointment.)       Allergies   Allergen Reactions     No Known Drug Allergy      Recent Labs   Lab Test  11/07/17   0855  09/15/17   0815   09/13/17   0407   09/12/17   1253   05/31/17   1811  08/13/14   0846  01/05/12   1157  01/07/11   0758   A1C   --    --    --   6.1*   --    --    --    --    --    --    --    LDL  129*   --    --    --    --    --    --    --   143*  166*  135*   HDL  56   --    --    --    --    --    --    --   39*  48  42   TRIG  246*   --    --    --    --    --    --    --   124  160*  144   ALT  69   --    --    --    --   25   --    --   20  17  18   CR  1.03  0.86   < >  1.13   < >  1.17   < >  0.97  1.06  1.02  1.00   GFRESTIMATED  75  >90   < >  67   < >  65   < >  80  73  78  80   GFRESTBLACK  >90  >90   < >  82   < >  78   < >  >90   GFR Calc    89  >90  >90   POTASSIUM  4.0  4.2   < >  5.0   < >  4.2   < >  4.1  4.5  4.9  4.5   TSH   --    --    --    --    --    --    --   2.92   --    --   2.07    < > = values in this interval not displayed.      BP Readings from Last 3 Encounters:   11/22/17 96/60   11/14/17 100/72  "  10/10/17 (!) 88/50    Wt Readings from Last 3 Encounters:   11/22/17 281 lb 6.4 oz (127.6 kg)   11/20/17 277 lb (125.6 kg)   11/14/17 281 lb 6.4 oz (127.6 kg)                  Labs reviewed in EPIC          Reviewed and updated as needed this visit by clinical staffTobacco  Allergies  Meds  Problems  Med Hx  Surg Hx  Fam Hx  Soc Hx        Reviewed and updated as needed this visit by Provider  Allergies  Meds  Problems         ROS:  C: NEGATIVE for fever, chills, change in weight  INTEGUMENTARY/SKIN: NEGATIVE for worrisome rashes, moles or lesions and POSITIVE for bruising on occasion.  E/M: NEGATIVE for ear, mouth and throat problems  R: NEGATIVE for significant cough or SOB  CV: NEGATIVE for chest pain, palpitations or peripheral edema  ROS otherwise negative    OBJECTIVE:     BP 96/60 (BP Location: Left arm, Patient Position: Chair, Cuff Size: Adult Large)  Pulse 94  Temp 97.7  F (36.5  C) (Temporal)  Resp 14  Wt 281 lb 6.4 oz (127.6 kg)  SpO2 96%  BMI 40.38 kg/m2  Body mass index is 40.38 kg/(m^2).  GENERAL: healthy, alert and no distress  HENT: ear canals and TM's normal, nose and mouth without ulcers or lesions  NECK: no adenopathy, no asymmetry, masses, or scars and thyroid normal to palpation  RESP: lungs clear to auscultation - no rales, rhonchi or wheezes  CV: regular rates and rhythm, normal S1 S2, no S3 or S4, no murmur, click or rub, systolic click of MV closure  present, peripheral pulses strong and no peripheral edema  ABDOMEN: soft, nontender, no hepatosplenomegaly, no masses and bowel sounds normal  MS: no gross musculoskeletal defects noted, no edema    Diagnostic Test Results:  none     ASSESSMENT/PLAN:   Morbid obesity (H)  BMI:   Estimated body mass index is 40.38 kg/(m^2) as calculated from the following:    Height as of 11/20/17: 5' 10\" (1.778 m).    Weight as of this encounter: 281 lb 6.4 oz (127.6 kg).   Weight management plan: Discussed healthy diet and exercise guidelines " and patient will follow up in 6 months in clinic to re-evaluate.        1. Rheumatic mitral regurgitation  Severe MV regurgitation status post MVR doing well. He will follow up with coumadin clinic in 2 days and recheck lipid in 2 months. He will return to work on 12/9/17 without restriction. Continue daily weights, avoid smoking.     2. S/P MVR (mitral valve replacement)  Severe MV regurgitation status post MVR doing well. He will follow up with coumadin clinic in 2 days and recheck lipid in 2 months. He will return to work on 12/9/17 without restriction. Continue daily weights, avoid smoking.     3. Hyperlipidemia with target LDL less than 130  High risk. Now on statin therapy. Recheck lipid in 2 months.          FUTURE LABS:       - Schedule fasting labs in 2 months  FUTURE APPOINTMENTS:       - Follow-up visit in 6 months.  SELF MONITORING:       - Please check blood pressure readings daily       - Daily weights.  Work on weight loss  Regular exercise    Osbaldo Renee MD  [facility]

## 2017-11-22 NOTE — NURSING NOTE
"Chief Complaint   Patient presents with     RECHECK       Initial BP 96/60 (BP Location: Left arm, Patient Position: Chair, Cuff Size: Adult Large)  Pulse 94  Temp 97.7  F (36.5  C) (Temporal)  Resp 14  Wt 281 lb 6.4 oz (127.6 kg)  SpO2 96%  BMI 40.38 kg/m2 Estimated body mass index is 40.38 kg/(m^2) as calculated from the following:    Height as of 11/20/17: 5' 10\" (1.778 m).    Weight as of this encounter: 281 lb 6.4 oz (127.6 kg).  Medication Reconciliation: complete     Nakia Elmore MA 11/22/2017  2:50 PM          "

## 2017-11-28 ENCOUNTER — ANTICOAGULATION THERAPY VISIT (OUTPATIENT)
Dept: ANTICOAGULATION | Facility: CLINIC | Age: 57
End: 2017-11-28
Payer: COMMERCIAL

## 2017-11-28 DIAGNOSIS — I48.91 ATRIAL FIBRILLATION, UNSPECIFIED TYPE (H): ICD-10-CM

## 2017-11-28 DIAGNOSIS — Z79.01 LONG-TERM (CURRENT) USE OF ANTICOAGULANTS: ICD-10-CM

## 2017-11-28 DIAGNOSIS — Z79.01 ANTICOAGULATED ON COUMADIN: ICD-10-CM

## 2017-11-28 DIAGNOSIS — I48.20 CHRONIC ATRIAL FIBRILLATION (H): ICD-10-CM

## 2017-11-28 LAB — INR POINT OF CARE: 2.1 (ref 0.86–1.14)

## 2017-11-28 PROCEDURE — 99207 ZZC NO CHARGE NURSE ONLY: CPT

## 2017-11-28 PROCEDURE — 85610 PROTHROMBIN TIME: CPT | Mod: QW

## 2017-11-28 PROCEDURE — 36416 COLLJ CAPILLARY BLOOD SPEC: CPT

## 2017-11-28 RX ORDER — WARFARIN SODIUM 5 MG/1
TABLET ORAL
Qty: 60 TABLET | Refills: 1 | COMMUNITY
Start: 2017-11-28 | End: 2018-01-10

## 2017-11-28 NOTE — PROGRESS NOTES
ANTICOAGULATION FOLLOW-UP CLINIC VISIT    Patient Name:  Derek Stover  Date:  11/28/2017  Contact Type:  Face to Face    SUBJECTIVE:     Patient Findings     Comments Pt started smoking nicotine from a pipe, which contains Mary K.              OBJECTIVE    INR Protime   Date Value Ref Range Status   11/28/2017 2.1 (A) 0.86 - 1.14 Final       ASSESSMENT / PLAN  INR assessment SUB    Recheck INR In: 10 DAYS    INR Location Clinic      Anticoagulation Summary as of 11/28/2017     INR goal 2.0-3.0   Today's INR 2.1   Maintenance plan 5 mg (5 mg x 1) on Tue, Thu, Sat; 2.5 mg (5 mg x 0.5) all other days   Full instructions 5 mg on Tue, Thu, Sat; 2.5 mg all other days   Weekly total 25 mg   Plan last modified Perla Walden RN (11/28/2017)   Next INR check 12/8/2017   Target end date     Indications   Atrial fibrillation (H) [I48.91]  Atrial fibrillation (H) [I48.91] (Resolved) [I48.91]  Long-term (current) use of anticoagulants [Z79.01] [Z79.01]         Anticoagulation Episode Summary     INR check location     Preferred lab     Send INR reminders to [facility]    Comments       Anticoagulation Care Providers     Provider Role Specialty Phone number    Carlos Archibald DO [facility] Internal Medicine [phone]            See the Encounter Report to view Anticoagulation Flowsheet and Dosing Calendar (Go to Encounters tab in chart review, and find the Anticoagulation Therapy Visit)    Dosage adjustment made based on physician directed care plan.        Perla Walden RN

## 2017-11-28 NOTE — MR AVS SNAPSHOT
Derek SHERMAN Stover   11/28/2017 10:45 AM   Anticoagulation Therapy Visit    Description:  57 year old male   Provider:  GUILHERME ANTI COASAMEER   Department:  [facility]           INR as of 11/28/2017     Today's INR 2.1      Anticoagulation Summary as of 11/28/2017     INR goal 2.0-3.0   Today's INR 2.1   Full instructions 5 mg on Tue, Thu, Sat; 2.5 mg all other days   Next INR check 12/8/2017    Indications   Atrial fibrillation (H) [I48.91]  Atrial fibrillation (H) [I48.91] (Resolved) [I48.91]  Long-term (current) use of anticoagulants [Z79.01] [Z79.01]         Your next Anticoagulation Clinic appointment(s)     Dec 08, 2017  9:45 AM CST   Anticoagulation Visit with GUILHERME ANTI BERNARDO   [facility]    [address] [place] [id number]   [phone]              Contact Numbers     Clinic Number:         November 2017 Details    Sun Mon Tue Wed Thu Fri Sat        1               2               3               4                 5               6               7               8               9               10               11                 12               13               14               15               16               17               18                 19               20               21               22               23               24               25                 26               27               28      5 mg   See details      29      2.5 mg         30      5 mg            Date Details   11/28 This INR check               How to take your warfarin dose     To take:  2.5 mg Take 0.5 of a 5 mg tablet.    To take:  5 mg Take 1 of the 5 mg tablets.           December 2017 Details    Sun Mon Tue Wed Thu Fri Sat          1      2.5 mg         2      5 mg           3      2.5 mg         4      2.5 mg         5      5 mg         6      2.5 mg         7      5 mg         8            9                 10               11               12               13                14               15               16                 17               18               19               20               21               22               23                 24               25               26               27               28               29               30                 31                      Date Details   No additional details    Date of next INR:  12/8/2017         How to take your warfarin dose     To take:  2.5 mg Take 0.5 of a 5 mg tablet.    To take:  5 mg Take 1 of the 5 mg tablets.

## 2017-12-08 ENCOUNTER — ANTICOAGULATION THERAPY VISIT (OUTPATIENT)
Dept: ANTICOAGULATION | Facility: CLINIC | Age: 57
End: 2017-12-08
Payer: COMMERCIAL

## 2017-12-08 DIAGNOSIS — Z79.01 LONG-TERM (CURRENT) USE OF ANTICOAGULANTS: ICD-10-CM

## 2017-12-08 DIAGNOSIS — I48.91 ATRIAL FIBRILLATION, UNSPECIFIED TYPE (H): ICD-10-CM

## 2017-12-08 LAB — INR POINT OF CARE: 2.6 (ref 0.86–1.14)

## 2017-12-08 PROCEDURE — 85610 PROTHROMBIN TIME: CPT | Mod: QW

## 2017-12-08 PROCEDURE — 99207 ZZC NO CHARGE NURSE ONLY: CPT

## 2017-12-08 PROCEDURE — 36416 COLLJ CAPILLARY BLOOD SPEC: CPT

## 2017-12-08 NOTE — MR AVS SNAPSHOT
Derek SHERMAN Stover   12/8/2017 9:45 AM   Anticoagulation Therapy Visit    Description:  57 year old male   Provider:  GUILHERME ANTI COAG   Department:  [facility]           INR as of 12/8/2017     Today's INR 2.6      Anticoagulation Summary as of 12/8/2017     INR goal 2.0-3.0   Today's INR 2.6   Full instructions 5 mg on Tue, Thu, Sat; 2.5 mg all other days   Next INR check 12/29/2017    Indications   Atrial fibrillation (H) [I48.91]  Atrial fibrillation (H) [I48.91] (Resolved) [I48.91]  Long-term (current) use of anticoagulants [Z79.01] [Z79.01]         Your next Anticoagulation Clinic appointment(s)     Dec 29, 2017 10:45 AM CST   Anticoagulation Visit [facility] GU[facility]HERME ANTI BERNARDO   [facility]    [address] [place] [id number]   [phone]              Contact Numbers     Clinic Number:         December 2017 Details    Sun Mon Tue Wed Thu Fri Sat          1               2                 3               4               5               6               7               8      2.5 mg   See details      9      5 mg           10      2.5 mg         11      2.5 mg         12      5 mg         13      2.5 mg         14      5 mg         15      2.5 mg         16      5 mg           17      2.5 mg         18      2.5 mg         19      5 mg         20      2.5 mg         21      5 mg         22      2.5 mg         23      5 mg           24      2.5 mg         25      2.5 mg         26      5 mg         27      2.5 mg         28      5 mg         29            30                 31                      Date Details   12/08 This INR check       Date of next INR:  12/29/2017         How to take your warfarin dose     To take:  2.5 mg Take 0.5 of a 5 mg tablet.    To take:  5 mg Take 1 of the 5 mg tablets.

## 2017-12-08 NOTE — PROGRESS NOTES
ANTICOAGULATION FOLLOW-UP CLINIC VISIT    Patient Name:  Derek Stover  Date:  12/8/2017  Contact Type:  Face to Face    SUBJECTIVE:        OBJECTIVE    INR Protime   Date Value Ref Range Status   12/08/2017 2.6 (A) 0.86 - 1.14 Final       ASSESSMENT / PLAN  INR assessment THER    Recheck INR In: 3 WEEKS    INR Location Clinic      Anticoagulation Summary as of 12/8/2017     INR goal 2.0-3.0   Today's INR 2.6   Maintenance plan 5 mg (5 mg x 1) on Tue, Thu, Sat; 2.5 mg (5 mg x 0.5) all other days   Full instructions 5 mg on Tue, Thu, Sat; 2.5 mg all other days   Weekly total 25 mg   No change documented Perla Walden RN   Plan last modified Perla Walden RN (11/28/2017)   Next INR check 12/29/2017   Target end date     Indications   Atrial fibrillation (H) [I48.91]  Atrial fibrillation (H) [I48.91] (Resolved) [I48.91]  Long-term (current) use of anticoagulants [Z79.01] [Z79.01]         Anticoagulation Episode Summary     INR check location     Preferred lab     Send INR reminders to [facility]    Comments       Anticoagulation Care Providers     Provider Role Specialty Phone number    Carlos Archibald DO [facility] Internal Medicine [phone]            See the Encounter Report to view Anticoagulation Flowsheet and Dosing Calendar (Go to Encounters tab in chart review, and find the Anticoagulation Therapy Visit)    Dosage adjustment made based on physician directed care plan.  .      Perla Walden RN

## 2017-12-29 ENCOUNTER — ANTICOAGULATION THERAPY VISIT (OUTPATIENT)
Dept: ANTICOAGULATION | Facility: CLINIC | Age: 57
End: 2017-12-29
Payer: COMMERCIAL

## 2017-12-29 DIAGNOSIS — Z79.01 LONG-TERM (CURRENT) USE OF ANTICOAGULANTS: ICD-10-CM

## 2017-12-29 DIAGNOSIS — I48.91 ATRIAL FIBRILLATION, UNSPECIFIED TYPE (H): ICD-10-CM

## 2017-12-29 LAB — INR POINT OF CARE: 2.3 (ref 0.86–1.14)

## 2017-12-29 PROCEDURE — 36416 COLLJ CAPILLARY BLOOD SPEC: CPT

## 2017-12-29 PROCEDURE — 99207 ZZC NO CHARGE NURSE ONLY: CPT

## 2017-12-29 PROCEDURE — 85610 PROTHROMBIN TIME: CPT | Mod: QW

## 2017-12-29 NOTE — PROGRESS NOTES
ANTICOAGULATION FOLLOW-UP CLINIC VISIT    Patient Name:  Derek Stover  Date:  12/29/2017  Contact Type:  Face to Face    SUBJECTIVE:     Patient Findings     Positives No Problem Findings           OBJECTIVE    INR Protime   Date Value Ref Range Status   12/29/2017 2.3 (A) 0.86 - 1.14 Final       ASSESSMENT / PLAN  INR assessment THER    Recheck INR In: 3 WEEKS    INR Location Clinic      Anticoagulation Summary as of 12/29/2017     INR goal 2.0-3.0   Today's INR 2.3   Maintenance plan 5 mg (5 mg x 1) on Tue, Thu, Sat; 2.5 mg (5 mg x 0.5) all other days   Full instructions 5 mg on Tue, Thu, Sat; 2.5 mg all other days   Weekly total 25 mg   No change documented Perla Walden RN   Plan last modified Perla Walden RN (11/28/2017)   Next INR check    Target end date     Indications   Atrial fibrillation (H) [I48.91]  Atrial fibrillation (H) [I48.91] (Resolved) [I48.91]  Long-term (current) use of anticoagulants [Z79.01] [Z79.01]         Anticoagulation Episode Summary     INR check location     Preferred lab     Send INR reminders to [facility]    Comments       Anticoagulation Care Providers     Provider Role Specialty Phone number    Carlos Archibald DO [facility] Internal Medicine [phone]            See the Encounter Report to view Anticoagulation Flowsheet and Dosing Calendar (Go to Encounters tab in chart review, and find the Anticoagulation Therapy Visit)    Dosage adjustment made based on physician directed care plan.    Perla Walden RN               ANTICOAGULATION FOLLOW-UP CLINIC VISIT    Patient Name:  Derek Stover  Date:  12/29/2017  Contact Type:  Face to Face    SUBJECTIVE:     Patient Findings     Positives No Problem Findings           OBJECTIVE    INR Protime   Date Value Ref Range Status   12/29/2017 2.3 (A) 0.86 - 1.14 Final       ASSESSMENT / PLAN  INR assessment THER    Recheck INR In: 3 WEEKS    INR Location Clinic      Anticoagulation Summary as of  12/29/2017     INR goal 2.0-3.0   Today's INR 2.3   Maintenance plan 5 mg (5 mg x 1) on Tue, Thu, Sat; 2.5 mg (5 mg x 0.5) all other days   Full instructions 5 mg on Tue, Thu, Sat; 2.5 mg all other days   Weekly total 25 mg   No change documented Perla Walden RN   Plan last modified Perla Walden RN (11/28/2017)   Next INR check 1/18/2018   Target end date     Indications   Atrial fibrillation (H) [I48.91]  Atrial fibrillation (H) [I48.91] (Resolved) [I48.91]  Long-term (current) use of anticoagulants [Z79.01] [Z79.01]         Anticoagulation Episode Summary     INR check location     Preferred lab     Send INR reminders to [facility]    Comments       Anticoagulation Care Providers     Provider Role Specialty Phone number    Carlos Archibald DO [facility] Internal Medicine [phone]            See the Encounter Report to view Anticoagulation Flowsheet and Dosing Calendar (Go to Encounters tab in chart review, and find the Anticoagulation Therapy Visit)    Dosage adjustment made based on physician directed care plan.      Perla Walden, RN

## 2017-12-29 NOTE — MR AVS SNAPSHOT
Derek SHERMAN Stover   12/29/2017 10:45 AM   Anticoagulation Therapy Visit    Description:  57 year old male   Provider:  GUILHERME ANTI COAG   Department:  Ph Anticoag           INR as of 12/29/2017     Today's INR 2.3      Anticoagulation Summary as of 12/29/2017     INR goal 2.0-3.0   Today's INR 2.3   Full instructions 5 mg on Tue, Thu, Sat; 2.5 mg all other days   Next INR check 1/18/2018    Indications   Atrial fibrillation (H) [I48.91]  Atrial fibrillation (H) [I48.91] (Resolved) [I48.91]  Long-term (current) use of anticoagulants [Z79.01] [Z79.01]         Your next Anticoagulation Clinic appointment(s)     Dec 29, 2017 10:45 AM CST   Anticoagulation Visit with PH ANTI COAG   [facility] ([address] [address]   [phone]            Jan 19, 2018  8:45 AM CST   Anticoagulation Visit with PH ANTI COAG   [facility] ([address] [address]   [phone]              Contact Numbers     Clinic Number:         December 2017 Details    Sun Mon Tue Wed Thu Fri Sat          1               2                 3               4               5               6               7               8               9                 10               11               12               13               14               15               16                 17               18               19               20               21               22               23                 24               25               26               27               28               29      2.5 mg   See details      30      5 mg           31      2.5 mg                Date Details   12/29 This INR check               How to take your warfarin dose     To take:  2.5 mg Take 0.5 of a 5 mg tablet.    To take:  5 mg Take 1 of the 5 mg tablets.           January 2018 Details    Sun Mon Tue Wed Thu Fri Sat      1      2.5 mg          2      5 mg         3      2.5 mg         4      5 mg         5      2.5 mg         6      5 mg           7      2.5 mg         8      2.5 mg         9      5 mg         10      2.5 mg         11      5 mg         12      2.5 mg         13      5 mg           14      2.5 mg         15      2.5 mg         16      5 mg         17      2.5 mg         18            19               20                 21               22               23               24               25               26               27                 28               29               30               31                   Date Details   No additional details    Date of next INR:  1/18/2018         How to take your warfarin dose     To take:  2.5 mg Take 0.5 of a 5 mg tablet.    To take:  5 mg Take 1 of the 5 mg tablets.

## 2018-01-10 DIAGNOSIS — I48.20 CHRONIC ATRIAL FIBRILLATION (H): ICD-10-CM

## 2018-01-10 DIAGNOSIS — Z79.01 ANTICOAGULATED ON COUMADIN: ICD-10-CM

## 2018-01-10 DIAGNOSIS — Z79.01 LONG-TERM (CURRENT) USE OF ANTICOAGULANTS: ICD-10-CM

## 2018-01-10 RX ORDER — WARFARIN SODIUM 5 MG/1
TABLET ORAL
Qty: 65 TABLET | Refills: 1 | Status: SHIPPED | OUTPATIENT
Start: 2018-01-10 | End: 2018-02-16

## 2018-01-10 NOTE — TELEPHONE ENCOUNTER
"Requested Prescriptions   Pending Prescriptions Disp Refills     warfarin (COUMADIN) 5 MG tablet [Pharmacy Med Name: WARFARIN 5 MG       TAB TEVA] 60 tablet 0      Last Written Prescription Date:  11/28/2017  Last Fill Quantity: 60,  # refills: 1   Last Office Visit with [facility], [facility] or [facility] prescribing provider:  11/22/2017   Future Office Visit:      Sig: PER COUMADIN CLINIC DOSING    Vitamin K Antagonists Failed    1/10/2018  9:41 AM       Failed - INR is within goal in the past 6 weeks    Confirm INR is within goal in the past 6 weeks.     Recent Labs   Lab Test 12/29/17   INR  2.3*                      Passed - Recent or future visit with authorizing provider's specialty    Patient had office visit in the last year or has a visit in the next 30 days with authorizing provider.  See \"Patient Info\" tab in inbasket, or \"Choose Columns\" in Meds & Orders section of the refill encounter.              Passed - Patient is 18 years of age or older          "

## 2018-01-15 DIAGNOSIS — E78.5 HYPERLIPIDEMIA LDL GOAL <100: ICD-10-CM

## 2018-01-15 LAB
ALT SERPL W P-5'-P-CCNC: 26 U/L (ref 0–70)
CHOLEST SERPL-MCNC: 121 MG/DL
HDLC SERPL-MCNC: 48 MG/DL
LDLC SERPL CALC-MCNC: 48 MG/DL
NONHDLC SERPL-MCNC: 73 MG/DL
TRIGL SERPL-MCNC: 126 MG/DL

## 2018-01-15 PROCEDURE — 80061 LIPID PANEL: CPT | Performed by: PHYSICIAN ASSISTANT

## 2018-01-15 PROCEDURE — 36415 COLL VENOUS BLD VENIPUNCTURE: CPT | Performed by: PHYSICIAN ASSISTANT

## 2018-01-15 PROCEDURE — 84460 ALANINE AMINO (ALT) (SGPT): CPT | Performed by: PHYSICIAN ASSISTANT

## 2018-01-19 ENCOUNTER — ANTICOAGULATION THERAPY VISIT (OUTPATIENT)
Dept: ANTICOAGULATION | Facility: CLINIC | Age: 58
End: 2018-01-19
Payer: COMMERCIAL

## 2018-01-19 DIAGNOSIS — Z79.01 LONG-TERM (CURRENT) USE OF ANTICOAGULANTS: ICD-10-CM

## 2018-01-19 DIAGNOSIS — I48.91 ATRIAL FIBRILLATION, UNSPECIFIED TYPE (H): ICD-10-CM

## 2018-01-19 LAB — INR POINT OF CARE: 3.4 (ref 0.86–1.14)

## 2018-01-19 PROCEDURE — 99207 ZZC NO CHARGE NURSE ONLY: CPT

## 2018-01-19 PROCEDURE — 36416 COLLJ CAPILLARY BLOOD SPEC: CPT

## 2018-01-19 PROCEDURE — 85610 PROTHROMBIN TIME: CPT | Mod: QW

## 2018-01-19 NOTE — PROGRESS NOTES
ANTICOAGULATION FOLLOW-UP CLINIC VISIT    Patient Name:  Derek Stover  Date:  1/19/2018  Contact Type:  Face to Face    SUBJECTIVE:     Patient Findings     Positives Change in medications (NyQuil and DayQuil x1 week, Ibuprofen a couple times )           OBJECTIVE    INR Protime   Date Value Ref Range Status   01/19/2018 3.4 (A) 0.86 - 1.14 Final       ASSESSMENT / PLAN  INR assessment THER    Recheck INR In: 4 WEEKS    INR Location Clinic      Anticoagulation Summary as of 1/19/2018     INR goal 2.0-3.0   Today's INR 3.4!   Maintenance plan 5 mg (5 mg x 1) on Tue, Thu, Sat; 2.5 mg (5 mg x 0.5) all other days   Full instructions 1/20: 2.5 mg; 1/23: 2.5 mg; Otherwise 5 mg on Tue, Thu, Sat; 2.5 mg all other days   Weekly total 25 mg   Plan last modified Perla Walden RN (11/28/2017)   Next INR check 2/16/2018   Target end date     Indications   Atrial fibrillation (H) [I48.91]  Atrial fibrillation (H) [I48.91] (Resolved) [I48.91]  Long-term (current) use of anticoagulants [Z79.01] [Z79.01]         Anticoagulation Episode Summary     INR check location     Preferred lab     Send INR reminders to [facility]    Comments       Anticoagulation Care Providers     Provider Role Specialty Phone number    Carlos Archibald DO [facility] Internal Medicine [phone]            See the Encounter Report to view Anticoagulation Flowsheet and Dosing Calendar (Go to Encounters tab in chart review, and find the Anticoagulation Therapy Visit)    Dosage adjustment made based on physician directed care plan.        Perla Walden, RN

## 2018-01-19 NOTE — MR AVS SNAPSHOT
Derek SHERMAN Stover   1/19/2018 8:45 AM   Anticoagulation Therapy Visit    Description:  57 year old male   Provider:  GUILHERME ANTI COAG   Department:  [facility]           INR as of 1/19/2018     Today's INR 3.4!      Anticoagulation Summary as of 1/19/2018     INR goal 2.0-3.0   Today's INR 3.4!   Full instructions 1/20: 2.5 mg; 1/23: 2.5 mg; Otherwise 5 mg on Tue, Thu, Sat; 2.5 mg all other days   Next INR check 2/16/2018    Indications   Atrial fibrillation (H) [I48.91]  Atrial fibrillation (H) [I48.91] (Resolved) [I48.91]  Long-term (current) use of anticoagulants [Z79.01] [Z79.01]         Your next Anticoagulation Clinic appointment(s)     Feb 16, 2018  8:45 AM CST   [facility]agulation [facility] with [facility]TI COAG   [facility]    [address] [place] [id number] [phone]              Contact Numbers     Clinic Number:         January 2018 Details    Sun Mon Tue Wed Thu Fri Sat      1               2               3               4               5               6                 7               8               9               10               11               12               13                 14               15               16               17               18               19      2.5 mg   See details      20      2.5 mg           21      2.5 mg         22      2.5 mg         23      2.5 mg         24      2.5 mg         25      5 mg         26      2.5 mg         27      5 mg           28      2.5 mg         29      2.5 mg         30      5 mg         31      2.5 mg             Date Details   01/19 This INR check               How to take your warfarin dose     To take:  2.5 mg Take 0.5 of a 5 mg tablet.    To take:  5 mg Take 1 of the 5 mg tablets.           February 2018 Details    Sun Mon Tue Wed Thu Fri Sat         1      5 mg         2      2.5 mg         3      5 mg           4      2.5 mg         5      2.5 mg         6      5 mg         7       2.5 mg         8      5 mg         9      2.5 mg         10      5 mg           11      2.5 mg         12      2.5 mg         13      5 mg         14      2.5 mg         15      5 mg         16            17                 18               19               20               21               22               23               24                 25               26               27               28                   Date Details   No additional details    Date of next INR:  2/16/2018         How to take your warfarin dose     To take:  2.5 mg Take 0.5 of a 5 mg tablet.    To take:  5 mg Take 1 of the 5 mg tablets.

## 2018-02-16 ENCOUNTER — ANTICOAGULATION THERAPY VISIT (OUTPATIENT)
Dept: ANTICOAGULATION | Facility: CLINIC | Age: 58
End: 2018-02-16
Payer: COMMERCIAL

## 2018-02-16 DIAGNOSIS — Z79.01 LONG-TERM (CURRENT) USE OF ANTICOAGULANTS: ICD-10-CM

## 2018-02-16 DIAGNOSIS — I48.20 CHRONIC ATRIAL FIBRILLATION (H): ICD-10-CM

## 2018-02-16 DIAGNOSIS — I48.91 ATRIAL FIBRILLATION, UNSPECIFIED TYPE (H): ICD-10-CM

## 2018-02-16 DIAGNOSIS — Z79.01 ANTICOAGULATED ON COUMADIN: ICD-10-CM

## 2018-02-16 DIAGNOSIS — Z87.891 PERSONAL HISTORY OF TOBACCO USE, PRESENTING HAZARDS TO HEALTH: ICD-10-CM

## 2018-02-16 DIAGNOSIS — E78.5 HYPERLIPIDEMIA LDL GOAL <100: ICD-10-CM

## 2018-02-16 DIAGNOSIS — Z95.2 S/P MVR (MITRAL VALVE REPLACEMENT): ICD-10-CM

## 2018-02-16 LAB — INR POINT OF CARE: 2.4 (ref 0.86–1.14)

## 2018-02-16 PROCEDURE — 99207 ZZC NO CHARGE NURSE ONLY: CPT

## 2018-02-16 PROCEDURE — 85610 PROTHROMBIN TIME: CPT | Mod: QW

## 2018-02-16 PROCEDURE — 36416 COLLJ CAPILLARY BLOOD SPEC: CPT

## 2018-02-16 RX ORDER — WARFARIN SODIUM 5 MG/1
TABLET ORAL
Qty: 65 TABLET | Refills: 1 | Status: SHIPPED | OUTPATIENT
Start: 2018-02-16 | End: 2018-03-16

## 2018-02-16 RX ORDER — ATORVASTATIN CALCIUM 20 MG/1
20 TABLET, FILM COATED ORAL DAILY
Qty: 90 TABLET | Refills: 0 | Status: SHIPPED | OUTPATIENT
Start: 2018-02-16 | End: 2018-03-16

## 2018-02-16 NOTE — PROGRESS NOTES
ANTICOAGULATION FOLLOW-UP CLINIC VISIT    Patient Name:  Derek Stover  Date:  2/16/2018  Contact Type:  Face to Face    SUBJECTIVE:     Patient Findings     Positives No Problem Findings           OBJECTIVE    INR Protime   Date Value Ref Range Status   02/16/2018 2.4 (A) 0.86 - 1.14 Final       ASSESSMENT / PLAN  INR assessment THER    Recheck INR In: 4 WEEKS    INR Location Clinic      Anticoagulation Summary as of 2/16/2018     INR goal 2.5-3.5   Prior goal 2.0-3.0   Today's INR 2.4   Maintenance plan 5 mg (5 mg x 1) on Tue, Thu, Sat; 2.5 mg (5 mg x 0.5) all other days   Full instructions 5 mg on Tue, Thu, Sat; 2.5 mg all other days   Weekly total 25 mg   No change documented Perla Walden RN   Plan last modified Perla Walden RN (11/28/2017)   Next INR check 3/16/2018   Target end date     Indications   Atrial fibrillation (H) [I48.91]  Atrial fibrillation (H) [I48.91] (Resolved) [I48.91]  Long-term (current) use of anticoagulants [Z79.01] [Z79.01]         Anticoagulation Episode Summary     INR check location     Preferred lab     Send INR reminders to [facility] POOL    Comments 5 mg, PM dose, printout      Anticoagulation Care Providers     Provider Role Specialty Phone number    Carlos Archibald DO [facility] Internal Medicine [phone]            See the Encounter Report to view Anticoagulation Flowsheet and Dosing Calendar (Go to Encounters tab in chart review, and find the Anticoagulation Therapy Visit)    Dosage adjustment made based on physician directed care plan.      Perla Walden RN

## 2018-02-16 NOTE — MR AVS SNAPSHOT
Derek SHERMAN Stover   2/16/2018 8:45 AM   Anticoagulation Therapy Visit    Description:  57 year old male   Provider:  GUILHERME ANTI BERNARDO   Department:  [facility]           INR as of 2/16/2018     Today's INR 2.4      Anticoagulation Summary as of 2/16/2018     INR goal 2.0-3.0   Today's INR 2.4   Full instructions 5 mg on Tue, Thu, Sat; 2.5 mg all other days   Next INR check 3/16/2018    Indications   Atrial fibrillation (H) [I48.91]  Atrial fibrillation (H) [I48.91] (Resolved) [I48.91]  Long-term (current) use of anticoagulants [Z79.01] [Z79.01]         Your next Anticoagulation Clinic appointment(s)     Mar 16, 2018  8:45 AM CDT   Anticoagulation Visit with PH ANTI COAG   [facility]    [address] [place] [id number]   [phone]              Contact Numbers     Clinic Number:         February 2018 Details    Sun Mon Tue Wed Thu Fri Sat         1               2               3                 4               5               6               7               8               9               10                 11               12               13               14               15               16      2.5 mg   See details      17      5 mg           18      2.5 mg         19      2.5 mg         20      5 mg         21      2.5 mg         22      5 mg         23      2.5 mg         24      5 mg           25      2.5 mg         26      2.5 mg         27      5 mg         28      2.5 mg             Date Details   02/16 This INR check               How to take your warfarin dose     To take:  2.5 mg Take 0.5 of a 5 mg tablet.    To take:  5 mg Take 1 of the 5 mg tablets.           March 2018 Details    Sun Mon Tue Wed Thu Fri Sat         1      5 mg         2      2.5 mg         3      5 mg           4      2.5 mg         5      2.5 mg         6      5 mg         7      2.5 mg         8      5 mg         9      2.5 mg         10      5 mg           11      2.5 mg          12      2.5 mg         13      5 mg         14      2.5 mg         15      5 mg         16            17                 18               19               20               21               22               23               24                 25               26               27               28               29               30               31                Date Details   No additional details    Date of next INR:  3/16/2018         How to take your warfarin dose     To take:  2.5 mg Take 0.5 of a 5 mg tablet.    To take:  5 mg Take 1 of the 5 mg tablets.

## 2018-03-16 ENCOUNTER — ANTICOAGULATION THERAPY VISIT (OUTPATIENT)
Dept: ANTICOAGULATION | Facility: CLINIC | Age: 58
End: 2018-03-16
Payer: COMMERCIAL

## 2018-03-16 DIAGNOSIS — Z79.01 LONG-TERM (CURRENT) USE OF ANTICOAGULANTS: ICD-10-CM

## 2018-03-16 DIAGNOSIS — E78.5 HYPERLIPIDEMIA LDL GOAL <100: ICD-10-CM

## 2018-03-16 DIAGNOSIS — Z79.01 ANTICOAGULATED ON COUMADIN: ICD-10-CM

## 2018-03-16 DIAGNOSIS — I48.91 ATRIAL FIBRILLATION, UNSPECIFIED TYPE (H): ICD-10-CM

## 2018-03-16 DIAGNOSIS — I48.20 CHRONIC ATRIAL FIBRILLATION (H): ICD-10-CM

## 2018-03-16 DIAGNOSIS — Z87.891 PERSONAL HISTORY OF TOBACCO USE, PRESENTING HAZARDS TO HEALTH: ICD-10-CM

## 2018-03-16 DIAGNOSIS — Z95.2 S/P MVR (MITRAL VALVE REPLACEMENT): ICD-10-CM

## 2018-03-16 LAB — INR PPP: 2.4

## 2018-03-16 PROCEDURE — 99207 ZZC NO CHARGE NURSE ONLY: CPT

## 2018-03-16 RX ORDER — WARFARIN SODIUM 5 MG/1
TABLET ORAL
Qty: 65 TABLET | Refills: 1 | COMMUNITY
Start: 2018-03-16 | End: 2018-06-11

## 2018-03-16 RX ORDER — ATORVASTATIN CALCIUM 20 MG/1
20 TABLET, FILM COATED ORAL DAILY
Qty: 90 TABLET | Refills: 0 | Status: SHIPPED | OUTPATIENT
Start: 2018-03-16 | End: 2018-05-30

## 2018-03-16 NOTE — PROGRESS NOTES
ANTICOAGULATION FOLLOW-UP CLINIC VISIT    Patient Name:  Derek Stover  Date:  3/16/2018  Contact Type:  Face to Face    SUBJECTIVE:     Patient Findings     Positives No Problem Findings           OBJECTIVE    INR   Date Value Ref Range Status   03/16/2018 2.4  Final       ASSESSMENT / PLAN  INR assessment THER    Recheck INR In: 5 WEEKS    INR Location Clinic      Anticoagulation Summary as of 3/16/2018     INR goal 2.5-3.5   Today's INR 2.4!   Maintenance plan 2.5 mg (5 mg x 0.5) on Mon, Wed, Fri; 5 mg (5 mg x 1) all other days   Full instructions 2.5 mg on Mon, Wed, Fri; 5 mg all other days   Weekly total 27.5 mg   Plan last modified Perla Walden RN (3/16/2018)   Next INR check 4/20/2018   Target end date     Indications   Atrial fibrillation (H) [I48.91]  Atrial fibrillation (H) [I48.91] (Resolved) [I48.91]  Long-term (current) use of anticoagulants [Z79.01] [Z79.01]         Anticoagulation Episode Summary     INR check location     Preferred lab     Send INR reminders to [facility] CYNTHIA    Comments 5 mg, PM dose, printout      Anticoagulation Care Providers     Provider Role Specialty Phone number    DraganTavoCarloselva Yarbrough DO [facility] Internal Medicine [phone]            See the Encounter Report to view Anticoagulation Flowsheet and Dosing Calendar (Go to Encounters tab in chart review, and find the Anticoagulation Therapy Visit)    Dosage adjustment made based on physician directed care plan.      Perla Walden, RN

## 2018-03-16 NOTE — MR AVS SNAPSHOT
Derek SHERMAN Stover   3/16/2018 8:45 AM   Anticoagulation Therapy Visit    Description:  57 year old male   Provider:  GUILHERME ANTI COAG   Department:  [facility] Antico[facility]           INR as of 3/16/2018     Today's INR 2.4!      Anticoagulation Summary as of 3/16/2018     INR goal 2.5-3.5   Today's INR 2.4!   Full instructions 2.5 mg on Mon, Wed, Fri; 5 mg all other days   Next INR check 4/20/2018    Indications   Atrial fibrillation (H) [I48.91]  Atrial fibrillation (H) [I48.91] (Resolved) [I48.91]  Long-term (current) use of anticoagulants [Z79.01] [Z79.01]         Your next Anticoagulation Clinic appointment(s)     Mar 16, 2018  8:45 AM CDT   Anticoagulation Visit with PH ANTI COAG   [facility] ([address] [address]   [phone]            Apr 20, 2018  8:45 AM CDT   Anticoagulation Visit with PH ANTI COAG   [facility] ([address] [address]   [phone]              Contact Numbers     Clinic Number:         March 2018 Details    Sun Mon Tue Wed Thu Fri Sat         1               2               3                 4               5               6               7               8               9               10                 11               12               13               14               15               16      2.5 mg   See details      17      5 mg           18      5 mg         19      2.5 mg         20      5 mg         21      2.5 mg         22      5 mg         23      2.5 mg         24      5 mg           25      5 mg         26      2.5 mg         27      5 mg         28      2.5 mg         29      5 mg         30      2.5 mg         31      5 mg          Date Details   03/16 This INR check               How to take your warfarin dose     To take:  2.5 mg Take 0.5 of a 5 mg tablet.    To take:  5 mg Take 1 of the 5 mg tablets.           April 2018 Details    [place] Mon  Tue Wed Thu Fri Sat     1      5 mg         2      2.5 mg         3      5 mg         4      2.5 mg         5      5 mg         6      2.5 mg         7      5 mg           8      5 mg         9      2.5 mg         10      5 mg         11      2.5 mg         12      5 mg         13      2.5 mg         14      5 mg           15      5 mg         16      2.5 mg         17      5 mg         18      2.5 mg         19      5 mg         20            21                 22               23               24               25               26               27               28                 29               30                     Date Details   No additional details    Date of next INR:  4/20/2018         How to take your warfarin dose     To take:  2.5 mg Take 0.5 of a 5 mg tablet.    To take:  5 mg Take 1 of the 5 mg tablets.

## 2018-03-27 ENCOUNTER — OFFICE VISIT (OUTPATIENT)
Dept: CARDIOLOGY | Facility: CLINIC | Age: 58
End: 2018-03-27
Payer: COMMERCIAL

## 2018-03-27 VITALS
BODY MASS INDEX: 39.7 KG/M2 | HEIGHT: 70 IN | DIASTOLIC BLOOD PRESSURE: 86 MMHG | SYSTOLIC BLOOD PRESSURE: 128 MMHG | WEIGHT: 277.3 LBS | OXYGEN SATURATION: 100 % | HEART RATE: 82 BPM

## 2018-03-27 DIAGNOSIS — Z95.2 S/P MITRAL VALVE REPLACEMENT: Primary | ICD-10-CM

## 2018-03-27 PROCEDURE — 99213 OFFICE O/P EST LOW 20 MIN: CPT | Performed by: INTERNAL MEDICINE

## 2018-03-27 NOTE — MR AVS SNAPSHOT
After Visit Summary   3/27/2018    Derek Stover    MRN: [id number]           Patient Information     Date Of Birth          1960        Visit Information        Provider Department      3/27/2018 9:30 AM Oni Ross MD [facility]        Today's Diagnoses     S/P mitral valve replacement    -  1       Follow-ups after your visit        Additional Services     Follow-Up with Cardiologist                 Your next 10 appointments already scheduled     Apr 20, 2018  8:45 AM CDT   [facility]agulation [facility] with PH [facility]TI [facility]AG   [facility]    [address] [facility] [address] [phone]            May 30, 2018  2:30 PM CDT   Office Visit with Osbaldo Renee MD   [facility]    [address] [facility] [address] [phone]           Bring a current list of meds and any records pertaining to this visit. For Physicals, please bring immunization records and any forms needing to be filled out. Please arrive 10 minutes early to complete paperwork.              Future tests that were ordered for you today     Open Future Orders        Priority Expected Expires Ordered    Follow-Up with Cardiologist Routine 3/27/2019 3/28/2019 3/27/2018            Who to contact     If you have questions or need follow up information about today's clinic visit or your schedule please contact [facility] directly at [phone].  Normal or non-critical lab and imaging results will be communicated to you by MyChart, letter or phone within 4 business days after the clinic has received the results. If you do not hear from us within 7 days, please contact the clinic through MyChart or phone. If you have a critical or abnormal lab result, we will notify you by phone as soon as possible.  Submit refill requests through MyChart or call your  "pharmacy and they will forward the refill request to us. Please allow 3 business days for your refill to be completed.          Additional Information About Your Visit        MyChart Information     [employer] gives you secure access to your electronic health record. If you see a primary care provider, you can also send messages to your care team and make appointments. If you have questions, please call your primary care clinic.  If you do not have a primary care provider, please call [phone] and they will assist you.        Care EveryWhere ID     This is your Care EveryWhere ID. This could be used by other organizations to access your [place] medical records  [id number]        Your Vitals Were     Pulse Height Pulse Oximetry BMI (Body Mass Index)          82 1.778 m (5' 10\") 100% 39.79 kg/m2         Blood Pressure from Last 3 Encounters:   03/27/18 128/86   11/22/17 96/60   11/14/17 100/72    Weight from Last 3 Encounters:   03/27/18 125.8 kg (277 lb 4.8 oz)   11/22/17 127.6 kg (281 lb 6.4 oz)   11/20/17 125.6 kg (277 lb)               Primary Care Provider Office Phone # Fax #    Osbaldo Renee [phone]       [address] [place] [address]        Equal Access to Services     HOMER SPAIN AH: Hadii kendrick ku hadasho Soomaali, waaxda luqadaha, qaybta kaalmada adeegyada, malissa eugene. So [facility] [phone].    ATENCIÓN: Si habla español, tiene a macias disposición servicios gratuitos de asistencia lingüística. ame al [phone].    We comply with applicable federal civil rights laws and [place] laws. We do not discriminate on the basis of race, color, national origin, age, disability, sex, sexual orientation, or gender identity.            Thank you!     Thank you for choosing [facility]  for your care. Our goal is always to provide you with excellent care. Hearing back from our patients is one way we can continue to improve " our services. Please take a few minutes to complete the written survey that you may receive in the mail after your visit with us. Thank you!             Your Updated Medication List - Protect others around you: Learn how to safely use, store and throw away your medicines at www.disposemymeds.org.          This list is accurate as of 3/27/18  9:46 AM.  Always use your most recent med list.                   Brand Name Dispense Instructions for use Diagnosis    acetaminophen 325 MG tablet    TYLENOL    100 tablet    Take 2 tablets (650 mg) by mouth every 4 hours as needed for other (surgical pain)    S/P mitral valve replacement with metallic valve       AMOXIL PO      Take 1,500-3,000 mg by mouth daily as needed (patient takes 6 X 500 = 3,000 mg dose 1 hour before dental appointment and 3 X 500 = 1,500 mg dose 6 hours after dental appointment.)        aspirin 81 MG EC tablet     30 tablet    Take 1 tablet (81 mg) by mouth daily    S/P mitral valve replacement with metallic valve       atorvastatin 20 MG tablet    LIPITOR    90 tablet    Take 1 tablet (20 mg) by mouth daily    Personal history of tobacco use, presenting hazards to health, Hyperlipidemia LDL goal <100, S/P MVR (mitral valve replacement), Atrial fibrillation, unspecified type (H)       IBUPROFEN PO           warfarin 5 MG tablet    COUMADIN    65 tablet    Take 2.5 mg Mon, Wed, Fri and 5 mg all other days, or as directed by the coumadin clinic.    Long-term (current) use of anticoagulants, Chronic atrial fibrillation (H), Anticoagulated on Coumadin

## 2018-03-27 NOTE — PROGRESS NOTES
Service Date: 03/27/2018      REASON FOR CARDIOLOGY VISIT:  Followup post mitral valve replacement.      HISTORY OF PRESENT ILLNESS:  Mr. 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  He eventually underwent coronary angiogram that showed normal coronary arteries and then underwent mitral valve replacement by Dr. Marie in 09/2017 with a 32 mm [employer] [place] mechanical valve.        He did quite well postoperatively.  He had some lightheadedness and beta blocker was discontinued and he has maintained his ventricular rate for chronic atrial fibrillation without any need for AV myriam blocking agent.  He had a post-MVR echocardiogram that showed LVEF of 50%-55%.  No MR through the mechanical mitral valve, mean gradient of 8 mmHg.  The leaflets were opening well, mild aortic regurgitation.        Today, he is coming for routine followup.  The patient tells me that he is doing quite well.  No chest discomfort or shortness of breath.  He has noticed some runny nose for the last few weeks and is wondering if this could be from Lipitor.  He does not endorse any myalgias.  To be noted, his LDL was 129 and has come down to 48.  To be noted, coronary angiogram did not show any coronary artery disease.  He is on 20 mg of Lipitor.  Additionally, he is on Coumadin and baby aspirin.        Unfortunately, he has resumed smoking up to 1-1/2 packs per day.  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  In the past, he had tried Chantix and had success in terms of decreasing his tobacco, but unfortunately has resumed back again.        PHYSICAL EXAMINATION:   VITAL SIGNS:  Blood pressure 122/86, heart rate 82, regular, weight 277 pounds, BMI 39.87.   GENERAL:  The patient appears  pleasant, comfortable.   NECK:  Normal JVP, no bruit.   CARDIOVASCULAR SYSTEM:  Irregular, normal rate.  Crisp mechanical heart sounds heard.  No murmur, rub or gallop.   RESPIRATORY SYSTEM:  Clear to auscultation bilaterally system.   ABDOMEN:  Soft, nontender, obese.   EXTREMITIES:  No pitting pedal edema.   NEUROLOGICAL:  Alert, oriented x3.   PSYCHIATRIC:  Normal affect.   SKIN:  Normal.   HEENT:  No pallor or icterus.      IMPRESSION AND PLAN:  A very pleasant 57-year-old gentleman status post mechanical mitral valve for severe symptomatic mitral regurgitation, normal coronary arteries on coronary echocardiogram, chronic atrial fibrillation, on Coumadin for stroke prophylaxis, tobacco abuse.  Overall, cardiac status-wise he is doing quite well without any symptoms of angina or congestive heart failure.  Crisp mechanical heart sounds heard.  He is complaining of some runny nose which is unlikely due to Lipitor, but he is wondering if he can give a trial of discontinuation of Lipitor to see if it helps.  I think this is reasonable.  If he has noticed improvement in his symptoms after discontinuation within 2 weeks, he can continue to remain off Lipitor as noted above his coronary angiogram did not show any coronary artery disease.        The bulk of the clinic visit was spent discussing tobacco cessation.  I also reminded him about the importance of predental workup antibiotic prophylaxis.  If he continues to feel stable cardiac status-wise, we can see him back in 1 year, sooner if any change in clinical status.         HATTIE SHAVER MD             D: 2018   T: 2018   MT: LARRY      Name:     HAKEEM BORJAS   MRN:      [id number]        Account:      [id number]   :      1960           Service Date: 2018      Document: [id number]

## 2018-03-27 NOTE — PROGRESS NOTES
HPI and Plan:   See dictation(#[id number])    Orders Placed This Encounter   Procedures     Follow-Up with Cardiologist       No orders of the defined types were placed in this encounter.      There are no discontinued medications.      Encounter Diagnosis   Name Primary?     S/P mitral valve replacement Yes       CURRENT MEDICATIONS:  Current Outpatient Prescriptions   Medication Sig Dispense Refill     atorvastatin (LIPITOR) 20 MG tablet Take 1 tablet (20 mg) by mouth daily 90 tablet 0     warfarin (COUMADIN) 5 MG tablet Take 2.5 mg Mon, Wed, Fri and 5 mg all other days, or as directed by the coumadin clinic. 65 tablet 1     aspirin EC 81 MG EC tablet Take 1 tablet (81 mg) by mouth daily 30 tablet 0     IBUPROFEN PO        acetaminophen (TYLENOL) 325 MG tablet Take 2 tablets (650 mg) by mouth every 4 hours as needed for other (surgical pain) (Patient not taking: Reported on 3/27/2018) 100 tablet 0     Amoxicillin (AMOXIL PO) Take 1,500-3,000 mg by mouth daily as needed (patient takes 6 X 500 = 3,000 mg dose 1 hour before dental appointment and 3 X 500 = 1,500 mg dose 6 hours after dental appointment.)         ALLERGIES     Allergies   Allergen Reactions     No Known Drug Allergy        PAST MEDICAL HISTORY:  Past Medical History:   Diagnosis Date     Atrial fibrillation (H)     Valvular Afib.  Diagnosed 06/2017. Pt initiated on coumadin 7/18/17     Erectile dysfunction      Heart murmur     rheumatic fever as a child     Hyperlipidemia      Mitral regurgitation     rheumatic fever as a child. 3-4+ per MITCHELL 7/25/17     Tobacco abuse        PAST SURGICAL HISTORY:  Past Surgical History:   Procedure Laterality Date     COLONOSCOPY N/A 9/8/2014    Procedure: COMBINED COLONOSCOPY, SINGLE BIOPSY/POLYPECTOMY BY BIOPSY;  Surgeon: Kai Bailey MD;  Location:  GI     ORTHOPEDIC SURGERY      RIght knee scope     REPLACE VALVE MITRAL N/A 9/12/2017    Procedure: REPLACE VALVE MITRAL;  MITRAL VALVE REPLACEMENT  [employer] Masters  "Series Mechanical Heart Valve 33mm  Ref, 33MJ-501 Serial [id number]   ON PUMP, MITCHELL;  Surgeon: Ivana Marie MD;  Location:  OR       FAMILY HISTORY:  Family History   Problem Relation Age of Onset     DIABETES Mother      Obesity Mother      DIABETES Father        SOCIAL HISTORY:  Social History     Social History     Marital status:      Spouse name: N/A     Number of children: N/A     Years of education: N/A     Social History Main Topics     Smoking status: Current Every Day Smoker     Years: 39.00     Types: Pipe, Cigars     Smokeless tobacco: Never Used     Alcohol use 2.4 oz/week     4 Standard drinks or equivalent per week      Comment: occasional     Drug use: No     Sexual activity: Yes     Partners: Female     Other Topics Concern     Parent/Sibling W/ Cabg, Mi Or Angioplasty Before 65f 55m? No     Social History Narrative       Review of Systems:  Skin:  Positive for   chest incision of feels tight    Eyes:  Positive for      ENT:  Negative      Respiratory:  Negative       Cardiovascular:  Negative for;palpitations;chest pain;lightheadedness;dizziness Positive for feeling a little \"punky\" in the last month   Gastroenterology: Negative      Genitourinary:  Negative      Musculoskeletal:  Positive for back pain due to physical job   Neurologic:  Negative      Psychiatric:  Positive for sleep disturbances    Heme/Lymph/Imm:  Negative      Endocrine:  Negative        Physical Exam:  Vitals: /86 (BP Location: Right arm, Patient Position: Fowlers, Cuff Size: Adult Regular)  Pulse 82  Ht 1.778 m (5' 10\")  Wt 125.8 kg (277 lb 4.8 oz)  SpO2 100%  BMI 39.79 kg/m2    Constitutional:           Skin:             Head:           Eyes:           Lymph:      ENT:           Neck:           Respiratory:            Cardiac:                                                           GI:           Extremities and Muscular Skeletal:                 Neurological:           Psych:             CC  No " referring provider defined for this encounter.

## 2018-03-27 NOTE — LETTER
3/27/2018    Osbaldo Renee MD  [address] [facility] [address]    RE: Derek Stover       Dear Colleague,    I had the pleasure of seeing Derek Stover in the [facility].    HPI and Plan:   See dictation(#[id number])    Orders Placed This Encounter   Procedures     Follow-Up with Cardiologist       No orders of the defined types were placed in this encounter.      There are no discontinued medications.      Encounter Diagnosis   Name Primary?     S/P mitral valve replacement Yes       CURRENT MEDICATIONS:  Current Outpatient Prescriptions   Medication Sig Dispense Refill     atorvastatin (LIPITOR) 20 MG tablet Take 1 tablet (20 mg) by mouth daily 90 tablet 0     warfarin (COUMADIN) 5 MG tablet Take 2.5 mg Mon, Wed, Fri and 5 mg all other days, or as directed by the coumadin clinic. 65 tablet 1     aspirin EC 81 MG EC tablet Take 1 tablet (81 mg) by mouth daily 30 tablet 0     IBUPROFEN PO        acetaminophen (TYLENOL) 325 MG tablet Take 2 tablets (650 mg) by mouth every 4 hours as needed for other (surgical pain) (Patient not taking: Reported on 3/27/2018) 100 tablet 0     Amoxicillin (AMOXIL PO) Take 1,500-3,000 mg by mouth daily as needed (patient takes 6 X 500 = 3,000 mg dose 1 hour before dental appointment and 3 X 500 = 1,500 mg dose 6 hours after dental appointment.)         ALLERGIES     Allergies   Allergen Reactions     No Known Drug Allergy        PAST MEDICAL HISTORY:  Past Medical History:   Diagnosis Date     Atrial fibrillation (H)     Valvular Afib.  Diagnosed 06/2017. Pt initiated on coumadin 7/18/17     Erectile dysfunction      Heart murmur     rheumatic fever as a child     Hyperlipidemia      Mitral regurgitation     rheumatic fever as a child. 3-4+ per MITCHELL 7/25/17     Tobacco abuse        PAST SURGICAL HISTORY:  Past Surgical History:   Procedure Laterality Date     COLONOSCOPY N/A 9/8/2014    Procedure: COMBINED COLONOSCOPY, SINGLE BIOPSY/POLYPECTOMY BY  "BIOPSY;  Surgeon: Kai Bailey MD;  Location:  GI     ORTHOPEDIC SURGERY      RIght knee scope     REPLACE VALVE MITRAL N/A 9/12/2017    Procedure: REPLACE VALVE MITRAL;  MITRAL VALVE REPLACEMENT  [facility] Masters Series Mechanical Heart Valve 33mm  Ref, 33MJ-501 Serial [id number]   ON PUMP, MITCHELL;  Surgeon: Ivana Marie MD;  Location:  OR       FAMILY HISTORY:  Family History   Problem Relation Age of Onset     DIABETES Mother      Obesity Mother      DIABETES Father        SOCIAL HISTORY:  Social History     Social History     Marital status:      Spouse name: N/A     Number of children: N/A     Years of education: N/A     Social History Main Topics     Smoking status: Current Every Day Smoker     Years: 39.00     Types: Pipe, Cigars     Smokeless tobacco: Never Used     Alcohol use 2.4 oz/week     4 Standard drinks or equivalent per week      Comment: occasional     Drug use: No     Sexual activity: Yes     Partners: Female     Other Topics Concern     Parent/Sibling W/ Cabg, Mi Or Angioplasty Before 65f 55m? No     Social History Narrative       Review of Systems:  Skin:  Positive for   chest incision of feels tight    Eyes:  Positive for      ENT:  Negative      Respiratory:  Negative       Cardiovascular:  Negative for;palpitations;chest pain;lightheadedness;dizziness Positive for feeling a little \"punky\" in the last month   Gastroenterology: Negative      Genitourinary:  Negative      Musculoskeletal:  Positive for back pain due to physical job   Neurologic:  Negative      Psychiatric:  Positive for sleep disturbances    Heme/Lymph/Imm:  Negative      Endocrine:  Negative        Physical Exam:  Vitals: /86 (BP Location: Right arm, Patient Position: Fowlers, Cuff Size: Adult Regular)  Pulse 82  Ht 1.778 m (5' 10\")  Wt 125.8 kg (277 lb 4.8 oz)  SpO2 100%  BMI 39.79 kg/m2    Constitutional:           Skin:             Head:           Eyes:           Lymph:      ENT:           Neck:    "        Respiratory:            Cardiac:                                                           GI:           Extremities and Muscular Skeletal:                 Neurological:           Psych:             CC  No referring provider defined for this encounter.                    Service Date: 03/27/2018      REASON FOR CARDIOLOGY VISIT:  Followup post mitral valve replacement.      HISTORY OF PRESENT ILLNESS:  Mr. Stover is a very pleasant 57-year-old gentleman who I saw in 07/2017 for new-onset dyspnea on exertion, newly diagnosed atrial fibrillation and newly diagnosed severe mitral regurgitation with symmetric appearance of the mitral valve.  He eventually underwent coronary angiogram that showed normal coronary arteries and then underwent mitral valve replacement by Dr. Marie in 09/2017 with a 32 mm [employer] [place] mechanical valve.        He did quite well postoperatively.  He had some lightheadedness and beta blocker was discontinued and he has maintained his ventricular rate for chronic atrial fibrillation without any need for AV myriam blocking agent.  He had a post-MVR echocardiogram that showed LVEF of 50%-55%.  No MR through the mechanical mitral valve, mean gradient of 8 mmHg.  The leaflets were opening well, mild aortic regurgitation.        Today, he is coming for routine followup.  The patient tells me that he is doing quite well.  No chest discomfort or shortness of breath.  He has noticed some runny nose for the last few weeks and is wondering if this could be from Lipitor.  He does not endorse any myalgias.  To be noted, his LDL was 129 and has come down to 48.  To be noted, coronary angiogram did not show any coronary artery disease.  He is on 20 mg of Lipitor.  Additionally, he is on Coumadin and baby aspirin.        Unfortunately, he has resumed smoking up to 1-1/2 packs per day.  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  In  the past, he had tried Chantix and had success in terms of decreasing his tobacco, but unfortunately has resumed back again.        PHYSICAL EXAMINATION:   VITAL SIGNS:  Blood pressure 122/86, heart rate 82, regular, weight 277 pounds, BMI 39.87.   GENERAL:  The patient appears pleasant, comfortable.   NECK:  Normal JVP, no bruit.   CARDIOVASCULAR SYSTEM:  Irregular, normal rate.  Crisp mechanical heart sounds heard.  No murmur, rub or gallop.   RESPIRATORY SYSTEM:  Clear to auscultation bilaterally system.   ABDOMEN:  Soft, nontender, obese.   EXTREMITIES:  No pitting pedal edema.   NEUROLOGICAL:  Alert, oriented x3.   PSYCHIATRIC:  Normal affect.   SKIN:  Normal.   HEENT:  No pallor or icterus.      IMPRESSION AND PLAN:  A very pleasant 57-year-old gentleman status post mechanical mitral valve for severe symptomatic mitral regurgitation, normal coronary arteries on coronary echocardiogram, chronic atrial fibrillation, on Coumadin for stroke prophylaxis, tobacco abuse.  Overall, cardiac status-wise he is doing quite well without any symptoms of angina or congestive heart failure.  Crisp mechanical heart sounds heard.  He is complaining of some runny nose which is unlikely due to Lipitor, but he is wondering if he can give a trial of discontinuation of Lipitor to see if it helps.  I think this is reasonable.  If he has noticed improvement in his symptoms after discontinuation within 2 weeks, he can continue to remain off Lipitor as noted above his coronary angiogram did not show any coronary artery disease.        The bulk of the clinic visit was spent discussing tobacco cessation.  I also reminded him about the importance of predental workup antibiotic prophylaxis.  If he continues to feel stable cardiac status-wise, we can see him back in 1 year, sooner if any change in clinical status.         HATTIE SHAVER MD             D: 03/27/2018   T: 03/27/2018   MT: LARRY      Name:     HAKEEM BORJAS   MRN:       [id number]        Account:      [id number]   :      1960           Service Date: 2018      Document: [id number]         Thank you for allowing me to participate in the care of your patient.      Sincerely,     Oni Ross MD     [facility]

## 2018-04-20 ENCOUNTER — ANTICOAGULATION THERAPY VISIT (OUTPATIENT)
Dept: ANTICOAGULATION | Facility: CLINIC | Age: 58
End: 2018-04-20
Payer: COMMERCIAL

## 2018-04-20 DIAGNOSIS — I48.91 ATRIAL FIBRILLATION, UNSPECIFIED TYPE (H): ICD-10-CM

## 2018-04-20 DIAGNOSIS — Z79.01 LONG-TERM (CURRENT) USE OF ANTICOAGULANTS: ICD-10-CM

## 2018-04-20 LAB — INR POINT OF CARE: 2.6 (ref 0.86–1.14)

## 2018-04-20 PROCEDURE — 99207 ZZC NO CHARGE NURSE ONLY: CPT

## 2018-04-20 PROCEDURE — 85610 PROTHROMBIN TIME: CPT | Mod: QW

## 2018-04-20 PROCEDURE — 36416 COLLJ CAPILLARY BLOOD SPEC: CPT

## 2018-04-20 NOTE — MR AVS SNAPSHOT
Derek SHERMAN Stover   4/20/2018 8:45 AM   Anticoagulation Therapy Visit    Description:  57 year old male   Provider:  [facility]   Department:  [facility]           INR as of 4/20/2018     Today's INR 2.6      Anticoagulation Summary as of 4/20/2018     INR goal 2.5-3.5   Today's INR 2.6   Full instructions 2.5 mg on Mon, Wed, Fri; 5 mg all other days   Next INR check 6/1/2018    Indications   Atrial fibrillation (H) [I48.91]  Atrial fibrillation (H) [I48.91] (Resolved) [I48.91]  Long-term (current) use of anticoagulants [Z79.01] [Z79.01]         Your next Anticoagulation Clinic appointment(s)     Jun 01, 2018  8:45 AM CDT   Anticoagulation [facility] with [facility]    [address] [place] [id number]   [phone]              Contact Numbers     Clinic Number:         April 2018 Details    Sun Mon Tue Wed Thu Fri Sat     1               2               3               4               5               6               7                 8               9               10               11               12               13               14                 15               16               17               18               19               20      2.5 mg   See details      21      5 mg           22      5 mg         23      2.5 mg         24      5 mg         25      2.5 mg         26      5 mg         27      2.5 mg         28      5 mg           29      5 mg         30      2.5 mg               Date Details   04/20 This INR check               How to take your warfarin dose     To take:  2.5 mg Take 0.5 of a 5 mg tablet.    To take:  5 mg Take 1 of the 5 mg tablets.           May 2018 Details    Sun Mon Tue Wed Thu Fri Sat       1      5 mg         2      2.5 mg         3      5 mg         4      2.5 mg         5      5 mg           6      5 mg         7      2.5 mg         8      5 mg         9      2.5 mg         10      5 mg         11       2.5 mg         12      5 mg           13      5 mg         14      2.5 mg         15      5 mg         16      2.5 mg         17      5 mg         18      2.5 mg         19      5 mg           20      5 mg         21      2.5 mg         22      5 mg         23      2.5 mg         24      5 mg         25      2.5 mg         26      5 mg           27      5 mg         28      2.5 mg         29      5 mg         30      2.5 mg         31      5 mg            Date Details   No additional details            How to take your warfarin dose     To take:  2.5 mg Take 0.5 of a 5 mg tablet.    To take:  5 mg Take 1 of the 5 mg tablets.           June 2018 Details    Sun Mon Tue Wed Thu Fri Sat          1            2                 3               4               5               6               7               8               9                 10               11               12               13               14               15               16                 17               18               19               20               21               22               23                 24               25               26               27               28               29               30                Date Details   No additional details    Date of next INR:  6/1/2018         How to take your warfarin dose     To take:  2.5 mg Take 0.5 of a 5 mg tablet.

## 2018-04-20 NOTE — PROGRESS NOTES
ANTICOAGULATION FOLLOW-UP CLINIC VISIT    Patient Name:  Derek Stover  Date:  4/20/2018  Contact Type:  Face to Face    SUBJECTIVE:     Patient Findings     Positives No Problem Findings           OBJECTIVE    INR Protime   Date Value Ref Range Status   04/20/2018 2.6 (A) 0.86 - 1.14 Final       ASSESSMENT / PLAN  INR assessment THER    Recheck INR In: 6 WEEKS    INR Location Clinic      Anticoagulation Summary as of 4/20/2018     INR goal 2.5-3.5   Today's INR 2.6   Maintenance plan 2.5 mg (5 mg x 0.5) on Mon, Wed, Fri; 5 mg (5 mg x 1) all other days   Full instructions 2.5 mg on Mon, Wed, Fri; 5 mg all other days   Weekly total 27.5 mg   No change documented Perla Walden RN   Plan last modified Perla Walden RN (3/16/2018)   Next INR check 6/1/2018   Target end date     Indications   Atrial fibrillation (H) [I48.91]  Atrial fibrillation (H) [I48.91] (Resolved) [I48.91]  Long-term (current) use of anticoagulants [Z79.01] [Z79.01]         Anticoagulation Episode Summary     INR check location     Preferred lab     Send INR reminders to [facility] POOL    Comments 5 mg, PM dose, printout      Anticoagulation Care Providers     Provider Role Specialty Phone number    Carlos Archibald DO [facility] Internal [facility] [phone]            See the Encounter Report to view Anticoagulation Flowsheet and Dosing Calendar (Go to Encounters tab in chart review, and find the Anticoagulation Therapy Visit)    Dosage adjustment made based on physician directed care plan.      Perla Walden RN

## 2018-05-30 ENCOUNTER — OFFICE VISIT (OUTPATIENT)
Dept: FAMILY MEDICINE | Facility: OTHER | Age: 58
End: 2018-05-30
Payer: COMMERCIAL

## 2018-05-30 VITALS
RESPIRATION RATE: 18 BRPM | TEMPERATURE: 97.6 F | DIASTOLIC BLOOD PRESSURE: 68 MMHG | OXYGEN SATURATION: 95 % | SYSTOLIC BLOOD PRESSURE: 118 MMHG | BODY MASS INDEX: 38.81 KG/M2 | HEART RATE: 84 BPM | WEIGHT: 270.5 LBS

## 2018-05-30 DIAGNOSIS — R23.3 PETECHIAE: Primary | ICD-10-CM

## 2018-05-30 DIAGNOSIS — Z11.59 ENCOUNTER FOR HCV SCREENING TEST FOR LOW RISK PATIENT: ICD-10-CM

## 2018-05-30 DIAGNOSIS — Z11.4 SCREENING FOR HUMAN IMMUNODEFICIENCY VIRUS: ICD-10-CM

## 2018-05-30 DIAGNOSIS — Z79.01 LONG-TERM (CURRENT) USE OF ANTICOAGULANTS: ICD-10-CM

## 2018-05-30 LAB
ERYTHROCYTE [DISTWIDTH] IN BLOOD BY AUTOMATED COUNT: 15.2 % (ref 10–15)
HCT VFR BLD AUTO: 43.7 % (ref 40–53)
HGB BLD-MCNC: 14.3 G/DL (ref 13.3–17.7)
MCH RBC QN AUTO: 31 PG (ref 26.5–33)
MCHC RBC AUTO-ENTMCNC: 32.7 G/DL (ref 31.5–36.5)
MCV RBC AUTO: 95 FL (ref 78–100)
PLATELET # BLD AUTO: 151 10E9/L (ref 150–450)
RBC # BLD AUTO: 4.61 10E12/L (ref 4.4–5.9)
WBC # BLD AUTO: 9.1 10E9/L (ref 4–11)

## 2018-05-30 PROCEDURE — 99214 OFFICE O/P EST MOD 30 MIN: CPT | Performed by: FAMILY MEDICINE

## 2018-05-30 PROCEDURE — 85027 COMPLETE CBC AUTOMATED: CPT | Performed by: FAMILY MEDICINE

## 2018-05-30 PROCEDURE — 87389 HIV-1 AG W/HIV-1&-2 AB AG IA: CPT | Performed by: FAMILY MEDICINE

## 2018-05-30 PROCEDURE — 36415 COLL VENOUS BLD VENIPUNCTURE: CPT | Performed by: FAMILY MEDICINE

## 2018-05-30 PROCEDURE — 86803 HEPATITIS C AB TEST: CPT | Performed by: FAMILY MEDICINE

## 2018-05-30 ASSESSMENT — PAIN SCALES - GENERAL: PAINLEVEL: NO PAIN (0)

## 2018-05-30 NOTE — PROGRESS NOTES
SUBJECTIVE:   Derek Stover is a 57 year old male who presents to clinic today for the following health issues:    Patient states cardiologist took him off lipitor stated to stay off for 3 months and see how patient feels.    Medication Followup of Warfarin    Taking Medication as prescribed: yes    Side Effects:  Left leg started having cramping yesterday and is noticing bruises a lot easier     Medication Helping Symptoms:  yes       Problem list and histories reviewed & adjusted, as indicated.  Additional history: He was seen by Cardiology 3 months ago. Doing well. Stopped Lipitor due to runny nose and normal coronary angiogram. He has petechia on lower shins. Occasional bruising.     Prior Chest CT in 2012 with stable nodule. He continues to smoke. Recommended 12 month follow up. No follow up noted.    Patient Active Problem List   Diagnosis     Smoker     Tinea versicolor     Erectile dysfunction     Hyperlipidemia with target LDL less than 130     Morbid obesity (H)     Long-term (current) use of anticoagulants [Z79.01]     Mitral regurgitation     Atrial fibrillation (H)     Severe mitral regurgitation     S/P MVR (mitral valve replacement)     Fluid overload     Transient hyperglycemia post procedure     Anticoagulated on Coumadin     Past Surgical History:   Procedure Laterality Date     COLONOSCOPY N/A 9/8/2014    Procedure: COMBINED COLONOSCOPY, SINGLE BIOPSY/POLYPECTOMY BY BIOPSY;  Surgeon: Kai Bailey MD;  Location:  GI     ORTHOPEDIC SURGERY      RIght knee scope     REPLACE VALVE MITRAL N/A 9/12/2017    Procedure: REPLACE VALVE MITRAL;  MITRAL VALVE REPLACEMENT  [facility] Masters Series Mechanical Heart Valve 33mm  Ref, 33MJ-501 Serial [id number]   ON PUMP, MITCHELL;  Surgeon: Ivana Marie MD;  Location:  OR       Social History   Substance Use Topics     Smoking status: Current Every Day Smoker     Years: 39.00     Types: Pipe, Cigars     Smokeless tobacco: Never Used     Alcohol use 2.4  oz/week     4 Standard drinks or equivalent per week      Comment: occasional     Family History   Problem Relation Age of Onset     DIABETES Mother      Obesity Mother      DIABETES Father          Current Outpatient Prescriptions   Medication Sig Dispense Refill     aspirin EC 81 MG EC tablet Take 1 tablet (81 mg) by mouth daily 30 tablet 0     warfarin (COUMADIN) 5 MG tablet Take 2.5 mg Mon, Wed, Fri and 5 mg all other days, or as directed by the coumadin clinic. 65 tablet 1     acetaminophen (TYLENOL) 325 MG tablet Take 2 tablets (650 mg) by mouth every 4 hours as needed for other (surgical pain) (Patient not taking: Reported on 3/27/2018) 100 tablet 0     Amoxicillin (AMOXIL PO) Take 1,500-3,000 mg by mouth daily as needed (patient takes 6 X 500 = 3,000 mg dose 1 hour before dental appointment and 3 X 500 = 1,500 mg dose 6 hours after dental appointment.)       IBUPROFEN PO        Allergies   Allergen Reactions     No Known Drug Allergy      Recent Labs   Lab Test  01/15/18   0801  11/07/17   0855  09/15/17   0815   09/13/17   0407   09/12/17   1253   05/31/17   1811  08/13/14   0846   01/07/11   0758   A1C   --    --    --    --   6.1*   --    --    --    --    --    --    --    LDL  48  129*   --    --    --    --    --    --    --   143*   < >  135*   HDL  48  56   --    --    --    --    --    --    --   39*   < >  42   TRIG  126  246*   --    --    --    --    --    --    --   124   < >  144   ALT  26  69   --    --    --    --   25   --    --   20   < >  18   CR   --   1.03  0.86   < >  1.13   < >  1.17   < >  0.97  1.06   < >  1.00   GFRESTIMATED   --   75  >90   < >  67   < >  65   < >  80  73   < >  80   GFRESTBLACK   --   >90  >90   < >  82   < >  78   < >  >90   GFR Calc    89   < >  >90   POTASSIUM   --   4.0  4.2   < >  5.0   < >  4.2   < >  4.1  4.5   < >  4.5   TSH   --    --    --    --    --    --    --    --   2.92   --    --   2.07    < > = values in this interval not  displayed.      BP Readings from Last 3 Encounters:   05/30/18 118/68   03/27/18 128/86   11/22/17 96/60    Wt Readings from Last 3 Encounters:   05/30/18 270 lb 8 oz (122.7 kg)   03/27/18 277 lb 4.8 oz (125.8 kg)   11/22/17 281 lb 6.4 oz (127.6 kg)                  Labs reviewed in EPIC    Reviewed and updated as needed this visit by clinical staff  Tobacco  Allergies  Meds  Problems  Med Hx  Surg Hx  Fam Hx  Soc Hx        Reviewed and updated as needed this visit by Provider  Allergies  Meds  Problems         ROS:  Constitutional, HEENT, cardiovascular, pulmonary, gi and gu systems are negative, except as otherwise noted.    OBJECTIVE:     /68  Pulse 84  Temp 97.6  F (36.4  C) (Temporal)  Resp 18  Wt 270 lb 8 oz (122.7 kg)  SpO2 95%  BMI 38.81 kg/m2  Body mass index is 38.81 kg/(m^2).  GENERAL: healthy, alert and no distress  NECK: no adenopathy, no asymmetry, masses, or scars and thyroid normal to palpation  RESP: lungs clear to auscultation - no rales, rhonchi or wheezes  CV: regular rates and rhythm, normal S1 S2, no S3 or S4, no murmur, click or rub, peripheral pulses strong, no peripheral edema and crisp mechanical valve  ABDOMEN: soft, nontender, no hepatosplenomegaly, no masses and bowel sounds normal  MS: no gross musculoskeletal defects noted, no edema  SKIN: no suspicious lesions or rashes and petechiae - lower legs    Diagnostic Test Results:  Results for orders placed or performed in visit on 05/30/18 (from the past 24 hour(s))   CBC with platelets   Result Value Ref Range    WBC 9.1 4.0 - 11.0 10e9/L    RBC Count 4.61 4.4 - 5.9 10e12/L    Hemoglobin 14.3 13.3 - 17.7 g/dL    Hematocrit 43.7 40.0 - 53.0 %    MCV 95 78 - 100 fl    MCH 31.0 26.5 - 33.0 pg    MCHC 32.7 31.5 - 36.5 g/dL    RDW 15.2 (H) 10.0 - 15.0 %    Platelet Count 151 150 - 450 10e9/L       ASSESSMENT/PLAN:       Tobacco Cessation:   reports that he has been smoking Pipe and Cigars.  He has smoked for the past 39.00  years. He has never used smokeless tobacco.  Tobacco Cessation Action Plan: Self help information given to patient      1. Petechiae  Lower extremities due to dual therapy with ASA and Coumadin with minimal LE edema. Platelets adequate. The current medical regimen is effective;  continue present plan and medications.   - CBC with platelets    2. Long-term (current) use of anticoagulants [Z79.01]  Hemoglobin and platelets adequate. The current medical regimen is effective;  continue present plan and medications.   - CBC with platelets    3. Encounter for HCV screening test for low risk patient  - **Hepatitis C Screen Reflex to RNA FUTURE anytime    4. Screening for human immunodeficiency virus  - HIV Antigen Antibody Combo    FURTHER TESTING:       - CT chest to follow up prior RUL stable nodule with failure of 12 month follow up 10 years ago. He continues to smoke. He will consider follow up.  FUTURE APPOINTMENTS:       - Follow-up visit in 6 months.  Work on weight loss  Regular exercise    Osbaldo Renee MD  [facility]

## 2018-05-30 NOTE — MR AVS SNAPSHOT
After Visit Summary   5/30/2018    Derek Stover    MRN: [id number]           Patient Information     Date Of Birth          1960        Visit Information        Provider Department      5/30/2018 2:30 PM Osbaldo Renee MD [facility]        Today's Diagnoses     Petechiae    -  1    Long-term (current) use of anticoagulants [Z79.01]        Encounter for HCV screening test for low risk patient        Screening for human immunodeficiency virus           Follow-ups after your visit        Follow-up notes from your care team     Return in about 6 months (around 11/30/2018) for Routine Visit.      Your next 10 appointments already scheduled     Jun 01, 2018  8:45 AM CDT   [facility] with PH ANTI COAG   [facility]    [address] [place] [id number] [phone]              Who to contact     If you have questions or need follow up information about today's clinic visit or your schedule please contact [facility] directly at [phone].  Normal or non-critical lab and imaging results will be communicated to you by Syndevrxhart, letter or phone within 4 business days after the clinic has received the results. If you do not hear from us within 7 days, please contact the clinic through Justylet or phone. If you have a critical or abnormal lab result, we will notify you by phone as soon as possible.  Submit refill requests through HDF or call your pharmacy and they will forward the refill request to us. Please allow 3 business days for your refill to be completed.          Additional Information About Your Visit        Syndevrxhart Information     HDF gives you secure access to your electronic health record. If you see a primary care provider, you can also send messages to your care team and make appointments. If you have questions, please call your primary care clinic.  If you do not have a primary care  provider, please call [phone] and they will assist you.        Care EveryWhere ID     This is your Care EveryWhere ID. This could be used by other organizations to access your [facility] medical records  [id number]        Your Vitals Were     Pulse Temperature Respirations Pulse Oximetry BMI (Body Mass Index)       84 97.6  F (36.4  C) (Temporal) 18 95% 38.81 kg/m2        Blood Pressure from Last 3 Encounters:   05/30/18 118/68   03/27/18 128/86   11/22/17 96/60    Weight from Last 3 Encounters:   05/30/18 270 lb 8 oz (122.7 kg)   03/27/18 277 lb 4.8 oz (125.8 kg)   11/22/17 281 lb 6.4 oz (127.6 kg)              We Performed the Following     **Hepatitis C Screen Reflex to RNA FUTURE anytime     CBC with platelets     HIV Antigen Antibody Combo        Primary Care Provider Office Phone # Fax #    Osbaldo Renee [phone]       [address] [place] [address]        Equal Access to Services     TREY SPAIN : Hadii aad ku hadasho Soomaali, waaxda luqadaha, qaybta kaalmada adeegyada, malissa shi haysarah ramsey . So [facility] [phone].    ATENCIÓN: Si habla español, tiene a macias disposición servicios gratuitos de asistencia lingüística. Llame al [phone].    We comply with applicable federal civil rights laws and [place] laws. We do not discriminate on the basis of race, color, national origin, age, disability, sex, sexual orientation, or gender identity.            Thank you!     Thank you for choosing [facility]  for your care. Our goal is always to provide you with excellent care. Hearing back from our patients is one way we can continue to improve our services. Please take a few minutes to complete the written survey that you may receive in the mail after your visit with us. Thank you!             Your Updated Medication List - Protect others around you: Learn how to safely use, store and throw away your medicines at www.disposemymeds.org.          This list is  accurate as of 5/30/18  3:05 PM.  Always use your most recent med list.                   Brand Name Dispense Instructions for use Diagnosis    acetaminophen 325 MG tablet    TYLENOL    100 tablet    Take 2 tablets (650 mg) by mouth every 4 hours as needed for other (surgical pain)    S/P mitral valve replacement with metallic valve       AMOXIL PO      Take 1,500-3,000 mg by mouth daily as needed (patient takes 6 X 500 = 3,000 mg dose 1 hour before dental appointment and 3 X 500 = 1,500 mg dose 6 hours after dental appointment.)        aspirin 81 MG EC tablet     30 tablet    Take 1 tablet (81 mg) by mouth daily    S/P mitral valve replacement with metallic valve       IBUPROFEN PO           warfarin 5 MG tablet    COUMADIN    65 tablet    Take 2.5 mg Mon, Wed, Fri and 5 mg all other days, or as directed by the coumadin clinic.    Long-term (current) use of anticoagulants, Chronic atrial fibrillation (H), Anticoagulated on Coumadin

## 2018-05-31 LAB
HCV AB SERPL QL IA: NONREACTIVE
HIV 1+2 AB+HIV1 P24 AG SERPL QL IA: NONREACTIVE

## 2018-06-01 ENCOUNTER — ANTICOAGULATION THERAPY VISIT (OUTPATIENT)
Dept: ANTICOAGULATION | Facility: CLINIC | Age: 58
End: 2018-06-01
Payer: COMMERCIAL

## 2018-06-01 DIAGNOSIS — Z79.01 LONG-TERM (CURRENT) USE OF ANTICOAGULANTS: ICD-10-CM

## 2018-06-01 DIAGNOSIS — I48.91 ATRIAL FIBRILLATION, UNSPECIFIED TYPE (H): ICD-10-CM

## 2018-06-01 LAB — INR POINT OF CARE: 2.8 (ref 0.86–1.14)

## 2018-06-01 PROCEDURE — 99207 ZZC NO CHARGE NURSE ONLY: CPT

## 2018-06-01 PROCEDURE — 85610 PROTHROMBIN TIME: CPT | Mod: QW

## 2018-06-01 PROCEDURE — 36416 COLLJ CAPILLARY BLOOD SPEC: CPT

## 2018-06-01 NOTE — PROGRESS NOTES
ANTICOAGULATION FOLLOW-UP CLINIC VISIT    Patient Name:  Derek Stover  Date:  6/1/2018  Contact Type:  Face to Face    SUBJECTIVE:     Patient Findings     Positives No Problem Findings           OBJECTIVE    INR Protime   Date Value Ref Range Status   06/01/2018 2.8 (A) 0.86 - 1.14 Final       ASSESSMENT / PLAN  INR assessment THER    Recheck INR In: 6 WEEKS    INR Location Clinic      Anticoagulation Summary as of 6/1/2018     INR goal 2.5-3.5   Today's INR 2.8   Warfarin maintenance plan 2.5 mg (5 mg x 0.5) on Mon, Wed, Fri; 5 mg (5 mg x 1) all other days   Full warfarin instructions 2.5 mg on Mon, Wed, Fri; 5 mg all other days   Weekly warfarin total 27.5 mg   No change documented Perla Walden RN   Plan last modified Perla Walden RN (3/16/2018)   Next INR check 7/13/2018   Target end date     Indications   Atrial fibrillation (H) [I48.91]  Atrial fibrillation (H) [I48.91] (Resolved) [I48.91]  Long-term (current) use of anticoagulants [Z79.01] [Z79.01]         Anticoagulation Episode Summary     INR check location     Preferred lab     Send INR reminders to [facility] POOL    Comments 5 mg, PM dose, printout      Anticoagulation Care Providers     Provider Role Specialty Phone number    Carlos Archibald DO [facility] Internal Medicine [phone]            See the Encounter Report to view Anticoagulation Flowsheet and Dosing Calendar (Go to Encounters tab in chart review, and find the Anticoagulation Therapy Visit)    Dosage adjustment made based on physician directed care plan.      Perla Walden RN

## 2018-06-01 NOTE — MR AVS SNAPSHOT
Derek SHERMAN Stover   6/1/2018 8:45 AM   Anticoagulation Therapy Visit    Description:  57 year old male   Provider:  [facility] ANTI [facility]AG   Department:  [facility]           INR as of 6/1/2018     Today's INR 2.8      Anticoagulation Summary as of 6/1/2018     INR goal 2.5-3.5   Today's INR 2.8   Full warfarin instructions 2.5 mg on Mon, Wed, Fri; 5 mg all other days   Next INR check 7/13/2018    Indications   Atrial fibrillation (H) [I48.91]  Atrial fibrillation (H) [I48.91] (Resolved) [I48.91]  Long-term (current) use of anticoagulants [Z79.01] [Z79.01]         Your next Anticoagulation Clinic appointment(s)     Jul 13, 2018  8:45 AM CDT   Anticoagulation [facility] with [facility] ANTI [facility]ARD[facility]    [address] [place] [id number]   [phone]              Contact Numbers     Clinic Number:         June 2018 Details    Sun Mon Tue Wed Thu Fri Sat          1      2.5 mg   See details      2      5 mg           3      5 mg         4      2.5 mg         5      5 mg         6      2.5 mg         7      5 mg         8      2.5 mg         9      5 mg           10      5 mg         11      2.5 mg         12      5 mg         13      2.5 mg         14      5 mg         15      2.5 mg         16      5 mg           17      5 mg         18      2.5 mg         19      5 mg         20      2.5 mg         21      5 mg         22      2.5 mg         23      5 mg           24      5 mg         25      2.5 mg         26      5 mg         27      2.5 mg         28      5 mg         29      2.5 mg         30      5 mg          Date Details   06/01 This INR check               How to take your warfarin dose     To take:  2.5 mg Take 0.5 of a 5 mg tablet.    To take:  5 mg Take 1 of the 5 mg tablets.           July 2018 Details    Sun Mon Tue Wed Thu Fri Sat     1      5 mg         2      2.5 mg         3      5 mg         4      2.5 mg         5      5 mg         6      2.5 mg          7      5 mg           8      5 mg         9      2.5 mg         10      5 mg         11      2.5 mg         12      5 mg         13            14                 15               16               17               18               19               20               21                 22               23               24               25               26               27               28                 29               30               31                    Date Details   No additional details    Date of next INR:  7/13/2018         How to take your warfarin dose     To take:  2.5 mg Take 0.5 of a 5 mg tablet.    To take:  5 mg Take 1 of the 5 mg tablets.

## 2018-06-11 DIAGNOSIS — Z79.01 ANTICOAGULATED ON COUMADIN: ICD-10-CM

## 2018-06-11 DIAGNOSIS — Z79.01 LONG-TERM (CURRENT) USE OF ANTICOAGULANTS: ICD-10-CM

## 2018-06-11 DIAGNOSIS — I48.20 CHRONIC ATRIAL FIBRILLATION (H): ICD-10-CM

## 2018-06-11 RX ORDER — WARFARIN SODIUM 5 MG/1
TABLET ORAL
Qty: 80 TABLET | Refills: 1 | Status: SHIPPED | OUTPATIENT
Start: 2018-06-11 | End: 2018-06-12

## 2018-06-11 NOTE — TELEPHONE ENCOUNTER
"Requested Prescriptions   Pending Prescriptions Disp Refills     warfarin (COUMADIN) 5 MG tablet [Pharmacy Med Name: WARFARIN SODIUM 5MG TABS] 108 tablet 0    Last Written Prescription Date:  3/16/18  Last Fill Quantity: 65,  # refills: 1   Last office visit: 5/30/2018 with prescribing provider:  5/30/18   Future Office Visit:     Sig: TAKE ONE TABLET BY MOUTH ONCE DAILY ON TUES/THUR/SAT AND TAKE 1/2 TABLET ONCE DAILY THE REST OF THE WEEK OR AS DIRECTED    Vitamin K Antagonists Failed    6/11/2018  1:02 AM       Failed - INR is within goal in the past 6 weeks    Confirm INR is within goal in the past 6 weeks.     Recent Labs   Lab Test 06/01/18   INR  2.8*                      Passed - Recent (12 mo) or future (30 days) visit within the authorizing provider's specialty    Patient had office visit in the last 12 months or has a visit in the next 30 days with authorizing provider or within the authorizing provider's specialty.  See \"Patient Info\" tab in inbasket, or \"Choose Columns\" in Meds & Orders section of the refill encounter.           Passed - Patient is 18 years of age or older          "

## 2018-06-11 NOTE — TELEPHONE ENCOUNTER
Prescription approved per [facility] Refill Protocol.    Perla Walden RN

## 2018-06-12 DIAGNOSIS — Z79.01 LONG-TERM (CURRENT) USE OF ANTICOAGULANTS: ICD-10-CM

## 2018-06-12 DIAGNOSIS — I48.20 CHRONIC ATRIAL FIBRILLATION (H): ICD-10-CM

## 2018-06-12 DIAGNOSIS — Z79.01 ANTICOAGULATED ON COUMADIN: ICD-10-CM

## 2018-06-12 RX ORDER — WARFARIN SODIUM 5 MG/1
TABLET ORAL
Qty: 75 TABLET | Refills: 0 | Status: SHIPPED | OUTPATIENT
Start: 2018-06-12 | End: 2018-06-14

## 2018-06-12 NOTE — TELEPHONE ENCOUNTER
"Requested Prescriptions   Pending Prescriptions Disp Refills     warfarin (COUMADIN) 5 MG tablet [Pharmacy Med Name: WARFARIN SODIUM 5MG TABS] 108 tablet 0    Last Written Prescription Date:  6-  Last Fill Quantity: 80,  # refills: 1   Last office visit: 5/30/2018 with prescribing provider:  5-30-18   Future Office Visit:     Sig: TAKE ONE TABLET BY MOUTH ONCE DAILY ON TUES/THUR/SAT AND TAKE 1/2 TABLET ONCE DAILY THE REST OF THE WEEK OR AS DIRECTED    Vitamin K Antagonists Failed    6/12/2018  1:01 AM       Failed - INR is within goal in the past 6 weeks    Confirm INR is within goal in the past 6 weeks.     Recent Labs   Lab Test 06/01/18   INR  2.8*                      Passed - Recent (12 mo) or future (30 days) visit within the authorizing provider's specialty    Patient had office visit in the last 12 months or has a visit in the next 30 days with authorizing provider or within the authorizing provider's specialty.  See \"Patient Info\" tab in inbasket, or \"Choose Columns\" in Meds & Orders section of the refill encounter.           Passed - Patient is 18 years of age or older          "

## 2018-06-13 DIAGNOSIS — Z79.01 ANTICOAGULATED ON COUMADIN: ICD-10-CM

## 2018-06-13 DIAGNOSIS — I48.20 CHRONIC ATRIAL FIBRILLATION (H): ICD-10-CM

## 2018-06-13 DIAGNOSIS — Z79.01 LONG-TERM (CURRENT) USE OF ANTICOAGULANTS: ICD-10-CM

## 2018-06-13 RX ORDER — WARFARIN SODIUM 5 MG/1
TABLET ORAL
Qty: 108 TABLET | Refills: 0 | OUTPATIENT
Start: 2018-06-13

## 2018-06-13 NOTE — TELEPHONE ENCOUNTER
"Requested Prescriptions   Pending Prescriptions Disp Refills     warfarin (COUMADIN) 5 MG tablet [Pharmacy Med Name: WARFARIN SODIUM 5MG TABS] 108 tablet 0     Sig: TAKE ONE TABLET BY MOUTH ONCE DAILY ON TUES/THUR/SAT AND TAKE 1/2 TABLET ONCE DAILY THE REST OF THE WEEK OR AS DIRECTED    Vitamin K Antagonists Failed    6/13/2018  1:01 AM       Failed - INR is within goal in the past 6 weeks    Confirm INR is within goal in the past 6 weeks.     Recent Labs   Lab Test 06/01/18   INR  2.8*                      Passed - Recent (12 mo) or future (30 days) visit within the authorizing provider's specialty    Patient had office visit in the last 12 months or has a visit in the next 30 days with authorizing provider or within the authorizing provider's specialty.  See \"Patient Info\" tab in inbasket, or \"Choose Columns\" in Meds & Orders section of the refill encounter.           Passed - Patient is 18 years of age or older        Last Written Prescription Date:  6/12/18  Last Fill Quantity: 75,  # refills: 0   Last office visit: 5/30/2018 with prescribing provider:     Future Office Visit:      "

## 2018-06-14 DIAGNOSIS — Z79.01 LONG-TERM (CURRENT) USE OF ANTICOAGULANTS: ICD-10-CM

## 2018-06-14 DIAGNOSIS — Z79.01 ANTICOAGULATED ON COUMADIN: ICD-10-CM

## 2018-06-14 DIAGNOSIS — I48.20 CHRONIC ATRIAL FIBRILLATION (H): ICD-10-CM

## 2018-06-14 RX ORDER — WARFARIN SODIUM 5 MG/1
TABLET ORAL
Qty: 108 TABLET | Refills: 0 | Status: SHIPPED | OUTPATIENT
Start: 2018-06-14 | End: 2018-09-27

## 2018-06-14 NOTE — TELEPHONE ENCOUNTER
"Requested Prescriptions   Pending Prescriptions Disp Refills     warfarin (COUMADIN) 5 MG tablet [Pharmacy Med Name: WARFARIN SODIUM 5MG TABS] 108 tablet 0    Last Written Prescription Date:  6/12/18  Last Fill Quantity: 75,  # refills: 0   Last office visit: 5/30/2018 with prescribing provider:  5/30/18   Future Office Visit:     Sig: TAKE ONE TABLET BY MOUTH ONCE DAILY ON TUES/THUR/SAT AND TAKE 1/2 TABLET ONCE DAILY THE REST OF THE WEEK OR AS DIRECTED    Vitamin K Antagonists Failed    6/14/2018  1:01 AM       Failed - INR is within goal in the past 6 weeks    Confirm INR is within goal in the past 6 weeks.     Recent Labs   Lab Test 06/01/18   INR  2.8*                      Passed - Recent (12 mo) or future (30 days) visit within the authorizing provider's specialty    Patient had office visit in the last 12 months or has a visit in the next 30 days with authorizing provider or within the authorizing provider's specialty.  See \"Patient Info\" tab in inbasket, or \"Choose Columns\" in Meds & Orders section of the refill encounter.           Passed - Patient is 18 years of age or older          "

## 2018-07-09 DIAGNOSIS — I48.91 ATRIAL FIBRILLATION, UNSPECIFIED TYPE (H): ICD-10-CM

## 2018-07-09 DIAGNOSIS — Z79.01 LONG-TERM (CURRENT) USE OF ANTICOAGULANTS: Primary | ICD-10-CM

## 2018-07-13 ENCOUNTER — ANTICOAGULATION THERAPY VISIT (OUTPATIENT)
Dept: ANTICOAGULATION | Facility: OTHER | Age: 58
End: 2018-07-13

## 2018-07-13 DIAGNOSIS — Z79.01 LONG-TERM (CURRENT) USE OF ANTICOAGULANTS: ICD-10-CM

## 2018-07-13 DIAGNOSIS — I48.91 ATRIAL FIBRILLATION, UNSPECIFIED TYPE (H): ICD-10-CM

## 2018-07-13 LAB — INR BLD: 2.9 (ref 0.86–1.14)

## 2018-07-13 PROCEDURE — 36416 COLLJ CAPILLARY BLOOD SPEC: CPT | Performed by: FAMILY MEDICINE

## 2018-07-13 PROCEDURE — 85610 PROTHROMBIN TIME: CPT | Mod: QW | Performed by: FAMILY MEDICINE

## 2018-07-13 PROCEDURE — 99207 ZZC NO CHARGE NURSE ONLY: CPT | Performed by: FAMILY MEDICINE

## 2018-07-13 NOTE — MR AVS SNAPSHOT
Derek Stover   7/13/2018   Anticoagulation Therapy Visit    Description:  57 year old male   Provider:  Osbaldo Renee MD   Department:  [facility]           INR as of 7/13/2018     Today's INR 2.9      Anticoagulation Summary as of 7/13/2018     INR goal 2.5-3.5   Today's INR 2.9   Full warfarin instructions 2.5 mg on Mon, Wed, Fri; 5 mg all other days   Next INR check 8/24/2018    Indications   Atrial fibrillation (H) [I48.91]  Atrial fibrillation (H) [I48.91] (Resolved) [I48.91]  Long-term (current) use of anticoagulants [Z79.01] [Z79.01]         Contact Numbers     Clinic Number:         July 2018 Details    Sun Mon Tue Wed Thu Fri Sat     1               2               3               4               5               6               7                 8               9               10               11               12               13      2.5 mg   See details      14      5 mg           15      5 mg         16      2.5 mg         17      5 mg         18      2.5 mg         19      5 mg         20      2.5 mg         21      5 mg           22      5 mg         23      2.5 mg         24      5 mg         25      2.5 mg         26      5 mg         27      2.5 mg         28      5 mg           29      5 mg         30      2.5 mg         31      5 mg              Date Details   07/13 This INR check               How to take your warfarin dose     To take:  2.5 mg Take 0.5 of a 5 mg tablet.    To take:  5 mg Take 1 of the 5 mg tablets.           August 2018 Details    Sun Mon Tue Wed Thu Fri Sat        1      2.5 mg         2      5 mg         3      2.5 mg         4      5 mg           5      5 mg         6      2.5 mg         7      5 mg         8      2.5 mg         9      5 mg         10      2.5 mg         11      5 mg           12      5 mg         13      2.5 mg         14      5 mg         15      2.5 mg         16      5 mg         17      2.5 mg         18      5 mg           19      5 mg          20      2.5 mg         21      5 mg         22      2.5 mg         23      5 mg         24            25                 26               27               28               29               30               31                 Date Details   No additional details    Date of next INR:  8/24/2018         How to take your warfarin dose     To take:  2.5 mg Take 0.5 of a 5 mg tablet.    To take:  5 mg Take 1 of the 5 mg tablets.

## 2018-07-13 NOTE — PROGRESS NOTES
ANTICOAGULATION FOLLOW-UP CLINIC VISIT    Patient Name:  Derek Stover  Date:  7/13/2018  Contact Type:  Telephone/ LM with dosing instructions    SUBJECTIVE:     Patient Findings     Positives No Problem Findings           OBJECTIVE    INR Point of Care   Date Value Ref Range Status   07/13/2018 2.9 (H) 0.86 - 1.14 Final     Comment:     This test is intended for monitoring Coumadin therapy.  Results are not   accurate in patients with prolonged INR due to factor deficiency.         ASSESSMENT / PLAN  INR assessment THER    Recheck INR In: 6 WEEKS    INR Location Outside lab      Anticoagulation Summary as of 7/13/2018     INR goal 2.5-3.5   Today's INR 2.9   Warfarin maintenance plan 2.5 mg (5 mg x 0.5) on Mon, Wed, Fri; 5 mg (5 mg x 1) all other days   Full warfarin instructions 2.5 mg on Mon, Wed, Fri; 5 mg all other days   Weekly warfarin total 27.5 mg   No change documented Slim Preston RN   Plan last modified Perla Walden RN (3/16/2018)   Next INR check 8/24/2018   Target end date     Indications   Atrial fibrillation (H) [I48.91]  Atrial fibrillation (H) [I48.91] (Resolved) [I48.91]  Long-term (current) use of anticoagulants [Z79.01] [Z79.01]         Anticoagulation Episode Summary     INR check location     Preferred lab     Send INR reminders to [facility] CYNTHIA    Comments 5 mg, PM dose, printout      Anticoagulation Care Providers     Provider Role Specialty Phone number    Tavo Archibaldifford DO [facility] Internal Medicine [phone]            See the Encounter Report to view Anticoagulation Flowsheet and Dosing Calendar (Go to Encounters tab in chart review, and find the Anticoagulation Therapy Visit)    Dosage adjustment made based on physician directed care plan.    Slim Preston, LUL

## 2018-08-24 ENCOUNTER — ANTICOAGULATION THERAPY VISIT (OUTPATIENT)
Dept: ANTICOAGULATION | Facility: OTHER | Age: 58
End: 2018-08-24

## 2018-08-24 DIAGNOSIS — Z79.01 LONG-TERM (CURRENT) USE OF ANTICOAGULANTS: ICD-10-CM

## 2018-08-24 DIAGNOSIS — I48.91 ATRIAL FIBRILLATION, UNSPECIFIED TYPE (H): ICD-10-CM

## 2018-08-24 LAB — INR BLD: 2.8 (ref 0.86–1.14)

## 2018-08-24 PROCEDURE — 85610 PROTHROMBIN TIME: CPT | Mod: QW | Performed by: FAMILY MEDICINE

## 2018-08-24 PROCEDURE — 36416 COLLJ CAPILLARY BLOOD SPEC: CPT | Performed by: FAMILY MEDICINE

## 2018-08-24 NOTE — PROGRESS NOTES
ANTICOAGULATION FOLLOW-UP CLINIC VISIT    Patient Name:  Derek Stover  Date:  8/24/2018  Contact Type:  Telephone    SUBJECTIVE:        OBJECTIVE    INR Point of Care   Date Value Ref Range Status   08/24/2018 2.8 (H) 0.86 - 1.14 Final     Comment:     This test is intended for monitoring Coumadin therapy.  Results are not   accurate in patients with prolonged INR due to factor deficiency.         ASSESSMENT / PLAN  INR assessment THER    Recheck INR In: 6 WEEKS    INR Location Clinic      Anticoagulation Summary as of 8/24/2018     INR goal 2.5-3.5   Today's INR 2.8   Warfarin maintenance plan 2.5 mg (5 mg x 0.5) on Mon, Wed, Fri; 5 mg (5 mg x 1) all other days   Full warfarin instructions 2.5 mg on Mon, Wed, Fri; 5 mg all other days   Weekly warfarin total 27.5 mg   No change documented Slim Preston RN   Plan last modified Perla Walden RN (3/16/2018)   Next INR check 10/5/2018   Target end date     Indications   Atrial fibrillation (H) [I48.91]  Atrial fibrillation (H) [I48.91] (Resolved) [I48.91]  Long-term (current) use of anticoagulants [Z79.01] [Z79.01]         Anticoagulation Episode Summary     INR check location     Preferred lab     Send INR reminders to [facility] CYNTHIA    Comments 5 mg, PM dose, printout      Anticoagulation Care Providers     Provider Role Specialty Phone number    Carlos Archibald DO [facility] Internal Medicine [phone]            See the Encounter Report to view Anticoagulation Flowsheet and Dosing Calendar (Go to Encounters tab in chart review, and find the Anticoagulation Therapy Visit)    Dosage adjustment made based on physician directed care plan.    Slim Preston, RN

## 2018-08-24 NOTE — MR AVS SNAPSHOT
Derek Stover   8/24/2018   Anticoagulation Therapy Visit    Description:  57 year old male   Provider:  Osbaldo Renee MD   Department:   Anticoag           INR as of 8/24/2018     Today's INR 2.8      Anticoagulation Summary as of 8/24/2018     INR goal 2.5-3.5   Today's INR 2.8   Full warfarin instructions 2.5 mg on Mon, Wed, Fri; 5 mg all other days   Next INR check 10/5/2018    Indications   Atrial fibrillation (H) [I48.91]  Atrial fibrillation (H) [I48.91] (Resolved) [I48.91]  Long-term (current) use of anticoagulants [Z79.01] [Z79.01]         Your next Anticoagulation Clinic appointment(s)     Oct 05, 2018  3:30 PM CDT   [facility]agulation [facility] with PH ANTI COAG   [facility]    [address] [place] [address]   [phone]              Contact Numbers     Clinic Number:         August 2018 Details    Sun Mon Tue Wed Thu Fri Sat        1               2               3               4                 5               6               7               8               9               10               11                 12               13               14               15               16               17               18                 19               20               21               22               23               24      2.5 mg   See details      25      5 mg           26      5 mg         27      2.5 mg         28      5 mg         29      2.5 mg         30      5 mg         31      2.5 mg           Date Details   08/24 This INR check               How to take your warfarin dose     To take:  2.5 mg Take 0.5 of a 5 mg tablet.    To take:  5 mg Take 1 of the 5 mg tablets.           September 2018 Details    Sun Mon Tue Wed Thu Fri Sat           1      5 mg           2      5 mg         3      2.5 mg         4      5 mg         5      2.5 mg         6      5 mg         7      2.5 mg         8      5 mg           9      5 mg         10       2.5 mg         11      5 mg         12      2.5 mg         13      5 mg         14      2.5 mg         15      5 mg           16      5 mg         17      2.5 mg         18      5 mg         19      2.5 mg         20      5 mg         21      2.5 mg         22      5 mg           23      5 mg         24      2.5 mg         25      5 mg         26      2.5 mg         27      5 mg         28      2.5 mg         29      5 mg           30      5 mg                Date Details   No additional details            How to take your warfarin dose     To take:  2.5 mg Take 0.5 of a 5 mg tablet.    To take:  5 mg Take 1 of the 5 mg tablets.           October 2018 Details    Sun Mon Tue Wed Thu Fri Sat      1      2.5 mg         2      5 mg         3      2.5 mg         4      5 mg         5            6                 7               8               9               10               11               12               13                 14               15               16               17               18               19               20                 21               22               23               24               25               26               27                 28               29               30               31                   Date Details   No additional details    Date of next INR:  10/5/2018         How to take your warfarin dose     To take:  2.5 mg Take 0.5 of a 5 mg tablet.    To take:  5 mg Take 1 of the 5 mg tablets.

## 2018-09-27 ENCOUNTER — MYC REFILL (OUTPATIENT)
Dept: FAMILY MEDICINE | Facility: OTHER | Age: 58
End: 2018-09-27

## 2018-09-27 DIAGNOSIS — I48.20 CHRONIC ATRIAL FIBRILLATION (H): ICD-10-CM

## 2018-09-27 DIAGNOSIS — Z79.01 LONG-TERM (CURRENT) USE OF ANTICOAGULANTS: ICD-10-CM

## 2018-09-27 DIAGNOSIS — Z79.01 ANTICOAGULATED ON COUMADIN: ICD-10-CM

## 2018-09-28 RX ORDER — WARFARIN SODIUM 5 MG/1
TABLET ORAL
Qty: 108 TABLET | Refills: 0 | Status: SHIPPED | OUTPATIENT
Start: 2018-09-28 | End: 2018-09-29

## 2018-09-28 NOTE — TELEPHONE ENCOUNTER
Message from Streamixt:  Original authorizing provider: MD Derek Trimble would like a refill of the following medications:  warfarin (COUMADIN) 5 MG tablet [Osbaldo Renee MD]    Preferred pharmacy: [address] - [facility] [address]    Comment:  I have 4 tablets left. That will take me through Tuesday.

## 2018-09-29 ENCOUNTER — MYC REFILL (OUTPATIENT)
Dept: FAMILY MEDICINE | Facility: OTHER | Age: 58
End: 2018-09-29

## 2018-09-29 DIAGNOSIS — Z79.01 ANTICOAGULATED ON COUMADIN: ICD-10-CM

## 2018-09-29 DIAGNOSIS — I48.20 CHRONIC ATRIAL FIBRILLATION (H): ICD-10-CM

## 2018-09-29 DIAGNOSIS — Z79.01 LONG-TERM (CURRENT) USE OF ANTICOAGULANTS: ICD-10-CM

## 2018-10-01 RX ORDER — WARFARIN SODIUM 5 MG/1
TABLET ORAL
Qty: 108 TABLET | Refills: 0 | Status: SHIPPED | OUTPATIENT
Start: 2018-10-01 | End: 2018-10-05

## 2018-10-01 NOTE — TELEPHONE ENCOUNTER
Message from MyChart:  Original authorizing provider: MD Derek Trimble would like a refill of the following medications:  warfarin (COUMADIN) 5 MG tablet [Osbaldo Renee MD]    Preferred pharmacy: [place] [id number] - [place], [place] - [facility] [address]    Comment:

## 2018-10-05 ENCOUNTER — ANTICOAGULATION THERAPY VISIT (OUTPATIENT)
Dept: ANTICOAGULATION | Facility: CLINIC | Age: 58
End: 2018-10-05
Payer: COMMERCIAL

## 2018-10-05 DIAGNOSIS — I48.91 ATRIAL FIBRILLATION, UNSPECIFIED TYPE (H): ICD-10-CM

## 2018-10-05 DIAGNOSIS — Z79.01 ANTICOAGULATED ON COUMADIN: ICD-10-CM

## 2018-10-05 DIAGNOSIS — I48.20 CHRONIC ATRIAL FIBRILLATION (H): ICD-10-CM

## 2018-10-05 LAB — INR POINT OF CARE: 2.4 (ref 0.86–1.14)

## 2018-10-05 PROCEDURE — 99207 ZZC NO CHARGE NURSE ONLY: CPT

## 2018-10-05 PROCEDURE — 36416 COLLJ CAPILLARY BLOOD SPEC: CPT

## 2018-10-05 PROCEDURE — 85610 PROTHROMBIN TIME: CPT | Mod: QW

## 2018-10-05 RX ORDER — WARFARIN SODIUM 5 MG/1
TABLET ORAL
Qty: 108 TABLET | Refills: 0 | COMMUNITY
Start: 2018-10-05 | End: 2018-10-30

## 2018-10-05 NOTE — MR AVS SNAPSHOT
Derek Stover   10/5/2018 3:30 PM   Anticoagulation Therapy Visit    Description:  57 year old male   Provider:  GUILHERME ANTI COASAMEER   Department:  Ph Anticoag           INR as of 10/5/2018     Today's INR 2.4!      Anticoagulation Summary as of 10/5/2018     INR goal 2.5-3.5   Today's INR 2.4!   Full warfarin instructions 2.5 mg on Mon, Wed; 5 mg all other days   Next INR check 11/19/2018    Indications   Atrial fibrillation (H) [I48.91]  Atrial fibrillation (H) [I48.91] (Resolved) [I48.91]  Long-term (current) use of anticoagulants [Z79.01] [Z79.01]         Your next Anticoagulation Clinic appointment(s)     Nov 16, 2018  3:30 PM CST   Anticoagulation Visit with GUILHERME ANTI BERNARDO   [facility] ([facility]    [address] [place] [address]   [phone]              Contact Numbers     Clinic Number:         October 2018 Details    Sun Mon Tue Wed Thu Fri Sat      1               2               3               4               5      5 mg   See details      6      5 mg           7      5 mg         8      2.5 mg         9      5 mg         10      2.5 mg         11      5 mg         12      5 mg         13      5 mg           14      5 mg         15      2.5 mg         16      5 mg         17      2.5 mg         18      5 mg         19      5 mg         20      5 mg           21      5 mg         22      2.5 mg         23      5 mg         24      2.5 mg         25      5 mg         26      5 mg         27      5 mg           28      5 mg         29      2.5 mg         30      5 mg         31      2.5 mg             Date Details   10/05 This INR check               How to take your warfarin dose     To take:  2.5 mg Take 0.5 of a 5 mg tablet.    To take:  5 mg Take 1 of the 5 mg tablets.           November 2018 Details    Sun Mon Tue Wed Thu Fri Sat         1      5 mg         2      5 mg         3      5 mg           4      5 mg         5      2.5 mg         6      5 mg          7      2.5 mg         8      5 mg         9      5 mg         10      5 mg           11      5 mg         12      2.5 mg         13      5 mg         14      2.5 mg         15      5 mg         16      5 mg         17      5 mg           18      5 mg         19            20               21               22               23               24                 25               26               27               28               29               30                 Date Details   No additional details    Date of next INR:  11/19/2018         How to take your warfarin dose     To take:  2.5 mg Take 0.5 of a 5 mg tablet.    To take:  5 mg Take 1 of the 5 mg tablets.

## 2018-10-05 NOTE — PROGRESS NOTES
ANTICOAGULATION FOLLOW-UP CLINIC VISIT    Patient Name:  Derek Stover  Date:  10/5/2018  Contact Type:  Face to Face    SUBJECTIVE:     Patient Findings     Positives Activity level change (pt has been busier at work for the last month)           OBJECTIVE    INR Protime   Date Value Ref Range Status   10/05/2018 2.4 (A) 0.86 - 1.14 Final       ASSESSMENT / PLAN  INR assessment THER    Recheck INR In: 6 WEEKS    INR Location Clinic      Anticoagulation Summary as of 10/5/2018     INR goal 2.5-3.5   Today's INR 2.4!   Warfarin maintenance plan 2.5 mg (5 mg x 0.5) on Mon, Wed; 5 mg (5 mg x 1) all other days   Full warfarin instructions 2.5 mg on Mon, Wed; 5 mg all other days   Weekly warfarin total 30 mg   Plan last modified Perla Walden RN (10/5/2018)   Next INR check 11/19/2018   Target end date     Indications   Atrial fibrillation (H) [I48.91]  Atrial fibrillation (H) [I48.91] (Resolved) [I48.91]  Long-term (current) use of anticoagulants [Z79.01] [Z79.01]         Anticoagulation Episode Summary     INR check location     Preferred lab     Send INR reminders to [facility] POOL    Comments 5 mg, PM dose, printout      Anticoagulation Care Providers     Provider Role Specialty Phone number    Carlos Archibald DO [facility] Internal Medicine [phone]                  See the Encounter Report to view Anticoagulation Flowsheet and Dosing Calendar (Go to Encounters tab in chart review, and find the Anticoagulation Therapy Visit)    Dosage adjustment made based on physician directed care plan.      Perla Walden RN

## 2018-10-30 ENCOUNTER — MYC REFILL (OUTPATIENT)
Dept: FAMILY MEDICINE | Facility: OTHER | Age: 58
End: 2018-10-30

## 2018-10-30 DIAGNOSIS — Z79.01 ANTICOAGULATED ON COUMADIN: ICD-10-CM

## 2018-10-30 DIAGNOSIS — I48.20 CHRONIC ATRIAL FIBRILLATION (H): ICD-10-CM

## 2018-10-30 RX ORDER — WARFARIN SODIUM 5 MG/1
TABLET ORAL
Qty: 80 TABLET | Refills: 1 | Status: SHIPPED | OUTPATIENT
Start: 2018-10-30 | End: 2019-03-29

## 2018-10-30 NOTE — TELEPHONE ENCOUNTER
Message from MyChart:  Original authorizing provider: MD Derek Trimble would like a refill of the following medications:  warfarin (COUMADIN) 5 MG tablet [Osbaldo Renee MD]    Preferred pharmacy: [facility] [id number] - [place], [place] - [facility] [place] [address]    Comment:

## 2018-11-16 ENCOUNTER — TELEPHONE (OUTPATIENT)
Dept: ANTICOAGULATION | Facility: OTHER | Age: 58
End: 2018-11-16

## 2018-11-16 ENCOUNTER — ANTICOAGULATION THERAPY VISIT (OUTPATIENT)
Dept: ANTICOAGULATION | Facility: CLINIC | Age: 58
End: 2018-11-16
Payer: COMMERCIAL

## 2018-11-16 DIAGNOSIS — I48.91 ATRIAL FIBRILLATION, UNSPECIFIED TYPE (H): ICD-10-CM

## 2018-11-16 DIAGNOSIS — Z95.2 MECHANICAL HEART VALVE PRESENT: Primary | ICD-10-CM

## 2018-11-16 LAB — INR POINT OF CARE: 2.7 (ref 0.86–1.14)

## 2018-11-16 PROCEDURE — 36416 COLLJ CAPILLARY BLOOD SPEC: CPT

## 2018-11-16 PROCEDURE — 99207 ZZC NO CHARGE NURSE ONLY: CPT

## 2018-11-16 PROCEDURE — 85610 PROTHROMBIN TIME: CPT | Mod: QW

## 2018-11-16 NOTE — PROGRESS NOTES
ANTICOAGULATION FOLLOW-UP CLINIC VISIT    Patient Name:  Derek Stover  Date:  11/16/2018  Contact Type:  Face to Face    SUBJECTIVE:     Patient Findings     Positives No Problem Findings           OBJECTIVE    INR Protime   Date Value Ref Range Status   11/16/2018 2.7 (A) 0.86 - 1.14 Final       ASSESSMENT / PLAN  INR assessment THER    Recheck INR In: 6 WEEKS    INR Location Clinic      Anticoagulation Summary as of 11/16/2018     INR goal 2.5-3.5   Today's INR 2.7   Warfarin maintenance plan 2.5 mg (5 mg x 0.5) on Mon, Wed; 5 mg (5 mg x 1) all other days   Full warfarin instructions 2.5 mg on Mon, Wed; 5 mg all other days   Weekly warfarin total 30 mg   No change documented Perla Walden RN   Plan last modified Perla Walden RN (10/5/2018)   Next INR check 12/28/2018   Target end date     Indications   Atrial fibrillation (H) [I48.91]  Atrial fibrillation (H) [I48.91] (Resolved) [I48.91]  Long-term (current) use of anticoagulants [Z79.01] [Z79.01]         Anticoagulation Episode Summary     INR check location     Preferred lab     Send INR reminders to [facility] POOL    Comments 5 mg, PM dose, printout      Anticoagulation Care Providers     Provider Role Specialty Phone number    Carlos Archibald DO [facility] Internal Medicine [phone]            See the Encounter Report to view Anticoagulation Flowsheet and Dosing Calendar (Go to Encounters tab in chart review, and find the Anticoagulation Therapy Visit)    Dosage adjustment made based on physician directed care plan.      Perla Walden RN

## 2018-11-16 NOTE — MR AVS SNAPSHOT
Derek SHERMAN Stover   11/16/2018 3:30 PM   Anticoagulation Therapy Visit    Description:  57 year old male   Provider:  GUILHERME ANTI COAG   Department:  Ph Anticoag           INR as of 11/16/2018     Today's INR 2.7      Anticoagulation Summary as of 11/16/2018     INR goal 2.5-3.5   Today's INR 2.7   Full warfarin instructions 2.5 mg on Mon, Wed; 5 mg all other days   Next INR check 12/28/2018    Indications   Atrial fibrillation (H) [I48.91]  Atrial fibrillation (H) [I48.91] (Resolved) [I48.91]  Long-term (current) use of anticoagulants [Z79.01] [Z79.01]         Your next Anticoagulation Clinic appointment(s)     Nov 16, 2018  3:30 PM CST   Anticoagulation Visit with PH ANTI COAG   [facility] ([address] [address]   [phone]            Dec 28, 2018  3:30 PM CST   Anticoagulation Visit with PH ANTI COAG   [facility] ([address] [address]   [phone]              Contact Numbers     Clinic Number:         November 2018 Details    Sun Mon Tue Wed Thu Fri Sat         1               2               3                 4               5               6               7               8               9               10                 11               12               13               14               15               16      5 mg   See details      17      5 mg           18      5 mg         19      2.5 mg         20      5 mg         21      2.5 mg         22      5 mg         23      5 mg         24      5 mg           25      5 mg         26      2.5 mg         27      5 mg         28      2.5 mg         29      5 mg         30      5 mg           Date Details   11/16 This INR check               How to take your warfarin dose     To take:  2.5 mg Take 0.5 of a 5 mg tablet.    To take:  5 mg Take 1 of the 5 mg tablets.           December 2018 Details    Sun Mon Tue Wed Thu  Fri Sat           1      5 mg           2      5 mg         3      2.5 mg         4      5 mg         5      2.5 mg         6      5 mg         7      5 mg         8      5 mg           9      5 mg         10      2.5 mg         11      5 mg         12      2.5 mg         13      5 mg         14      5 mg         15      5 mg           16      5 mg         17      2.5 mg         18      5 mg         19      2.5 mg         20      5 mg         21      5 mg         22      5 mg           23      5 mg         24      2.5 mg         25      5 mg         26      2.5 mg         27      5 mg         28            29                 30               31                     Date Details   No additional details    Date of next INR:  12/28/2018         How to take your warfarin dose     To take:  2.5 mg Take 0.5 of a 5 mg tablet.    To take:  5 mg Take 1 of the 5 mg tablets.

## 2018-11-16 NOTE — TELEPHONE ENCOUNTER
Please review and renew this patients INR referral, orders pending. Thank you!          Has the patient previously taken warfarin? yes  If yes, for what indication? Mechanical valve    Does the patient have any of the following indications for a higher range of 2.5-3.5:    Mitral position mechanical valve? yes    Mireya-Shiley, Ball and Cage or Monoleaflet valve (regardless of position) no    Other (if yes, please explain) no        Perla Walden RN

## 2018-12-03 ENCOUNTER — TELEPHONE (OUTPATIENT)
Dept: FAMILY MEDICINE | Facility: OTHER | Age: 58
End: 2018-12-03

## 2018-12-03 NOTE — TELEPHONE ENCOUNTER
OK for workin Same Day on Friday 12/7/18. Please call patient  to schedule time.  Electronically signed by Osbaldo Renee MD

## 2018-12-03 NOTE — TELEPHONE ENCOUNTER
Reason for Call:  Same Day Appointment, Requested Provider:  Osbaldo Renee M.D.    PCP: Osbaldo Renee    Reason for visit: 6 mo heart follow up    Duration of symptoms:     Have you been treated for this in the past?     Additional comments: pt needs to be seen this week due to ins    Can we leave a detailed message on this number? YES    Phone number patient can be reached at: Home number on file [phone] (home)    Best Time:     Call taken on 12/3/2018 at 12:22 PM by Lin Marino

## 2018-12-07 ENCOUNTER — OFFICE VISIT (OUTPATIENT)
Dept: FAMILY MEDICINE | Facility: OTHER | Age: 58
End: 2018-12-07
Payer: COMMERCIAL

## 2018-12-07 VITALS
RESPIRATION RATE: 16 BRPM | OXYGEN SATURATION: 94 % | BODY MASS INDEX: 39.16 KG/M2 | TEMPERATURE: 97.8 F | WEIGHT: 272.9 LBS | DIASTOLIC BLOOD PRESSURE: 62 MMHG | HEART RATE: 82 BPM | SYSTOLIC BLOOD PRESSURE: 112 MMHG

## 2018-12-07 DIAGNOSIS — F17.200 SMOKER: Primary | ICD-10-CM

## 2018-12-07 DIAGNOSIS — Z79.01 LONG TERM CURRENT USE OF ANTICOAGULANT THERAPY: ICD-10-CM

## 2018-12-07 DIAGNOSIS — M77.02 MEDIAL EPICONDYLITIS OF ELBOW, LEFT: ICD-10-CM

## 2018-12-07 PROCEDURE — 99213 OFFICE O/P EST LOW 20 MIN: CPT | Performed by: FAMILY MEDICINE

## 2018-12-07 ASSESSMENT — PAIN SCALES - GENERAL: PAINLEVEL: NO PAIN (0)

## 2018-12-07 NOTE — MR AVS SNAPSHOT
After Visit Summary   12/7/2018    Derek Stover    MRN: [id number]           Patient Information     Date Of Birth          1960        Visit Information        Provider Department      12/7/2018 4:10 PM Osbaldo Renee MD [facility]         Follow-ups after your visit        Follow-up notes from your care team     Return in about 6 months (around 6/7/2019) for Routine Visit, Lab Work.      Your next 10 appointments already scheduled     Dec 28, 2018  3:30 PM CST   Anticoagulation Visit with PH ANTI COAG   [facility] ([facility]    [address] [place] [id number] [phone]              Who to contact     If you have questions or need follow up information about today's clinic visit or your schedule please contact [facility] directly at [phone].  Normal or non-critical lab and imaging results will be communicated to you by MyChart, letter or phone within 4 business days after the clinic has received the results. If you do not hear from us within 7 days, please contact the clinic through MyChart or phone. If you have a critical or abnormal lab result, we will notify you by phone as soon as possible.  Submit refill requests through [employer] or call your pharmacy and they will forward the refill request to us. Please allow 3 business days for your refill to be completed.          Additional Information About Your Visit        MyChart Information     [employer] gives you secure access to your electronic health record. If you see a primary care provider, you can also send messages to your care team and make appointments. If you have questions, please call your primary care clinic.  If you do not have a primary care provider, please call [phone] and they will assist you.        Care EveryWhere ID     This is your Care EveryWhere ID. This could be used by other organizations to access your [facility]  records  [id number]        Your Vitals Were     Pulse Temperature Respirations Pulse Oximetry BMI (Body Mass Index)       82 97.8  F (36.6  C) (Temporal) 16 94% 39.16 kg/m2        Blood Pressure from Last 3 Encounters:   12/07/18 112/62   05/30/18 118/68   03/27/18 128/86    Weight from Last 3 Encounters:   12/07/18 272 lb 14.4 oz (123.8 kg)   05/30/18 270 lb 8 oz (122.7 kg)   03/27/18 277 lb 4.8 oz (125.8 kg)              Today, you had the following     No orders found for display       Primary Care Provider Office Phone # Fax #    Osbaldo Renee [phone]       [address] [facility] [address]        Equal Access to Services     HOMER SPAIN : Ainsley Shukla, wamike luqadaha, qaybta kaalmaangel merrill, malissa ramsey . So [facility] [phone].    ATENCIÓN: Si habla español, tiene a macias disposición servicios gratuitos de asistencia lingüística. Llame al [phone].    We comply with applicable federal civil rights laws and [place] laws. We do not discriminate on the basis of race, color, national origin, age, disability, sex, sexual orientation, or gender identity.            Thank you!     Thank you for choosing [facility]  for your care. Our goal is always to provide you with excellent care. Hearing back from our patients is one way we can continue to improve our services. Please take a few minutes to complete the written survey that you may receive in the mail after your visit with us. Thank you!             Your Updated Medication List - Protect others around you: Learn how to safely use, store and throw away your medicines at www.disposemymeds.org.          This list is accurate as of 12/7/18  4:51 PM.  Always use your most recent med list.                   Brand Name Dispense Instructions for use Diagnosis    acetaminophen 325 MG tablet    TYLENOL    100 tablet    Take 2 tablets (650 mg) by mouth every 4 hours as needed for other  (surgical pain)    S/P mitral valve replacement with metallic valve       AMOXIL PO      Take 1,500-3,000 mg by mouth daily as needed (patient takes 6 X 500 = 3,000 mg dose 1 hour before dental appointment and 3 X 500 = 1,500 mg dose 6 hours after dental appointment.)        aspirin 81 MG EC tablet    ASA    30 tablet    Take 1 tablet (81 mg) by mouth daily    S/P mitral valve replacement with metallic valve       IBUPROFEN PO           warfarin 5 MG tablet    COUMADIN    80 tablet    Take 2.5 mg Mon, Wed and 5 mg all other days or as directed by the coumadin clinic.    Chronic atrial fibrillation (H), Anticoagulated on Coumadin

## 2018-12-07 NOTE — PROGRESS NOTES
SUBJECTIVE:   Derek Stover is a 58 year old male who presents to clinic today for the following health issues:    Patient states cardiologist took him off lipitor stated to stay off for 9 months and see how patient feels. He continues to feel well. He also continues to smoke.    Medication Followup of Warfarin    Taking Medication as prescribed: yes    Side Effects:  None    Medication Helping Symptoms:  yes   Concern: Left elbow swollen.     Problem list and histories reviewed & adjusted, as indicated.  Additional history: He was seen by Cardiology 9 months ago. Doing well. Stopped Lipitor due to runny nose and normal coronary angiogram. He has petechia on lower shins. Occasional bruising.     Prior Chest CT in 2012 with stable nodule. He continues to smoke. Recommended 12 month follow up. No follow up noted. He is now changing jobs and will be without health insurance until 2019. He will consider follow up at that time.    Patient Active Problem List   Diagnosis     Smoker     Tinea versicolor     Erectile dysfunction     Hyperlipidemia with target LDL less than 130     Morbid obesity (H)     Long-term (current) use of anticoagulants [Z79.01]     Mitral regurgitation     Atrial fibrillation (H)     Severe mitral regurgitation     S/P MVR (mitral valve replacement)     Fluid overload     Transient hyperglycemia post procedure     Anticoagulated on Coumadin     Past Surgical History:   Procedure Laterality Date     COLONOSCOPY N/A 9/8/2014    Procedure: COMBINED COLONOSCOPY, SINGLE BIOPSY/POLYPECTOMY BY BIOPSY;  Surgeon: Kai aBiley MD;  Location:  GI     ORTHOPEDIC SURGERY      RIght knee scope     REPLACE VALVE MITRAL N/A 9/12/2017    Procedure: REPLACE VALVE MITRAL;  MITRAL VALVE REPLACEMENT  [employer] Series Mechanical Heart Valve 33mm  Ref, [id number] Serial [id number]   ON PUMP, MITCHELL;  Surgeon: Ivana Marie MD;  Location:  OR       Social History   Substance Use Topics     Smoking  status: Current Every Day Smoker     Years: 39.00     Types: Pipe, Cigars     Smokeless tobacco: Never Used     Alcohol use 2.4 oz/week     4 Standard drinks or equivalent per week      Comment: occasional     Family History   Problem Relation Age of Onset     Diabetes Mother      Obesity Mother      Diabetes Father          Current Outpatient Prescriptions   Medication Sig Dispense Refill     acetaminophen (TYLENOL) 325 MG tablet Take 2 tablets (650 mg) by mouth every 4 hours as needed for other (surgical pain) 100 tablet 0     aspirin EC 81 MG EC tablet Take 1 tablet (81 mg) by mouth daily 30 tablet 0     IBUPROFEN PO        warfarin (COUMADIN) 5 MG tablet Take 2.5 mg Mon, Wed and 5 mg all other days or as directed by the coumadin clinic. 80 tablet 1     Amoxicillin (AMOXIL PO) Take 1,500-3,000 mg by mouth daily as needed (patient takes 6 X 500 = 3,000 mg dose 1 hour before dental appointment and 3 X 500 = 1,500 mg dose 6 hours after dental appointment.)       Allergies   Allergen Reactions     No Known Drug Allergy      Recent Labs   Lab Test  01/15/18   0801  11/07/17   0855  09/15/17   0815   09/13/17   0407   09/12/17   1253   05/31/17   1811  08/13/14   0846   01/07/11   0758   A1C   --    --    --    --   6.1*   --    --    --    --    --    --    --    LDL  48  129*   --    --    --    --    --    --    --   143*   < >  135*   HDL  48  56   --    --    --    --    --    --    --   39*   < >  42   TRIG  126  246*   --    --    --    --    --    --    --   124   < >  144   ALT  26  69   --    --    --    --   25   --    --   20   < >  18   CR   --   1.03  0.86   < >  1.13   < >  1.17   < >  0.97  1.06   < >  1.00   GFRESTIMATED   --   75  >90   < >  67   < >  65   < >  80  73   < >  80   GFRESTBLACK   --   >90  >90   < >  82   < >  78   < >  >90   GFR Calc    89   < >  >90   POTASSIUM   --   4.0  4.2   < >  5.0   < >  4.2   < >  4.1  4.5   < >  4.5   TSH   --    --    --    --    --    --     --    --   2.92   --    --   2.07    < > = values in this interval not displayed.      BP Readings from Last 3 Encounters:   12/07/18 112/62   05/30/18 118/68   03/27/18 128/86    Wt Readings from Last 3 Encounters:   12/07/18 272 lb 14.4 oz (123.8 kg)   05/30/18 270 lb 8 oz (122.7 kg)   03/27/18 277 lb 4.8 oz (125.8 kg)                  Labs reviewed in EPIC    Reviewed and updated as needed this visit by clinical staff  Tobacco  Allergies  Meds  Problems  Med Hx  Surg Hx  Fam Hx  Soc Hx        Reviewed and updated as needed this visit by Provider  Allergies  Meds  Problems         ROS:  Constitutional, HEENT, cardiovascular, pulmonary, gi and gu systems are negative, except as otherwise noted.    OBJECTIVE:     /62 (BP Location: Left arm, Patient Position: Chair, Cuff Size: Adult Large)  Pulse 82  Temp 97.8  F (36.6  C) (Temporal)  Resp 16  Wt 272 lb 14.4 oz (123.8 kg)  SpO2 94%  BMI 39.16 kg/m2  Body mass index is 39.16 kg/(m^2).  GENERAL: healthy, alert and no distress  NECK: no adenopathy, no asymmetry, masses, or scars and thyroid normal to palpation  RESP: lungs clear to auscultation - no rales, rhonchi or wheezes  CV: regular rates and rhythm, normal S1 S2, no S3 or S4, no murmur, click or rub, peripheral pulses strong, no peripheral edema and crisp mechanical valve  ABDOMEN: soft, nontender, no hepatosplenomegaly, no masses and bowel sounds normal  MS: no gross musculoskeletal defects noted, no edema. Mild edema of left medial epicondyl without tenderness.  SKIN: no suspicious lesions or rashes and petechiae - lower legs    Diagnostic Test Results:  No results found for this or any previous visit (from the past 24 hour(s)).    ASSESSMENT/PLAN:       Tobacco Cessation:   reports that he has been smoking Pipe and Cigars.  He has smoked for the past 39.00 years. He has never used smokeless tobacco.  Tobacco Cessation Action Plan: Self help information given to patient      2. Long-term  (current) use of anticoagulants [Z79.01]  Hemoglobin and platelets adequate. The current medical regimen is effective;  continue present plan and medications.   - CBC with platelets    3. Medial epicondylitis of elbow, left  Chronic over use from driving truck.      FURTHER TESTING:       - CT chest to follow up prior RUL stable nodule with failure of 12 month follow up 10 years ago. He continues to smoke. He will consider follow up.  FUTURE APPOINTMENTS:       - Follow-up visit in 6 months.  Work on weight loss  Regular exercise  Discussed option to consider alternative statin therapy in future. Encourage smoking cessation as primary risk factor.    Osbaldo Renee MD  [facility]

## 2018-12-28 ENCOUNTER — ANTICOAGULATION THERAPY VISIT (OUTPATIENT)
Dept: ANTICOAGULATION | Facility: CLINIC | Age: 58
End: 2018-12-28
Payer: COMMERCIAL

## 2018-12-28 DIAGNOSIS — Z79.01 LONG TERM CURRENT USE OF ANTICOAGULANT THERAPY: ICD-10-CM

## 2018-12-28 DIAGNOSIS — I48.91 ATRIAL FIBRILLATION, UNSPECIFIED TYPE (H): ICD-10-CM

## 2018-12-28 LAB — INR POINT OF CARE: 2.9 (ref 0.86–1.14)

## 2018-12-28 PROCEDURE — 36416 COLLJ CAPILLARY BLOOD SPEC: CPT

## 2018-12-28 PROCEDURE — 99207 ZZC NO CHARGE NURSE ONLY: CPT

## 2018-12-28 PROCEDURE — 85610 PROTHROMBIN TIME: CPT | Mod: QW

## 2018-12-28 NOTE — PROGRESS NOTES
ANTICOAGULATION FOLLOW-UP CLINIC VISIT    Patient Name:  Derek Stover  Date:  2018  Contact Type:  Face to Face    SUBJECTIVE:     Patient Findings     Positives:   No Problem Findings           OBJECTIVE    INR Protime   Date Value Ref Range Status   2018 2.9 (A) 0.86 - 1.14 Final       ASSESSMENT / PLAN  INR assessment THER    Recheck INR In: 6 WEEKS    INR Location Clinic      Anticoagulation Summary  As of 2018    INR goal:   2.5-3.5   TTR:   67.9 % (1.3 y)   INR used for dosin.9 (2018)   Warfarin maintenance plan:   2.5 mg (5 mg x 0.5) every Mon, Wed; 5 mg (5 mg x 1) all other days   Full warfarin instructions:   2.5 mg every Mon, Wed; 5 mg all other days   Weekly warfarin total:   30 mg   No change documented:   Perla Walden RN   Plan last modified:   Perla Walden RN (10/5/2018)   Next INR check:   2019   Target end date:       Indications    Atrial fibrillation (H) [I48.91]  Atrial fibrillation (H) [I48.91] (Resolved) [I48.91]  Long term current use of anticoagulant therapy [Z79.01]             Anticoagulation Episode Summary     INR check location:       Preferred lab:       Send INR reminders to:   SAMY MARIE    Comments:   5 mg, PM dose, printout      Anticoagulation Care Providers     Provider Role Specialty Phone number    Carlos Archibald DO [facility] Internal Medicine [phone]            See the Encounter Report to view Anticoagulation Flowsheet and Dosing Calendar (Go to Encounters tab in chart review, and find the Anticoagulation Therapy Visit)    Dosage adjustment made based on physician directed care plan.      Perla Walden RN

## 2019-01-10 ENCOUNTER — TELEPHONE (OUTPATIENT)
Dept: FAMILY MEDICINE | Facility: OTHER | Age: 59
End: 2019-01-10

## 2019-01-10 NOTE — TELEPHONE ENCOUNTER
I spoke with patient. 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 He has MVR    Past Surgical History:   Procedure Laterality Date     COLONOSCOPY N/A 9/8/2014    Procedure: COMBINED COLONOSCOPY, SINGLE BIOPSY/POLYPECTOMY BY BIOPSY;  Surgeon: Kai Bailey MD;  Location:  GI     ORTHOPEDIC SURGERY      RIght knee scope     REPLACE VALVE MITRAL N/A 9/12/2017    Procedure: REPLACE VALVE MITRAL;  MITRAL VALVE REPLACEMENT  S[employer] Masters Series Mechanical Heart Valve 33mm  Ref, [id number]-501 Serial [id number]   ON PUMP, MITCHELL;  Surgeon: Ivana Marie MD;  Location:  OR      Current Outpatient Medications   Medication Sig Dispense Refill     acetaminophen (TYLENOL) 325 MG tablet Take 2 tablets (650 mg) by mouth every 4 hours as needed for other (surgical pain) 100 tablet 0     Amoxicillin (AMOXIL PO) Take 1,500-3,000 mg by mouth daily as needed (patient takes 6 X 500 = 3,000 mg dose 1 hour before dental appointment and 3 X 500 = 1,500 mg dose 6 hours after dental appointment.)       aspirin EC 81 MG EC tablet Take 1 tablet (81 mg) by mouth daily 30 tablet 0     IBUPROFEN PO        warfarin (COUMADIN) 5 MG tablet Take 2.5 mg Mon, Wed and 5 mg all other days or as directed by the coumadin clinic. 80 tablet 1        He will discuss any regulatory restrictions with DOT provider. No medical concerns.  Electronically signed by Osbaldo Renee MD

## 2019-01-10 NOTE — TELEPHONE ENCOUNTER
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  Aware Dr Renee doesn't do them, but has questions about his mitral valve replacement a year and 4 months ago and if he can get his card still.    Please call him at [phone], it is ok to leave a detailed message on his phone.    Thank you,  Jasmin WHITAKER

## 2019-02-16 ENCOUNTER — ANTICOAGULATION THERAPY VISIT (OUTPATIENT)
Dept: ANTICOAGULATION | Facility: OTHER | Age: 59
End: 2019-02-16
Payer: COMMERCIAL

## 2019-02-16 DIAGNOSIS — Z79.01 LONG TERM CURRENT USE OF ANTICOAGULANT THERAPY: ICD-10-CM

## 2019-02-16 DIAGNOSIS — I48.91 ATRIAL FIBRILLATION, UNSPECIFIED TYPE (H): ICD-10-CM

## 2019-02-16 LAB — INR BLD: 2.7 (ref 0.86–1.14)

## 2019-02-16 PROCEDURE — 36416 COLLJ CAPILLARY BLOOD SPEC: CPT | Performed by: FAMILY MEDICINE

## 2019-02-16 PROCEDURE — 85610 PROTHROMBIN TIME: CPT | Mod: QW | Performed by: FAMILY MEDICINE

## 2019-02-16 PROCEDURE — 99207 ZZC NO CHARGE NURSE ONLY: CPT | Performed by: FAMILY MEDICINE

## 2019-02-18 NOTE — PROGRESS NOTES
ANTICOAGULATION FOLLOW-UP CLINIC VISIT    Patient Name:  Derek Stover  Date:  2019  Contact Type:  Telephone    SUBJECTIVE:     Patient Findings     Comments:   Left a detailed message on VM with this information, pt is advised to call back if pt has any questions, missed doses or concerns such as symptoms of bleeding, clotting, infection, change in diet or other.             OBJECTIVE    INR Point of Care   Date Value Ref Range Status   2019 2.7 (H) 0.86 - 1.14 Final     Comment:     This test is intended for monitoring Coumadin therapy.  Results are not   accurate in patients with prolonged INR due to factor deficiency.         ASSESSMENT / PLAN  INR assessment THER    Recheck INR In: 6 WEEKS    INR Location Outside lab      Anticoagulation Summary  As of 2019    INR goal:   2.5-3.5   TTR:   --   INR used for dosin.7 (2019)   Warfarin maintenance plan:   2.5 mg (5 mg x 0.5) every Mon, Wed; 5 mg (5 mg x 1) all other days   Full warfarin instructions:   2.5 mg every Mon, Wed; 5 mg all other days   Weekly warfarin total:   30 mg   No change documented:   Danni Patton, RN   Plan last modified:   Perla Walden RN (10/5/2018)   Next INR check:   3/29/2019   Target end date:       Indications    Atrial fibrillation (H) [I48.91]  Atrial fibrillation (H) [I48.91] (Resolved) [I48.91]  Long term current use of anticoagulant therapy [Z79.01]             Anticoagulation Episode Summary     INR check location:       Preferred lab:       Send INR reminders to:   SAMY MARIE    Comments:   5 mg, PM dose, printout      Anticoagulation Care Providers     Provider Role Specialty Phone number    Carlos Archibald DO [facility] Internal Medicine [phone]            See the Encounter Report to view Anticoagulation Flowsheet and Dosing Calendar (Go to Encounters tab in chart review, and find the Anticoagulation Therapy Visit)    Dosage adjustment made based on physician directed care  plan.    Danni Patton RN

## 2019-03-23 ENCOUNTER — ANTICOAGULATION THERAPY VISIT (OUTPATIENT)
Dept: ANTICOAGULATION | Facility: OTHER | Age: 59
End: 2019-03-23

## 2019-03-23 DIAGNOSIS — Z79.01 LONG TERM CURRENT USE OF ANTICOAGULANT THERAPY: ICD-10-CM

## 2019-03-23 DIAGNOSIS — I48.91 ATRIAL FIBRILLATION, UNSPECIFIED TYPE (H): ICD-10-CM

## 2019-03-23 LAB — INR BLD: 3.5 (ref 0.86–1.14)

## 2019-03-23 PROCEDURE — 99207 ZZC NO CHARGE NURSE ONLY: CPT | Performed by: FAMILY MEDICINE

## 2019-03-23 PROCEDURE — 36416 COLLJ CAPILLARY BLOOD SPEC: CPT | Performed by: FAMILY MEDICINE

## 2019-03-23 PROCEDURE — 85610 PROTHROMBIN TIME: CPT | Mod: QW | Performed by: FAMILY MEDICINE

## 2019-03-25 NOTE — PROGRESS NOTES
ANTICOAGULATION FOLLOW-UP CLINIC VISIT    Patient Name:  Derek Stover  Date:  3/25/2019  Contact Type:  Telephone    SUBJECTIVE:     Patient Findings     Comments:   Left a detailed message on VM with this information, pt is advised to call back if pt has any questions, missed doses or concerns such as symptoms of bleeding, clotting, infection, change in diet or other.             OBJECTIVE    INR Point of Care   Date Value Ref Range Status   03/23/2019 3.5 (H) 0.86 - 1.14 Final     Comment:     This test is intended for monitoring Coumadin therapy.  Results are not   accurate in patients with prolonged INR due to factor deficiency.         ASSESSMENT / PLAN  INR assessment THER    Recheck INR In: 6 WEEKS    INR Location Outside lab      Anticoagulation Summary  As of 3/23/2019    INR goal:   2.5-3.5   TTR:   --   INR used for dosing:   3.5 (3/23/2019)   Warfarin maintenance plan:   2.5 mg (5 mg x 0.5) every Mon, Wed; 5 mg (5 mg x 1) all other days   Full warfarin instructions:   2.5 mg every Mon, Wed; 5 mg all other days   Weekly warfarin total:   30 mg   No change documented:   Danni Patton, RN   Plan last modified:   Perla Walden RN (10/5/2018)   Next INR check:   5/6/2019   Target end date:       Indications    Atrial fibrillation (H) [I48.91]  Atrial fibrillation (H) [I48.91] (Resolved) [I48.91]  Long term current use of anticoagulant therapy [Z79.01]             Anticoagulation Episode Summary     INR check location:       Preferred lab:       Send INR reminders to:   SAMY MARIE    Comments:   5 mg, PM dose, printout      Anticoagulation Care Providers     Provider Role Specialty Phone number    Carlos Archibald DO [facility] Internal Medicine [phone]            See the Encounter Report to view Anticoagulation Flowsheet and Dosing Calendar (Go to Encounters tab in chart review, and find the Anticoagulation Therapy Visit)    Dosage adjustment made based on physician directed care  plan.    Danni Patton RN

## 2019-03-29 DIAGNOSIS — I48.20 CHRONIC ATRIAL FIBRILLATION (H): ICD-10-CM

## 2019-03-29 DIAGNOSIS — Z79.01 ANTICOAGULATED ON COUMADIN: ICD-10-CM

## 2019-03-29 RX ORDER — WARFARIN SODIUM 5 MG/1
TABLET ORAL
Qty: 80 TABLET | Refills: 1 | Status: SHIPPED | OUTPATIENT
Start: 2019-03-29 | End: 2019-08-26

## 2019-04-11 NOTE — TELEPHONE ENCOUNTER
Noted. Pt informed and he has not stopped his coumadin. Will follow up as previously planned after his cardioversion with MITCHELL. Slim Preston RN, BSN     Followed protocol

## 2019-05-04 ENCOUNTER — ANTICOAGULATION THERAPY VISIT (OUTPATIENT)
Dept: ANTICOAGULATION | Facility: OTHER | Age: 59
End: 2019-05-04

## 2019-05-04 DIAGNOSIS — Z79.01 LONG TERM CURRENT USE OF ANTICOAGULANT THERAPY: ICD-10-CM

## 2019-05-04 DIAGNOSIS — I48.91 ATRIAL FIBRILLATION, UNSPECIFIED TYPE (H): ICD-10-CM

## 2019-05-04 LAB — INR BLD: 2.5 (ref 0.86–1.14)

## 2019-05-04 PROCEDURE — 85610 PROTHROMBIN TIME: CPT | Mod: QW | Performed by: FAMILY MEDICINE

## 2019-05-04 PROCEDURE — 36416 COLLJ CAPILLARY BLOOD SPEC: CPT | Performed by: FAMILY MEDICINE

## 2019-05-04 PROCEDURE — 99207 ZZC NO CHARGE NURSE ONLY: CPT | Performed by: FAMILY MEDICINE

## 2019-05-06 NOTE — PROGRESS NOTES
ANTICOAGULATION FOLLOW-UP CLINIC VISIT    Patient Name:  Derek Stover  Date:  2019  Contact Type:  Telephone    SUBJECTIVE:     Patient Findings     Comments:   The patient was assessed for diet, medication, and activity level changes, missed or extra doses, bruising or bleeding, with no problem findings.             OBJECTIVE    INR Point of Care   Date Value Ref Range Status   2019 2.5 (H) 0.86 - 1.14 Final     Comment:     This test is intended for monitoring Coumadin therapy.  Results are not   accurate in patients with prolonged INR due to factor deficiency.         ASSESSMENT / PLAN  INR assessment THER    Recheck INR In: 6 WEEKS    INR Location Outside lab      Anticoagulation Summary  As of 2019    INR goal:   2.5-3.5   TTR:   --   INR used for dosin.5 (2019)   Warfarin maintenance plan:   2.5 mg (5 mg x 0.5) every Mon, Wed; 5 mg (5 mg x 1) all other days   Full warfarin instructions:   2.5 mg every Mon, Wed; 5 mg all other days   Weekly warfarin total:   30 mg   No change documented:   Slim Preston RN   Plan last modified:   Perla Walden RN (10/5/2018)   Next INR check:   6/15/2019   Target end date:       Indications    Atrial fibrillation (H) [I48.91]  Atrial fibrillation (H) [I48.91] (Resolved) [I48.91]  Long term current use of anticoagulant therapy [Z79.01]             Anticoagulation Episode Summary     INR check location:       Preferred lab:       Send INR reminders to:   SAMY MARIE    Comments:   5 mg, PM dose, printout      Anticoagulation Care Providers     Provider Role Specialty Phone number    Dragan Carlos Yarbrough DO [facility] Internal Medicine [phone]            See the Encounter Report to view Anticoagulation Flowsheet and Dosing Calendar (Go to Encounters tab in chart review, and find the Anticoagulation Therapy Visit)    Dosage adjustment made based on physician directed care plan.    Slim Preston RN

## 2019-06-15 ENCOUNTER — ANTICOAGULATION THERAPY VISIT (OUTPATIENT)
Dept: ANTICOAGULATION | Facility: OTHER | Age: 59
End: 2019-06-15
Payer: COMMERCIAL

## 2019-06-15 DIAGNOSIS — I48.91 ATRIAL FIBRILLATION, UNSPECIFIED TYPE (H): ICD-10-CM

## 2019-06-15 DIAGNOSIS — Z79.01 LONG TERM CURRENT USE OF ANTICOAGULANT THERAPY: ICD-10-CM

## 2019-06-15 LAB — INR BLD: 4 (ref 0.86–1.14)

## 2019-06-15 PROCEDURE — 36416 COLLJ CAPILLARY BLOOD SPEC: CPT | Performed by: FAMILY MEDICINE

## 2019-06-15 PROCEDURE — 85610 PROTHROMBIN TIME: CPT | Mod: QW | Performed by: FAMILY MEDICINE

## 2019-06-15 PROCEDURE — 99207 ZZC NO CHARGE NURSE ONLY: CPT | Performed by: FAMILY MEDICINE

## 2019-06-17 ENCOUNTER — TELEPHONE (OUTPATIENT)
Dept: ANTICOAGULATION | Facility: OTHER | Age: 59
End: 2019-06-17

## 2019-06-17 NOTE — TELEPHONE ENCOUNTER
I have attempted to contact this patient by phone, left message to return call at [phone] or [phone].  Will try again later.    Danni Patton RN- Coumadin Clinic RN

## 2019-06-18 NOTE — PROGRESS NOTES
ANTICOAGULATION FOLLOW-UP CLINIC VISIT    Patient Name:  Derek Stover  Date:  2019  Contact Type:  Telephone    SUBJECTIVE:  Patient Findings     Positives:   Change in alcohol use (Pt drank heavily night before INR, also took some ibuprofen)             OBJECTIVE    INR Point of Care   Date Value Ref Range Status   06/15/2019 4.0 (H) 0.86 - 1.14 Final     Comment:     This test is intended for monitoring Coumadin therapy.  Results are not   accurate in patients with prolonged INR due to factor deficiency.         ASSESSMENT / PLAN  INR assessment SUPRA    Recheck INR In: 6 WEEKS    INR Location Outside lab      Anticoagulation Summary  As of 6/15/2019    INR goal:   2.5-3.5   TTR:   --   INR used for dosin.0! (6/15/2019)   Warfarin maintenance plan:   2.5 mg (5 mg x 0.5) every Mon, Wed; 5 mg (5 mg x 1) all other days   Full warfarin instructions:   : 2.5 mg; Otherwise 2.5 mg every Mon, Wed; 5 mg all other days   Weekly warfarin total:   30 mg   Plan last modified:   Perla Walden RN (10/5/2018)   Next INR check:   2019   Target end date:       Indications    Atrial fibrillation (H) [I48.91]  Atrial fibrillation (H) [I48.91] (Resolved) [I48.91]  Long term current use of anticoagulant therapy [Z79.01]             Anticoagulation Episode Summary     INR check location:       Preferred lab:       Send INR reminders to:   SAMY MARIE    Comments:   5 mg, PM dose, printout      Anticoagulation Care Providers     Provider Role Specialty Phone number    Carlos Archibald DO [facility] Internal Medicine [phone]            See the Encounter Report to view Anticoagulation Flowsheet and Dosing Calendar (Go to Encounters tab in chart review, and find the Anticoagulation Therapy Visit)    Dosage adjustment made based on physician directed care plan.    Danni Patton RN

## 2019-07-27 ENCOUNTER — ANTICOAGULATION THERAPY VISIT (OUTPATIENT)
Dept: ANTICOAGULATION | Facility: OTHER | Age: 59
End: 2019-07-27

## 2019-07-27 DIAGNOSIS — Z79.01 LONG TERM CURRENT USE OF ANTICOAGULANT THERAPY: ICD-10-CM

## 2019-07-27 DIAGNOSIS — Z79.01 LONG TERM CURRENT USE OF ANTICOAGULANT THERAPY: Primary | ICD-10-CM

## 2019-07-27 DIAGNOSIS — I48.91 ATRIAL FIBRILLATION, UNSPECIFIED TYPE (H): ICD-10-CM

## 2019-07-27 LAB — INR BLD: 2.9 (ref 0.86–1.14)

## 2019-07-27 PROCEDURE — 99207 ZZC NO CHARGE NURSE ONLY: CPT | Performed by: FAMILY MEDICINE

## 2019-07-27 PROCEDURE — 85610 PROTHROMBIN TIME: CPT | Mod: QW | Performed by: FAMILY MEDICINE

## 2019-07-27 PROCEDURE — 36416 COLLJ CAPILLARY BLOOD SPEC: CPT | Performed by: FAMILY MEDICINE

## 2019-07-29 NOTE — PROGRESS NOTES
ANTICOAGULATION FOLLOW-UP CLINIC VISIT    Patient Name:  Derek Stover  Date:  2019  Contact Type:  Telephone    SUBJECTIVE:  Patient Findings     Comments:   Left a detailed message on VM with this information, pt is advised to call back if pt has any questions, missed doses or concerns such as symptoms of bleeding, clotting, infection, change in diet or other.          Clinical Outcomes     Comments:   Left a detailed message on VM with this information, pt is advised to call back if pt has any questions, missed doses or concerns such as symptoms of bleeding, clotting, infection, change in diet or other.             OBJECTIVE    INR Point of Care   Date Value Ref Range Status   2019 2.9 (H) 0.86 - 1.14 Final     Comment:     This test is intended for monitoring Coumadin therapy.  Results are not   accurate in patients with prolonged INR due to factor deficiency.         ASSESSMENT / PLAN  INR assessment THER    Recheck INR In: 6 WEEKS    INR Location Outside lab      Anticoagulation Summary  As of 2019    INR goal:   2.5-3.5   TTR:   --   INR used for dosin.9 (2019)   Warfarin maintenance plan:   2.5 mg (5 mg x 0.5) every Mon, Wed; 5 mg (5 mg x 1) all other days   Full warfarin instructions:   2.5 mg every Mon, Wed; 5 mg all other days   Weekly warfarin total:   30 mg   No change documented:   Danni Patton RN   Plan last modified:   Perla Walden RN (10/5/2018)   Next INR check:   2019   Target end date:       Indications    Atrial fibrillation (H) [I48.91]  Atrial fibrillation (H) [I48.91] (Resolved) [I48.91]  Long term current use of anticoagulant therapy [Z79.01]             Anticoagulation Episode Summary     INR check location:       Preferred lab:       Send INR reminders to:   [facility] [place]    Comments:   5 mg, PM dose, printout      Anticoagulation Care Providers     Provider Role Specialty Phone number    Carlos Archibald DO Responsible Internal  Medicine [phone]            See the Encounter Report to view Anticoagulation Flowsheet and Dosing Calendar (Go to Encounters tab in chart review, and find the Anticoagulation Therapy Visit)    Dosage adjustment made based on physician directed care plan.    Danni Patton RN

## 2019-08-26 ENCOUNTER — OFFICE VISIT (OUTPATIENT)
Dept: FAMILY MEDICINE | Facility: OTHER | Age: 59
End: 2019-08-26
Payer: COMMERCIAL

## 2019-08-26 VITALS
DIASTOLIC BLOOD PRESSURE: 64 MMHG | RESPIRATION RATE: 14 BRPM | OXYGEN SATURATION: 99 % | HEIGHT: 71 IN | WEIGHT: 266 LBS | TEMPERATURE: 97.3 F | SYSTOLIC BLOOD PRESSURE: 112 MMHG | BODY MASS INDEX: 37.24 KG/M2 | HEART RATE: 80 BPM

## 2019-08-26 DIAGNOSIS — R91.8 PULMONARY NODULES: ICD-10-CM

## 2019-08-26 DIAGNOSIS — J01.00 ACUTE NON-RECURRENT MAXILLARY SINUSITIS: Primary | ICD-10-CM

## 2019-08-26 DIAGNOSIS — Z79.01 ANTICOAGULATED ON COUMADIN: ICD-10-CM

## 2019-08-26 DIAGNOSIS — I48.20 CHRONIC ATRIAL FIBRILLATION (H): ICD-10-CM

## 2019-08-26 PROCEDURE — 99214 OFFICE O/P EST MOD 30 MIN: CPT | Performed by: FAMILY MEDICINE

## 2019-08-26 RX ORDER — FLUTICASONE PROPIONATE 50 MCG
2 SPRAY, SUSPENSION (ML) NASAL DAILY
Qty: 10 ML | Refills: 1 | Status: SHIPPED | OUTPATIENT
Start: 2019-08-26 | End: 2020-08-25

## 2019-08-26 RX ORDER — WARFARIN SODIUM 5 MG/1
TABLET ORAL
Qty: 80 TABLET | Refills: 3 | Status: ON HOLD | OUTPATIENT
Start: 2019-08-26 | End: 2020-08-25

## 2019-08-26 ASSESSMENT — PAIN SCALES - GENERAL: PAINLEVEL: NO PAIN (0)

## 2019-08-26 ASSESSMENT — MIFFLIN-ST. JEOR: SCORE: 2050.76

## 2019-08-26 NOTE — PROGRESS NOTES
Subjective     Derek Stover is a 58 year old male who presents to clinic today for the following health issues:    HPI   Acute Illness   Acute illness concerns: sinus, headache   Onset: 2 weels     Fever: no    Chills/Sweats: YES, night sweats     Headache (location?): YES    Sinus Pressure:YES- left side     Conjunctivitis:  no    Ear Pain: no    Rhinorrhea: YES    Congestion: YES    Sore Throat: no     Cough: YES-productive of clear sputum, productive of yellow sputum    Wheeze: no    Decreased Appetite: no    Nausea: YES    Vomiting: no    Diarrhea:  no    Dysuria/Freq.: no    Fatigue/Achiness: YES    Sick/Strep Exposure: no     Therapies Tried and outcome: tylenol, 12 hour sinus, ibu, have helped with symptoms.     Pain starts after going to bed, 1-2 hours later. Only on left.  No nasal congestion. Improved with tylenol and OTC decongestant. Not improved with OTC decongestant alone.    Patient Active Problem List   Diagnosis     Smoker     Tinea versicolor     Erectile dysfunction     Hyperlipidemia with target LDL less than 130     Morbid obesity (H)     Long term current use of anticoagulant therapy     Mitral regurgitation     Atrial fibrillation (H)     Severe mitral regurgitation     S/P MVR (mitral valve replacement)     Fluid overload     Transient hyperglycemia post procedure     Anticoagulated on Coumadin     Past Surgical History:   Procedure Laterality Date     COLONOSCOPY N/A 9/8/2014    Procedure: COMBINED COLONOSCOPY, SINGLE BIOPSY/POLYPECTOMY BY BIOPSY;  Surgeon: Kai Bailey MD;  Location:  GI     ORTHOPEDIC SURGERY      RIght knee scope     REPLACE VALVE MITRAL N/A 9/12/2017    Procedure: REPLACE VALVE MITRAL;  MITRAL VALVE REPLACEMENT  [facility] Masters Series Mechanical Heart Valve 33mm  Ref, [id number]-501 Serial [id number]   ON PUMP, MITCHELL;  Surgeon: Ivana Marie MD;  Location:  OR       Social History     Tobacco Use     Smoking status: Current Every Day Smoker     Years: 39.00      Types: Pipe, Cigars     Smokeless tobacco: Never Used   Substance Use Topics     Alcohol use: Yes     Alcohol/week: 2.4 oz     Types: 4 Standard drinks or equivalent per week     Comment: occasional     Family History   Problem Relation Age of Onset     Diabetes Mother      Obesity Mother      Diabetes Father          Current Outpatient Medications   Medication Sig Dispense Refill     acetaminophen (TYLENOL) 325 MG tablet Take 2 tablets (650 mg) by mouth every 4 hours as needed for other (surgical pain) 100 tablet 0     aspirin EC 81 MG EC tablet Take 1 tablet (81 mg) by mouth daily 30 tablet 0     IBUPROFEN PO        warfarin (COUMADIN) 5 MG tablet Take 2.5 mg on Monday, Wednesday and 5 mg all other days, or as directed by the Coumadin Clinic 80 tablet 1     Amoxicillin (AMOXIL PO) Take 1,500-3,000 mg by mouth daily as needed (patient takes 6 X 500 = 3,000 mg dose 1 hour before dental appointment and 3 X 500 = 1,500 mg dose 6 hours after dental appointment.)       Allergies   Allergen Reactions     No Known Drug Allergy      Recent Labs   Lab Test 01/15/18  0801 11/07/17  0855 09/15/17  0815  09/13/17  0407  09/12/17  1253  05/31/17  1811 08/13/14  0846   A1C  --   --   --   --  6.1*  --   --   --   --   --    LDL 48 129*  --   --   --   --   --   --   --  143*   HDL 48 56  --   --   --   --   --   --   --  39*   TRIG 126 246*  --   --   --   --   --   --   --  124   ALT 26 69  --   --   --   --  25  --   --  20   CR  --  1.03 0.86   < > 1.13   < > 1.17   < > 0.97 1.06   GFRESTIMATED  --  75 >90   < > 67   < > 65   < > 80 73   GFRESTBLACK  --  >90 >90   < > 82   < > 78   < > >90   GFR Calc   89   POTASSIUM  --  4.0 4.2   < > 5.0   < > 4.2   < > 4.1 4.5   TSH  --   --   --   --   --   --   --   --  2.92  --     < > = values in this interval not displayed.      BP Readings from Last 3 Encounters:   08/26/19 112/64   12/07/18 112/62   05/30/18 118/68    Wt Readings from Last 3 Encounters:   08/26/19  "120.7 kg (266 lb)   12/07/18 123.8 kg (272 lb 14.4 oz)   05/30/18 122.7 kg (270 lb 8 oz)                      Reviewed and updated as needed this visit by Provider  Tobacco  Allergies  Meds  Problems  Med Hx  Surg Hx  Fam Hx         Review of Systems   ROS COMP: CONSTITUTIONAL: NEGATIVE for fever, chills, change in weight  ENT/MOUTH: POSITIVE for rhinorrhea-clear and sinus pressure and NEGATIVE for ear pain, fever, Hx sinus infections, rhinorrhea-purulent, sore throat, tooth pain and vertigo  RESP: NEGATIVE for significant cough or SOB  CV: NEGATIVE for chest pain, palpitations or peripheral edema  MUSCULOSKELETAL: NEGATIVE for neck pain, paresthesias and radicular pain   ROS otherwise negative      Objective    /64 (BP Location: Right arm, Patient Position: Chair, Cuff Size: Adult Large)   Pulse 80   Temp 97.3  F (36.3  C) (Temporal)   Resp 14   Ht 1.791 m (5' 10.5\")   Wt 120.7 kg (266 lb)   SpO2 99%   BMI 37.63 kg/m    Body mass index is 37.63 kg/m .  Physical Exam   GENERAL: healthy, alert and no distress  HENT: ear canals and TM's normal, nose and mouth without ulcers or lesions  NECK: no adenopathy, no asymmetry, masses, or scars and thyroid normal to palpation  RESP: lungs clear to auscultation - no rales, rhonchi or wheezes  CV: regular rates and rhythm, normal S1 S2, no S3 or S4, no murmur, click or rub, peripheral pulses strong, no peripheral edema and crisp MV click.  ABDOMEN: soft, nontender, no hepatosplenomegaly, no masses and bowel sounds normal    Diagnostic Test Results:  Labs reviewed in Epic        Assessment & Plan     1. Acute non-recurrent maxillary sinusitis  Acute sinusitis with primarily nocturnal symptoms vs acute cervical radiculitis. Will start Flonase and Augmentin. Warm pack neck prior to bedtime. OTC tylenol. If not improving consider imaging neck and possibly sinuses.  - fluticasone (FLONASE) 50 MCG/ACT nasal spray; Spray 2 sprays into both nostrils daily  Dispense: " "10 mL; Refill: 1  - amoxicillin-clavulanate (AUGMENTIN) 875-125 MG tablet; Take 1 tablet by mouth 2 times daily for 10 days  Dispense: 20 tablet; Refill: 0    2. Pulmonary nodules  Prior stable nodule in RUL in 2012 with recommendation for annual follow up. He has declined follow up over the last 2-3 years.  - CT Chest w/o Contrast; Future    3. Chronic atrial fibrillation (H)  Chronic stable. The current medical regimen is effective;  continue present plan and medications.   - warfarin (COUMADIN) 5 MG tablet; Take 2.5 mg on Monday, Wednesday and 5 mg all other days, or as directed by the Coumadin Clinic  Dispense: 80 tablet; Refill: 3    4. Anticoagulated on Coumadin  Chronic, life long. The current medical regimen is effective;  continue present plan and medications.   - warfarin (COUMADIN) 5 MG tablet; Take 2.5 mg on Monday, Wednesday and 5 mg all other days, or as directed by the Coumadin Clinic  Dispense: 80 tablet; Refill: 3     Tobacco Cessation:   reports that he has been smoking pipe and cigars.  He has smoked for the past 39.00 years. He has never used smokeless tobacco.  Tobacco Cessation Action Plan: Self help information given to patient      BMI:   Estimated body mass index is 37.63 kg/m  as calculated from the following:    Height as of this encounter: 1.791 m (5' 10.5\").    Weight as of this encounter: 120.7 kg (266 lb).   Weight management plan: Discussed healthy diet and exercise guidelines        Work on weight loss  Regular exercise    Return in about 4 weeks (around 9/23/2019) for If symptoms worsen or fail to improve.    Osbaldo Renee MD  [facility]      " Ht. 70 "     Wt. 67.6 Kg      21.4 BMI       IBW 75.5  Kg      Pt is at 90 %  IBW

## 2019-09-06 DIAGNOSIS — I48.20 CHRONIC ATRIAL FIBRILLATION (H): ICD-10-CM

## 2019-09-06 DIAGNOSIS — Z79.01 ANTICOAGULATED ON COUMADIN: ICD-10-CM

## 2019-09-06 RX ORDER — WARFARIN SODIUM 5 MG/1
TABLET ORAL
Qty: 80 TABLET | Refills: 1 | Status: ON HOLD | OUTPATIENT
Start: 2019-09-06 | End: 2020-08-25

## 2019-09-08 ENCOUNTER — ANTICOAGULATION THERAPY VISIT (OUTPATIENT)
Dept: ANTICOAGULATION | Facility: OTHER | Age: 59
End: 2019-09-08

## 2019-09-08 DIAGNOSIS — Z79.01 LONG TERM CURRENT USE OF ANTICOAGULANT THERAPY: Primary | ICD-10-CM

## 2019-09-08 DIAGNOSIS — Z79.01 LONG TERM CURRENT USE OF ANTICOAGULANT THERAPY: ICD-10-CM

## 2019-09-08 DIAGNOSIS — I48.91 ATRIAL FIBRILLATION, UNSPECIFIED TYPE (H): ICD-10-CM

## 2019-09-08 LAB — INR BLD: 4.8 (ref 0.86–1.14)

## 2019-09-08 PROCEDURE — 85610 PROTHROMBIN TIME: CPT | Mod: QW | Performed by: FAMILY MEDICINE

## 2019-09-08 PROCEDURE — 99207 ZZC NO CHARGE NURSE ONLY: CPT | Performed by: FAMILY MEDICINE

## 2019-09-08 PROCEDURE — 36416 COLLJ CAPILLARY BLOOD SPEC: CPT | Performed by: FAMILY MEDICINE

## 2019-09-09 NOTE — PROGRESS NOTES
ANTICOAGULATION FOLLOW-UP CLINIC VISIT    Patient Name:  Derek Stover  Date:  2019  Contact Type:  Telephone    SUBJECTIVE:  Patient Findings     Positives:   Change in medications (Pt states he just finished a course of antibioitics. This most likely affected his INR)             OBJECTIVE    INR Point of Care   Date Value Ref Range Status   2019 4.8 (H) 0.86 - 1.14 Final     Comment:     This test is intended for monitoring Coumadin therapy.  Results are not   accurate in patients with prolonged INR due to factor deficiency.         ASSESSMENT / PLAN  INR assessment SUPRA    Recheck INR In: 4 WEEKS    INR Location Outside lab      Anticoagulation Summary  As of 2019    INR goal:   2.5-3.5   TTR:   70.5 % (2 y)   INR used for dosin.8! (2019)   Warfarin maintenance plan:   2.5 mg (5 mg x 0.5) every Mon, Wed; 5 mg (5 mg x 1) all other days   Full warfarin instructions:   : Hold; Otherwise 2.5 mg every Mon, Wed; 5 mg all other days   Weekly warfarin total:   30 mg   Plan last modified:   Perla Walden RN (10/5/2018)   Next INR check:   10/5/2019   Target end date:       Indications    Atrial fibrillation (H) [I48.91]  Atrial fibrillation (H) [I48.91] (Resolved) [I48.91]  Long term current use of anticoagulant therapy [Z79.01]             Anticoagulation Episode Summary     INR check location:       Preferred lab:       Send INR reminders to:   [facility] [place]    Comments:   5 mg, PM dose, printout      Anticoagulation Care Providers     Provider Role Specialty Phone number    Tavo Archibaldelva Yarbrough DO [facility] Internal Medicine [phone]            See the Encounter Report to view Anticoagulation Flowsheet and Dosing Calendar (Go to Encounters tab in chart review, and find the Anticoagulation Therapy Visit)    Dosage adjustment made based on physician directed care plan.    Slim Preston RN

## 2019-09-16 ENCOUNTER — HOSPITAL ENCOUNTER (OUTPATIENT)
Dept: CT IMAGING | Facility: CLINIC | Age: 59
Discharge: HOME | End: 2019-09-16
Attending: FAMILY MEDICINE | Admitting: FAMILY MEDICINE
Payer: COMMERCIAL

## 2019-09-16 DIAGNOSIS — R91.8 PULMONARY NODULES: ICD-10-CM

## 2019-09-16 PROCEDURE — 71250 CT THORAX DX C-: CPT

## 2019-10-04 ENCOUNTER — HEALTH MAINTENANCE LETTER (OUTPATIENT)
Age: 59
End: 2019-10-04

## 2019-10-12 ENCOUNTER — ANTICOAGULATION THERAPY VISIT (OUTPATIENT)
Dept: ANTICOAGULATION | Facility: OTHER | Age: 59
End: 2019-10-12

## 2019-10-12 DIAGNOSIS — I48.91 ATRIAL FIBRILLATION, UNSPECIFIED TYPE (H): ICD-10-CM

## 2019-10-12 DIAGNOSIS — Z79.01 LONG TERM (CURRENT) USE OF ANTICOAGULANTS: Primary | ICD-10-CM

## 2019-10-12 DIAGNOSIS — Z79.01 LONG TERM CURRENT USE OF ANTICOAGULANT THERAPY: ICD-10-CM

## 2019-10-12 LAB — INR BLD: 1.9 (ref 0.86–1.14)

## 2019-10-12 PROCEDURE — 85610 PROTHROMBIN TIME: CPT | Mod: QW | Performed by: FAMILY MEDICINE

## 2019-10-12 PROCEDURE — 99207 ZZC NO CHARGE NURSE ONLY: CPT | Performed by: FAMILY MEDICINE

## 2019-10-12 PROCEDURE — 36416 COLLJ CAPILLARY BLOOD SPEC: CPT | Performed by: FAMILY MEDICINE

## 2019-10-14 NOTE — PROGRESS NOTES
ANTICOAGULATION FOLLOW-UP CLINIC VISIT    Patient Name:  Derek Stover  Date:  10/14/2019  Contact Type:  Telephone    SUBJECTIVE:  Patient Findings     Positives:   Missed doses (Missed Thursday's dose)             OBJECTIVE    INR Point of Care   Date Value Ref Range Status   10/12/2019 1.9 (H) 0.86 - 1.14 Final     Comment:     This test is intended for monitoring Coumadin therapy.  Results are not   accurate in patients with prolonged INR due to factor deficiency.         ASSESSMENT / PLAN  INR assessment SUB    Recheck INR In: 4 WEEKS    INR Location Outside lab      Anticoagulation Summary  As of 10/12/2019    INR goal:   2.5-3.5   TTR:   --   INR used for dosin.9! (10/12/2019)   Warfarin maintenance plan:   2.5 mg (5 mg x 0.5) every Mon, Wed; 5 mg (5 mg x 1) all other days   Full warfarin instructions:   10/14: 5 mg; Otherwise 2.5 mg every Mon, Wed; 5 mg all other days   Weekly warfarin total:   30 mg   Plan last modified:   Perla Walden RN (10/5/2018)   Next INR check:   2019   Target end date:       Indications    Atrial fibrillation (H) [I48.91]  Atrial fibrillation (H) [I48.91] (Resolved) [I48.91]  Long term current use of anticoagulant therapy [Z79.01]             Anticoagulation Episode Summary     INR check location:       Preferred lab:       Send INR reminders to:   [facility] [place]    Comments:   5 mg, PM dose, printout      Anticoagulation Care Providers     Provider Role Specialty Phone number    Carlos Archibald DO [facility] Internal Medicine [phone]            See the Encounter Report to view Anticoagulation Flowsheet and Dosing Calendar (Go to Encounters tab in chart review, and find the Anticoagulation Therapy Visit)    Dosage adjustment made based on physician directed care plan.    Slim Preston RN

## 2019-11-15 ENCOUNTER — ANTICOAGULATION THERAPY VISIT (OUTPATIENT)
Dept: ANTICOAGULATION | Facility: OTHER | Age: 59
End: 2019-11-15

## 2019-11-15 DIAGNOSIS — Z79.01 LONG TERM CURRENT USE OF ANTICOAGULANT THERAPY: Primary | ICD-10-CM

## 2019-11-15 DIAGNOSIS — I48.91 ATRIAL FIBRILLATION, UNSPECIFIED TYPE (H): ICD-10-CM

## 2019-11-15 DIAGNOSIS — Z79.01 LONG TERM CURRENT USE OF ANTICOAGULANT THERAPY: ICD-10-CM

## 2019-11-15 LAB
CAPILLARY BLOOD COLLECTION: NORMAL
INR BLD: 3.2 (ref 0.86–1.14)

## 2019-11-15 PROCEDURE — 36416 COLLJ CAPILLARY BLOOD SPEC: CPT | Performed by: FAMILY MEDICINE

## 2019-11-15 PROCEDURE — 99207 ZZC NO CHARGE NURSE ONLY: CPT | Performed by: FAMILY MEDICINE

## 2019-11-15 PROCEDURE — 85610 PROTHROMBIN TIME: CPT | Mod: QW | Performed by: FAMILY MEDICINE

## 2019-11-15 NOTE — PROGRESS NOTES
ANTICOAGULATION FOLLOW-UP CLINIC VISIT    Patient Name:  Derek Stover  Date:  11/15/2019  Contact Type:  Telephone/ LM with dosing instructions    SUBJECTIVE:  Patient Findings     Comments:   I left a detailed voicemail with the orders below. I have also requested a call back if there have been any missed doses, concerns, illness, fever, or if there have been any changes in medications, activity level, or diet          Clinical Outcomes     Negatives:   Major bleeding event, Thromboembolic event, Anticoagulation-related hospital admission, Anticoagulation-related ED visit, Anticoagulation-related fatality    Comments:   I left a detailed voicemail with the orders below. I have also requested a call back if there have been any missed doses, concerns, illness, fever, or if there have been any changes in medications, activity level, or diet             OBJECTIVE    INR Point of Care   Date Value Ref Range Status   11/15/2019 3.2 (H) 0.86 - 1.14 Final     Comment:     This test is intended for monitoring Coumadin therapy.  Results are not   accurate in patients with prolonged INR due to factor deficiency.         ASSESSMENT / PLAN  INR assessment THER    Recheck INR In: 6 WEEKS    INR Location Outside lab      Anticoagulation Summary  As of 11/15/2019    INR goal:   2.5-3.5   TTR:   68.3 % (2.2 y)   INR used for dosing:   3.2 (11/15/2019)   Warfarin maintenance plan:   2.5 mg (5 mg x 0.5) every Mon, Wed; 5 mg (5 mg x 1) all other days   Full warfarin instructions:   2.5 mg every Mon, Wed; 5 mg all other days   Weekly warfarin total:   30 mg   No change documented:   Slim Preston RN   Plan last modified:   Perla Walden RN (10/5/2018)   Next INR check:   12/27/2019   Priority:   High   Target end date:       Indications    Atrial fibrillation (H) [I48.91]  Atrial fibrillation (H) [I48.91] (Resolved) [I48.91]  Long term current use of anticoagulant therapy [Z79.01]             Anticoagulation Episode  Summary     INR check location:       Preferred lab:       Send INR reminders to:   [facility] [place]    Comments:   5 mg, PM dose, printout      Anticoagulation Care Providers     Provider Role Specialty Phone number    Luimaru Carlos Yarbrough DO [facility] Internal Medicine [phone]            See the Encounter Report to view Anticoagulation Flowsheet and Dosing Calendar (Go to Encounters tab in chart review, and find the Anticoagulation Therapy Visit)    Dosage adjustment made based on physician directed care plan.    Slim Preston RN

## 2019-12-29 DIAGNOSIS — Z79.01 LONG TERM CURRENT USE OF ANTICOAGULANT THERAPY: Primary | ICD-10-CM

## 2019-12-29 LAB — INR BLD: 2.7 (ref 0.86–1.14)

## 2019-12-29 PROCEDURE — 36416 COLLJ CAPILLARY BLOOD SPEC: CPT | Performed by: FAMILY MEDICINE

## 2019-12-29 PROCEDURE — 85610 PROTHROMBIN TIME: CPT | Mod: QW | Performed by: FAMILY MEDICINE

## 2019-12-30 ENCOUNTER — ANTICOAGULATION THERAPY VISIT (OUTPATIENT)
Dept: FAMILY MEDICINE | Facility: OTHER | Age: 59
End: 2019-12-30
Payer: COMMERCIAL

## 2019-12-30 DIAGNOSIS — Z79.01 LONG TERM CURRENT USE OF ANTICOAGULANT THERAPY: ICD-10-CM

## 2019-12-30 PROCEDURE — 99207 ZZC NO CHARGE NURSE ONLY: CPT | Performed by: FAMILY MEDICINE

## 2019-12-30 NOTE — PROGRESS NOTES
ANTICOAGULATION FOLLOW-UP CLINIC VISIT    Patient Name:  Derek Stover  Date:  2019  Contact Type:  Telephone    SUBJECTIVE:  Patient Findings         Clinical Outcomes     Negatives:   Major bleeding event, Thromboembolic event, Anticoagulation-related hospital admission, Anticoagulation-related ED visit, Anticoagulation-related fatality           OBJECTIVE    INR Point of Care   Date Value Ref Range Status   2019 2.7 (H) 0.86 - 1.14 Final     Comment:     This test is intended for monitoring Coumadin therapy.  Results are not   accurate in patients with prolonged INR due to factor deficiency.         ASSESSMENT / PLAN  INR assessment THER    Recheck INR In: 6 WEEKS    INR Location Outside lab      Anticoagulation Summary  As of 2019    INR goal:   2.5-3.5   TTR:   72.4 % (1 y)   INR used for dosin.7 (2019)   Warfarin maintenance plan:   2.5 mg (5 mg x 0.5) every Mon, Wed; 5 mg (5 mg x 1) all other days   Full warfarin instructions:   2.5 mg every Mon, Wed; 5 mg all other days   Weekly warfarin total:   30 mg   No change documented:   Noehre, Parisa, RN   Plan last modified:   Perla Walden RN (10/5/2018)   Next INR check:   2/10/2020   Priority:   Maintenance   Target end date:       Indications    Atrial fibrillation (H) [I48.91]  Atrial fibrillation (H) [I48.91] (Resolved) [I48.91]  Long term current use of anticoagulant therapy [Z79.01]             Anticoagulation Episode Summary     INR check location:       Preferred lab:       Send INR reminders to:   [facility] [place]    Comments:   5 mg, PM dose, printout      Anticoagulation Care Providers     Provider Role Specialty Phone number    Carlos Archibald DO [facility] Internal Medicine [phone]            See the Encounter Report to view Anticoagulation Flowsheet and Dosing Calendar (Go to Encounters tab in chart review, and find the Anticoagulation Therapy Visit)    Dosage adjustment made based on physician  directed care plan.    Parisa Grant RN

## 2020-02-15 DIAGNOSIS — I48.20 CHRONIC ATRIAL FIBRILLATION (H): Primary | ICD-10-CM

## 2020-02-15 LAB — INR BLD: 2.4 (ref 0.86–1.14)

## 2020-02-15 PROCEDURE — 36416 COLLJ CAPILLARY BLOOD SPEC: CPT | Performed by: FAMILY MEDICINE

## 2020-02-15 PROCEDURE — 85610 PROTHROMBIN TIME: CPT | Mod: QW | Performed by: FAMILY MEDICINE

## 2020-02-26 ENCOUNTER — HOSPITAL ENCOUNTER (OUTPATIENT)
Dept: CARDIOLOGY | Facility: CLINIC | Age: 60
Discharge: HOME | End: 2020-02-26
Attending: INTERNAL MEDICINE | Admitting: INTERNAL MEDICINE
Payer: COMMERCIAL

## 2020-02-26 ENCOUNTER — OFFICE VISIT (OUTPATIENT)
Dept: CARDIOLOGY | Facility: CLINIC | Age: 60
End: 2020-02-26
Payer: COMMERCIAL

## 2020-02-26 VITALS
HEIGHT: 71 IN | WEIGHT: 266.2 LBS | OXYGEN SATURATION: 96 % | BODY MASS INDEX: 37.27 KG/M2 | HEART RATE: 68 BPM | SYSTOLIC BLOOD PRESSURE: 112 MMHG | DIASTOLIC BLOOD PRESSURE: 68 MMHG

## 2020-02-26 DIAGNOSIS — Z72.0 TOBACCO ABUSE: Primary | ICD-10-CM

## 2020-02-26 DIAGNOSIS — Z98.890 S/P MVR (MITRAL VALVE REPAIR): ICD-10-CM

## 2020-02-26 PROCEDURE — 25500064 ZZH RX 255 OP 636: Performed by: INTERNAL MEDICINE

## 2020-02-26 PROCEDURE — 40000264 ECHOCARDIOGRAM COMPLETE

## 2020-02-26 PROCEDURE — 99214 OFFICE O/P EST MOD 30 MIN: CPT | Performed by: INTERNAL MEDICINE

## 2020-02-26 PROCEDURE — 93306 TTE W/DOPPLER COMPLETE: CPT | Mod: 26 | Performed by: INTERNAL MEDICINE

## 2020-02-26 RX ORDER — NICOTINE 21 MG/24HR
1 PATCH, TRANSDERMAL 24 HOURS TRANSDERMAL EVERY 24 HOURS
Qty: 30 PATCH | Refills: 4 | Status: SHIPPED | OUTPATIENT
Start: 2020-02-26 | End: 2020-08-25

## 2020-02-26 RX ORDER — BUPROPION HYDROCHLORIDE 150 MG/1
150 TABLET, EXTENDED RELEASE ORAL 2 TIMES DAILY
Qty: 30 TABLET | Refills: 4 | Status: SHIPPED | OUTPATIENT
Start: 2020-02-26 | End: 2020-08-25

## 2020-02-26 RX ADMIN — HUMAN ALBUMIN MICROSPHERES AND PERFLUTREN 2 ML: 10; .22 INJECTION, SOLUTION INTRAVENOUS at 15:28

## 2020-02-26 ASSESSMENT — MIFFLIN-ST. JEOR: SCORE: 2046.67

## 2020-02-26 NOTE — PROGRESS NOTES
CARDIOLOGY CLINIC VISIT  DATE OF SERVICE:  3/27/18    PRIMARY CARE PHYSICIAN:  Osbaldo Renee    HISTORY OF PRESENT ILLNESS:  Mr. 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 MVR in 9/2017 and paroxysmal atrial fibrillation.  He presents to clinic today for follow up.  He was last seen by Dr. Ross in 3/2018 and this is my first visit with Roberto.  In brief review, he did quite well postoperatively.  He had some lightheadedness and beta blocker was discontinued and he has maintained his ventricular rate for chronic atrial fibrillation without any need for AV myriam blocking agent.  He had a post-MVR echocardiogram that showed LVEF of 50%-55%.  No MR through the mechanical mitral valve, mean gradient of 8 mmHg.  The leaflets were opening well, mild aortic regurgitation.     In follow-up today Roberto reports feeling well overall.  He notes that he had fevers chills and general malaise several weeks ago.  He attributes this to a keto pill he was taking, however, it sounds like he had a viral illness.  He notes some shortness of breath with this although he feels it is improving.  He denies any exertional chest pain or chest discomfort.  He denies any orthopnea, PND or lower extremity edema.  He denies any heart palpitations or racing heart.  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  This is fairly sedentary.  He denies any other exercise.  He continues to smoke about a pack and a half of cigarettes per day.  He does have interest in quitting and has quit up to 10 months in the past.  He has concerns about gaining weight when he quit cigarettes.         PAST MEDICAL HISTORY:  1.  Chronic (valvular) atrial fibrillation:  Rate controlled on no AV myriam blockers.  On coumadin  2.  Rheumatic heart disease with severe MR:  S/P #32 [facility] [place] MVR in 9/2017  3.   Hyperlipidemia  4.  Tobacco abuse      MEDICATIONS:  Current Outpatient Medications   Medication     acetaminophen (TYLENOL) 325 MG tablet     Amoxicillin (AMOXIL PO)     aspirin EC 81 MG EC tablet     fluticasone (FLONASE) 50 MCG/ACT nasal spray     IBUPROFEN PO     warfarin (COUMADIN) 5 MG tablet     warfarin (COUMADIN) 5 MG tablet     No current facility-administered medications for this visit.        ALLERGIES:  Allergies   Allergen Reactions     No Known Drug Allergy      REVIEW OF SYSTEMS:  A complete ROS was obtained and the pertinent positives are outlined in the history of present illness above.  The remainder of systems is negative.      PHYSICAL EXAM:                     Vital Signs with Ranges     0 lbs 0 oz    Constitutional: awake, alert, no distress  Eyes: PERRL, sclera nonicteric  ENT: trachea midline  Respiratory: CTAB  Cardiovascular: RRR, crisp mechanical valve click, no JVD  GI: nondistended, nontender, bowel sounds present  Lymph/Hematologic: no lymphadenopathy  Skin: dry, no rash  Musculoskeletal: good muscle tone, strength 5/5 in upper and lower extremities  Neurologic: no focal deficits  Neuropsychiatric: appropriate affact    DATA:  Labs:   Reviewed in EPIC      ASSESSMENT:  1.  Chronic atrial fibrillation: Asymptomatic and rate controlled on no AV myriam blocking agents.  He is on Coumadin.  2.  S/P mechanical MVR:  No symptoms of concern.  Due for surveillance echocardiogram.  He is tolerating Coumadin without difficulty.  3.  Tobacco abuse: Currently smokes a pack and a half a day.  He is interested in quitting.      RECOMMENDATIONS:  1.  We will plan for updated echocardiogram to assess LV function mechanical MVR.  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    2.  We extensively discussed smoking cessation and he is interested in trying this again.  After discussion of various options, we  elected to start nicotine patch 21 mg daily along with adjunctive Wellbutrin 150 mg p.o. x3 days, then increase 150 mg p.o. twice daily.  The Wellbutrin should be started 7 days prior to his quit date.   3.  Plan for follow-up in 1 year or before then as necessary.      No Mcghee MD  Cardiology - [facility]  Pager:  [phone]  February 26, 2020

## 2020-02-26 NOTE — PATIENT INSTRUCTIONS
-Schedule echocardiogram  -Choose smoking quit date.  7 days prior to quit date, start welbutrin 150 mg once daily for three days, then 150 mg twice daily  -Follow up in one year with Dr. Mcghee

## 2020-02-26 NOTE — LETTER
2/26/2020    Osbaldo Renee MD  [address] [facility] [address]    RE: Derek Jaureguiberg       Dear Colleague,    I had the pleasure of seeing Derek Stover in the [facility].    CARDIOLOGY CLINIC VISIT  DATE OF SERVICE:  3/27/18    PRIMARY CARE PHYSICIAN:  Osbaldo Renee    HISTORY OF PRESENT ILLNESS:  Mr. 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 [place] MVR in 9/2017 and paroxysmal atrial fibrillation.  He presents to clinic today for follow up.  He was last seen by Dr. Ross in 3/2018 and this is my first visit with Roberto.  In brief review, he did quite well postoperatively.  He had some lightheadedness and beta blocker was discontinued and he has maintained his ventricular rate for chronic atrial fibrillation without any need for AV myriam blocking agent.  He had a post-MVR echocardiogram that showed LVEF of 50%-55%.  No MR through the mechanical mitral valve, mean gradient of 8 mmHg.  The leaflets were opening well, mild aortic regurgitation.     In follow-up today Roberto reports feeling well overall.  He notes that he had fevers chills and general malaise several weeks ago.  He attributes this to a keto pill he was taking, however, it sounds like he had a viral illness.  He notes some shortness of breath with this although he feels it is improving.  He denies any exertional chest pain or chest discomfort.  He denies any orthopnea, PND or lower extremity edema.  He denies any heart palpitations or racing heart.  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  He denies any other exercise.  He continues to smoke about a pack and a half of cigarettes per day.  He does have interest in quitting and has quit up to 10 months in the past.  He has concerns about gaining weight when he quit cigarettes.         PAST MEDICAL  HISTORY:  1.  Chronic (valvular) atrial fibrillation:  Rate controlled on no AV myriam blockers.  On coumadin  2.  Rheumatic heart disease with severe MR:  S/P #32 [facility] [place] MVR in 9/2017  3.  Hyperlipidemia  4.  Tobacco abuse      MEDICATIONS:  Current Outpatient Medications   Medication     acetaminophen (TYLENOL) 325 MG tablet     Amoxicillin (AMOXIL PO)     aspirin EC 81 MG EC tablet     fluticasone (FLONASE) 50 MCG/ACT nasal spray     IBUPROFEN PO     warfarin (COUMADIN) 5 MG tablet     warfarin (COUMADIN) 5 MG tablet     No current facility-administered medications for this visit.        ALLERGIES:  Allergies   Allergen Reactions     No Known Drug Allergy      REVIEW OF SYSTEMS:  A complete ROS was obtained and the pertinent positives are outlined in the history of present illness above.  The remainder of systems is negative.      PHYSICAL EXAM:                     Vital Signs with Ranges     0 lbs 0 oz    Constitutional: awake, alert, no distress  Eyes: PERRL, sclera nonicteric  ENT: trachea midline  Respiratory: CTAB  Cardiovascular: RRR, crisp mechanical valve click, no JVD  GI: nondistended, nontender, bowel sounds present  Lymph/Hematologic: no lymphadenopathy  Skin: dry, no rash  Musculoskeletal: good muscle tone, strength 5/5 in upper and lower extremities  Neurologic: no focal deficits  Neuropsychiatric: appropriate affact    DATA:  Labs:   Reviewed in EPIC      ASSESSMENT:  1.  Chronic atrial fibrillation: Asymptomatic and rate controlled on no AV myriam blocking agents.  He is on Coumadin.  2.  S/P mechanical MVR:  No symptoms of concern.  Due for surveillance echocardiogram.  He is tolerating Coumadin without difficulty.  3.  Tobacco abuse: Currently smokes a pack and a half a day.  He is interested in quitting.      RECOMMENDATIONS:  1.  We will plan for updated echocardiogram to assess LV function mechanical MVR.  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    2.  We extensively discussed smoking cessation and he is interested in trying this again.  After discussion of various options, we elected to start nicotine patch 21 mg daily along with adjunctive Wellbutrin 150 mg p.o. x3 days, then increase 150 mg p.o. twice daily.  The Wellbutrin should be started 7 days prior to his quit date.   3.  Plan for follow-up in 1 year or before then as necessary.      No Mcghee MD  Cardiology - [facility]  Pager:  [phone]  February 26, 2020    Thank you for allowing me to participate in the care of your patient.    Sincerely,     No Mcghee MD     [facility]

## 2020-02-27 DIAGNOSIS — Z98.890 S/P MVR (MITRAL VALVE REPAIR): Primary | ICD-10-CM

## 2020-03-28 ENCOUNTER — TELEPHONE (OUTPATIENT)
Dept: FAMILY MEDICINE | Facility: OTHER | Age: 60
End: 2020-03-28

## 2020-03-28 ENCOUNTER — ANTICOAGULATION THERAPY VISIT (OUTPATIENT)
Dept: ANTICOAGULATION | Facility: OTHER | Age: 60
End: 2020-03-28

## 2020-03-28 DIAGNOSIS — I48.20 CHRONIC ATRIAL FIBRILLATION (H): ICD-10-CM

## 2020-03-28 DIAGNOSIS — Z79.01 LONG TERM (CURRENT) USE OF ANTICOAGULANTS: Primary | ICD-10-CM

## 2020-03-28 DIAGNOSIS — I48.91 ATRIAL FIBRILLATION (H): ICD-10-CM

## 2020-03-28 DIAGNOSIS — Z79.01 LONG TERM CURRENT USE OF ANTICOAGULANT THERAPY: Primary | ICD-10-CM

## 2020-03-28 DIAGNOSIS — Z79.01 LONG TERM CURRENT USE OF ANTICOAGULANT THERAPY: ICD-10-CM

## 2020-03-28 LAB
CAPILLARY BLOOD COLLECTION: NORMAL
INR PPP: 3.8 (ref 0.86–1.14)

## 2020-03-28 PROCEDURE — 99207 ZZC NO CHARGE NURSE ONLY: CPT | Performed by: FAMILY MEDICINE

## 2020-03-28 PROCEDURE — 85610 PROTHROMBIN TIME: CPT | Performed by: FAMILY MEDICINE

## 2020-03-28 PROCEDURE — 36416 COLLJ CAPILLARY BLOOD SPEC: CPT | Performed by: FAMILY MEDICINE

## 2020-03-30 NOTE — PROGRESS NOTES
ANTICOAGULATION FOLLOW-UP CLINIC VISIT    Patient Name:  Derek Stover  Date:  3/30/2020  Contact Type:  Telephone    SUBJECTIVE:  Patient Findings     Positives:   Change in medications (Pt has been taking some OTC allergy medication)             OBJECTIVE    INR   Date Value Ref Range Status   03/28/2020 3.80 (H) 0.86 - 1.14 Final     Comment:     Test performed on [employer]  This test is intended for monitoring Coumadin therapy.  Results are not   accurate in patients with prolonged INR due to factor deficiency.         ASSESSMENT / PLAN  INR assessment SUPRA    Recheck INR In: 6 WEEKS    INR Location Outside lab      Anticoagulation Summary  As of 3/28/2020    INR goal:   2.5-3.5   TTR:   --   INR used for dosing:   3.80! (3/28/2020)   Warfarin maintenance plan:   2.5 mg (5 mg x 0.5) every Mon, Wed; 5 mg (5 mg x 1) all other days   Full warfarin instructions:   3/31: 2.5 mg; Otherwise 2.5 mg every Mon, Wed; 5 mg all other days   Weekly warfarin total:   30 mg   Plan last modified:   Perla Walden RN (10/5/2018)   Next INR check:   5/18/2020   Priority:   Maintenance   Target end date:       Indications    Atrial fibrillation (H) [I48.91]  Atrial fibrillation (H) [I48.91] (Resolved) [I48.91]  Long term current use of anticoagulant therapy [Z79.01]             Anticoagulation Episode Summary     INR check location:       Preferred lab:       Send INR reminders to:   [facility] [place]    Comments:   5 mg, PM dose, printout      Anticoagulation Care Providers     Provider Role Specialty Phone number    Carlos Archibald DO [facility] Internal Medicine [phone]            See the Encounter Report to view Anticoagulation Flowsheet and Dosing Calendar (Go to Encounters tab in chart review, and find the Anticoagulation Therapy Visit)    Dosage adjustment made based on physician directed care plan.    Slim Preston RN

## 2020-05-15 ENCOUNTER — ANTICOAGULATION THERAPY VISIT (OUTPATIENT)
Dept: ANTICOAGULATION | Facility: OTHER | Age: 60
End: 2020-05-15

## 2020-05-15 DIAGNOSIS — Z79.01 LONG TERM CURRENT USE OF ANTICOAGULANT THERAPY: ICD-10-CM

## 2020-05-15 DIAGNOSIS — I48.20 CHRONIC ATRIAL FIBRILLATION (H): ICD-10-CM

## 2020-05-15 DIAGNOSIS — I48.91 ATRIAL FIBRILLATION (H): ICD-10-CM

## 2020-05-15 LAB
CAPILLARY BLOOD COLLECTION: NORMAL
INR BLD: 3.7 (ref 0.86–1.14)

## 2020-05-15 PROCEDURE — 99207 ZZC NO CHARGE NURSE ONLY: CPT | Performed by: FAMILY MEDICINE

## 2020-05-15 PROCEDURE — 36416 COLLJ CAPILLARY BLOOD SPEC: CPT | Performed by: FAMILY MEDICINE

## 2020-05-15 PROCEDURE — 85610 PROTHROMBIN TIME: CPT | Mod: QW | Performed by: FAMILY MEDICINE

## 2020-05-15 NOTE — PROGRESS NOTES
ANTICOAGULATION FOLLOW-UP CLINIC VISIT    Patient Name:  Derek Stover  Date:  5/15/2020  Contact Type:  Telephone    SUBJECTIVE:  Patient Findings     Positives:   Change in medications (Pt is still taking some allergy medication - maintenance dose slighltly lowered)             OBJECTIVE    Recent labs: (last 7 days)     05/15/20  1220   INR 3.7*       ASSESSMENT / PLAN  INR assessment SUPRA    Recheck INR In: 4 WEEKS    INR Location Outside lab      Anticoagulation Summary  As of 5/15/2020    INR goal:   2.5-3.5   TTR:   51.7 % (1 y)   INR used for dosing:   3.7! (5/15/2020)   Warfarin maintenance plan:   2.5 mg (5 mg x 0.5) every Mon, Wed, Fri; 5 mg (5 mg x 1) all other days   Full warfarin instructions:   2.5 mg every Mon, Wed, Fri; 5 mg all other days   Weekly warfarin total:   27.5 mg   Plan last modified:   Slim Preston RN (5/15/2020)   Next INR check:   6/12/2020   Priority:   Maintenance   Target end date:       Indications    Atrial fibrillation (H) [I48.91]  Atrial fibrillation (H) [I48.91] (Resolved) [I48.91]  Long term current use of anticoagulant therapy [Z79.01]             Anticoagulation Episode Summary     INR check location:       Preferred lab:       Send INR reminders to:   [facility] [place]    Comments:   5 mg, PM dose, printout      Anticoagulation Care Providers     Provider Role Specialty Phone number    Tavo Archibaldifford DO [facility] Internal Medicine [phone]            See the Encounter Report to view Anticoagulation Flowsheet and Dosing Calendar (Go to Encounters tab in chart review, and find the Anticoagulation Therapy Visit)    Dosage adjustment made based on physician directed care plan.    Slim Preston RN

## 2020-06-13 ENCOUNTER — ANTICOAGULATION THERAPY VISIT (OUTPATIENT)
Dept: ANTICOAGULATION | Facility: OTHER | Age: 60
End: 2020-06-13

## 2020-06-13 DIAGNOSIS — Z79.01 LONG TERM CURRENT USE OF ANTICOAGULANT THERAPY: ICD-10-CM

## 2020-06-13 DIAGNOSIS — I48.91 ATRIAL FIBRILLATION (H): ICD-10-CM

## 2020-06-13 DIAGNOSIS — I48.20 CHRONIC ATRIAL FIBRILLATION (H): ICD-10-CM

## 2020-06-13 LAB
CAPILLARY BLOOD COLLECTION: NORMAL
INR BLD: 3.7 (ref 0.86–1.14)

## 2020-06-13 PROCEDURE — 36416 COLLJ CAPILLARY BLOOD SPEC: CPT | Performed by: FAMILY MEDICINE

## 2020-06-13 PROCEDURE — 85610 PROTHROMBIN TIME: CPT | Mod: QW | Performed by: FAMILY MEDICINE

## 2020-06-13 PROCEDURE — 99207 ZZC NO CHARGE NURSE ONLY: CPT | Performed by: FAMILY MEDICINE

## 2020-06-15 ENCOUNTER — TELEPHONE (OUTPATIENT)
Dept: ANTICOAGULATION | Facility: OTHER | Age: 60
End: 2020-06-15

## 2020-06-15 DIAGNOSIS — Z95.2 MECHANICAL HEART VALVE PRESENT: ICD-10-CM

## 2020-06-15 DIAGNOSIS — I48.11 LONGSTANDING PERSISTENT ATRIAL FIBRILLATION (H): Primary | ICD-10-CM

## 2020-06-15 DIAGNOSIS — Z79.01 LONG TERM CURRENT USE OF ANTICOAGULANT THERAPY: ICD-10-CM

## 2020-06-15 NOTE — PROGRESS NOTES
ANTICOAGULATION FOLLOW-UP CLINIC VISIT    Patient Name:  Derek Stover  Date:  6/15/2020  Contact Type:  Telephone    SUBJECTIVE:  Patient Findings     Comments:   unable to identify a reason for supratherapeutic INR.          Clinical Outcomes     Comments:   unable to identify a reason for supratherapeutic INR.             OBJECTIVE    Recent labs: (last 7 days)     06/13/20  0908   INR 3.7*       ASSESSMENT / PLAN  INR assessment SUPRA    Recheck INR In: 2 WEEKS    INR Location Outside lab      Anticoagulation Summary  As of 6/13/2020    INR goal:   2.5-3.5   TTR:   --   INR used for dosing:   3.7! (6/13/2020)   Warfarin maintenance plan:   5 mg (5 mg x 1) every Tue, Thu, Sat; 2.5 mg (5 mg x 0.5) all other days   Full warfarin instructions:   5 mg every Tue, Thu, Sat; 2.5 mg all other days   Weekly warfarin total:   25 mg   Plan last modified:   Danni Patton RN (6/15/2020)   Next INR check:   6/27/2020   Priority:   Maintenance   Target end date:       Indications    Atrial fibrillation (H) [I48.91]  Atrial fibrillation (H) [I48.91] (Resolved) [I48.91]  Long term current use of anticoagulant therapy [Z79.01]             Anticoagulation Episode Summary     INR check location:       Preferred lab:       Send INR reminders to:   [facility] [place]    Comments:   5 mg, PM dose, printout      Anticoagulation Care Providers     Provider Role Specialty Phone number    Carlos Archibald DO [facility] Internal Medicine [phone]            See the Encounter Report to view Anticoagulation Flowsheet and Dosing Calendar (Go to Encounters tab in chart review, and find the Anticoagulation Therapy Visit)    Dosage adjustment made based on physician directed care plan.    Danni Patton RN

## 2020-06-15 NOTE — TELEPHONE ENCOUNTER
Please review and renew this patients INR referral, orders pending. Thank you!      Danni Patton, RN- Coumadin Clinic RN

## 2020-06-27 ENCOUNTER — ANTICOAGULATION THERAPY VISIT (OUTPATIENT)
Dept: ANTICOAGULATION | Facility: OTHER | Age: 60
End: 2020-06-27

## 2020-06-27 DIAGNOSIS — Z95.2 MECHANICAL HEART VALVE PRESENT: ICD-10-CM

## 2020-06-27 DIAGNOSIS — I48.20 CHRONIC ATRIAL FIBRILLATION (H): ICD-10-CM

## 2020-06-27 DIAGNOSIS — I48.11 LONGSTANDING PERSISTENT ATRIAL FIBRILLATION (H): ICD-10-CM

## 2020-06-27 DIAGNOSIS — Z79.01 LONG TERM CURRENT USE OF ANTICOAGULANT THERAPY: ICD-10-CM

## 2020-06-27 LAB — INR BLD: 3.3 (ref 0.86–1.14)

## 2020-06-27 PROCEDURE — 85610 PROTHROMBIN TIME: CPT | Mod: QW | Performed by: FAMILY MEDICINE

## 2020-06-27 PROCEDURE — 36416 COLLJ CAPILLARY BLOOD SPEC: CPT | Performed by: FAMILY MEDICINE

## 2020-06-27 PROCEDURE — 99207 ZZC NO CHARGE NURSE ONLY: CPT | Performed by: FAMILY MEDICINE

## 2020-06-29 NOTE — PROGRESS NOTES
ANTICOAGULATION FOLLOW-UP CLINIC VISIT    Patient Name:  Derek Stover  Date:  6/29/2020  Contact Type:  Telephone    SUBJECTIVE:  Patient Findings     Comments:   The patient was assessed for diet, medication, and activity level changes, missed or extra doses, bruising or bleeding, with no problem findings.          Clinical Outcomes     Negatives:   Major bleeding event, Thromboembolic event, Anticoagulation-related hospital admission, Anticoagulation-related ED visit, Anticoagulation-related fatality    Comments:   The patient was assessed for diet, medication, and activity level changes, missed or extra doses, bruising or bleeding, with no problem findings.             OBJECTIVE    Recent labs: (last 7 days)     06/27/20  0927   INR 3.3*       ASSESSMENT / PLAN  INR assessment THER    Recheck INR In: 4 WEEKS    INR Location Outside lab      Anticoagulation Summary  As of 6/27/2020    INR goal:   2.5-3.5   TTR:   --   Prior goal:   2.5-3.5   INR used for dosing:   3.3 (6/27/2020)   Warfarin maintenance plan:   5 mg (5 mg x 1) every Tue, Thu, Sat; 2.5 mg (5 mg x 0.5) all other days   Full warfarin instructions:   5 mg every Tue, Thu, Sat; 2.5 mg all other days   Weekly warfarin total:   25 mg   No change documented:   Slim Preston, RN   Plan last modified:   Danni Patton, RN (6/15/2020)   Next INR check:   7/25/2020   Priority:   Maintenance   Target end date:   Indefinite    Indications    Atrial fibrillation (H) [I48.91]  Atrial fibrillation (H) [I48.91] (Resolved) [I48.91]  Long term current use of anticoagulant therapy [Z79.01]  Mechanical heart valve present [Z95.2]  Longstanding persistent atrial fibrillation (H) [I48.11]             Anticoagulation Episode Summary     INR check location:       Preferred lab:       Send INR reminders to:   [facility] [place]    Comments:   5 mg, PM dose, printout      Anticoagulation Care Providers     Provider Role Specialty Phone number    Osbaldo Renee MD  Referring [facility] [phone]    Carlos Archibald DO [facility] Internal Medicine [phone]            See the Encounter Report to view Anticoagulation Flowsheet and Dosing Calendar (Go to Encounters tab in chart review, and find the Anticoagulation Therapy Visit)    Dosage adjustment made based on physician directed care plan.    Slim Preston RN

## 2020-07-25 ENCOUNTER — ANTICOAGULATION THERAPY VISIT (OUTPATIENT)
Dept: ANTICOAGULATION | Facility: OTHER | Age: 60
End: 2020-07-25

## 2020-07-25 DIAGNOSIS — Z79.01 LONG TERM CURRENT USE OF ANTICOAGULANT THERAPY: ICD-10-CM

## 2020-07-25 DIAGNOSIS — I48.11 LONGSTANDING PERSISTENT ATRIAL FIBRILLATION (H): ICD-10-CM

## 2020-07-25 DIAGNOSIS — I48.20 CHRONIC ATRIAL FIBRILLATION (H): ICD-10-CM

## 2020-07-25 DIAGNOSIS — Z95.2 MECHANICAL HEART VALVE PRESENT: ICD-10-CM

## 2020-07-25 LAB
CAPILLARY BLOOD COLLECTION: NORMAL
INR BLD: 3.4 (ref 0.86–1.14)

## 2020-07-25 PROCEDURE — 99207 ZZC NO CHARGE NURSE ONLY: CPT | Performed by: FAMILY MEDICINE

## 2020-07-25 PROCEDURE — 85610 PROTHROMBIN TIME: CPT | Mod: QW | Performed by: FAMILY MEDICINE

## 2020-07-25 PROCEDURE — 36416 COLLJ CAPILLARY BLOOD SPEC: CPT | Performed by: FAMILY MEDICINE

## 2020-07-27 NOTE — PROGRESS NOTES
ANTICOAGULATION FOLLOW-UP CLINIC VISIT    Patient Name:  Derek Stover  Date:  7/27/2020  Contact Type:  Telephone    SUBJECTIVE:  Patient Findings     Comments:   The patient was assessed for diet, medication, and activity level changes, missed or extra doses, bruising or bleeding, with no problem findings.          Clinical Outcomes     Comments:   The patient was assessed for diet, medication, and activity level changes, missed or extra doses, bruising or bleeding, with no problem findings.             OBJECTIVE    Recent labs: (last 7 days)     07/25/20  0937   INR 3.4*       ASSESSMENT / PLAN  INR assessment THER    Recheck INR In: 6 WEEKS    INR Location Outside lab      Anticoagulation Summary  As of 7/25/2020    INR goal:   2.5-3.5   TTR:   --   INR used for dosing:   3.4 (7/25/2020)   Warfarin maintenance plan:   5 mg (5 mg x 1) every Tue, Thu, Sat; 2.5 mg (5 mg x 0.5) all other days   Full warfarin instructions:   5 mg every Tue, Thu, Sat; 2.5 mg all other days   Weekly warfarin total:   25 mg   No change documented:   Danni Patton, RN   Plan last modified:   Danni Patton, RN (6/15/2020)   Next INR check:   9/5/2020   Priority:   Maintenance   Target end date:   Indefinite    Indications    Atrial fibrillation (H) [I48.91]  Atrial fibrillation (H) [I48.91] (Resolved) [I48.91]  Long term current use of anticoagulant therapy [Z79.01]  Mechanical heart valve present [Z95.2]  Longstanding persistent atrial fibrillation (H) [I48.11]             Anticoagulation Episode Summary     INR check location:       Preferred lab:       Send INR reminders to:   [facility] [place]    Comments:   5 mg, PM dose, printout      Anticoagulation Care Providers     Provider Role Specialty Phone number    Osbaldo Renee MD Referring Family Practice [phone]    Carlos Archibald DO [facility] Internal Medicine [phone]            See the Encounter Report to view Anticoagulation Flowsheet and Dosing  Calendar (Go to Encounters tab in chart review, and find the Anticoagulation Therapy Visit)    Dosage adjustment made based on physician directed care plan.    Danni Patton RN

## 2020-08-24 ENCOUNTER — HOSPITAL ENCOUNTER (EMERGENCY)
Facility: CLINIC | Age: 60
Discharge: ANOTHER HOSPITAL | End: 2020-08-25
Attending: FAMILY MEDICINE | Admitting: FAMILY MEDICINE
Payer: COMMERCIAL

## 2020-08-24 DIAGNOSIS — Z79.01 CHRONIC ANTICOAGULATION: ICD-10-CM

## 2020-08-24 DIAGNOSIS — Z72.0 TOBACCO ABUSE: ICD-10-CM

## 2020-08-24 DIAGNOSIS — I21.4 NSTEMI (NON-ST ELEVATED MYOCARDIAL INFARCTION) (H): ICD-10-CM

## 2020-08-24 DIAGNOSIS — R07.9 CHEST PAIN, UNSPECIFIED TYPE: ICD-10-CM

## 2020-08-24 DIAGNOSIS — I48.92 CHRONIC ATRIAL FLUTTER (H): ICD-10-CM

## 2020-08-24 PROCEDURE — 93005 ELECTROCARDIOGRAM TRACING: CPT | Performed by: FAMILY MEDICINE

## 2020-08-24 PROCEDURE — C9803 HOPD COVID-19 SPEC COLLECT: HCPCS | Performed by: FAMILY MEDICINE

## 2020-08-24 PROCEDURE — 93005 ELECTROCARDIOGRAM TRACING: CPT | Mod: 76 | Performed by: FAMILY MEDICINE

## 2020-08-24 PROCEDURE — 93010 ELECTROCARDIOGRAM REPORT: CPT | Performed by: FAMILY MEDICINE

## 2020-08-24 PROCEDURE — 99285 EMERGENCY DEPT VISIT HI MDM: CPT | Mod: 25 | Performed by: FAMILY MEDICINE

## 2020-08-24 PROCEDURE — 99285 EMERGENCY DEPT VISIT HI MDM: CPT | Mod: Z6 | Performed by: FAMILY MEDICINE

## 2020-08-25 ENCOUNTER — HOSPITAL ENCOUNTER (INPATIENT)
Facility: CLINIC | Age: 60
LOS: 2 days | Discharge: HOME | DRG: 247 | End: 2020-08-27
Attending: STUDENT IN AN ORGANIZED HEALTH CARE EDUCATION/TRAINING PROGRAM | Admitting: HOSPITALIST
Payer: COMMERCIAL

## 2020-08-25 ENCOUNTER — APPOINTMENT (OUTPATIENT)
Dept: CARDIOLOGY | Facility: CLINIC | Age: 60
DRG: 247 | End: 2020-08-25
Attending: STUDENT IN AN ORGANIZED HEALTH CARE EDUCATION/TRAINING PROGRAM
Payer: COMMERCIAL

## 2020-08-25 ENCOUNTER — APPOINTMENT (OUTPATIENT)
Dept: GENERAL RADIOLOGY | Facility: CLINIC | Age: 60
End: 2020-08-25
Attending: FAMILY MEDICINE
Payer: COMMERCIAL

## 2020-08-25 VITALS
HEART RATE: 60 BPM | OXYGEN SATURATION: 95 % | DIASTOLIC BLOOD PRESSURE: 71 MMHG | TEMPERATURE: 98.1 F | WEIGHT: 246 LBS | BODY MASS INDEX: 34.8 KG/M2 | RESPIRATION RATE: 16 BRPM | SYSTOLIC BLOOD PRESSURE: 107 MMHG

## 2020-08-25 DIAGNOSIS — F17.200 SMOKER: ICD-10-CM

## 2020-08-25 DIAGNOSIS — I48.11 LONGSTANDING PERSISTENT ATRIAL FIBRILLATION (H): ICD-10-CM

## 2020-08-25 DIAGNOSIS — Z95.2 MECHANICAL HEART VALVE PRESENT: ICD-10-CM

## 2020-08-25 DIAGNOSIS — Z95.2 S/P MVR (MITRAL VALVE REPLACEMENT): ICD-10-CM

## 2020-08-25 DIAGNOSIS — I21.4 NSTEMI (NON-ST ELEVATED MYOCARDIAL INFARCTION) (H): Primary | ICD-10-CM

## 2020-08-25 LAB
ALBUMIN SERPL-MCNC: 3.8 G/DL (ref 3.4–5)
ALP SERPL-CCNC: 77 U/L (ref 40–150)
ALT SERPL W P-5'-P-CCNC: 21 U/L (ref 0–70)
ANION GAP SERPL CALCULATED.3IONS-SCNC: 4 MMOL/L (ref 3–14)
AST SERPL W P-5'-P-CCNC: 18 U/L (ref 0–45)
BASOPHILS # BLD AUTO: 0 10E9/L (ref 0–0.2)
BASOPHILS NFR BLD AUTO: 0 %
BILIRUB SERPL-MCNC: 0.5 MG/DL (ref 0.2–1.3)
BUN SERPL-MCNC: 14 MG/DL (ref 7–30)
CALCIUM SERPL-MCNC: 8.9 MG/DL (ref 8.5–10.1)
CHLORIDE SERPL-SCNC: 109 MMOL/L (ref 94–109)
CO2 SERPL-SCNC: 28 MMOL/L (ref 20–32)
CREAT SERPL-MCNC: 0.94 MG/DL (ref 0.66–1.25)
DIFFERENTIAL METHOD BLD: NORMAL
EOSINOPHIL # BLD AUTO: 0.1 10E9/L (ref 0–0.7)
EOSINOPHIL NFR BLD AUTO: 1 %
ERYTHROCYTE [DISTWIDTH] IN BLOOD BY AUTOMATED COUNT: 15 % (ref 10–15)
GFR SERPL CREATININE-BSD FRML MDRD: 89 ML/MIN/{1.73_M2}
GLUCOSE SERPL-MCNC: 95 MG/DL (ref 70–99)
HCT VFR BLD AUTO: 47.6 % (ref 40–53)
HGB BLD-MCNC: 15.8 G/DL (ref 13.3–17.7)
INR PPP: 2.4 (ref 0.86–1.14)
LABORATORY COMMENT REPORT: NORMAL
LMWH PPP CHRO-ACNC: 0.13 IU/ML
LMWH PPP CHRO-ACNC: 0.19 IU/ML
LYMPHOCYTES # BLD AUTO: 3.5 10E9/L (ref 0.8–5.3)
LYMPHOCYTES NFR BLD AUTO: 42 %
MCH RBC QN AUTO: 31.6 PG (ref 26.5–33)
MCHC RBC AUTO-ENTMCNC: 33.2 G/DL (ref 31.5–36.5)
MCV RBC AUTO: 95 FL (ref 78–100)
MONOCYTES # BLD AUTO: 0.7 10E9/L (ref 0–1.3)
MONOCYTES NFR BLD AUTO: 9 %
NEUTROPHILS # BLD AUTO: 4 10E9/L (ref 1.6–8.3)
NEUTROPHILS NFR BLD AUTO: 48 %
PLATELET # BLD AUTO: 155 10E9/L (ref 150–450)
PLATELET # BLD EST: NORMAL 10*3/UL
POTASSIUM SERPL-SCNC: 3.7 MMOL/L (ref 3.4–5.3)
PROT SERPL-MCNC: 7 G/DL (ref 6.8–8.8)
RBC # BLD AUTO: 5 10E12/L (ref 4.4–5.9)
RBC MORPH BLD: NORMAL
SARS-COV-2 RNA SPEC QL NAA+PROBE: NEGATIVE
SARS-COV-2 RNA SPEC QL NAA+PROBE: NORMAL
SODIUM SERPL-SCNC: 141 MMOL/L (ref 133–144)
SPECIMEN SOURCE: NORMAL
SPECIMEN SOURCE: NORMAL
TROPONIN I SERPL-MCNC: 0.07 UG/L (ref 0–0.04)
TROPONIN I SERPL-MCNC: 0.14 UG/L (ref 0–0.04)
TROPONIN I SERPL-MCNC: 0.16 UG/L (ref 0–0.04)
TROPONIN I SERPL-MCNC: 0.23 UG/L (ref 0–0.04)
TROPONIN I SERPL-MCNC: <0.015 UG/L (ref 0–0.04)
WBC # BLD AUTO: 8.3 10E9/L (ref 4–11)

## 2020-08-25 PROCEDURE — 25000132 ZZH RX MED GY IP 250 OP 250 PS 637: Performed by: INTERNAL MEDICINE

## 2020-08-25 PROCEDURE — 85025 COMPLETE CBC W/AUTO DIFF WBC: CPT | Performed by: FAMILY MEDICINE

## 2020-08-25 PROCEDURE — 85520 HEPARIN ASSAY: CPT | Performed by: STUDENT IN AN ORGANIZED HEALTH CARE EDUCATION/TRAINING PROGRAM

## 2020-08-25 PROCEDURE — U0003 INFECTIOUS AGENT DETECTION BY NUCLEIC ACID (DNA OR RNA); SEVERE ACUTE RESPIRATORY SYNDROME CORONAVIRUS 2 (SARS-COV-2) (CORONAVIRUS DISEASE [COVID-19]), AMPLIFIED PROBE TECHNIQUE, MAKING USE OF HIGH THROUGHPUT TECHNOLOGIES AS DESCRIBED BY CMS-2020-01-R: HCPCS | Performed by: FAMILY MEDICINE

## 2020-08-25 PROCEDURE — 71046 X-RAY EXAM CHEST 2 VIEWS: CPT | Mod: TC

## 2020-08-25 PROCEDURE — 84484 ASSAY OF TROPONIN QUANT: CPT | Performed by: STUDENT IN AN ORGANIZED HEALTH CARE EDUCATION/TRAINING PROGRAM

## 2020-08-25 PROCEDURE — 85610 PROTHROMBIN TIME: CPT | Performed by: FAMILY MEDICINE

## 2020-08-25 PROCEDURE — 25000128 H RX IP 250 OP 636: Performed by: STUDENT IN AN ORGANIZED HEALTH CARE EDUCATION/TRAINING PROGRAM

## 2020-08-25 PROCEDURE — 21000001 ZZH R&B HEART CARE

## 2020-08-25 PROCEDURE — 25000132 ZZH RX MED GY IP 250 OP 250 PS 637: Performed by: FAMILY MEDICINE

## 2020-08-25 PROCEDURE — 25500064 ZZH RX 255 OP 636: Performed by: STUDENT IN AN ORGANIZED HEALTH CARE EDUCATION/TRAINING PROGRAM

## 2020-08-25 PROCEDURE — 84484 ASSAY OF TROPONIN QUANT: CPT | Performed by: FAMILY MEDICINE

## 2020-08-25 PROCEDURE — 84484 ASSAY OF TROPONIN QUANT: CPT | Mod: 91 | Performed by: FAMILY MEDICINE

## 2020-08-25 PROCEDURE — 93306 TTE W/DOPPLER COMPLETE: CPT | Mod: 26 | Performed by: INTERNAL MEDICINE

## 2020-08-25 PROCEDURE — 36415 COLL VENOUS BLD VENIPUNCTURE: CPT | Performed by: PHYSICIAN ASSISTANT

## 2020-08-25 PROCEDURE — 80053 COMPREHEN METABOLIC PANEL: CPT | Performed by: FAMILY MEDICINE

## 2020-08-25 PROCEDURE — 99223 1ST HOSP IP/OBS HIGH 75: CPT | Mod: AI | Performed by: PHYSICIAN ASSISTANT

## 2020-08-25 PROCEDURE — 93306 TTE W/DOPPLER COMPLETE: CPT

## 2020-08-25 PROCEDURE — 99223 1ST HOSP IP/OBS HIGH 75: CPT | Mod: 25 | Performed by: INTERNAL MEDICINE

## 2020-08-25 PROCEDURE — 93005 ELECTROCARDIOGRAM TRACING: CPT

## 2020-08-25 PROCEDURE — 93010 ELECTROCARDIOGRAM REPORT: CPT | Performed by: INTERNAL MEDICINE

## 2020-08-25 PROCEDURE — 85520 HEPARIN ASSAY: CPT | Performed by: PHYSICIAN ASSISTANT

## 2020-08-25 PROCEDURE — 36415 COLL VENOUS BLD VENIPUNCTURE: CPT | Performed by: STUDENT IN AN ORGANIZED HEALTH CARE EDUCATION/TRAINING PROGRAM

## 2020-08-25 RX ORDER — WARFARIN SODIUM 5 MG/1
2.5-5 TABLET ORAL EVERY EVENING
COMMUNITY
End: 2020-09-30

## 2020-08-25 RX ORDER — HEPARIN SODIUM 10000 [USP'U]/100ML
1050 INJECTION, SOLUTION INTRAVENOUS CONTINUOUS
Status: DISCONTINUED | OUTPATIENT
Start: 2020-08-25 | End: 2020-08-26

## 2020-08-25 RX ORDER — ASPIRIN 81 MG/1
162 TABLET, CHEWABLE ORAL ONCE
Status: COMPLETED | OUTPATIENT
Start: 2020-08-25 | End: 2020-08-25

## 2020-08-25 RX ORDER — ACETAMINOPHEN 325 MG/1
650 TABLET ORAL EVERY 4 HOURS PRN
Status: DISCONTINUED | OUTPATIENT
Start: 2020-08-25 | End: 2020-08-27 | Stop reason: HOSPADM

## 2020-08-25 RX ORDER — ONDANSETRON 2 MG/ML
4 INJECTION INTRAMUSCULAR; INTRAVENOUS EVERY 6 HOURS PRN
Status: DISCONTINUED | OUTPATIENT
Start: 2020-08-25 | End: 2020-08-26

## 2020-08-25 RX ORDER — ONDANSETRON 4 MG/1
4 TABLET, ORALLY DISINTEGRATING ORAL EVERY 6 HOURS PRN
Status: DISCONTINUED | OUTPATIENT
Start: 2020-08-25 | End: 2020-08-26

## 2020-08-25 RX ORDER — ATORVASTATIN CALCIUM 80 MG/1
80 TABLET, FILM COATED ORAL EVERY EVENING
Status: DISCONTINUED | OUTPATIENT
Start: 2020-08-25 | End: 2020-08-27 | Stop reason: HOSPADM

## 2020-08-25 RX ORDER — NALOXONE HYDROCHLORIDE 0.4 MG/ML
.1-.4 INJECTION, SOLUTION INTRAMUSCULAR; INTRAVENOUS; SUBCUTANEOUS
Status: DISCONTINUED | OUTPATIENT
Start: 2020-08-25 | End: 2020-08-27 | Stop reason: HOSPADM

## 2020-08-25 RX ORDER — NITROGLYCERIN 0.4 MG/1
0.4 TABLET SUBLINGUAL EVERY 5 MIN PRN
Status: DISCONTINUED | OUTPATIENT
Start: 2020-08-25 | End: 2020-08-27 | Stop reason: HOSPADM

## 2020-08-25 RX ORDER — LIDOCAINE 40 MG/G
CREAM TOPICAL
Status: DISCONTINUED | OUTPATIENT
Start: 2020-08-25 | End: 2020-08-27 | Stop reason: HOSPADM

## 2020-08-25 RX ORDER — ATORVASTATIN CALCIUM 40 MG/1
40 TABLET, FILM COATED ORAL EVERY EVENING
Status: DISCONTINUED | OUTPATIENT
Start: 2020-08-25 | End: 2020-08-25

## 2020-08-25 RX ORDER — ASPIRIN 81 MG/1
81 TABLET ORAL DAILY
Status: DISCONTINUED | OUTPATIENT
Start: 2020-08-25 | End: 2020-08-26

## 2020-08-25 RX ADMIN — HUMAN ALBUMIN MICROSPHERES AND PERFLUTREN 9 ML: 10; .22 INJECTION, SOLUTION INTRAVENOUS at 11:48

## 2020-08-25 RX ADMIN — Medication 2600 UNITS: at 20:51

## 2020-08-25 RX ADMIN — HEPARIN SODIUM 1050 UNITS/HR: 10000 INJECTION, SOLUTION INTRAVENOUS at 05:59

## 2020-08-25 RX ADMIN — ASPIRIN 81 MG 162 MG: 81 TABLET ORAL at 03:08

## 2020-08-25 RX ADMIN — ATORVASTATIN CALCIUM 80 MG: 80 TABLET, FILM COATED ORAL at 21:22

## 2020-08-25 SDOH — HEALTH STABILITY: MENTAL HEALTH: HOW OFTEN DO YOU HAVE A DRINK CONTAINING ALCOHOL?: 2-4 TIMES A MONTH

## 2020-08-25 SDOH — HEALTH STABILITY: MENTAL HEALTH: HOW MANY STANDARD DRINKS CONTAINING ALCOHOL DO YOU HAVE ON A TYPICAL DAY?: 3 OR 4

## 2020-08-25 SDOH — HEALTH STABILITY: MENTAL HEALTH: HOW OFTEN DO YOU HAVE 6 OR MORE DRINKS ON ONE OCCASION?: NEVER

## 2020-08-25 ASSESSMENT — ACTIVITIES OF DAILY LIVING (ADL)
ADLS_ACUITY_SCORE: 11
FALL_HISTORY_WITHIN_LAST_SIX_MONTHS: YES
ADLS_ACUITY_SCORE: 11
AMBULATION: 0-->INDEPENDENT
ADLS_ACUITY_SCORE: 11
BATHING: 0-->INDEPENDENT
RETIRED_EATING: 0-->INDEPENDENT
DRESS: 0-->INDEPENDENT
NUMBER_OF_TIMES_PATIENT_HAS_FALLEN_WITHIN_LAST_SIX_MONTHS: 1
TOILETING: 0-->INDEPENDENT
ADLS_ACUITY_SCORE: 11
SWALLOWING: 0-->SWALLOWS FOODS/LIQUIDS WITHOUT DIFFICULTY
COGNITION: 0 - NO COGNITION ISSUES REPORTED
TRANSFERRING: 0-->INDEPENDENT
RETIRED_COMMUNICATION: 0-->UNDERSTANDS/COMMUNICATES WITHOUT DIFFICULTY

## 2020-08-25 ASSESSMENT — MIFFLIN-ST. JEOR: SCORE: 1958.44

## 2020-08-25 NOTE — Clinical Note
Stent deployed in the posterior descending artery. Max pressure = 10 corry. Total duration = 25 seconds.

## 2020-08-25 NOTE — ED NOTES
DATE:  8/25/2020   TIME OF RECEIPT FROM LAB:  0303  LAB TEST:  Troponin  LAB VALUE:  0.156  RESULTS GIVEN WITH READ-BACK TO (PROVIDER):  Dr Tucker and primary RN  TIME LAB VALUE REPORTED TO PROVIDER:   0301

## 2020-08-25 NOTE — Clinical Note
Stent deployed in the posterior descending artery. Max pressure = 12 corry. Total duration = 14 seconds.

## 2020-08-25 NOTE — PLAN OF CARE
Pt is A&O. Denies CP and RUE N/T. Tele is A flutter w/CVR. LS diminished. Pt declined nicotine replacement when offered. Heparin infusing per orders. Spoke to pt's wife via telephone regarding POC. Pt is up independently in room. Plan is for heart cath tomorrow.

## 2020-08-25 NOTE — PLAN OF CARE
Neuro- A&OX4  Most Recent Vitals- Temp: 97.9  F (36.6  C) Temp src: Oral BP: 100/51 Pulse: 71   Resp: 16 SpO2: 94 % O2 Device: None (Room air)    Tele/Cardiac- A-flutter CVR  Resp- RA, Right lung sound's are coarse, chronic smokers cough  Activity- independent   Pain- Denies   Drips- Heparin gtt 1050units/hr 10a due at 1900  Drains/Tubes- P-IV  Skin- Intact   GI/- WDL  Aggression Color- Green  Plan- Angio tomorrow 8/26, Consent is signed.   Misc-     Nas Preciado RN

## 2020-08-25 NOTE — PHARMACY-ADMISSION MEDICATION HISTORY
Pharmacy Medication History  Admission medication history interview status for the 8/25/2020  admission is complete. See EPIC admission navigator for prior to admission medications     Medication history sources: Patient, Surescripts and Care Everywhere  Medication history source reliability: Good  Adherence assessment: Good    Significant changes made to the medication list:        Additional medication history information:   Confirmed with patient that he is no longer on Wellbutrin SR, FLonase, or Nicotine patch     Medication reconciliation completed by provider prior to medication history? Yes    Time spent in this activity: 15 min      Prior to Admission medications    Medication Sig Last Dose Taking? Auth Provider   acetaminophen (TYLENOL) 325 MG tablet Take 2 tablets (650 mg) by mouth every 4 hours as needed for other (surgical pain) 8/24/2020 at pm Yes Aminta Devlin PA-C   aspirin EC 81 MG EC tablet Take 1 tablet (81 mg) by mouth daily 8/24/2020 at pm Yes Aminta Devlin PA-C   warfarin ANTICOAGULANT (COUMADIN) 5 MG tablet Take 2.5-5 mg by mouth every evening 5mg Tu/Thu/Sa; 2.5mg ROW 8/24/2020 at pm Yes Unknown, Entered By History   Amoxicillin (AMOXIL PO) Take 1,500-3,000 mg by mouth daily as needed (patient takes 6 X 500 = 3,000 mg dose 1 hour before dental appointment and 3 X 500 = 1,500 mg dose 6 hours after dental appointment.)   Reported, Patient

## 2020-08-25 NOTE — Clinical Note
The first balloon was inserted into the left pulmonary artery.Max pressure = 14 corry. Total duration = 14 seconds.     Max pressure = 16 corry. Total duration = 20 seconds.    Balloon reinflated a second time: Max pressure = 16 corry. Total duration = 20 seconds.

## 2020-08-25 NOTE — PLAN OF CARE
A&Ox4. VSS on RA. Tele SR/ Afib CVR. Up independently. Denies CP, SOB, nausea, teeth pain. States still feels R arm numbness, no change. Hep gtt at 10.5/hr. Trending trops, see results. NPO. Plan for cards consult and Echo today. Continue to monitor.

## 2020-08-25 NOTE — CONSULTS
Consult Date:  08/25/2020      REFERRAL SOURCE:  Yonathan Smith MD      PRIMARY CARDIOLOGIST:  No Mcghee MD, [facility]      REASON FOR REFERRAL:  Non-ST elevation myocardial infarction.      HISTORY OF PRESENT ILLNESS:    Roberto Stover is a 59-year-old centrally obese  male who was transferred from [facility] in the early hours of this morning with a diagnosis of non-ST elevation myocardial infarction.  He is , lives with his wife, has adult children and works as a .      He is known to Cardiology and follows with my partner, Dr. Mcghee at [facility].  His cardiovascular history includes:   1.  Status post 32 mm [employer] [place] mechanical mitral valve replacement in 09/2017 for severe mitral valve regurgitation, performed by Dr. Ivana Marie.   2.  Preoperative coronary angiogram in 2017 did not show significant coronary artery disease.   3.  Paroxysmal atrial fibrillation.  Beta blocker previously discontinued due to low blood pressure and symptoms of lightheadedness.  He is rate controlled without any AV blocking agent.   4.  Heavy tobacco user.  He smokes 2 packs of cigarettes daily.  He has successfully quit using Chantix for 10 months in the past, but went back to smoking because he gained 35 pounds in weight.   5.  Not known to have hypertension or diabetes, not on a statin.      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  Two nights ago, he went on a hunting trip and walked extensively carrying his bow.  The following night (last night), he was woken up from sleep at about 10:45 a.m. with central chest pain radiating to his teeth and right arm, associated with diaphoresis.  Since the pain does not resolve with Tylenol, he had his wife take him to the [facility], where ECG showed rate controlled atrial fibrillation without any ischemic changes.  His initial pain episode lasted about 30 minutes and he has  been subsequently pain-free.  Not surprisingly, his initial troponin was negative and subsequently elevated at 0.156 and 0.228.  He was transferred here for heart catheterization.  He is on IV heparin.  Overnight stay unremarkable.  No recurrent pain.      The rest of his labs are satisfactory, with a creatinine of 0.94, potassium 3.7, normal liver enzymes, total cholesterol 121, HDL 48, LDL 48, triglycerides 126.      His bedside echocardiogram showed normal left ventricular size, normal systolic function, LVEF 55% to 60%, well-seated mechanical mitral valve without abnormal regurgitation and a satisfactory mean diastolic gradient.  Moderate left atrial enlargement.  Normal right ventricular size and systolic function.      FAMILY HISTORY:  He denies any cardiovascular history in his first-degree relatives.      PHYSICAL EXAMINATION:   GENERAL:  He is alert, oriented x3, appears his stated age, is centrally obese.   EYES:  No pallor, icterus, no clubbing.  Satisfactory dental hygiene.   CARDIOVASCULAR:  Midline well-healed sternotomy scar.  Apical impulse undisplaced.  Mechanical crisp closing sound of the first heart sound; normal second heart sound.  No audible murmur or pericardial friction rub.   LUNGS:  Clear to auscultation.  No pleural rub.   ABDOMEN:  Obese, soft, nontender.   EXTREMITIES:  No edema.   MUSCULOSKELETAL:  Normal.     NEUROLOGIC:  No overt neurological deficit.  Gait not assessed.   PSYCHIATRY:  Normal mood and affect.      DIAGNOSES:   1.  Non-ST elevation myocardial infarction.   2.  Status post 32 mm [employer] Servando mechanical mitral valve replacement for severe mitral valve regurgitation in 09/2017.   3.  Heavy tobacco user, 2 packs a day.   4.  Chronic rate controlled atrial fibrillation.   5.  Chronic warfarin anticoagulation.      ASSESSMENT AND PLAN:   1.  I am advising heart catheterization and intervention, as needed.  However, his INR is 2.4. Patient is pain-free, there is no wall motion  abnormality on echocardiogram, and therefore, he would be a low risk non-ST-elevation myocardial infarction.  Ideally, an INR of less than 2.0 is preferable to minimize bleeding risk.  Because he has a mechanical valve, I do not want to reverse him with vitamin K.  Informed consent obtained.  No contrast allergy.  Therefore, please keep the patient n.p.o. from midnight and hopefully his INR will trend down to 2.0 or below and we can safely perform the procedure tomorrow.     2.  His warfarin has been removed from his inpatient meds for now.   3.  I am unable to give him a beta blocker due to low resting blood pressure of 97/50, but his atrial fibrillation is rate controlled at 60-70 BPM.   4.  Continue IV heparin.   5.  Prior to starting atorvastatin.      Thank you for consulting Cardiology.  We will follow.         MEGGAN CADET MD             D: 2020   T: 2020   MT: MONSTER      Name:     HAKEEM BORJAS   MRN:      [id number]        Account:       [id number]   :      1960           Consult Date:  2020      Document: [id number]       cc: Osbaldo Renee MD

## 2020-08-25 NOTE — ED TRIAGE NOTES
Pt reports having chest pain that started an hour and a half ago while attempting to sleep, also having pain down the right arm like it is going to sleep, reports he took tylenol before coming and the chest pain is better but still having arm pain

## 2020-08-25 NOTE — CONSULTS
Inpatient Cardiology Consultation   [facility]  Date of Admission:8/25/2020  Date of Consult: 8/25/2020    Inpatient cardiology consultation note has been dictated. Dictation number   [id number]      Randall Tompkins MD [facility]  Cardiology            REVIEW OF SYSTEMS:  A comprehensive 10 point review of systems was completed and the pertinent positives are documented in history of present illness.    MEDICATIONS:  Prior to Admission Medications   Prescriptions Last Dose Informant Patient Reported? Taking?   Amoxicillin (AMOXIL PO)  Self Yes No   Sig: Take 1,500-3,000 mg by mouth daily as needed (patient takes 6 X 500 = 3,000 mg dose 1 hour before dental appointment and 3 X 500 = 1,500 mg dose 6 hours after dental appointment.)   acetaminophen (TYLENOL) 325 MG tablet 8/24/2020 at pm Self No Yes   Sig: Take 2 tablets (650 mg) by mouth every 4 hours as needed for other (surgical pain)   aspirin EC 81 MG EC tablet 8/24/2020 at pm Self No Yes   Sig: Take 1 tablet (81 mg) by mouth daily   warfarin ANTICOAGULANT (COUMADIN) 5 MG tablet 8/24/2020 at pm Self Yes Yes   Sig: Take 2.5-5 mg by mouth every evening 5mg Tu/Thu/Sa; 2.5mg ROW      Facility-Administered Medications: None       ALLERGIES:  Allergies   Allergen Reactions     Hmg-Coa-R Inhibitors      No allergy, felt horrible on this drug.     No Known Drug Allergy      Pollen Extract      Seasonal Allergies        PAST MEDICAL HISTORY:  Past Medical History:   Diagnosis Date     Chronic atrial fibrillation (H)     Valvular Afib.  Diagnosed 06/2017. Pt initiated on coumadin 7/18/17     Erectile dysfunction      Hyperlipidemia      Mitral regurgitation     rheumatic fever as a child.  Severe mitral valve regurgitation.  Underwent 32 mm St Servando [place] mechanical mitral valve replacement in September 2017.     Prediabetes      Tobacco abuse      Warfarin anticoagulation        PAST SURGICAL HISTORY:  Past Surgical History:   Procedure Laterality Date      COLONOSCOPY N/A 9/8/2014    Procedure: COMBINED COLONOSCOPY, SINGLE BIOPSY/POLYPECTOMY BY BIOPSY;  Surgeon: Kai Bailey MD;  Location:  GI     EXTRACTION(S) DENTAL       ORTHOPEDIC SURGERY      RIght knee scope     REPLACE VALVE MITRAL N/A 9/12/2017    Procedure: REPLACE VALVE MITRAL;  MITRAL VALVE REPLACEMENT  [facility] [employer] Series Mechanical Heart Valve 33mm  Ref, [id number]-[id number] Serial [id number]   ON PUMP, MITCHELL;  Surgeon: Ivana Marie MD;  Location:  OR       SOCIAL HISTORY:   Derek Stover  reports that he has been smoking pipe and cigars. He started smoking about 43 years ago. He has a 78.00 pack-year smoking history. He has never used smokeless tobacco. He reports current alcohol use of about 4.0 standard drinks of alcohol per week. He reports that he does not use drugs.    FAMILY HISTORY:  Family History   Problem Relation Age of Onset     Diabetes Mother      Obesity Mother      Diabetes Father      Hyperlipidemia Father        PHYSICAL EXAMINATION:  Temp: 97.8  F (36.6  C) Temp src: Oral BP: 97/51     Resp: 16 SpO2: 94 % O2 Device: None (Room air)    No intake/output data recorded.  Vitals:    08/25/20 0527   Weight: 112.7 kg (248 lb 8 oz)         Randall Tompkins MD

## 2020-08-25 NOTE — PROGRESS NOTES
[facility] #[id number]    Marybeth Horowitz), PAUrsulaC  Hospitalist R[place]  Pager: [phone]

## 2020-08-25 NOTE — PROVIDER NOTIFICATION
MD Notification    Notified Person: MD    Notified Person Name: Dr. Smith    Notification Date/Time: 8/25/2020 @ 0635    Notification Interaction: page/phone    Purpose of Notification: critical troponin 0.228    Orders Received: no new orders per MD    Comments:

## 2020-08-25 NOTE — UTILIZATION REVIEW
Admission Status; Secondary Review Determination     Under the authority of the Utilization Management Commitee, the utilization review process indicated a secondary review on the above patient. The review outcome is based on review of the medical records, discussions with staff, and applying clinical experience noted on the date of the review.     (x) Inpatient Status Appropriate - This patient's medical care is consistent with medical management for inpatient care and reasonable inpatient medical practice.     RATIONALE FOR DETERMINATION:  59-year-old centrally obese  male with a hx of mechanical mitral valve for which he takes warfarin who was transferred from [facility] with a diagnosis of non-ST elevation myocardial infarction.  The patient had a troponin elevation and was seen by cardiology.  He is being treated with a heparin drip currently.  Course complicated by elevated INR in the setting of warfarin use for mechanical valve.  Cardiology does not want to reverse the patient's warfarin with vitamin K and is letting it drift down to less than 2 to help ensure that his INR will be stable when warfarin resumed after cardiac catheterization, which is being planned for tomorrow.  Given the complexity of care in this high risk patient with mechanical mitral valve on warfarin and need for IV anticoagulation in the setting of NSTEMI inpatient status appears appropriate.      At the time of admission with the information available to the attending physician more than 2 nights Hospital complex care was anticipated, based on patient risk of adverse outcome if treated as outpatient and complex care required. Inpatient admission is appropriate based on the Medicare guidelines.    The information on this document is developed by the utilization review team in order for the business office to ensure compliance. This only denotes the appropriateness of proper admission status and does not reflect the  quality of care rendered.   The definitions of Inpatient Status and Observation Status used in making the determination above are those provided in the CMS Coverage Manual, Chapter 1 and Chapter 6, section 70.4.     Sincerely,     Cordell Lee MD  Utilization Review   Physician Advisor   [facility]

## 2020-08-25 NOTE — ED PROVIDER NOTES
[facility] ED Provider Note   Patient: Derek Stover  MRN #:  [id number]  Date of Visit: August 25, 2020    CC:     Chief Complaint   Patient presents with     Chest Pain     Arm Pain     HPI:  Derek Stover is a 59 year old male who presented to the emergency department with acute onset of midsternal chest pain described as burning with radiation of pain into the teeth and numbness into the right arm.  Patient was laying in bed at around 10:45 PM when he started to develop the burning sensation in the chest.  He then felt some numbness in the right arm, and then pain into the teeth.  This lasted about an hour and a half and his pain started to improve after he took 2 Tylenol tablets at 11:15 PM.  Patient had slight diaphoresis but no shortness of breath or nausea.  Patient has a history of chronic atrial fibrillation/flutter and has had a mitral valve replacement in 2017.  He had an echocardiogram earlier this year that revealed good functioning of his mitral valve.  He had a stress test prior to his mitral valve replacement and reports that it took him 20 minutes to recover his breathing from that test.  He is usually active at work, but had the day off today.  He has not had any exertional chest pain or dyspnea.  He has occasional swelling of the legs at the end of the day.  He is on chronic Coumadin therapy with his last INR level checked a month ago.  Patient is a heavy smoker of 2 packs/day.  He denies a personal history of diabetes mellitus. hypertension or hyperlipidemia.  Patient denies fever, chills, cough or recent illness.    Problem List:  Patient Active Problem List    Diagnosis Date Noted     Mechanical heart valve present 06/15/2020     Priority: Medium     Longstanding persistent atrial fibrillation (H) 06/15/2020     Priority: Medium     S/P MVR (mitral valve replacement) 09/21/2017     Priority: Medium     Fluid overload  09/21/2017     Priority: Medium     Transient hyperglycemia post procedure 09/21/2017     Priority: Medium     Anticoagulated on Coumadin 09/21/2017     Priority: Medium     Severe mitral regurgitation 09/12/2017     Priority: Medium     Mitral regurgitation      Priority: Medium     rheumatic fever as a child. 3-4+ per MITCHELL 7/25/17       Atrial fibrillation (H)      Priority: Medium     Valvular Afib.  Diagnosed 06/2017. Pt initiated on coumadin 7/18/17       Long term current use of anticoagulant therapy 07/21/2017     Priority: Medium     Morbid obesity (H) 05/31/2017     Priority: Medium     Hyperlipidemia with target LDL less than 130 08/13/2014     Priority: Medium     Tinea versicolor 01/06/2012     Priority: Medium     Erectile dysfunction 01/06/2012     Priority: Medium     Smoker 01/06/2011     Priority: Medium       Past Medical History:   Diagnosis Date     Atrial fibrillation (H)      Erectile dysfunction      Heart murmur      Hyperlipidemia      Mitral regurgitation      Tobacco abuse        MEDS: acetaminophen (TYLENOL) 325 MG tablet  Amoxicillin (AMOXIL PO)  aspirin EC 81 MG EC tablet  warfarin (COUMADIN) 5 MG tablet  warfarin (COUMADIN) 5 MG tablet        ALLERGIES:    Allergies   Allergen Reactions     No Known Drug Allergy        Past Surgical History:   Procedure Laterality Date     COLONOSCOPY N/A 9/8/2014    Procedure: COMBINED COLONOSCOPY, SINGLE BIOPSY/POLYPECTOMY BY BIOPSY;  Surgeon: Kai Bailey MD;  Location:  GI     ORTHOPEDIC SURGERY      RIght knee scope     REPLACE VALVE MITRAL N/A 9/12/2017    Procedure: REPLACE VALVE MITRAL;  MITRAL VALVE REPLACEMENT  [facility] Series Mechanical Heart Valve 33mm  Ref, 33MJ-501 Serial [id number]   ON PUMP, MITCHELL;  Surgeon: Ivana Marie MD;  Location:  OR       Social History     Tobacco Use     Smoking status: Current Every Day Smoker     Years: 39.00     Types: Pipe, Cigars     Smokeless tobacco: Never Used   Substance Use Topics      Alcohol use: Yes     Alcohol/week: 4.0 standard drinks     Types: 4 Standard drinks or equivalent per week     Comment: occasional     Drug use: No         Review of Systems   Except as noted in HPI, all other systems were reviewed and are negative    Physical Exam     Vitals were reviewed  Patient Vitals for the past 12 hrs:   BP Temp Temp src Pulse Resp SpO2 Weight   08/25/20 0300 103/65 -- -- 59 16 96 % --   08/25/20 0245 112/67 -- -- 62 18 97 % --   08/25/20 0220 113/66 -- -- 61 16 96 % --   08/25/20 0200 100/68 -- -- 61 16 96 % --   08/25/20 0140 111/66 -- -- 63 18 92 % --   08/25/20 0120 103/62 -- -- 61 15 97 % --   08/25/20 0100 106/64 -- -- 59 16 95 % --   08/25/20 0040 105/61 -- -- 65 13 96 % --   08/25/20 0030 116/74 -- -- 61 16 97 % --   08/25/20 0000 128/76 -- -- 80 16 99 % --   08/24/20 2358 137/73 98.3  F (36.8  C) Oral 80 16 99 % 111.6 kg (246 lb)     GENERAL APPEARANCE: Alert and oriented x3, no respiratory distress  FACE: normal facies  EYES: Pupils are equal  HENT: normal external exam  NECK: no adenopathy or asymmetry  RESP: normal respiratory effort; clear breath sounds bilaterally  CHEST: No reproducible chest wall tenderness  CV: Regular rhythm, grade 4/6 mechanical heart valve murmur  ABD: soft, no tenderness; no rebound or guarding; bowel sounds are normal  MS: no gross deformities noted; normal muscle tone.  EXT: No calf tenderness; trace ankle edema  SKIN: no worrisome rash  NEURO: no facial droop; no focal deficits, speech is normal  PSYCH: normal mood and affect      Available Lab/Imaging Results     Results for orders placed or performed during the hospital encounter of 08/24/20 (from the past 24 hour(s))   CBC with platelets differential   Result Value Ref Range    WBC 8.3 4.0 - 11.0 10e9/L    RBC Count 5.00 4.4 - 5.9 10e12/L    Hemoglobin 15.8 13.3 - 17.7 g/dL    Hematocrit 47.6 40.0 - 53.0 %    MCV 95 78 - 100 fl    MCH 31.6 26.5 - 33.0 pg    MCHC 33.2 31.5 - 36.5 g/dL    RDW 15.0  10.0 - 15.0 %    Platelet Count 155 150 - 450 10e9/L    Diff Method Manual Differential     % Neutrophils 48.0 %    % Lymphocytes 42.0 %    % Monocytes 9.0 %    % Eosinophils 1.0 %    % Basophils 0.0 %    Absolute Neutrophil 4.0 1.6 - 8.3 10e9/L    Absolute Lymphocytes 3.5 0.8 - 5.3 10e9/L    Absolute Monocytes 0.7 0.0 - 1.3 10e9/L    Absolute Eosinophils 0.1 0.0 - 0.7 10e9/L    Absolute Basophils 0.0 0.0 - 0.2 10e9/L    RBC Morphology Consistent with reported results     Platelet Estimate       Automated count confirmed.  Platelet morphology is normal.   Comprehensive metabolic panel   Result Value Ref Range    Sodium 141 133 - 144 mmol/L    Potassium 3.7 3.4 - 5.3 mmol/L    Chloride 109 94 - 109 mmol/L    Carbon Dioxide 28 20 - 32 mmol/L    Anion Gap 4 3 - 14 mmol/L    Glucose 95 70 - 99 mg/dL    Urea Nitrogen 14 7 - 30 mg/dL    Creatinine 0.94 0.66 - 1.25 mg/dL    GFR Estimate 89 >60 mL/min/[1.73_m2]    GFR Estimate If Black >90 >60 mL/min/[1.73_m2]    Calcium 8.9 8.5 - 10.1 mg/dL    Bilirubin Total 0.5 0.2 - 1.3 mg/dL    Albumin 3.8 3.4 - 5.0 g/dL    Protein Total 7.0 6.8 - 8.8 g/dL    Alkaline Phosphatase 77 40 - 150 U/L    ALT 21 0 - 70 U/L    AST 18 0 - 45 U/L   Troponin I   Result Value Ref Range    Troponin I ES <0.015 0.000 - 0.045 ug/L   INR   Result Value Ref Range    INR 2.40 (H) 0.86 - 1.14   XR Chest 2 Views    Narrative    EXAM: XR CHEST 2 VW  LOCATION: [facility]  DATE/TIME: 8/25/2020 12:15 AM    INDICATION: Chest pain. Smoking history.  COMPARISON: 09/15/2017      Impression    IMPRESSION: Postoperative changes from cardiac valvular surgery. Heart size and pulmonary vessels are normal lungs are hyperinflated clear, no focal pneumonia or pleural effusion.   Troponin I (second draw)   Result Value Ref Range    Troponin I ES 0.156 (HH) 0.000 - 0.045 ug/L       EKG reviewed by me: Atrial flutter with controlled ventricular rate of 81.  Nonspecific QRS widening.  Nonspecific ST depression.   No acute ST elevation.  Impression: Nondiagnostic EKG.  Atrial flutter with controlled rate.  Nonspecific QRS widening.  No change compared with the previous EKG from November 2017.     EKG #2 reviewed by me: Atrial flutter/fibrillation with heart rate of 59.  Nonspecific QRS widening.  Normal intervals.  No acute ST-T wave changes.      Impression     Final diagnoses:   Chest pain   NSTEMI (non-ST elevated myocardial infarction) (H)   Chronic anticoagulation   Chronic atrial flutter (H)   Tobacco abuse         ED Course & Medical Decision Making   Derek Stover is a 59 year old male who presented to the emergency department With acute onset of midsternal chest pain described as burning and pressure that occurred this evening as he was laying down to go to bed.  Patient had pain radiating into the teeth, and numbness into the right arm.  Symptoms lasted about an hour and a half and eventually resolved possibly after taking a couple of Tylenol tablets.  By the time the patient arrived in the emergency department, his symptoms had resolved.  He reported feeling slightly diaphoretic at the time of his pain.  He has a history of chronic atrial flutter, and is status post mitral valve replacement in 2017.  He is on chronic anticoagulation with Coumadin with a goal INR level between 2.5 and 3.5.  Patient's cardiac risk factors include active tobacco use of 2 packs/day.  Patient was seen shortly after arrival.  Vital signs were normal with blood pressure 137/73, temp of 98.3.  EKG revealed atrial flutter with controlled ventricular rate.  There is no acute ST-T wave changes.  Initial troponin is less than 0.015 and INR levels 2.40.  Chest x-ray revealed postoperative changes from cardiac valvular surgery.  Patient did not have any recurrence of pain.  A repeat troponin was elevated at 0.156.  Patient was given 2 baby aspirins to take.  Patient understands it he may have had myocardial infarction.  Repeat EKG revealed  atrial flutter/fibrillation without signs of acute ischemia.  Patient understands that he may have had a non-ST elevation MI.  He is already anticoagulated with Coumadin with INR level of 2.4.  Patient does not have any current chest pain and blood pressure is on the low side of normal.  Patient would like to return to [facility] where he had his initial mechanical heart valve replacement.  I spoke with Dr. Yonathan Smith, hospitalist at [facility], and he is graciously accepted transfer of the patient to their coronary unit.  Patient and his wife are in agreement with this plan.  We will make transport arrangements by ambulance as soon as possible.           ED to Inpatient Handoff:    Discussed with Dr. Yonathan Smith at [facility]  Patient accepted for Inpatient Stay  Pending studies include serial troponin enzymes, cardiology consult, possible coronary catheterization  Code Status: Full Code             HEART Score  Background  Calculates the overall risk of adverse event in patient's presenting with chest pain.  Based on 5 criteria (each assigned 0-2 points) including suspiciousness of history, EKG, age, risk factors and troponin.    Data  59 year old male  has Smoker; Tinea versicolor; Erectile dysfunction; Hyperlipidemia with target LDL less than 130; Morbid obesity (H); Long term current use of anticoagulant therapy; Mitral regurgitation; Atrial fibrillation (H); Severe mitral regurgitation; S/P MVR (mitral valve replacement); Fluid overload; Transient hyperglycemia post procedure; Anticoagulated on Coumadin; Mechanical heart valve present; and Longstanding persistent atrial fibrillation (H) on their problem list.   reports that he has been smoking pipe and cigars. He has smoked for the past 39.00 years. He has never used smokeless tobacco.  family history includes Diabetes in his father and mother; Obesity in his mother.  Lab Results   Component Value Date    TROPI 0.156  08/25/2020     Criteria   0-2 points for each of 5 items (maximum of 10 points):  Score 2- History highly suspicious for coronary syndrome  Score 1- EKG with Non-specific repolarization disturbance  Score 1- Age 45 to 65 years old  Score 1- One to 2 risk factors for atherosclerotic disease  Score 2- More than twice the normal troponin upper limit  Interpretation  Risk of adverse outcome  Heart Score: 7  Total Score 7-10- Adverse Outcome Risk 72.7% - Supports early aggressive management, typically including cardiac catheterization             Disclaimer: This note consists of words and symbols derived from keyboarding and dictation using voice recognition software.  As a result, there may be errors that have gone undetected.  Please consider this when interpreting information found in this note.       Dayna Tucker MD  08/25/20 [id number]

## 2020-08-25 NOTE — Clinical Note
The first balloon was inserted into the left pulmonary artery.Max pressure = 12 corry. Total duration = 25 seconds.

## 2020-08-25 NOTE — H&P
[facility]    History & Physical - Hospitalist Service    Date of Admission:     08/25/2020      PRIMARY CARE PROVIDER:  Osbaldo Renee MD      CHIEF COMPLAINT:  Chest discomfort.      HISTORY OF PRESENT ILLNESS:    Mr. Roberto Stover is a very pleasant 59-year-old gentleman with past medical history significant for severe mitral regurgitation, status post mechanical valve replacement in 2017, atrial fibrillation - on warfarin, prediabetes, hyperlipidemia, and heavy current tobacco use who presented to [facility] Emergency Department with complaints of chest discomfort.  The patient reports he was in his usual state of health until last night, when he had difficulty sleeping and was tossing and turning for about 1 hour, which is unusual for him.  He notes he may have finally fallen asleep, when he was woken abruptly with an achiness and burning in his middle substernal to left side of chest.  He notes that this was 5/10 in severity and he felt a similar pain in his teeth, but no radiation to his neck or jaw.  He notes that right upper extremity felt like it had fallen asleep and was numb.  He took 2 Tylenol and the pain lasted approximately 45 minutes.  He was on his way to the Emergency Department with his wife when he noted the pain actually started to improve, almost prompting him to turn around and go back home.  Pain lasted approximately 1-1/2 hours in total and has since resolved without any recurrence. He noted he felt clammy at that time.  No nausea, shortness of breath, or palpitations.  He denies any dyspnea on exertion, lightheadedness or dizziness.  He has never had a cardiac event before.  He has been taking his medications regularly.      In the Emergency Department, the patient was hemodynamically stable and only noted pain 1/10 on arrival.  Basic labs were drawn, including a CMP, CBC, glucose and troponin, which were all within normal limits.  His INR is 2.4,  consistent with therapeutic on his PTA warfarin.  EKG in the emergency room showed atrial flutter with nonspecific ST changes.  Rate 81 beats per minute.  Given his symptoms and concern for NSTEMI, a second troponin was drawn 2-1/2 hours after the initial normal and had elevated to 0.156.  Given the elevation and concern for NSTEMI, the patient was started on a heparin drip and transferred to [facility] for NSTEMI and Cardiology consultation.      After arrival to [facility], during my visit, the patient confirms the history above.  He is chest pain-free.  He does note that his right arm still feels a little bit funny, but not nearly as numb as it was earlier.  No current chest pain.  He is hemodynamically stable and otherwise feeling well.  The troponin on arrival to [facility] was increased to 0.228.      REVIEW OF SYSTEMS:  Ten-point review of systems was conducted and is negative other than as listed in the HPI.      PAST MEDICAL HISTORY:   1.  Atrial fibrillation, on warfarin.   2.  Erectile dysfunction.   3.  Heart murmur due to rheumatic fever as a child.   4.  Hyperlipidemia, not currently on statin.   5.  Severe mitral regurgitation, status post mechanical valve replacement.   6.  Prediabetes.   7.  Heavy tobacco use disorder.      PAST SURGICAL HISTORY:   1.  Mitral valve replacement, 2017.   2.  Right knee scope.   3.  Dental extractions.   4.  Colonoscopy.      SOCIAL HISTORY:    The patient is a .  He is a current everyday very heavy smoker.  He started smoking at the age of 17, in approximately .  Over the past 10 years, he has been smoking 2 packs per day.  He occasionally drinks alcohol, about 2-3 drinks 2-3 times per month.  He does not use any illicit drugs or marijuana.      FAMILY HISTORY:    The patient's mother is .  She had diabetes and obesity.  His father also had diabetes and hyperlipidemia.  He denies any history of cardiac events, heart  "stenting, or heart bypass in the family, in his parents, grandparents, or siblings.      PRIOR TO ADMISSION MEDICATIONS:    Medications Prior to Admission   Medication Sig Dispense Refill Last Dose     acetaminophen (TYLENOL) 325 MG tablet Take 2 tablets (650 mg) by mouth every 4 hours as needed for other (surgical pain) 100 tablet 0 8/24/2020 at pm     aspirin EC 81 MG EC tablet Take 1 tablet (81 mg) by mouth daily 30 tablet 0 8/24/2020 at pm     warfarin ANTICOAGULANT (COUMADIN) 5 MG tablet Take 2.5-5 mg by mouth every evening 5mg Tu/Thu/Sa; 2.5mg ROW   8/24/2020 at pm     Amoxicillin (AMOXIL PO) Take 1,500-3,000 mg by mouth daily as needed (patient takes 6 X 500 = 3,000 mg dose 1 hour before dental appointment and 3 X 500 = 1,500 mg dose 6 hours after dental appointment.)             ALLERGIES:    THE PATIENT HAS SEASONAL ALLERGIES, INCLUDING TO POLLEN.  HE ALSO NOTES HE HAD AN INTOLERANCE TO STATINS IN THE PAST, WHERE HE FELT \"HORRIBLE\" ON THIS DRUG.  HE DENIED ANY MUSCLE WEAKNESS OR OVERT ALLERGY.      LABORATORY DATA:  As listed in the HPI.        IMAGING STUDIES:  EKG as listed in the HPI.  Chest x-ray:  Postop changes and cardiac valvular surgery.  No acute pathology.     PHYSICAL EXAM:  Vital signs: Temp 97.8, RR 16, BP 97/51  General: Awake, alert, middle aged obese man who appears stated age. Looks comfortable sitting up in bed. No acute distress.  HEENT: Normocephalic, atraumatic. Extraocular movements intact.   Respiratory: Clear to auscultation bilaterally, no rales, wheezing, or rhonchi to anterolateral fields.  Cardiovascular: Irregular rate and rhythm, +S1 and S2, mechanically sounding valve auscultated. No peripheral edema.   Gastrointestinal: Soft, non-tender, obese but non-distended. Bowel sounds present.  Skin: Warm, dry. No obvious rashes or lesions on exposed skin. Dorsalis pedis pulses palpable bilaterally.  Musculoskeletal: No joint swelling, erythema or tenderness. Moves all extremities " equally.  Neurologic: AAO x3. Cranial nerves 2-12 grossly intact, normal strength and sensation.  Psychiatric: Appropriate mood and affect. No obvious anxiety or depression.     ASSESSMENT AND PLAN:    Mr. Roberto Stover is a very pleasant 59-year-old gentleman with a past medical history significant for a history of rheumatic fever as a child resulting in severe mitral regurgitation and status post mechanical mitral valve replacement, atrial fibrillation - on warfarin, hyperlipidemia, prediabetes, and heavy tobacco use, who was directly admitted to [facility] from [facility] Emergency Department with concern for non-ST elevation myocardial infarction.      Non-ST elevation myocardial infarction.   Hyperlipidemia.     The patient has never had a cardiac event before.  He notes he was in his general state of health until last night around 10:45 PM, when he developed achiness and burning to the mid and left side of his chest radiating to the teeth with associated right upper extremity numbness, 5/10 in severity.  He took 2 Tylenol and presented to the ER.  He noted he felt clammy at that time.  No nausea, shortness of breath, or palpitations.  No lightheadedness or dizziness.  Hemodynamically stable in the ED.  Basic labs unremarkable.  Troponin trend less than 0.015, increased to 0.156, increased to 0.228.  The patient was chest pain-free on arrival to [facility].  Chest x-ray unremarkable.  EKG:  Atrial flutter with nonspecific ST changes.  INR is therapeutic at 2.4, consistent with warfarin use.  He was given 162 mg of aspirin in the ER and started on a heparin drip.    Cardiac Risk stratification:  The patient is a very heavy smoker.  He has been smoking since the age of 17 and over the past 10 years smokes 2 packs per day.  He has been instructed to quit several times, but has been unable to do this as of yet. His father had hyperlipidemia.  He has no family history of any cardiac events, coronary stenting,  "or bypass that he is aware of.  He does have a history of hypercholesterolemia, with last level of cholesterol 121, HDL 48, LDL 48, triglycerides 126 and he has not been on statins given normality of last labs.  He does have a history of prediabetes with A1c of 6.1% in 2017 and he is obese.  As below, the patient had rheumatic fever as a child and has a mechanical mitral valve, given severe mitral regurgitation as well as atrial fibrillation. He follows with Dr. No Mcghee who he last saw in February 2020.  - [facility]    - Obtain new Echocardiogram  - Cardiology consultation  - Continue heparin drip, which is being dosed by Pharmacy.    - Continue on PTA aspirin and started on atorvastatin 40 mg at bedtime. Tolerance will be monitored and adjusted by Cardiology vs changed to a different statin given hx \"feeling horrible\" on statins in the past.  - The patient does not have any history of hypertension and has a soft and normal blood pressure and a normal heart rate. He was previously on a beta blocker but it was discontinued due to lightheadedness and able to maintain rate despite hx Afib. Therefore, we will hold off on any beta blockade at this time, but may consider a very low-dose of beta blocker, defer this to Cardiology.    - Recheck cholesterol and hemoglobin A1c    Atrial flutter with history of atrial fibrillation, on warfarin.    In anticipation for coronary angiogram, we will hold off on warfarin for now, likely resume later today or tomorrow.  His INR is therapeutic at 2.4.    - We will check INR daily  - Resume warfarin, pharmacy to dose after cardiology plan in place and able to resume post angio, likely tonight or tomorrow  - Currently on heparin gtt as above    History of rheumatic fever and severe mitral regurgitation, status post mechanical valve.    On warfarin as above.  He does follow with Cardiology and last saw Dr. Mcghee 02/2020.    Last ECHO " 2020 and showed EF of 60% to 65%, mild LV dilation, severe biatrial enlargement, well-seated mechanical valve, 1-2+ aortic regurgitation, and mild aortic root dilation at 42 mm.    - As above, we will resume the warfarin after a plan from Cardiology was established and we will continue aspirin and heparin at this time.     Obesity class 2.    The patient's BMI is 35.7 consistent with class II obesity.  Diet and lifestyle changes recommended, especially given cardiac event.    - He will follow-up with primary care and Cardiology on discharge.     Heavy tobacco use.    The patient has been a smoker since the age of 17 and over the past 10 years, he has been smoking 2 packs per day.  He has been recommended to quit several times, but has been unable to at this point.    - Cessation will be greatly encouraged given current admission.     DVT prophylaxis.  The patient is on a heparin drip.     CODE STATUS:  The patient is full code, which I discussed with the patient on admission and he confirms.      DISPOSITION:  The patient will likely be able to discharge home in the next 1-2 days, pending coronary angiogram and any stent placement.      This patient was discussed with Dr. Fernando Barbosa of the Hospitalist Service who agrees with current plans as outlined above.    Yaw Muhammad PA-C ([facility])  Hospitalist RY       FERNANDO BARBOSA, DO       As dictated by YAW MUHAMMAD PA-C            D: 2020   T: 2020   MT: MONSTER      Name:     HAKEEM BORJAS   MRN:      [id number]        Account:      [id number]   :      1960        Admitted:     2020                   Document: [id number]       cc: Osbaldo Renee MD

## 2020-08-25 NOTE — LETTER
[facility]  [address], [address]  [facility] [address]  Phone: [phone]    August 27, 2020        Derek Stover  [address] [place] [id number]          To whom it may concern:    RE: Derek Stover was seen and treated at our hospital recently. He may return to work on 8/31/2020 with the following:  No working or lifting restrictions.    Please contact me for questions or concerns.      Sincerely,      Danny Mariee PA-C   8/27/2020

## 2020-08-25 NOTE — PROGRESS NOTES
"BRIEF IM PROGRESS NOTE    In short this is a 59-year-old male with past medical history of atrial fibrillation currently on Coumadin, status post mitral valve replacement hyperlipidemia, who presents for evaluation of chest pain.    He was transferred from [facility] after being evaluated at their emergency department for burning chest pain that the patient developed last evening.  Pertinent work-up included an EKG that showed atrial flutter with controlled ventricular rate.  His chest x-ray showed showed no acute evidence of consolidation, pneumothorax.  His INR is therapeutic at 2.40.  His troponin was elevated to 0.156, and in the setting of his chest pain, is consistent with an NSTEMI.  He was ultimately transferred to  [facility] for further cardiac work-up. He is currently hemodynamically stable on a heparin drip.    /71   Temp 97.7  F (36.5  C) (Oral)   Resp 16   Ht 1.778 m (5' 10\")   Wt 112.7 kg (248 lb 8 oz)   SpO2 94%   BMI 35.66 kg/m      Full H&P to come by admitting team.    Yonathan Smith MD     "

## 2020-08-26 ENCOUNTER — SURGERY (OUTPATIENT)
Age: 60
End: 2020-08-26
Payer: COMMERCIAL

## 2020-08-26 LAB
CHOLEST SERPL-MCNC: 167 MG/DL
HBA1C MFR BLD: 5.1 % (ref 0–5.6)
HDLC SERPL-MCNC: 37 MG/DL
INR PPP: 2.29 (ref 0.86–1.14)
KCT BLD-ACNC: 268 SEC (ref 75–150)
KCT BLD-ACNC: 327 SEC (ref 75–150)
LDLC SERPL CALC-MCNC: 108 MG/DL
LMWH PPP CHRO-ACNC: 0.37 IU/ML
NONHDLC SERPL-MCNC: 130 MG/DL
TRIGL SERPL-MCNC: 112 MG/DL

## 2020-08-26 PROCEDURE — B2111ZZ FLUOROSCOPY OF MULTIPLE CORONARY ARTERIES USING LOW OSMOLAR CONTRAST: ICD-10-PCS | Performed by: INTERNAL MEDICINE

## 2020-08-26 PROCEDURE — 99152 MOD SED SAME PHYS/QHP 5/>YRS: CPT | Mod: 59 | Performed by: INTERNAL MEDICINE

## 2020-08-26 PROCEDURE — 25000132 ZZH RX MED GY IP 250 OP 250 PS 637: Performed by: INTERNAL MEDICINE

## 2020-08-26 PROCEDURE — 21000001 ZZH R&B HEART CARE

## 2020-08-26 PROCEDURE — 25000128 H RX IP 250 OP 636: Performed by: INTERNAL MEDICINE

## 2020-08-26 PROCEDURE — C1725 CATH, TRANSLUMIN NON-LASER: HCPCS | Performed by: INTERNAL MEDICINE

## 2020-08-26 PROCEDURE — 93010 ELECTROCARDIOGRAM REPORT: CPT | Performed by: INTERNAL MEDICINE

## 2020-08-26 PROCEDURE — 93454 CORONARY ARTERY ANGIO S&I: CPT | Mod: 26 | Performed by: INTERNAL MEDICINE

## 2020-08-26 PROCEDURE — C9600 PERC DRUG-EL COR STENT SING: HCPCS | Mod: LD | Performed by: INTERNAL MEDICINE

## 2020-08-26 PROCEDURE — C1874 STENT, COATED/COV W/DEL SYS: HCPCS | Performed by: INTERNAL MEDICINE

## 2020-08-26 PROCEDURE — C1753 CATH, INTRAVAS ULTRASOUND: HCPCS | Performed by: INTERNAL MEDICINE

## 2020-08-26 PROCEDURE — 92978 ENDOLUMINL IVUS OCT C 1ST: CPT | Mod: 26 | Performed by: INTERNAL MEDICINE

## 2020-08-26 PROCEDURE — 25000128 H RX IP 250 OP 636: Performed by: STUDENT IN AN ORGANIZED HEALTH CARE EDUCATION/TRAINING PROGRAM

## 2020-08-26 PROCEDURE — 25000125 ZZHC RX 250: Performed by: INTERNAL MEDICINE

## 2020-08-26 PROCEDURE — 85610 PROTHROMBIN TIME: CPT | Performed by: STUDENT IN AN ORGANIZED HEALTH CARE EDUCATION/TRAINING PROGRAM

## 2020-08-26 PROCEDURE — 99232 SBSQ HOSP IP/OBS MODERATE 35: CPT | Mod: 25 | Performed by: INTERNAL MEDICINE

## 2020-08-26 PROCEDURE — C1887 CATHETER, GUIDING: HCPCS | Performed by: INTERNAL MEDICINE

## 2020-08-26 PROCEDURE — 99153 MOD SED SAME PHYS/QHP EA: CPT | Performed by: INTERNAL MEDICINE

## 2020-08-26 PROCEDURE — C1894 INTRO/SHEATH, NON-LASER: HCPCS | Performed by: INTERNAL MEDICINE

## 2020-08-26 PROCEDURE — 99152 MOD SED SAME PHYS/QHP 5/>YRS: CPT | Performed by: INTERNAL MEDICINE

## 2020-08-26 PROCEDURE — 85520 HEPARIN ASSAY: CPT | Performed by: STUDENT IN AN ORGANIZED HEALTH CARE EDUCATION/TRAINING PROGRAM

## 2020-08-26 PROCEDURE — 85347 COAGULATION TIME ACTIVATED: CPT

## 2020-08-26 PROCEDURE — 99232 SBSQ HOSP IP/OBS MODERATE 35: CPT | Performed by: HOSPITALIST

## 2020-08-26 PROCEDURE — 83036 HEMOGLOBIN GLYCOSYLATED A1C: CPT | Performed by: STUDENT IN AN ORGANIZED HEALTH CARE EDUCATION/TRAINING PROGRAM

## 2020-08-26 PROCEDURE — 25000132 ZZH RX MED GY IP 250 OP 250 PS 637: Performed by: STUDENT IN AN ORGANIZED HEALTH CARE EDUCATION/TRAINING PROGRAM

## 2020-08-26 PROCEDURE — 92928 PRQ TCAT PLMT NTRAC ST 1 LES: CPT | Mod: LC | Performed by: INTERNAL MEDICINE

## 2020-08-26 PROCEDURE — 27210794 ZZH OR GENERAL SUPPLY STERILE: Performed by: INTERNAL MEDICINE

## 2020-08-26 PROCEDURE — 80061 LIPID PANEL: CPT | Performed by: STUDENT IN AN ORGANIZED HEALTH CARE EDUCATION/TRAINING PROGRAM

## 2020-08-26 PROCEDURE — 25800030 ZZH RX IP 258 OP 636: Performed by: STUDENT IN AN ORGANIZED HEALTH CARE EDUCATION/TRAINING PROGRAM

## 2020-08-26 PROCEDURE — 93454 CORONARY ARTERY ANGIO S&I: CPT | Performed by: INTERNAL MEDICINE

## 2020-08-26 PROCEDURE — 027035Z DILATION OF CORONARY ARTERY, ONE ARTERY WITH TWO DRUG-ELUTING INTRALUMINAL DEVICES, PERCUTANEOUS APPROACH: ICD-10-PCS | Performed by: INTERNAL MEDICINE

## 2020-08-26 PROCEDURE — 92978 ENDOLUMINL IVUS OCT C 1ST: CPT | Performed by: INTERNAL MEDICINE

## 2020-08-26 PROCEDURE — 93005 ELECTROCARDIOGRAM TRACING: CPT

## 2020-08-26 PROCEDURE — 36415 COLL VENOUS BLD VENIPUNCTURE: CPT | Performed by: STUDENT IN AN ORGANIZED HEALTH CARE EDUCATION/TRAINING PROGRAM

## 2020-08-26 PROCEDURE — C1769 GUIDE WIRE: HCPCS | Performed by: INTERNAL MEDICINE

## 2020-08-26 DEVICE — STENT RESOLUTE ONYX DE 2.7FR 2.50X30MM RONYX DE25030UX: Type: IMPLANTABLE DEVICE | Status: FUNCTIONAL

## 2020-08-26 DEVICE — IMPLANTABLE DEVICE: Type: IMPLANTABLE DEVICE | Status: FUNCTIONAL

## 2020-08-26 RX ORDER — FENTANYL CITRATE 50 UG/ML
INJECTION, SOLUTION INTRAMUSCULAR; INTRAVENOUS
Status: DISCONTINUED | OUTPATIENT
Start: 2020-08-26 | End: 2020-08-26 | Stop reason: HOSPADM

## 2020-08-26 RX ORDER — NITROGLYCERIN 5 MG/ML
VIAL (ML) INTRAVENOUS
Status: DISCONTINUED | OUTPATIENT
Start: 2020-08-26 | End: 2020-08-26 | Stop reason: HOSPADM

## 2020-08-26 RX ORDER — LORAZEPAM 0.5 MG/1
0.5 TABLET ORAL
Status: DISCONTINUED | OUTPATIENT
Start: 2020-08-26 | End: 2020-08-26 | Stop reason: HOSPADM

## 2020-08-26 RX ORDER — ONDANSETRON 4 MG/1
4 TABLET, ORALLY DISINTEGRATING ORAL EVERY 6 HOURS PRN
Status: DISCONTINUED | OUTPATIENT
Start: 2020-08-26 | End: 2020-08-27 | Stop reason: HOSPADM

## 2020-08-26 RX ORDER — WARFARIN SODIUM 7.5 MG/1
7.5 TABLET ORAL
Status: COMPLETED | OUTPATIENT
Start: 2020-08-26 | End: 2020-08-26

## 2020-08-26 RX ORDER — POTASSIUM CHLORIDE 1500 MG/1
20 TABLET, EXTENDED RELEASE ORAL
Status: DISCONTINUED | OUTPATIENT
Start: 2020-08-26 | End: 2020-08-26 | Stop reason: HOSPADM

## 2020-08-26 RX ORDER — CLOPIDOGREL BISULFATE 75 MG/1
75 TABLET ORAL DAILY
Status: DISCONTINUED | OUTPATIENT
Start: 2020-08-27 | End: 2020-08-27 | Stop reason: HOSPADM

## 2020-08-26 RX ORDER — FLUMAZENIL 0.1 MG/ML
0.2 INJECTION, SOLUTION INTRAVENOUS
Status: ACTIVE | OUTPATIENT
Start: 2020-08-26 | End: 2020-08-26

## 2020-08-26 RX ORDER — OXYCODONE HYDROCHLORIDE 5 MG/1
5 TABLET ORAL EVERY 4 HOURS PRN
Status: DISCONTINUED | OUTPATIENT
Start: 2020-08-26 | End: 2020-08-27 | Stop reason: HOSPADM

## 2020-08-26 RX ORDER — METOPROLOL TARTRATE 1 MG/ML
5-10 INJECTION, SOLUTION INTRAVENOUS
Status: DISCONTINUED | OUTPATIENT
Start: 2020-08-26 | End: 2020-08-27 | Stop reason: HOSPADM

## 2020-08-26 RX ORDER — SODIUM CHLORIDE 9 MG/ML
INJECTION, SOLUTION INTRAVENOUS CONTINUOUS
Status: DISCONTINUED | OUTPATIENT
Start: 2020-08-26 | End: 2020-08-26 | Stop reason: HOSPADM

## 2020-08-26 RX ORDER — LIDOCAINE 40 MG/G
CREAM TOPICAL
Status: DISCONTINUED | OUTPATIENT
Start: 2020-08-26 | End: 2020-08-26

## 2020-08-26 RX ORDER — NALOXONE HYDROCHLORIDE 0.4 MG/ML
.2-.4 INJECTION, SOLUTION INTRAMUSCULAR; INTRAVENOUS; SUBCUTANEOUS
Status: DISCONTINUED | OUTPATIENT
Start: 2020-08-26 | End: 2020-08-26

## 2020-08-26 RX ORDER — FENTANYL CITRATE 50 UG/ML
25-50 INJECTION, SOLUTION INTRAMUSCULAR; INTRAVENOUS
Status: ACTIVE | OUTPATIENT
Start: 2020-08-26 | End: 2020-08-26

## 2020-08-26 RX ORDER — ONDANSETRON 2 MG/ML
4 INJECTION INTRAMUSCULAR; INTRAVENOUS EVERY 6 HOURS PRN
Status: DISCONTINUED | OUTPATIENT
Start: 2020-08-26 | End: 2020-08-27 | Stop reason: HOSPADM

## 2020-08-26 RX ORDER — HYDRALAZINE HYDROCHLORIDE 20 MG/ML
10 INJECTION INTRAMUSCULAR; INTRAVENOUS EVERY 4 HOURS PRN
Status: DISCONTINUED | OUTPATIENT
Start: 2020-08-26 | End: 2020-08-27 | Stop reason: HOSPADM

## 2020-08-26 RX ORDER — LORAZEPAM 2 MG/ML
0.5 INJECTION INTRAMUSCULAR
Status: DISCONTINUED | OUTPATIENT
Start: 2020-08-26 | End: 2020-08-26 | Stop reason: HOSPADM

## 2020-08-26 RX ORDER — NITROGLYCERIN 0.4 MG/1
0.4 TABLET SUBLINGUAL EVERY 5 MIN PRN
Status: DISCONTINUED | OUTPATIENT
Start: 2020-08-26 | End: 2020-08-26

## 2020-08-26 RX ORDER — ATROPINE SULFATE 0.1 MG/ML
0.5 INJECTION INTRAVENOUS
Status: ACTIVE | OUTPATIENT
Start: 2020-08-26 | End: 2020-08-26

## 2020-08-26 RX ORDER — ACETAMINOPHEN 325 MG/1
650 TABLET ORAL EVERY 4 HOURS PRN
Status: DISCONTINUED | OUTPATIENT
Start: 2020-08-26 | End: 2020-08-26

## 2020-08-26 RX ORDER — IOPAMIDOL 755 MG/ML
INJECTION, SOLUTION INTRAVASCULAR
Status: DISCONTINUED | OUTPATIENT
Start: 2020-08-26 | End: 2020-08-26 | Stop reason: HOSPADM

## 2020-08-26 RX ORDER — HEPARIN SODIUM 1000 [USP'U]/ML
INJECTION, SOLUTION INTRAVENOUS; SUBCUTANEOUS
Status: DISCONTINUED | OUTPATIENT
Start: 2020-08-26 | End: 2020-08-26 | Stop reason: HOSPADM

## 2020-08-26 RX ORDER — OXYCODONE HYDROCHLORIDE 5 MG/1
10 TABLET ORAL EVERY 4 HOURS PRN
Status: DISCONTINUED | OUTPATIENT
Start: 2020-08-26 | End: 2020-08-27 | Stop reason: HOSPADM

## 2020-08-26 RX ORDER — CLOPIDOGREL BISULFATE 75 MG/1
TABLET ORAL
Status: DISCONTINUED | OUTPATIENT
Start: 2020-08-26 | End: 2020-08-26 | Stop reason: HOSPADM

## 2020-08-26 RX ORDER — SODIUM CHLORIDE 9 MG/ML
INJECTION, SOLUTION INTRAVENOUS CONTINUOUS
Status: ACTIVE | OUTPATIENT
Start: 2020-08-26 | End: 2020-08-26

## 2020-08-26 RX ORDER — VERAPAMIL HYDROCHLORIDE 2.5 MG/ML
INJECTION, SOLUTION INTRAVENOUS
Status: DISCONTINUED | OUTPATIENT
Start: 2020-08-26 | End: 2020-08-26 | Stop reason: HOSPADM

## 2020-08-26 RX ADMIN — MIDAZOLAM 1 MG: 1 INJECTION INTRAMUSCULAR; INTRAVENOUS at 10:13

## 2020-08-26 RX ADMIN — NITROGLYCERIN 300 MCG: 5 INJECTION, SOLUTION INTRAVENOUS at 11:06

## 2020-08-26 RX ADMIN — FENTANYL CITRATE 25 MCG: 50 INJECTION, SOLUTION INTRAMUSCULAR; INTRAVENOUS at 10:38

## 2020-08-26 RX ADMIN — MIDAZOLAM 0.5 MG: 1 INJECTION INTRAMUSCULAR; INTRAVENOUS at 10:20

## 2020-08-26 RX ADMIN — IOPAMIDOL 260 ML: 755 INJECTION, SOLUTION INTRAVENOUS at 11:32

## 2020-08-26 RX ADMIN — MIDAZOLAM 0.5 MG: 1 INJECTION INTRAMUSCULAR; INTRAVENOUS at 10:57

## 2020-08-26 RX ADMIN — LIDOCAINE HYDROCHLORIDE 2 ML: 10 INJECTION, SOLUTION EPIDURAL; INFILTRATION; INTRACAUDAL; PERINEURAL at 10:18

## 2020-08-26 RX ADMIN — FENTANYL CITRATE 25 MCG: 50 INJECTION, SOLUTION INTRAMUSCULAR; INTRAVENOUS at 11:04

## 2020-08-26 RX ADMIN — FENTANYL CITRATE 25 MCG: 50 INJECTION, SOLUTION INTRAMUSCULAR; INTRAVENOUS at 10:17

## 2020-08-26 RX ADMIN — HEPARIN SODIUM 2000 UNITS: 1000 INJECTION INTRAVENOUS; SUBCUTANEOUS at 10:56

## 2020-08-26 RX ADMIN — FENTANYL CITRATE 25 MCG: 50 INJECTION, SOLUTION INTRAMUSCULAR; INTRAVENOUS at 10:57

## 2020-08-26 RX ADMIN — HEPARIN SODIUM 1200 UNITS/HR: 10000 INJECTION, SOLUTION INTRAVENOUS at 00:03

## 2020-08-26 RX ADMIN — CLOPIDOGREL BISULFATE 600 MG: 75 TABLET ORAL at 10:38

## 2020-08-26 RX ADMIN — MIDAZOLAM 0.5 MG: 1 INJECTION INTRAMUSCULAR; INTRAVENOUS at 11:27

## 2020-08-26 RX ADMIN — HEPARIN SODIUM 5000 UNITS: 1000 INJECTION INTRAVENOUS; SUBCUTANEOUS at 10:38

## 2020-08-26 RX ADMIN — NITROGLYCERIN 300 MCG: 5 INJECTION, SOLUTION INTRAVENOUS at 11:17

## 2020-08-26 RX ADMIN — FENTANYL CITRATE 50 MCG: 50 INJECTION, SOLUTION INTRAMUSCULAR; INTRAVENOUS at 10:12

## 2020-08-26 RX ADMIN — NITROGLYCERIN 200 MCG: 5 INJECTION, SOLUTION INTRAVENOUS at 10:19

## 2020-08-26 RX ADMIN — MIDAZOLAM 0.5 MG: 1 INJECTION INTRAMUSCULAR; INTRAVENOUS at 10:47

## 2020-08-26 RX ADMIN — ATORVASTATIN CALCIUM 80 MG: 80 TABLET, FILM COATED ORAL at 20:40

## 2020-08-26 RX ADMIN — WARFARIN SODIUM 7.5 MG: 7.5 TABLET ORAL at 18:19

## 2020-08-26 RX ADMIN — MIDAZOLAM 0.5 MG: 1 INJECTION INTRAMUSCULAR; INTRAVENOUS at 10:39

## 2020-08-26 RX ADMIN — ASPIRIN 81 MG: 81 TABLET, COATED ORAL at 08:15

## 2020-08-26 RX ADMIN — SODIUM CHLORIDE: 9 INJECTION, SOLUTION INTRAVENOUS at 08:15

## 2020-08-26 RX ADMIN — VERAPAMIL HYDROCHLORIDE 2.5 MG: 2.5 INJECTION, SOLUTION INTRAVENOUS at 10:19

## 2020-08-26 RX ADMIN — MIDAZOLAM 0.5 MG: 1 INJECTION INTRAMUSCULAR; INTRAVENOUS at 10:17

## 2020-08-26 ASSESSMENT — ACTIVITIES OF DAILY LIVING (ADL)
ADLS_ACUITY_SCORE: 11

## 2020-08-26 ASSESSMENT — MIFFLIN-ST. JEOR: SCORE: 1959.35

## 2020-08-26 NOTE — PHARMACY-ANTICOAGULATION SERVICE
Clinical Pharmacy - Warfarin Dosing Consult     Pharmacy has been consulted to manage this patient s warfarin therapy.  Indication: Mechanical Mitral Valve Replacement  Therapy Goal: INR 2.5-3.5  Warfarin Prior to Admission: Yes  Warfarin PTA Regimen: 5mg on tu/thr/sa and 2.5mg row    INR   Date Value Ref Range Status   08/26/2020 2.29 (H) 0.86 - 1.14 Final   08/25/2020 2.40 (H) 0.86 - 1.14 Final       Recommend warfarin 7.5 mg today.  Pharmacy will monitor Derek Stover daily and order warfarin doses to achieve specified goal.      Please contact pharmacy as soon as possible if the warfarin needs to be held for a procedure or if the warfarin goals change.

## 2020-08-26 NOTE — PROGRESS NOTES
"[facility]    Medicine Progress Note - Hospitalist Service       Date of Admission:  8/25/2020  Assessment & Plan   Mr. Roberto Stover is a very pleasant 59-year-old gentleman with a past medical history significant for a history of rheumatic fever as a child resulting in severe mitral regurgitation and status post mechanical mitral valve replacement, atrial fibrillation - on warfarin, hyperlipidemia, prediabetes, and heavy tobacco use, who was directly admitted to [facility] from [facility] Emergency Department with concern for non-ST elevation myocardial infarction.      Non-ST elevation myocardial infarction  Hyperlipidemia     The patient has never had a cardiac event before.  He notes he was in his general state of health until last night around 10:45 PM, when he developed achiness and burning to the mid and left side of his chest radiating to the teeth with associated right upper extremity numbness, 5/10 in severity.  He follows with Dr. No Mcghee who he last saw in February 2020. Given ASA in ED and heparin started prior to transferring to [facility] from [facility].  - Cardiology consultation  - Continue heparin drip, which is being dosed by Pharmacy.    - Continue on PTA aspirin and started on atorvastatin 40 mg at bedtime. Tolerance will be monitored and adjusted by Cardiology vs changed to a different statin given hx \"feeling horrible\" on statins in the past.  - beta blocker not tolerated in past - leonel/hypotension   - , A1c 5.1%     Paroxysmal atrial fibrillation, on warfarin    In anticipation for coronary angiogram, we will hold off on warfarin for now, likely resume later today or tomorrow.  His INR is therapeutic at 2.29  - INR daily  - Resume warfarin, pharmacy to dose after cardiology plan in place and able to resume post angio, likely tonight or tomorrow  - Currently on heparin gtt as above     History of rheumatic fever and severe mitral regurgitation, status " post mechanical valve  On warfarin as above.  He does follow with Cardiology and last saw Dr. Mcghee 02/2020.    Last ECHO 02/2020 and showed EF of 60% to 65%, mild LV dilation, severe biatrial enlargement, well-seated mechanical valve, 1-2+ aortic regurgitation, and mild aortic root dilation at 42 mm.    -- Echo yesterday showed LV EF 55-60% well seated with MVG 6.5mmHg 8/25/20  - As above, we will resume the warfarin after a plan from Cardiology was established and we will continue aspirin and heparin at this time.      Obesity    The patient's BMI is 35.7 consistent with class II obesity.  Diet and lifestyle changes recommended, especially given cardiac event.    - He will follow-up with primary care and Cardiology on discharge.      Heavy tobacco use  The patient has been a smoker since the age of 17 and over the past 10 years, he has been smoking 2 packs per day.  He has been recommended to quit several times, but has been unable to at this point.    - Cessation will be greatly encouraged given current admission.     COVID asymptomatic screening  Negative 8/25/20      Diet: NPO for Medical/Clinical Reasons Except for: Meds    DVT Prophylaxis: heparin gtt for now, then back to warf  Guerra Catheter: not present  Code Status: Full Code           Disposition Plan   Expected discharge: pending angiogram today  Entered: Oskar Warren MD 08/26/2020, 9:49 AM       The patient's care was discussed with the Bedside Nurse and Patient.    Oskar Warren MD  Hospitalist Service  [facility]    ______________________________________________________________________    Interval History   Seen and examined prior to angio this AM. Heparin gtt infusing. No pain in chest, arm or jaw like he had yesterday. Trop trending down from 0.228 yesterday. Echo ok yesterday also. Denies f/c/cough or sob.    Data reviewed today: I reviewed all medications, new labs and imaging results over the last 24 hours. I  personally reviewed no images or EKG's today.    Physical Exam   Vital Signs: Temp: 97.7  F (36.5  C) Temp src: Oral BP: 96/77 Pulse: 65   Resp: 18 SpO2: 92 % O2 Device: None (Room air)    Weight: 248 lbs 11.2 oz    Gen: NAD, pleasant  HEENT: Normocephalic, EOMI, MMM  Resp: no crackles,  no wheezes, no increased work of resp  CV: S1S2 heard, irreg irreg rhythm, reg rate, no pedal edema  Abdo: soft, nontender, nondistended, bowel sounds present  Ext: calves nontender, well perfused  Neuro: AAOx3, CN grossly intact, no facial asymmetry      Data   Recent Labs   Lab 20  0242 20  1749 20  1155 20  0604  20  0005   WBC  --   --   --   --   --  8.3   HGB  --   --   --   --   --  15.8   MCV  --   --   --   --   --  95   PLT  --   --   --   --   --  155   INR 2.29*  --   --   --   --  2.40*   NA  --   --   --   --   --  141   POTASSIUM  --   --   --   --   --  3.7   CHLORIDE  --   --   --   --   --  109   CO2  --   --   --   --   --  28   BUN  --   --   --   --   --  14   CR  --   --   --   --   --  0.94   ANIONGAP  --   --   --   --   --  4   FIORELLA  --   --   --   --   --  8.9   GLC  --   --   --   --   --  95   ALBUMIN  --   --   --   --   --  3.8   PROTTOTAL  --   --   --   --   --  7.0   BILITOTAL  --   --   --   --   --  0.5   ALKPHOS  --   --   --   --   --  77   ALT  --   --   --   --   --  21   AST  --   --   --   --   --  18   TROPI  --  0.072* 0.139* 0.228*   < > <0.015    < > = values in this interval not displayed.     Recent Results (from the past 24 hour(s))   Echocardiogram Complete    Narrative    [id number]  [id number]  [id number]  [id number]^LENI^JORDAN           [facility]  Echocardiography Laboratory  [facility] [id number]        Name: HAKEEM BORJAS  MRN: [id number]  : 1960  Study Date: 2020 10:30 AM  Age: 59 yrs  Gender: Male  Patient Location: [facility]  Reason For Study: Chest Pain  Ordering Physician: JORDAN FARRAR  Referring  Physician: Osbaldo Renee  Performed By: Nenita Dalal     BSA: 2.3 m2  Height: 70 in  Weight: 248 lb  HR: 82  BP: 97/51 mmHg  _____________________________________________________________________________  __        Procedure  Complete Portable Echo Adult. Optison (NDC #[id number]) given intravenously.  _____________________________________________________________________________  __        Interpretation Summary     The visual ejection fraction is estimated at 50-55%.  Left ventricular systolic function is low normal.  There is a mechanical mitral valve. The gradients are borderline raised across  this prosthetic mitral valve but similar to 2/26/2020.  Mild valvular aortic stenosis.  Mild aortic root dilatation.  The ascending aorta is Mildly dilated.  The rhythm was atrial fibrillation.  The study was technically difficult.  _____________________________________________________________________________  __        Left Ventricle  The left ventricle is borderline dilated. There is mild eccentric left  ventricular hypertrophy. Diastolic function not assessed due to presence of  prosthetic mitral valve. The visual ejection fraction is estimated at 50-55%.  Left ventricular systolic function is low normal.     Right Ventricle  The right ventricle is normal size. Right ventricular function cannot be  assessed due to poor image quality.     Atria  The left atrium is severely dilated. The right atrium is severely dilated.     Mitral Valve  Post MVR chordal remnant seen in the left ventricular cavity. There is trace  mitral regurgitation. The mean mitral valve gradient is 6.5 mmHg. There is a  mechanical mitral valve.        Tricuspid Valve  There is mild (1+) tricuspid regurgitation. The right ventricular systolic  pressure is approximated at 26mmHg plus the right atrial pressure.     Aortic Valve  The aortic valve is not well visualized. There is mild (1+) aortic  regurgitation. The calculated aortic valve are is 1.9  cm^2. The peak AoV  pressure gradient is 13.0 mmHg. The mean AoV pressure gradient is 7.5 mmHg.  Mild valvular aortic stenosis.     Pulmonic Valve  There is trace pulmonic valvular regurgitation.     Vessels  Mild aortic root dilatation. The ascending aorta is Mildly dilated. IVC  diameter and respiratory changes fall into an intermediate range suggesting an  RA pressure of 8 mmHg.     Pericardium  There is no pericardial effusion.        Rhythm  The rhythm was atrial fibrillation.  _____________________________________________________________________________  __  MMode/2D Measurements & Calculations  IVSd: 1.5 cm     LVIDd: 5.8 cm  LVIDs: 4.6 cm  LVPWd: 1.1 cm  FS: 19.8 %  LV mass(C)d: 333.4 grams  LV mass(C)dI: 145.8 grams/m2  Ao root diam: 4.1 cm  LA dimension: 6.1 cm  asc Aorta Diam: 3.8 cm  LA/Ao: 1.5  LVOT diam: 2.5 cm  LVOT area: 5.1 cm2  LA Volume (BP): 216.0 ml  LA Volume Index (BP): 94.3 ml/m2  RWT: 0.40  TAPSE: 2.0 cm           Doppler Measurements & Calculations  MV E max bobby: 178.5 cm/sec  MV A max bobby: 30.4 cm/sec  MV E/A: 5.9  MV max P.0 mmHg  MV mean P.5 mmHg  MV V2 VTI: 35.5 cm  MVA(VTI): 2.1 cm2  MV P1/2t max bobby: 190.0 cm/sec  MV P1/2t: 105.0 msec  MVA(P1/2t): 2.1 cm2  MV dec slope: 530.0 cm/sec2  MV dec time: 0.32 sec  Ao V2 max: 176.8 cm/sec  Ao max P.0 mmHg  Ao V2 mean: 130.5 cm/sec  Ao mean P.5 mmHg  Ao V2 VTI: 40.0 cm  SARI(I,D): 1.9 cm2  SARI(V,D): 2.2 cm2  AI P1/2t: 545.3 msec  LV V1 max P.2 mmHg  LV V1 max: 75.0 cm/sec  LV V1 VTI: 14.7 cm  SV(LVOT): 74.9 ml  SI(LVOT): 32.8 ml/m2  PA acc time: 0.11 sec  TR max bobby: 226.9 cm/sec  TR max P.6 mmHg  RVSP(TR): 30.6 mmHg  AV Bobby Ratio (DI): 0.42  SARI Index (cm2/m2): 0.82  E/E' av.2  Lateral E/e': 19.7  Medial E/e': 26.7              _____________________________________________________________________________  __           Report approved by: Priya Trevizo 2020 12:35 PM

## 2020-08-26 NOTE — PLAN OF CARE
Pt had angio today, 2 stents placed to the LPA & dLPA, right radial site is CDI. Initially re-bled with pulling 3 ml but fine after re-inserted and re-started again after 45 minutes. Tolerating diet well.

## 2020-08-26 NOTE — PROVIDER NOTIFICATION
MD Notification    Notified Person: MD    Notified Person Name: Turner    Notification Date/Time: 08/26/20 @ 5:27 PM     Notification Interaction: web paged MD    Purpose of Notification: Reverse Barbeau abnormal.    Orders Received: Spoke with MD, no further orders. Could be a small hematoma due to increased INR. 8/27/2020 cardiologist to follow up.    Comments:

## 2020-08-26 NOTE — PROGRESS NOTES
[facility]  Cardiology Progress Note  Date of Service: 08/26/2020  Primary Cardiologist: Dr. Mcghee    Assessment & Plan    Derek Stover is a 59 year old male admitted 8/25/20 with complaints of chest pain and NSTEMI, transferred from [facility].     Assessment:  1. NSTEMI - troponin peak 0.228, EKG showed rate controlled atrial fibrillation without any ischemic changes.  LV function normal (55-60%). Stable, chest discomfort has resolved.    - No betablocker therapy due to bradycardia/hypotension    - Cardiac cath in 2017 showed no significant coronary artery disease.    - Plan for coronary angiogram today, INR 2.29 at 2am, no vitamin k given mechanical mitral valve.    - Continue aspirin, atorvastatin and heparin gtt.   2. Mitral regurgitation s/p mechanical mitral valve replacement - stable, 9/2017 with 32 mm [employer] Servando Bromide, well seated with mean valve gradient 6.5mmHg on echocardiogram 8/25/20.    3. Paroxsymal atrial fibrillation - atrial fibrillation with controlled ventricular rate on telemetry, stable, rate controlled, asymptomatic. AV myriam blocking agents discontinued in the past due to hypotension and lightheadedness.   4. Tobacco use - 2ppd, notes he has quit in the past successfully for 10 months with Chantix. He gets frustrated by the weight gain when he quits smoking.   5. Obesity - counseled on weight loss strategies, he started using nutrisystem at home which has helped with weight loss. Sedentary lifestyle.       Plan:   1. Coronary angiogram this morning. INR 2.29 at 2am, no vitamin k given mechanical mitral valve.   2. Counseled on smoking cessation today  3. Further recommendations after coronary angiogram today.       [facility]  Pager: [phone]    Interval History   No acute events overnight. Denies chest pain, chest tightness or arm pain. Denies shortness of breath. Slept good.     Physical Exam   Temp: 97.7  F (36.5  C) Temp  src: Oral BP: 96/77 Pulse: 65   Resp: 18 SpO2: 92 % O2 Device: None (Room air)    Vitals:    08/25/20 0527 08/26/20 0653   Weight: 112.7 kg (248 lb 8 oz) 112.8 kg (248 lb 11.2 oz)       Constitutional alert and oriented, in no acute distress.  Skin warm and dry to touch  ENT no pallor or cyanosis  Neck  Supple, no JVP appreciated on exam  Lungs clear bilaterally   Cardiac irregularly irregular rate/rhythm, no murmur   Abdomen abdomen soft, non distended  Extremities no lower extremity edema  Neurological  no gross motor deficits noted, affect appropriate, oriented to time, person and place.    Medications     HEParin 1,050 Units/hr (08/26/20 0815)     - MEDICATION INSTRUCTIONS -       - MEDICATION INSTRUCTIONS -       sodium chloride 150 mL/hr at 08/26/20 0815       aspirin  81 mg Oral Daily     atorvastatin  80 mg Oral QPM     sodium chloride (PF)  3 mL Intracatheter Q8H       Data   Most Recent 3 CBC's:  Recent Labs   Lab Test 08/25/20  0005 05/30/18  1505 11/07/17  0855   WBC 8.3 9.1 8.7   HGB 15.8 14.3 12.6*   MCV 95 95 94    151 181     Most Recent 3 BMP's:  Recent Labs   Lab Test 08/25/20  0005 11/07/17  0855 09/15/17  0815    140 133   POTASSIUM 3.7 4.0 4.2   CHLORIDE 109 105 98   CO2 28 28 29   BUN 14 19 14   CR 0.94 1.03 0.86   ANIONGAP 4 7 6   FIORELLA 8.9 8.4* 8.6   GLC 95 101* 110*     Most Recent 3 Troponin's:  Recent Labs   Lab Test 08/25/20  1749 08/25/20  1155 08/25/20  0604   TROPI 0.072* 0.139* 0.228*     Last 24 hours labs reviewed     Echo: 8/25/20  The visual ejection fraction is estimated at 50-55%.  Left ventricular systolic function is low normal.  There is a mechanical mitral valve. The gradients are borderline raised across  this prosthetic mitral valve but similar to 2/26/2020.  Mild valvular aortic stenosis.  Mild aortic root dilatation.  The ascending aorta is Mildly dilated.  The rhythm was atrial fibrillation.  The study was technically difficult.    Coronary angiogram  8/14/2017  LM: Left main is a large-caliber long in length vessel. This vessel  gives rise to the LAD and circumflex respectively. There is no disease  the left main     LAD: Left anterior descending artery is a medium size vessel which  extends and wraps on apex. This vessel gives rise to several septal  perforators and 2 major diagonal vessels.  There is no disease in the  LAD or its branches     CFX: Circumflex is a large-caliber codominant vessel. This vessel  gives rise to 1 major obtuse marginal branch in the mid segment and  left-sided posterior lateral branches. There is no disease in the  circumflex or its branches     RCA: Right coronary is a large-caliber codominant vessel. This vessel  gives rise to a conus branch proximally there are RV marginal branches  in the mid segment. The vessel then terminates with a medium sized  PDA. There is no disease in the RCA or its branches

## 2020-08-26 NOTE — PLAN OF CARE
Pt a/o, VSS on RA. Denies pain. Up independently in room. Tele afib w/ CVR. Lung sounds diminished with expiratory wheezes. Heparin gtt infusing @ 1050 Xa @ 1000. NPO since MN for angiogram today.

## 2020-08-26 NOTE — PRE-PROCEDURE
GENERAL PRE-PROCEDURE:   Procedure:  Coronary angiogram with possible PCI  Date/Time:  8/26/2020 10:00 AM    Written consent obtained?: Yes    Risks and benefits: Risks, benefits and alternatives were discussed    Consent given by:  Patient  Patient states understanding of procedure being performed: Yes    Patient's understanding of procedure matches consent: Yes    Procedure consent matches procedure scheduled: Yes    Expected level of sedation:  Moderate  Appropriately NPO:  Yes  ASA Class:  Class 2- mild systemic disease, no acute problems, no functional limitations  Mallampati  :  Grade 1- soft palate, uvula, tonsillar pillars, and posterior pharyngeal wall visible  Lungs:  Lungs clear with good breath sounds bilaterally  Heart:  Normal heart sounds and rate  History & Physical reviewed:  History and physical reviewed and no updates needed  Statement of review:  I have reviewed the lab findings, diagnostic data, medications, and the plan for sedation

## 2020-08-27 ENCOUNTER — TELEPHONE (OUTPATIENT)
Dept: FAMILY MEDICINE | Facility: OTHER | Age: 60
End: 2020-08-27

## 2020-08-27 VITALS
DIASTOLIC BLOOD PRESSURE: 56 MMHG | TEMPERATURE: 97.4 F | HEART RATE: 71 BPM | SYSTOLIC BLOOD PRESSURE: 98 MMHG | WEIGHT: 246.4 LBS | OXYGEN SATURATION: 98 % | RESPIRATION RATE: 18 BRPM | HEIGHT: 70 IN | BODY MASS INDEX: 35.28 KG/M2

## 2020-08-27 LAB
INR PPP: 2.28 (ref 0.86–1.14)
INTERPRETATION ECG - MUSE: NORMAL

## 2020-08-27 PROCEDURE — 36415 COLL VENOUS BLD VENIPUNCTURE: CPT | Performed by: PHYSICIAN ASSISTANT

## 2020-08-27 PROCEDURE — 93005 ELECTROCARDIOGRAM TRACING: CPT

## 2020-08-27 PROCEDURE — 93010 ELECTROCARDIOGRAM REPORT: CPT | Performed by: INTERNAL MEDICINE

## 2020-08-27 PROCEDURE — 99239 HOSP IP/OBS DSCHRG MGMT >30: CPT | Performed by: HOSPITALIST

## 2020-08-27 PROCEDURE — 85610 PROTHROMBIN TIME: CPT | Performed by: PHYSICIAN ASSISTANT

## 2020-08-27 PROCEDURE — 99232 SBSQ HOSP IP/OBS MODERATE 35: CPT | Performed by: INTERNAL MEDICINE

## 2020-08-27 PROCEDURE — 25000132 ZZH RX MED GY IP 250 OP 250 PS 637: Performed by: STUDENT IN AN ORGANIZED HEALTH CARE EDUCATION/TRAINING PROGRAM

## 2020-08-27 RX ORDER — CLOPIDOGREL BISULFATE 75 MG/1
75 TABLET ORAL DAILY
Qty: 30 TABLET | Refills: 0 | Status: SHIPPED | OUTPATIENT
Start: 2020-08-27 | End: 2020-09-22

## 2020-08-27 RX ORDER — NITROGLYCERIN 0.4 MG/1
TABLET SUBLINGUAL
Qty: 15 TABLET | Refills: 0 | Status: SHIPPED | OUTPATIENT
Start: 2020-08-27 | End: 2023-02-02

## 2020-08-27 RX ORDER — WARFARIN SODIUM 7.5 MG/1
7.5 TABLET ORAL
Status: DISCONTINUED | OUTPATIENT
Start: 2020-08-27 | End: 2020-08-27 | Stop reason: HOSPADM

## 2020-08-27 RX ORDER — ATORVASTATIN CALCIUM 80 MG/1
80 TABLET, FILM COATED ORAL EVERY EVENING
Qty: 30 TABLET | Refills: 0 | Status: SHIPPED | OUTPATIENT
Start: 2020-08-27 | End: 2020-09-03

## 2020-08-27 RX ADMIN — CLOPIDOGREL BISULFATE 75 MG: 75 TABLET, FILM COATED ORAL at 08:51

## 2020-08-27 ASSESSMENT — ACTIVITIES OF DAILY LIVING (ADL)
ADLS_ACUITY_SCORE: 11

## 2020-08-27 ASSESSMENT — MIFFLIN-ST. JEOR: SCORE: 1948.91

## 2020-08-27 NOTE — PLAN OF CARE
A&O x4. VS stable on room air. Up independently. Right radial site intact without evidence of hematoma. Denied pain. Tele monitored, A-fib with CVR. Plan to likely discharge to home tomorrow.

## 2020-08-27 NOTE — DISCHARGE INSTRUCTIONS
.Cardiac Angiogram Discharge Instructions - Radial    After you go home:      Have an adult stay with you until tomorrow.    Drink extra fluids for 2 days.    You may resume your normal diet.    No smoking       For 24 hours - due to the sedation you received:    Relax and take it easy.    Do NOT make any important or legal decisions.    Do NOT drive or operate machines at home or at work.    Do NOT drink alcohol.    Care of Wrist Puncture Site:      For the first 24 hrs - check the puncture site every 1-2 hours while awake.    It is normal to have soreness at the puncture site and mild tingling in your hand for up to 3 days.    Remove the bandaid after 24 hours. If there is minor oozing, apply another bandaid and remove it after 12 hours.    You may shower tomorrow.  Do NOT take a bath, or use a hot tub or pool for at least 3 days. Do NOT scrub the site. Do not use lotion or powder near the puncture site.           Activity:        For 2 days:     do not use your hand or arm to support your weight (such as rising from a chair)     do not bend your wrist (such as lifting a garage door).    do not lift more than 5 pounds or exercise your arm (such as tennis, golf or bowling).    Do NOT do any heavy activity such as exercise, lifting, or straining.     Bleeding:      If you start bleeding from the site in your wrist, sit down and press firmly on/above the site for 10 minutes.     Once bleeding stops, keep arm still for 2 hours.     Call [facility] as soon as you can.       Call 911 right away if you have heavy bleeding or bleeding that does not stop.      Medicines:      If you are taking an antiplatelet medication such as Plavix, Brilinta or Effient, do not stop taking it until you talk to your cardiologist.        If you are on Metformin (Glucophage), do not restart it until you have blood tests (within 2 to 3 days after discharge).  After you have your blood drawn, you may restart the Metformin.     Take your  medications, including blood thinners, unless your provider tells you not to.      If you take Coumadin (Warfarin), have your INR checked by your provider in  3-5 days. Call your clinic to schedule this.    If you have stopped any medicines, check with your provider about when to restart them.    Follow Up Appointments:      Follow up with [facility] Practitioner at [facility] of patient preference in 7-10 days.    Call the clinic if:      You have a large or growing hard lump around the site.    The site is red, swollen, hot or tender.    Blood or fluid is draining from the site.    You have chills or a fever greater than 101 F (38 C).    Your arm feels numb, cool or changes color.    You have hives, a rash or unusual itching.    Any questions or concerns.          [facility] [place]:    [phone] [facility] (7 days a week)

## 2020-08-27 NOTE — PLAN OF CARE
[facility] DISCHARGE NOTE    Patient discharged to home at 1:09 PM via wheel chair. Accompanied by spouse and staff. Discharge instructions reviewed with patient and spouse, opportunity offered to ask questions. Prescriptions sent to patients preferred pharmacy. All belongings sent with patient.    Lorie Fuller RN

## 2020-08-27 NOTE — TELEPHONE ENCOUNTER
Patient called to schedule an appointment for a hospital follow-up or appeared on a report showing that they were recently discharged from the hospital.    Patient was admitted to [facility]:  8/25/20  Discharged date: 8/27/20  Reason for hospital admission:  Chest pain  Does patient have future appointment scheduled with provider? Yes SJ  Date of future appointment:  9/3/20      This information will be used to help the care team plan for the patients upcoming visit.  The triage RN may determine that a follow up call is necessary and reach out to the patient via the phone number listed in the chart.     Please route this message on routine priority to the Triage RN pool.

## 2020-08-27 NOTE — CONSULTS
NUTRITION EDUCATION    REASON FOR ASSESSMENT:  Nutrition education on American Heart Association (AHA) Heart Healthy Diet.    NUTRITION HISTORY:  Information obtained from patient    Pt following Nutrisystem diet--since initiating diet he's lost significant weight (he estimates about 25 lbs over 6-8 weeks) and has increased his fiber and decreased his fat intake overall. He reports limiting portions and frequency of high fat, high sodium foods such as pizza. He reads labels frequently.     CURRENT DIET ORDER:  Low saturated fat, 2400 mg     NUTRITION DIAGNOSIS:  Food- and nutrition-related knowledge deficit r/t new diet recommendations as evidenced by patient reporting no prior cardiac diet education.    INTERVENTIONS:  Nutrition Prescription:    Recommended AHA Heart Healthy Diet    Implementation:     Nutrition Education (Content):  a) reviewed Heart Healthy Diet guidelines  b) provided heart healthy diet handout    Nutrition Education (Application):  a) Discussed current eating habits and recommended alternative food choices    Anticipate good compliance    Diet Education - refer to Education flowsheet    Goals:    Patient verbalizes understanding of diet      All of the above goals met during education session    Follow Up/Monitoring:    Pt to follow up in cardiac rehab

## 2020-08-27 NOTE — TELEPHONE ENCOUNTER
Patient is still in the hospital. Will postpone message to tomorrow and follow up then.     ANANT CrawfordN, RN  [facility]

## 2020-08-27 NOTE — DISCHARGE SUMMARY
[facility]  Hospitalist Discharge Summary      Date of Admission:  8/25/2020  Date of Discharge:  8/27/2020  Discharging Provider: Oskar Warren MD      Discharge Diagnoses   1. NSTEMI s/p 2 RAYRAY to L PDA  2. Atrial fibrillation on warfarin  3. History of rheumatic heart disease, severe mitral regurg s/p mechanical valve replacement  4. Heavy tobacco use  5. Hyperlipidemia  6. Prediabetes    Follow-ups Needed After Discharge   1. PCP for hospital follow up  2. Cardiology as scheduled   3. Cardiac Rehab  4. Continue prior INR checking regimen    Unresulted Labs Ordered in the Past 30 Days of this Admission     No orders found from 7/26/2020 to 8/26/2020.      These results will be followed up by NA    Discharge Disposition   Discharged to home  Condition at discharge: Stable      Hospital Course   Mr. Roberto Stover is a very pleasant 59-year-old gentleman with a past medical history significant for a history of rheumatic fever as a child resulting in severe mitral regurgitation and status post mechanical mitral valve replacement, atrial fibrillation - on warfarin, hyperlipidemia, prediabetes, and heavy tobacco use, who was directly admitted to [facility] from [facility] Emergency Department with concern for non-ST elevation myocardial infarction.      Non-ST elevation myocardial infarction  S/P 2 RAYRAY to L PDA   Hyperlipidemia     The patient has never had a cardiac event before.  He notes he was in his general state of health until last night around 10:45 PM, when he developed achiness and burning to the mid and left side of his chest radiating to the teeth with associated right upper extremity numbness, 5/10 in severity.  He follows with Dr. No Mcghee who he last saw in February 2020. Given ASA in ED and heparin started prior to transferring to [facility] from [facility].  - Cardiology following  - Noted prior intolerance of statin, will try to take it again - lipitor as below  - beta  blocker not tolerated in past - leonel/hypotension (continued)  - , A1c 5.1%  - warfarin resumed as below  - PTA aspirin stopped, clopidogrel 75 daily for 1 year     Paroxysmal atrial fibrillation, on warfarin    In anticipation for coronary angiogram, we will hold off on warfarin for now, likely resume later today or tomorrow.  His INR is therapeutic at 2.28  - INR follow up with PTA regimen. Goal 2.5-3.0  - warfarin resumed per cardiology     History of rheumatic fever and severe mitral regurgitation, status post mechanical valve  On warfarin as above.  He does follow with Cardiology and last saw Dr. Mcghee 02/2020.    Last ECHO 02/2020 and showed EF of 60% to 65%, mild LV dilation, severe biatrial enlargement, well-seated mechanical valve, 1-2+ aortic regurgitation, and mild aortic root dilation at 42 mm.    -- Echo yesterday showed LV EF 55-60% well seated with MVG 6.5mmHg 8/25/20  - per Cardiology, warfarin to continue with aspirin stopped while on plavix for next year     Obesity    The patient's BMI is 35.7 consistent with class II obesity.  Diet and lifestyle changes recommended, especially given cardiac event.    - He will follow-up with primary care and Cardiology on discharge.      Heavy tobacco use  The patient has been a smoker since the age of 17 and over the past 10 years, he has been smoking 2 packs per day.  He has been recommended to quit several times, but has been unable to at this point.    - Cessation will be greatly encouraged given current admission.   - Discussed the absolute importance of stopping smoking especially with stents     COVID asymptomatic screening  Negative 8/25/20    Consultations This Hospital Stay   PHARMACY TO DOSE HEPARIN  CARDIOLOGY IP CONSULT  SMOKING CESSATION PROGRAM IP CONSULT  NUTRITION SERVICES ADULT IP CONSULT  PHARMACY IP CONSULT  PHARMACY IP CONSULT  PHARMACY TO DOSE WARFARIN  SMOKING CESSATION PROGRAM IP CONSULT    Code Status   Full Code    Time Spent on  this Encounter   I, Oskar Warren MD, personally saw the patient today and spent greater than 30 minutes discharging this patient.       Oskar Warren MD  [facility]  ______________________________________________________________________    Physical Exam   Vital Signs: Temp: 97.4  F (36.3  C) Temp src: Oral BP: 98/56 Pulse: 71   Resp: 18 SpO2: 98 % O2 Device: None (Room air)    Weight: 246 lbs 6.4 oz    Gen: NAD, pleasant  HEENT: Normocephalic, EOMI, MMM  Resp: no crackles,  no wheezes, no increased work of resp  CV: S1S2 heard, reg rhythm, reg rate, no pedal edema  Abdo: soft, nontender, nondistended, bowel sounds present  Ext: calves nontender, well perfused  Neuro: AAOx3, CN grossly intact, no facial asymmetry         Primary Care Physician   Osbaldo Renee    Discharge Orders       Significant Results and Procedures   Most Recent 3 CBC's:  Recent Labs   Lab Test 08/25/20  0005 05/30/18  1505 11/07/17  0855   WBC 8.3 9.1 8.7   HGB 15.8 14.3 12.6*   MCV 95 95 94    151 181     Most Recent 3 BMP's:  Recent Labs   Lab Test 08/25/20  0005 11/07/17  0855 09/15/17  0815    140 133   POTASSIUM 3.7 4.0 4.2   CHLORIDE 109 105 98   CO2 28 28 29   BUN 14 19 14   CR 0.94 1.03 0.86   ANIONGAP 4 7 6   FIORELLA 8.9 8.4* 8.6   GLC 95 101* 110*     Most Recent 3 Troponin's:  Recent Labs   Lab Test 08/25/20  1749 08/25/20  1155 08/25/20  0604   TROPI 0.072* 0.139* 0.228*     Most Recent Cholesterol Panel:  Recent Labs   Lab Test 08/26/20  0242   CHOL 167   *   HDL 37*   TRIG 112     Most Recent Hemoglobin A1c:  Recent Labs   Lab Test 08/26/20  0242   A1C 5.1   ,   Results for orders placed or performed during the hospital encounter of 08/25/20   Echocardiogram Complete    Narrative    [id number]  [id number]  [id number]  [id number]^LENI^JORDAN           [facility]  Echocardiography Laboratory  [address] [place] [id number]        Name: HAKEEM BORJAS  MRN:  [id number]  : 1960  Study Date: 2020 10:30 AM  Age: 59 yrs  Gender: Male  Patient Location: [facility]  Reason For Study: Chest Pain  Ordering Physician: JORDAN FARRAR  Referring Physician: Osbaldo Renee  Performed By: Nenita Dalal     BSA: 2.3 m2  Height: 70 in  Weight: 248 lb  HR: 82  BP: 97/51 mmHg  _____________________________________________________________________________  __        Procedure  Complete Portable Echo Adult. Optison (NDC #[id number]) given intravenously.  _____________________________________________________________________________  __        Interpretation Summary     The visual ejection fraction is estimated at 50-55%.  Left ventricular systolic function is low normal.  There is a mechanical mitral valve. The gradients are borderline raised across  this prosthetic mitral valve but similar to 2020.  Mild valvular aortic stenosis.  Mild aortic root dilatation.  The ascending aorta is Mildly dilated.  The rhythm was atrial fibrillation.  The study was technically difficult.  _____________________________________________________________________________  __        Left Ventricle  The left ventricle is borderline dilated. There is mild eccentric left  ventricular hypertrophy. Diastolic function not assessed due to presence of  prosthetic mitral valve. The visual ejection fraction is estimated at 50-55%.  Left ventricular systolic function is low normal.     Right Ventricle  The right ventricle is normal size. Right ventricular function cannot be  assessed due to poor image quality.     Atria  The left atrium is severely dilated. The right atrium is severely dilated.     Mitral Valve  Post MVR chordal remnant seen in the left ventricular cavity. There is trace  mitral regurgitation. The mean mitral valve gradient is 6.5 mmHg. There is a  mechanical mitral valve.        Tricuspid Valve  There is mild (1+) tricuspid regurgitation. The right ventricular systolic  pressure is  approximated at 26mmHg plus the right atrial pressure.     Aortic Valve  The aortic valve is not well visualized. There is mild (1+) aortic  regurgitation. The calculated aortic valve are is 1.9 cm^2. The peak AoV  pressure gradient is 13.0 mmHg. The mean AoV pressure gradient is 7.5 mmHg.  Mild valvular aortic stenosis.     Pulmonic Valve  There is trace pulmonic valvular regurgitation.     Vessels  Mild aortic root dilatation. The ascending aorta is Mildly dilated. IVC  diameter and respiratory changes fall into an intermediate range suggesting an  RA pressure of 8 mmHg.     Pericardium  There is no pericardial effusion.        Rhythm  The rhythm was atrial fibrillation.  _____________________________________________________________________________  __  MMode/2D Measurements & Calculations  IVSd: 1.5 cm     LVIDd: 5.8 cm  LVIDs: 4.6 cm  LVPWd: 1.1 cm  FS: 19.8 %  LV mass(C)d: 333.4 grams  LV mass(C)dI: 145.8 grams/m2  Ao root diam: 4.1 cm  LA dimension: 6.1 cm  asc Aorta Diam: 3.8 cm  LA/Ao: 1.5  LVOT diam: 2.5 cm  LVOT area: 5.1 cm2  LA Volume (BP): 216.0 ml  LA Volume Index (BP): 94.3 ml/m2  RWT: 0.40  TAPSE: 2.0 cm           Doppler Measurements & Calculations  MV E max bobby: 178.5 cm/sec  MV A max bobby: 30.4 cm/sec  MV E/A: 5.9  MV max P.0 mmHg  MV mean P.5 mmHg  MV V2 VTI: 35.5 cm  MVA(VTI): 2.1 cm2  MV P1/2t max bobby: 190.0 cm/sec  MV P1/2t: 105.0 msec  MVA(P1/2t): 2.1 cm2  MV dec slope: 530.0 cm/sec2  MV dec time: 0.32 sec  Ao V2 max: 176.8 cm/sec  Ao max P.0 mmHg  Ao V2 mean: 130.5 cm/sec  Ao mean P.5 mmHg  Ao V2 VTI: 40.0 cm  SARI(I,D): 1.9 cm2  SARI(V,D): 2.2 cm2  AI P1/2t: 545.3 msec  LV V1 max P.2 mmHg  LV V1 max: 75.0 cm/sec  LV V1 VTI: 14.7 cm  SV(LVOT): 74.9 ml  SI(LVOT): 32.8 ml/m2  PA acc time: 0.11 sec  TR max bobby: 226.9 cm/sec  TR max P.6 mmHg  RVSP(TR): 30.6 mmHg  AV Bobby Ratio (DI): 0.42  SARI Index (cm2/m2): 0.82  E/E' av.2  Lateral E/e': 19.7  Medial E/e': 26.7               _____________________________________________________________________________  __           Report approved by: Priya Trevizo 08/25/2020 12:35 PM      Cardiac Catheterization    Narrative    1. NSTEMI due to plaque rupture and severe proximal left PDA stenosis s/p   successful intervention with 2 drug eluting stents (overlapping Resolute   esperanza 2.5x30mm, 2.25x8mm stents with small overlap)  2. OCT used to confirm plaque rupture given appearance of lesion on   angiography  3. No significant residual coronary artery disease  4. Normal leaflet motion of mechanical mitral valve on fluoroscopy  5. TR band to right radial artery       Discharge Medications   Current Discharge Medication List      START taking these medications    Details   atorvastatin (LIPITOR) 80 MG tablet Take 1 tablet (80 mg) by mouth every evening  Qty: 30 tablet, Refills: 0    Associated Diagnoses: NSTEMI (non-ST elevated myocardial infarction) (H)      clopidogrel (PLAVIX) 75 MG tablet Take 1 tablet (75 mg) by mouth daily  Qty: 30 tablet, Refills: 0    Associated Diagnoses: NSTEMI (non-ST elevated myocardial infarction) (H)      nitroGLYcerin (NITROSTAT) 0.4 MG sublingual tablet For chest pain place 1 tablet under the tongue every 5 minutes for 3 doses. If symptoms persist 5 minutes after 1st dose call 911.  Qty: 15 tablet, Refills: 0    Associated Diagnoses: NSTEMI (non-ST elevated myocardial infarction) (H)         CONTINUE these medications which have NOT CHANGED    Details   acetaminophen (TYLENOL) 325 MG tablet Take 2 tablets (650 mg) by mouth every 4 hours as needed for other (surgical pain)  Qty: 100 tablet, Refills: 0    Associated Diagnoses: S/P mitral valve replacement with metallic valve      warfarin ANTICOAGULANT (COUMADIN) 5 MG tablet Take 2.5-5 mg by mouth every evening 5mg Tu/Thu/Sa; 2.5mg ROW      Amoxicillin (AMOXIL PO) Take 1,500-3,000 mg by mouth daily as needed (patient takes 6 X 500 = 3,000 mg dose 1 hour  before dental appointment and 3 X 500 = 1,500 mg dose 6 hours after dental appointment.)         STOP taking these medications       aspirin EC 81 MG EC tablet Comments:   Reason for Stopping:             Allergies   Allergies   Allergen Reactions     Hmg-Coa-R Inhibitors      No allergy, felt horrible on this drug.     No Known Drug Allergy      Pollen Extract      Seasonal Allergies

## 2020-08-27 NOTE — PLAN OF CARE
A&O x 4. VSS. RA. Denies pain/SOB. Up independent. Tele: Afib CVR. R) radial site, unchanged. Possible discharge today. Will continue w/plan of care.

## 2020-08-27 NOTE — PROGRESS NOTES
[facility]  Cardiology Progress Note  Date of Service: 08/27/2020  Primary Cardiologist: Dr. Mcghee    Assessment & Plan    Derek Stover is a 59 year old male admitted 8/25/20 with complaints of chest pain and NSTEMI, transferred from [facility].     Assessment:  1. NSTEMI - troponin peak 0.228, EKG showed rate controlled atrial fibrillation without any ischemic changes.  LV function normal (55-60%). Stable, chest discomfort has resolved.    - No betablocker therapy due to bradycardia/hypotension    - Cardiac cath in 2017 showed no significant coronary artery disease.    - Cath on this admission showed plaque rupture and severe prox left PDA stenosis- s/p PCI with two RAYRAY; no significant residual disease   - Continue plavix and atorvastatin (aspirin discontinued to avoid triple therapy)    2. Mitral regurgitation s/p mechanical mitral valve replacement - stable, 9/2017 with 32 mm [facility], well seated with mean valve gradient 6.5mmHg on echocardiogram 8/25/20. INR today 2.28    3. Paroxsymal atrial fibrillation/atrial flutter -   -- Has hx of atrial fibrillation but tele actually shows atrial flutter with variable block  -- controlled ventricular rate on telemetry, stable, rate controlled, asymptomatic.  -- AV myriam blocking agents discontinued in the past due to hypotension and lightheadedness.   -- If rate become problematic or patient has symptoms, could benefit from referral to EP for ablation    4. Tobacco use - 2ppd, notes he has quit in the past successfully for 10 months with Chantix. He gets frustrated by the weight gain when he quits smoking.     5. Obesity - counseled on weight loss strategies, he started using nutrisystem at home which has helped with weight loss. Sedentary lifestyle.       Plan:   -- We talked about importance of smoking cessation. He understands this. He is already eating healthy diet. I also asked him to go for walks or other activities to his  pulse up. He says he is active at work but it does not appear to be cardio activity.   -- Outpatient cardiac rehab  -- Prescription for SL nitroglycerin   -- Follow up in 2 weeks with [facility] team (virtual)  -- Continue with plavix and coumadin  -- OK to discharge to home today per cardiology      Interval History   No acute events overnight. Denies chest pain, chest tightness or arm pain. Denies shortness of breath. Slept good.     Physical Exam   Temp: 97.4  F (36.3  C) Temp src: Oral BP: 98/56 Pulse: 71   Resp: 16 SpO2: 98 % O2 Device: None (Room air)    Vitals:    08/25/20 0527 08/26/20 0653 08/27/20 0553   Weight: 112.7 kg (248 lb 8 oz) 112.8 kg (248 lb 11.2 oz) 111.8 kg (246 lb 6.4 oz)       Constitutional alert and oriented, in no acute distress.  Skin warm and dry to touch  ENT no pallor or cyanosis  Neck  Supple, no JVP appreciated on exam  Lungs expiratory wheezing j carlos  Cardiac irregularly irregular rate/rhythm, no murmur   Abdomen abdomen soft, non distended  Extremities no lower extremity edema  Neurological  no gross motor deficits noted, affect appropriate, oriented to time, person and place.  Skin no ecchymosis, hematoma, or bruit at the radial site.   Vasc 4+ radial pulses j carlos    Medications     Continuing ACE inhibitor/ARB/ARNI from home medication list OR ACE inhibitor/ARB order already placed during this visit       - MEDICATION INSTRUCTIONS -       - MEDICATION INSTRUCTIONS -       - MEDICATION INSTRUCTIONS -       Percutaneous Coronary Intervention orders placed (this is information for BPA alerting)       ASPIRIN NOT PRESCRIBED       Warfarin Therapy Reminder         atorvastatin  80 mg Oral QPM     clopidogrel  75 mg Oral Daily     sodium chloride (PF)  3 mL Intracatheter Q8H       Data   Most Recent 3 CBC's:  Recent Labs   Lab Test 08/25/20  0005 05/30/18  1505 11/07/17  0855   WBC 8.3 9.1 8.7   HGB 15.8 14.3 12.6*   MCV 95 95 94    151 181     Most Recent 3 BMP's:  Recent Labs   Lab  Test 08/25/20  0005 11/07/17  0855 09/15/17  0815    140 133   POTASSIUM 3.7 4.0 4.2   CHLORIDE 109 105 98   CO2 28 28 29   BUN 14 19 14   CR 0.94 1.03 0.86   ANIONGAP 4 7 6   FIORELLA 8.9 8.4* 8.6   GLC 95 101* 110*     Most Recent 3 Troponin's:  Recent Labs   Lab Test 08/25/20  1749 08/25/20  1155 08/25/20  0604   TROPI 0.072* 0.139* 0.228*     Last 24 hours labs reviewed     Tele: Atrial flutter CVR    Danny Mariee PA-C   8/27/2020  Pager: [phone]

## 2020-08-28 ENCOUNTER — TELEPHONE (OUTPATIENT)
Dept: CARDIOLOGY | Facility: CLINIC | Age: 60
End: 2020-08-28

## 2020-08-28 LAB — INTERPRETATION ECG - MUSE: NORMAL

## 2020-08-28 NOTE — TELEPHONE ENCOUNTER
"ED/Discharge Protocol    \"Hi, my name is Kate Ro RN, a registered nurse, and I am calling on behalf of Dr. Treviño's office at [facility].  I am calling to follow up and see how things are going for you after your recent visit.\"    \"I see that you were in the (ER/UC/IP) on 8/25/2020.    How are you doing now that you are home?\" Doing really good    Is patient experiencing symptoms that may require a hospital visit?  No    Discharge Instructions    \"Let's review your discharge instructions.  What is/are the follow-up recommendations?  Pt. Response: I understand    \"Were you instructed to make a follow-up appointment?\"  Pt. Response: Yes.  Has appointment been made?   Yes      \"When you see the provider, I would recommend that you bring your discharge instructions with you.    Medications    \"How many new medications are you on since your hospitalization/ED visit?\"    0-1  \"How many of your current medicines changed (dose, timing, name, etc.) while you were in the hospital/ED visit?\"   0-1  \"Do you have questions about your medications?\"   No  \"Were you newly diagnosed with heart failure, COPD, diabetes or did you have a heart attack?\"   Heart attack  For patients on insulin: \"Did you start on insulin in the hospital or did you have your insulin dose changed?\"   No  Post Discharge Medication Reconciliation Status: discharge medications reconciled, continue medications without change.    Was MTM referral placed (*Make sure to put transitions as reason for referral)?   No    Call Summary    \"Do you have any questions or concerns about your condition or care plan at the moment?\"    No  Triage nurse advice given: please call if you have any further questions or concerns.    Patient was in ER 1 in the past year (assess appropriateness of ER visits.)      \"If you have questions or things don't continue to improve, we encourage you contact us through the main clinic number,  [phone].  Even if the clinic is not " "open, triage nurses are available 24/7 to help you.     We would like you to know that our clinic has extended hours (provide information).  We also have urgent care (provide details on closest location and hours/contact info)\"      \"Thank you for your time and take care!\"    Kate Ro RN        "

## 2020-08-28 NOTE — TELEPHONE ENCOUNTER
Patient was evaluated by cardiology while inpatient for chest pain and NSTEMI after being transferred from [facility]. PMH: RHD and subsequent MVR, PAF, smoking-ongoing. 8/26/20: Coronary angiogram via RRA found NSTEMI due to plaque rupture and severe proximal left PDA stenosis s/p successful intervention with 2 drug eluting stents (overlapping Resolute [place]anza 2.5x30mm, 2.25x8mm stents with small overlap. Pt was discharged on Lipitor, NTG and Plavix. Called patient to discuss any post hospital d/c questions, review medication changes, and confirm f/u appts. RN confirmed with patient that he was d/c with the antiplatelet Plavix. Pt has an Rx for PRN SL Nitroglycerin. Patient denied any questions regarding new medications or changes to PTA medications. Patient denied any SOB, chest pain, fever or light headedness. RRA cardiac cath site is without bleeding, swelling, redness or tenderness. RN confirmed with patient that he has a virtual visit appt scheduled on 9/8/20 at 1245 with [facility]. Cardiac rehab is scheduled 9/1/20 at 1000 in [place]. Patient advised to call clinic with any cardiac related questions or concerns prior to this edilma't. Patient verbalized understanding and agreed with plan. EDWARD Willis RN.

## 2020-08-30 LAB — INTERPRETATION ECG - MUSE: NORMAL

## 2020-09-01 ENCOUNTER — HOSPITAL ENCOUNTER (OUTPATIENT)
Dept: CARDIAC REHAB | Facility: CLINIC | Age: 60
End: 2020-09-01
Attending: STUDENT IN AN ORGANIZED HEALTH CARE EDUCATION/TRAINING PROGRAM
Payer: COMMERCIAL

## 2020-09-01 DIAGNOSIS — I21.4 NSTEMI (NON-ST ELEVATED MYOCARDIAL INFARCTION) (H): ICD-10-CM

## 2020-09-01 PROCEDURE — 40000116 ZZH STATISTIC OP CR VISIT

## 2020-09-01 PROCEDURE — 93798 PHYS/QHP OP CAR RHAB W/ECG: CPT

## 2020-09-01 PROCEDURE — 93797 PHYS/QHP OP CAR RHAB WO ECG: CPT

## 2020-09-01 PROCEDURE — 40000575 ZZH STATISTIC OP CARDIAC VISIT #2

## 2020-09-03 ENCOUNTER — OFFICE VISIT (OUTPATIENT)
Dept: FAMILY MEDICINE | Facility: CLINIC | Age: 60
End: 2020-09-03
Payer: COMMERCIAL

## 2020-09-03 VITALS
HEART RATE: 98 BPM | DIASTOLIC BLOOD PRESSURE: 86 MMHG | OXYGEN SATURATION: 100 % | RESPIRATION RATE: 18 BRPM | TEMPERATURE: 97.9 F | BODY MASS INDEX: 35.32 KG/M2 | SYSTOLIC BLOOD PRESSURE: 130 MMHG | WEIGHT: 246.13 LBS

## 2020-09-03 DIAGNOSIS — I21.4 NSTEMI (NON-ST ELEVATED MYOCARDIAL INFARCTION) (H): ICD-10-CM

## 2020-09-03 PROCEDURE — 99495 TRANSJ CARE MGMT MOD F2F 14D: CPT | Performed by: FAMILY MEDICINE

## 2020-09-03 RX ORDER — ATORVASTATIN CALCIUM 80 MG/1
40 TABLET, FILM COATED ORAL EVERY EVENING
Qty: 30 TABLET | Refills: 0 | COMMUNITY
Start: 2020-09-03 | End: 2020-10-15

## 2020-09-03 ASSESSMENT — PAIN SCALES - GENERAL: PAINLEVEL: NO PAIN (0)

## 2020-09-03 NOTE — PROGRESS NOTES
Subjective     Derek Stover is a 59 year old male who presents to clinic today for the following health issues:    HPI     Patient would also like to to discuss stopping the Lipitor.       Hospital Follow-up Visit:    Hospital/Nursing Home/IP Rehab Facility: [facility]  Date of Admission: 08/25/2020  Date of Discharge: 08/27/2020  Reason(s) for Admission:  Heart attack, NSTEMI, A-fib on warfarin       Was your hospitalization related to COVID-19? No   Problems taking medications regularly:  None  Medication changes since discharge: Took him off the baby aspirin   Problems adhering to non-medication therapy:  None    Summary of hospitalization:  [facility] discharge summary reviewed  Diagnostic Tests/Treatments reviewed.  Follow up needed: Cardiology and cardiac rehab  Other Healthcare Providers Involved in Patient s Care:         Specialist appointment - Cardiology and Cardiac rehab  Update since discharge: improved. Post Discharge Medication Reconciliation: discharge medications reconciled and changed, per note/orders.  Plan of care communicated with patient          SUBJECTIVE:  Derek  is a 59 year old male who presents for: Hospital follow-up following his non-ST elevation myocardial infarction.  8/25/2020.  He has a history of mechanical valve replacement of his mitral valve and persistent atrial fibrillation.  On Coumadin.  Plavix was added now as he had stenting done on this recent hospitalization.  In for follow-up today I have not seen him before.  He is feeling well.  No chest pain.  He does want to discuss his Lipitor.  He was on this before after his first surgery and he did not feel well on it.  And now he is back on 80 mg.  He actually had stopped it for a number of years.  Questions the need of this now.  Knows he needs to quit smoking.  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  Timelines and weather conditions get to  him..    Past Medical History:   Diagnosis Date     Chronic atrial fibrillation (H)     Valvular Afib.  Diagnosed 06/2017. Pt initiated on coumadin 7/18/17     Erectile dysfunction      Hyperlipidemia      Mitral regurgitation     rheumatic fever as a child.  Severe mitral valve regurgitation.  Underwent 32 mm [employer] [place] mechanical mitral valve replacement in September 2017.     Prediabetes      Tobacco abuse      Warfarin anticoagulation      Past Surgical History:   Procedure Laterality Date     COLONOSCOPY N/A 9/8/2014    Procedure: COMBINED COLONOSCOPY, SINGLE BIOPSY/POLYPECTOMY BY BIOPSY;  Surgeon: Kai Bailey MD;  Location:  GI     CV HEART CATHETERIZATION WITH POSSIBLE INTERVENTION N/A 8/26/2020    Procedure: Heart Catheterization with Possible Intervention;  Surgeon: Marin Hagen MD;  Location:  HEART CARDIAC CATH LAB     EXTRACTION(S) DENTAL       ORTHOPEDIC SURGERY      RIght knee scope     REPLACE VALVE MITRAL N/A 9/12/2017    Procedure: REPLACE VALVE MITRAL;  MITRAL VALVE REPLACEMENT  [facility] Masters Series Mechanical Heart Valve 33mm  Ref, [id number]-[id number] Serial [id number]   ON PUMP, MITCHELL;  Surgeon: Ivana Marie MD;  Location:  OR     Social History     Tobacco Use     Smoking status: Current Every Day Smoker     Packs/day: 2.00     Years: 39.00     Pack years: 78.00     Types: Pipe, Cigars     Start date: 1977     Smokeless tobacco: Never Used     Tobacco comment: started smoking at 17, smoking 2 ppd since 2010   Substance Use Topics     Alcohol use: Yes     Alcohol/week: 4.0 standard drinks     Types: 4 Standard drinks or equivalent per week     Frequency: 2-4 times a month     Drinks per session: 3 or 4     Binge frequency: Never     Comment: occasional     Current Outpatient Medications   Medication Sig Dispense Refill     acetaminophen (TYLENOL) 325 MG tablet Take 2 tablets (650 mg) by mouth every 4 hours as needed for other (surgical pain) 100 tablet 0     atorvastatin (LIPITOR)  80 MG tablet Take 0.5 tablets (40 mg) by mouth every evening 30 tablet 0     clopidogrel (PLAVIX) 75 MG tablet Take 1 tablet (75 mg) by mouth daily 30 tablet 0     nitroGLYcerin (NITROSTAT) 0.4 MG sublingual tablet For chest pain place 1 tablet under the tongue every 5 minutes for 3 doses. If symptoms persist 5 minutes after 1st dose call 911. 15 tablet 0     warfarin ANTICOAGULANT (COUMADIN) 5 MG tablet Take 2.5-5 mg by mouth every evening 5mg Tu/Thu/Sa; 2.5mg ROW       Amoxicillin (AMOXIL PO) Take 1,500-3,000 mg by mouth daily as needed (patient takes 6 X 500 = 3,000 mg dose 1 hour before dental appointment and 3 X 500 = 1,500 mg dose 6 hours after dental appointment.)         REVIEW OF SYSTEMS:   5 point ROS negative except as noted above in HPI, including Gen., Resp, CV, GI &  system review.     OBJECTIVE:  Vitals: /86   Pulse 98   Temp 97.9  F (36.6  C) (Temporal)   Resp 18   Wt 111.6 kg (246 lb 2 oz)   SpO2 100%   BMI 35.32 kg/m    BMI= Body mass index is 35.32 kg/m .  He is alert and appears well.  HEENT is clear.  Neck with no JVD.  Lungs are clear to auscultation.  Heart with an irregularly irregular rhythm.  Chest wall with well-healed surgical scar.  Extremities are normal.  Site of catheter on the right wrist looks okay.  Some tenderness.    ASSESSMENT:  #1 non-ST elevation myocardial infarction with 2 drug-eluting stents #2 atrial fibrillation on warfarin No. 3 status post mechanical valve replacement of the mitral valve #4 tobacco use disorder #5 hyperlipidemia    PLAN:  He is doing well.  Working on smoking cessation.  Going to compromise with his hyperlipidemia and see if he can tolerate 40 mg of Lipitor so we will have him cut these in half.  We will recheck a lipid panel in 4 to 6 weeks.  He will see cardiology next week and they can weigh in on this as well.  Continue cardiac rehabilitation.  He left [employer] with us and is looking at probably going back to work September 14.  He  has to go back with no restrictions.  He sees cardiology on September 8.  If they feel that timeline is okay then he has to get a physical ability work-up done by Cj and a DOT physical later in that week.  Told him we will hold on the [facility] paperwork until after his meeting with cardiology to decide on return to work and activities.  Tobacco Cessation Action Plan: Self help information given to patient  Weight management plan: Discussed healthy diet and exercise guidelines    Camden Berry MD  [facility]

## 2020-09-04 ENCOUNTER — HOSPITAL ENCOUNTER (OUTPATIENT)
Dept: CARDIAC REHAB | Facility: CLINIC | Age: 60
End: 2020-09-04
Attending: STUDENT IN AN ORGANIZED HEALTH CARE EDUCATION/TRAINING PROGRAM
Payer: COMMERCIAL

## 2020-09-04 PROCEDURE — 40000116 ZZH STATISTIC OP CR VISIT

## 2020-09-04 PROCEDURE — 93798 PHYS/QHP OP CAR RHAB W/ECG: CPT

## 2020-09-05 DIAGNOSIS — I48.20 CHRONIC ATRIAL FIBRILLATION (H): ICD-10-CM

## 2020-09-05 DIAGNOSIS — Z79.01 LONG TERM CURRENT USE OF ANTICOAGULANT THERAPY: ICD-10-CM

## 2020-09-05 LAB
CAPILLARY BLOOD COLLECTION: NORMAL
INR BLD: 3.5 (ref 0.86–1.14)

## 2020-09-05 PROCEDURE — 36416 COLLJ CAPILLARY BLOOD SPEC: CPT | Performed by: FAMILY MEDICINE

## 2020-09-05 PROCEDURE — 85610 PROTHROMBIN TIME: CPT | Mod: QW | Performed by: FAMILY MEDICINE

## 2020-09-08 ENCOUNTER — VIRTUAL VISIT (OUTPATIENT)
Dept: CARDIOLOGY | Facility: CLINIC | Age: 60
End: 2020-09-08
Payer: COMMERCIAL

## 2020-09-08 ENCOUNTER — TELEPHONE (OUTPATIENT)
Dept: FAMILY MEDICINE | Facility: OTHER | Age: 60
End: 2020-09-08

## 2020-09-08 DIAGNOSIS — Z95.2 S/P MVR (MITRAL VALVE REPLACEMENT): ICD-10-CM

## 2020-09-08 DIAGNOSIS — I48.0 PAROXYSMAL ATRIAL FIBRILLATION (H): ICD-10-CM

## 2020-09-08 DIAGNOSIS — Z72.0 TOBACCO ABUSE: ICD-10-CM

## 2020-09-08 DIAGNOSIS — I48.92 ATRIAL FLUTTER, UNSPECIFIED TYPE (H): ICD-10-CM

## 2020-09-08 DIAGNOSIS — I25.10 CORONARY ARTERY DISEASE INVOLVING NATIVE CORONARY ARTERY OF NATIVE HEART WITHOUT ANGINA PECTORIS: Primary | ICD-10-CM

## 2020-09-08 PROCEDURE — 99214 OFFICE O/P EST MOD 30 MIN: CPT | Mod: GT | Performed by: PHYSICIAN ASSISTANT

## 2020-09-08 NOTE — LETTER
"9/8/2020    Osbaldo Renee MD  [address] [facility] [address]    RE: Derek Stover       Dear Colleague,    I had the pleasure of seeing Derek SHERMAN Stover in the [facility].    The patient has been notified of following:     \"This video visit will be conducted via a call between you and your physician/provider. We have found that certain health care needs can be provided without the need for an in-person physical exam.  This service lets us provide the care you need with a video conversation.  If a prescription is necessary we can send it directly to your pharmacy.  If lab work is needed we can place an order for that and you can then stop by our lab to have the test done at a later time.    Video visits are billed at different rates depending on your insurance coverage.  Please reach out to your insurance provider with any questions.    If during the course of the call the physician/provider feels a video visit is not appropriate, you will not be charged for this service.\"    Patient has given verbal consent for Video visit? Yes    How would you like to obtain your AVS? [employer]    Patient would like the video invitation sent by: Text to cell phone: [phone]    Will anyone else be joining your video visit? No      Blood pressure: doesn't monitor at home, but does go to cardiac rehab   Pulse: NA  Weight: 240 #      -----------------------------------------------------------------------------------------------------------------------------    Primary Cardiologist: Dr. Mcghee    Reason for Video Visit: Hospital follow up    HPI:  This is a very pleasant 59-year-old male with past medical history notable for coronary artery disease (coronary angiogram thousand 17 showed no significant coronary artery disease; recent admission with an STEMI having plaque rupture and severe proximal left PDA stenosis for which he received 2 drug-eluting stents; there appeared to be no significant " mitral disease; started on Plavix 75 mg; no aspirin to avoid triple therapy; echo showed preserved LV function), history of mitral regurgitation (status post mechanical mitral valve replacement with 32 mm [employer] [place] in 9/2017; most recent echocardiogram showed stable valve function with mean valve gradient of 6.5 mmHg), hypertension, dyslipidemia, paroxysmal atrial fibrillation/atrial flutter (on no AV myriam blocking agents due to hypotension and lightheadedness; already on Coumadin for his mechanical valve; recommendations were made for referral to EP if he had rate control issues for possible ablation), obesity, and history of tobacco use.    He reports feeling well with no symptoms of recurrent chest discomfort, lightheadedness, palpitations, PND, peripheral edema, abdominal distention, or bleeding issues.  He is attending cardiac rehab and this is going well.  He did need a reduction in atorvastatin from 80 mg to 40 mg as he has significant mental fogginess and muscle aches.  All of the symptoms have resolved with the decrease.  He tells me in the past he could not tolerate statins and he was off of them completely.    ROS:  12-pt ROS is negative except for as noted above.     Physical Exam:  A limited exam was conducted via video.  Constitutional:  Patient is pleasant, alert, cooperative, and in NAD.  HEENT:  NCAT. EOM's intact.   Neck:  CVP appears normal.   Pulmonary: Normal respiratory effort.   Extremities: No edema, erythema, cyanosis appreciated.  Skin:  No rashes or lesions appreciated.   Neurological:  No gross motor or sensory deficits.   Psych: Appropriate affect.       A/P:   This is a pleasant 59-year-old gentleman with past medical history notable for coronary artery disease with recent admission for an STEMI receiving 2 drug-eluting stents to proximal PDA branch.  He also has hypertension, high for lipidemia, obesity, history of tobacco use, history of mitral valve replacement with  mechanical valve, and paroxysmal atrial fibrillation/atrial flutter.    He appears to be doing well standpoint.  He is okay to resume work as a .  He will continue with cardiac rehab.  He will continue with Plavix therapy for at least 1 year.  After that.  We will consider switching this to baby aspirin.    Video start time: 12:14 PM  Video end time: 12:21 PM   Originating Location (pt. Location): home  Distant Location (provider location):  [facility]   Mode of Communication:  Video Conference via [employer] phone application       This visit is being conducted as a virtual visit due to the emphasis on mitigation of the COVID-19 virus pandemic. The clinician has decided that the risk of an in-office visit outweighs the benefit for this patient. The rest of the comprehensive physical examination is deferred due to public health emergency video visit restrictions.         Danny Mariee PA-C   9/8/2020  Pager: [phone]      Thank you for allowing me to participate in the care of your patient.    Sincerely,     Danny Mariee PA-C     [facility]

## 2020-09-08 NOTE — TELEPHONE ENCOUNTER
Reason for Call:  Other forms    Detailed comments: Roberto was seen on 9/3/20 by Dr Berry, at the time of his appointment he gave forms to Dr Berry, these forms need to be completed and faxed into his employer by Friday, 9/11/20 so he can be return to work on 9/14/20. Roberto is aware Dr Berry is out of clinic until 9/9/20.    Phone Number Patient can be reached at: Home number on file [phone] (home)    Best Time:     Can we leave a detailed message on this number? YES    Call taken on 9/8/2020 at 3:49 PM by Karine Delgado

## 2020-09-08 NOTE — PATIENT INSTRUCTIONS
Today's Plan:   1) No new changes.   2) Follow up in 2 months with fasting labs.   3) OK to go back to work.    If you have questions or concerns please call clinic at [phone].    Please call [phone] for scheduling.       It was a pleasure seeing you today!     Reminder: Please bring in all current medications, over the counter supplements and vitamin bottles to your next appointment.    Danny Mariee PA-C  9/8/2020

## 2020-09-09 NOTE — TELEPHONE ENCOUNTER
Forms filled out to the best of my knowledge and given to MD to review and sign. Pending.  Michelle Riggins, CMA

## 2020-09-10 NOTE — TELEPHONE ENCOUNTER
Forms completed, signed and faxed to [phone]. Patient advise and states understanding. His return to work date is 9/14/2020.  Michelle Riggins, CMA

## 2020-09-11 ENCOUNTER — ANTICOAGULATION THERAPY VISIT (OUTPATIENT)
Dept: ANTICOAGULATION | Facility: OTHER | Age: 60
End: 2020-09-11

## 2020-09-11 ENCOUNTER — HOSPITAL ENCOUNTER (OUTPATIENT)
Dept: CARDIAC REHAB | Facility: CLINIC | Age: 60
End: 2020-09-11
Attending: STUDENT IN AN ORGANIZED HEALTH CARE EDUCATION/TRAINING PROGRAM
Payer: COMMERCIAL

## 2020-09-11 DIAGNOSIS — Z79.01 LONG TERM CURRENT USE OF ANTICOAGULANT THERAPY: ICD-10-CM

## 2020-09-11 DIAGNOSIS — I48.11 LONGSTANDING PERSISTENT ATRIAL FIBRILLATION (H): ICD-10-CM

## 2020-09-11 DIAGNOSIS — Z95.2 MECHANICAL HEART VALVE PRESENT: ICD-10-CM

## 2020-09-11 DIAGNOSIS — I48.0 PAROXYSMAL ATRIAL FIBRILLATION (H): ICD-10-CM

## 2020-09-11 DIAGNOSIS — I48.20 CHRONIC ATRIAL FIBRILLATION (H): ICD-10-CM

## 2020-09-11 LAB
CAPILLARY BLOOD COLLECTION: NORMAL
INR BLD: 3.8 (ref 0.86–1.14)

## 2020-09-11 PROCEDURE — 40000116 ZZH STATISTIC OP CR VISIT

## 2020-09-11 PROCEDURE — 36416 COLLJ CAPILLARY BLOOD SPEC: CPT | Performed by: FAMILY MEDICINE

## 2020-09-11 PROCEDURE — 93798 PHYS/QHP OP CAR RHAB W/ECG: CPT

## 2020-09-11 PROCEDURE — 99207 ZZC NO CHARGE NURSE ONLY: CPT | Performed by: FAMILY MEDICINE

## 2020-09-11 PROCEDURE — 85610 PROTHROMBIN TIME: CPT | Mod: QW | Performed by: FAMILY MEDICINE

## 2020-09-11 NOTE — PROGRESS NOTES
ANTICOAGULATION FOLLOW-UP CLINIC VISIT    Patient Name:  Derek Stover  Date:  9/11/2020  Contact Type:  Telephone    SUBJECTIVE:  Patient Findings     Positives:   Change in medications (Still adjusting simvastatin dose. Will rehceck in 2 weeks. )             OBJECTIVE    Recent labs: (last 7 days)     09/11/20  1505   INR 3.8*       ASSESSMENT / PLAN  INR assessment SUPRA    Recheck INR In: 2 WEEKS    INR Location Outside lab      Anticoagulation Summary  As of 9/11/2020    INR goal:   2.5-3.5   TTR:   56.1 % (11.8 mo)   INR used for dosing:   3.8! (9/11/2020)   Warfarin maintenance plan:   5 mg (5 mg x 1) every Tue, Sat; 2.5 mg (5 mg x 0.5) all other days   Full warfarin instructions:   5 mg every Tue, Sat; 2.5 mg all other days   Weekly warfarin total:   22.5 mg   Plan last modified:   Slim Preston RN (9/11/2020)   Next INR check:   9/25/2020   Priority:   Maintenance   Target end date:   Indefinite    Indications    Atrial fibrillation (H) [I48.91]  Atrial fibrillation (H) [I48.91] (Resolved) [I48.91]  Long term current use of anticoagulant therapy [Z79.01]  Mechanical heart valve present [Z95.2]  Longstanding persistent atrial fibrillation (H) [I48.11]             Anticoagulation Episode Summary     INR check location:       Preferred lab:       Send INR reminders to:   [facility] [place] [facility]    Comments:   5 mg, PM dose, printout      Anticoagulation Care Providers     Provider Role Specialty Phone number    Osbaldo Renee MD Referring [facility] [phone]    Carlos Archibald DO [facility] Internal Medicine [phone]            See the Encounter Report to view Anticoagulation Flowsheet and Dosing Calendar (Go to Encounters tab in chart review, and find the Anticoagulation Therapy Visit)    Dosage adjustment made based on physician directed care plan.    Slim Preston RN

## 2020-09-11 NOTE — TELEPHONE ENCOUNTER
Derek came to the clinic asking for a copy of these forms, he said the forms were faxed to the correct place but his employer is requesting a copy before he can go to work. Can someone get a copy of these forms for him he would like them emailed to [email or web address] or sent to him through [employer]. Patient would like to be notified when it's sent - [phone].

## 2020-09-14 ENCOUNTER — TELEPHONE (OUTPATIENT)
Dept: FAMILY MEDICINE | Facility: OTHER | Age: 60
End: 2020-09-14

## 2020-09-14 NOTE — TELEPHONE ENCOUNTER
Reason for Call:  Other Paperwork    Detailed comments: Pt states he left short term disability paperwork with Dr. Berry on 9/3. The insurance company received it from us, but pt's employer did not. He is requesting just the workability page be emailed or put in his mychart so he can access it to give to employer. He can't return to work without it so he's requesting this to be done asap please. email is [email or web address]. Verbally read back for verification. Pt understands this will not be a secured email.    Phone Number Patient can be reached at: Home number on file [phone] (home)    Best Time: Anytime    Can we leave a detailed message on this number? YES    Call taken on 9/14/2020 at 9:08 AM by Tori Benitez

## 2020-09-18 ENCOUNTER — HOSPITAL ENCOUNTER (OUTPATIENT)
Dept: CARDIAC REHAB | Facility: CLINIC | Age: 60
End: 2020-09-18
Attending: STUDENT IN AN ORGANIZED HEALTH CARE EDUCATION/TRAINING PROGRAM
Payer: COMMERCIAL

## 2020-09-18 PROCEDURE — 93798 PHYS/QHP OP CAR RHAB W/ECG: CPT

## 2020-09-18 PROCEDURE — 40000116 ZZH STATISTIC OP CR VISIT

## 2020-09-21 ENCOUNTER — MYC MEDICAL ADVICE (OUTPATIENT)
Dept: FAMILY MEDICINE | Facility: CLINIC | Age: 60
End: 2020-09-21

## 2020-09-21 DIAGNOSIS — I21.4 NSTEMI (NON-ST ELEVATED MYOCARDIAL INFARCTION) (H): ICD-10-CM

## 2020-09-22 RX ORDER — CLOPIDOGREL BISULFATE 75 MG/1
75 TABLET ORAL DAILY
Qty: 30 TABLET | Refills: 11 | Status: SHIPPED | OUTPATIENT
Start: 2020-09-22 | End: 2022-08-02

## 2020-09-22 NOTE — TELEPHONE ENCOUNTER
Plavix Prescription approved per [facility] Refill Protocol. All required labs for med use were drawn in August and WNL. DX: Chronic Atrial Fib / NSTEMI  LUL Mcfadden

## 2020-09-25 ENCOUNTER — ANTICOAGULATION THERAPY VISIT (OUTPATIENT)
Dept: ANTICOAGULATION | Facility: OTHER | Age: 60
End: 2020-09-25

## 2020-09-25 DIAGNOSIS — I48.20 CHRONIC ATRIAL FIBRILLATION (H): ICD-10-CM

## 2020-09-25 DIAGNOSIS — Z79.01 LONG TERM CURRENT USE OF ANTICOAGULANT THERAPY: ICD-10-CM

## 2020-09-25 DIAGNOSIS — I48.0 PAROXYSMAL ATRIAL FIBRILLATION (H): ICD-10-CM

## 2020-09-25 DIAGNOSIS — Z95.2 MECHANICAL HEART VALVE PRESENT: ICD-10-CM

## 2020-09-25 DIAGNOSIS — I48.11 LONGSTANDING PERSISTENT ATRIAL FIBRILLATION (H): ICD-10-CM

## 2020-09-25 LAB — INR BLD: 2.6 (ref 0.86–1.14)

## 2020-09-25 PROCEDURE — 99207 ZZC NO CHARGE NURSE ONLY: CPT | Performed by: FAMILY MEDICINE

## 2020-09-25 PROCEDURE — 85610 PROTHROMBIN TIME: CPT | Mod: QW | Performed by: FAMILY MEDICINE

## 2020-09-25 PROCEDURE — 36416 COLLJ CAPILLARY BLOOD SPEC: CPT | Performed by: FAMILY MEDICINE

## 2020-09-25 NOTE — PROGRESS NOTES
ANTICOAGULATION FOLLOW-UP CLINIC VISIT    Patient Name:  Derek Stover  Date:  2020  Contact Type:  Telephone    SUBJECTIVE:  Patient Findings     Comments:   The patient was assessed for diet, medication, and activity level changes, missed or extra doses, bruising or bleeding, with no problem findings.          Clinical Outcomes     Negatives:   Major bleeding event, Thromboembolic event, Anticoagulation-related hospital admission, Anticoagulation-related ED visit, Anticoagulation-related fatality    Comments:   The patient was assessed for diet, medication, and activity level changes, missed or extra doses, bruising or bleeding, with no problem findings.             OBJECTIVE    Recent labs: (last 7 days)     20  1509   INR 2.6*       ASSESSMENT / PLAN  INR assessment THER    Recheck INR In: 3 WEEKS    INR Location Outside lab      Anticoagulation Summary  As of 2020    INR goal:   2.5-3.5   TTR:   58.4 % (11.8 mo)   INR used for dosin.6 (2020)   Warfarin maintenance plan:   5 mg (5 mg x 1) every Tue, Sat; 2.5 mg (5 mg x 0.5) all other days   Full warfarin instructions:   5 mg every Tue, Sat; 2.5 mg all other days   Weekly warfarin total:   22.5 mg   No change documented:   Slim Preston, RN   Plan last modified:   Slim Preston, RN (2020)   Next INR check:   10/16/2020   Priority:   Maintenance   Target end date:   Indefinite    Indications    Atrial fibrillation (H) [I48.91]  Atrial fibrillation (H) [I48.91] (Resolved) [I48.91]  Long term current use of anticoagulant therapy [Z79.01]  Mechanical heart valve present [Z95.2]  Longstanding persistent atrial fibrillation (H) [I48.11]             Anticoagulation Episode Summary     INR check location:       Preferred lab:       Send INR reminders to:   [facility] [place]    Comments:   5 mg, PM dose, printout      Anticoagulation Care Providers     Provider Role Specialty Phone number    Osbaldo Renee MD Referring Family  Practice [phone]    Carlos Archibald DO [facility] Internal Medicine [phone]            See the Encounter Report to view Anticoagulation Flowsheet and Dosing Calendar (Go to Encounters tab in chart review, and find the Anticoagulation Therapy Visit)    Dosage adjustment made based on physician directed care plan.    Slim Preston RN

## 2020-09-30 DIAGNOSIS — I48.20 CHRONIC ATRIAL FIBRILLATION (H): ICD-10-CM

## 2020-09-30 DIAGNOSIS — Z79.01 LONG TERM CURRENT USE OF ANTICOAGULANT THERAPY: Primary | ICD-10-CM

## 2020-09-30 RX ORDER — WARFARIN SODIUM 5 MG/1
TABLET ORAL
Qty: 60 TABLET | Refills: 0 | Status: SHIPPED | OUTPATIENT
Start: 2020-09-30 | End: 2020-12-17

## 2020-10-02 ENCOUNTER — HOSPITAL ENCOUNTER (OUTPATIENT)
Dept: CARDIAC REHAB | Facility: CLINIC | Age: 60
End: 2020-10-02
Attending: STUDENT IN AN ORGANIZED HEALTH CARE EDUCATION/TRAINING PROGRAM
Payer: COMMERCIAL

## 2020-10-02 ENCOUNTER — MYC MEDICAL ADVICE (OUTPATIENT)
Dept: CARDIOLOGY | Facility: CLINIC | Age: 60
End: 2020-10-02

## 2020-10-02 PROCEDURE — 999N000109 HC STATISTIC OP CR VISIT

## 2020-10-02 PROCEDURE — 93798 PHYS/QHP OP CAR RHAB W/ECG: CPT

## 2020-10-05 NOTE — TELEPHONE ENCOUNTER
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 They faxed us the guidelines on what they look for when certifying for DOT. Guidelines reviewed by Dr. Mcghee who felt patient did not need a stress test after just having his angiogram/stent placement end of August. Faxed ANN Mcduffie visit note from 9/8/20 who states \"He is okay to resume work as a .\" Asked [employer] [facility] [employer] to reach back out if there was anything else they needed from us in cardiology. Updated patient.   "

## 2020-10-07 ENCOUNTER — HOSPITAL ENCOUNTER (OUTPATIENT)
Dept: CARDIAC REHAB | Facility: CLINIC | Age: 60
End: 2020-10-07
Attending: STUDENT IN AN ORGANIZED HEALTH CARE EDUCATION/TRAINING PROGRAM
Payer: COMMERCIAL

## 2020-10-07 PROCEDURE — 93798 PHYS/QHP OP CAR RHAB W/ECG: CPT | Performed by: REHABILITATION PRACTITIONER

## 2020-10-07 PROCEDURE — 999N000109 HC STATISTIC OP CR VISIT: Performed by: REHABILITATION PRACTITIONER

## 2020-10-16 ENCOUNTER — ANTICOAGULATION THERAPY VISIT (OUTPATIENT)
Dept: ANTICOAGULATION | Facility: CLINIC | Age: 60
End: 2020-10-16

## 2020-10-16 DIAGNOSIS — Z79.01 LONG TERM CURRENT USE OF ANTICOAGULANT THERAPY: ICD-10-CM

## 2020-10-16 DIAGNOSIS — Z95.2 MECHANICAL HEART VALVE PRESENT: ICD-10-CM

## 2020-10-16 DIAGNOSIS — I48.0 PAROXYSMAL ATRIAL FIBRILLATION (H): ICD-10-CM

## 2020-10-16 DIAGNOSIS — I48.11 LONGSTANDING PERSISTENT ATRIAL FIBRILLATION (H): ICD-10-CM

## 2020-10-16 DIAGNOSIS — I48.20 CHRONIC ATRIAL FIBRILLATION (H): ICD-10-CM

## 2020-10-16 LAB
CAPILLARY BLOOD COLLECTION: NORMAL
INR BLD: 2 (ref 0.86–1.14)

## 2020-10-16 PROCEDURE — 85610 PROTHROMBIN TIME: CPT | Performed by: FAMILY MEDICINE

## 2020-10-16 PROCEDURE — 36416 COLLJ CAPILLARY BLOOD SPEC: CPT | Performed by: FAMILY MEDICINE

## 2020-10-16 PROCEDURE — 99207 PR NO CHARGE NURSE ONLY: CPT | Performed by: FAMILY MEDICINE

## 2020-10-16 NOTE — PROGRESS NOTES
ANTICOAGULATION FOLLOW-UP CLINIC VISIT    Patient Name:  Derek Stover  Date:  10/16/2020  Contact Type:  Telephone    SUBJECTIVE:  Patient Findings     Comments:  unable to identify a reason for subtherapeutic INR.          Clinical Outcomes     Comments:  unable to identify a reason for subtherapeutic INR.             OBJECTIVE    Recent labs: (last 7 days)     10/16/20  1505   INR 2.0*       ASSESSMENT / PLAN  INR assessment SUB    Recheck INR In: 2 WEEKS    INR Location Outside lab      Anticoagulation Summary  As of 10/16/2020    INR goal:  2.5-3.5   TTR:  56.7 % (11.8 mo)   INR used for dosin.0 (10/16/2020)   Warfarin maintenance plan:  5 mg (5 mg x 1) every Tue, Thu, Sat; 2.5 mg (5 mg x 0.5) all other days   Full warfarin instructions:  10/16: 5 mg; Otherwise 5 mg every Tue, Thu, Sat; 2.5 mg all other days   Weekly warfarin total:  25 mg   Plan last modified:  Danni Patton RN (10/16/2020)   Next INR check:  10/31/2020   Priority:  Maintenance   Target end date:  Indefinite    Indications    Atrial fibrillation (H) [I48.91]  Atrial fibrillation (H) [I48.91] (Resolved) [I48.91]  Long term current use of anticoagulant therapy [Z79.01]  Mechanical heart valve present [Z95.2]  Longstanding persistent atrial fibrillation (H) [I48.11]             Anticoagulation Episode Summary     INR check location:      Preferred lab:      Send INR reminders to:  [facility] [place]    Comments:  5 mg, PM dose, printout      Anticoagulation Care Providers     Provider Role Specialty Phone number    Osbaldo Renee MD Referring Family Practice [phone]    Carlos Archibald DO Responsible Internal Medicine [phone]            See the Encounter Report to view Anticoagulation Flowsheet and Dosing Calendar (Go to Encounters tab in chart review, and find the Anticoagulation Therapy Visit)    Dosage adjustment made based on physician directed care plan.    Danni Patton RN

## 2020-10-31 ENCOUNTER — ANTICOAGULATION THERAPY VISIT (OUTPATIENT)
Dept: ANTICOAGULATION | Facility: OTHER | Age: 60
End: 2020-10-31

## 2020-10-31 DIAGNOSIS — I48.0 PAROXYSMAL ATRIAL FIBRILLATION (H): ICD-10-CM

## 2020-10-31 DIAGNOSIS — I48.11 LONGSTANDING PERSISTENT ATRIAL FIBRILLATION (H): ICD-10-CM

## 2020-10-31 DIAGNOSIS — Z79.01 LONG TERM CURRENT USE OF ANTICOAGULANT THERAPY: ICD-10-CM

## 2020-10-31 DIAGNOSIS — Z95.2 MECHANICAL HEART VALVE PRESENT: ICD-10-CM

## 2020-10-31 DIAGNOSIS — I48.20 CHRONIC ATRIAL FIBRILLATION (H): ICD-10-CM

## 2020-10-31 LAB
CAPILLARY BLOOD COLLECTION: NORMAL
INR BLD: 2.4 (ref 0.86–1.14)

## 2020-10-31 PROCEDURE — 85610 PROTHROMBIN TIME: CPT | Performed by: FAMILY MEDICINE

## 2020-10-31 PROCEDURE — 99207 PR NO CHARGE NURSE ONLY: CPT | Performed by: FAMILY MEDICINE

## 2020-10-31 PROCEDURE — 36416 COLLJ CAPILLARY BLOOD SPEC: CPT | Performed by: FAMILY MEDICINE

## 2020-11-02 NOTE — PROGRESS NOTES
ANTICOAGULATION FOLLOW-UP CLINIC VISIT    Patient Name:  Derek Stover  Date:  2020  Contact Type:  Telephone    SUBJECTIVE:  Patient Findings     Comments:  unable to identify a reason for subtherapeutic INR.          Clinical Outcomes     Comments:  unable to identify a reason for subtherapeutic INR.             OBJECTIVE    Recent labs: (last 7 days)     10/31/20  0934   INR 2.4*       ASSESSMENT / PLAN  INR assessment SUB    Recheck INR In: 3 WEEKS    INR Location Outside lab      Anticoagulation Summary  As of 10/31/2020    INR goal:  2.5-3.5   TTR:  --   INR used for dosin.4 (10/31/2020)   Warfarin maintenance plan:  2.5 mg (5 mg x 0.5) every Mon, Wed, Fri; 5 mg (5 mg x 1) all other days   Full warfarin instructions:  2.5 mg every Mon, Wed, Fri; 5 mg all other days   Weekly warfarin total:  27.5 mg   Plan last modified:  Slim Preston RN (2020)   Next INR check:  2020   Priority:  Maintenance   Target end date:  Indefinite    Indications    Atrial fibrillation (H) [I48.91]  Atrial fibrillation (H) [I48.91] (Resolved) [I48.91]  Long term current use of anticoagulant therapy [Z79.01]  Mechanical heart valve present [Z95.2]  Longstanding persistent atrial fibrillation (H) [I48.11]             Anticoagulation Episode Summary     INR check location:      Preferred lab:      Send INR reminders to:  [facility] [place]    Comments:  5 mg, PM dose, printout      Anticoagulation Care Providers     Provider Role Specialty Phone number    Osbaldo Renee MD Referring Family Medicine [phone]    Carlos Archibald DO Responsible Internal Medicine [phone]            See the Encounter Report to view Anticoagulation Flowsheet and Dosing Calendar (Go to Encounters tab in chart review, and find the Anticoagulation Therapy Visit)    Dosage adjustment made based on physician directed care plan.    Slim Preston, RN

## 2020-11-07 ENCOUNTER — HEALTH MAINTENANCE LETTER (OUTPATIENT)
Age: 60
End: 2020-11-07

## 2020-11-21 DIAGNOSIS — Z79.01 LONG TERM CURRENT USE OF ANTICOAGULANT THERAPY: ICD-10-CM

## 2020-11-21 DIAGNOSIS — I48.20 CHRONIC ATRIAL FIBRILLATION (H): ICD-10-CM

## 2020-11-21 LAB
CAPILLARY BLOOD COLLECTION: NORMAL
INR BLD: 3.1 (ref 0.86–1.14)

## 2020-11-21 PROCEDURE — 85610 PROTHROMBIN TIME: CPT | Performed by: FAMILY MEDICINE

## 2020-11-21 PROCEDURE — 36416 COLLJ CAPILLARY BLOOD SPEC: CPT | Performed by: FAMILY MEDICINE

## 2020-11-23 ENCOUNTER — ANTICOAGULATION THERAPY VISIT (OUTPATIENT)
Dept: ANTICOAGULATION | Facility: OTHER | Age: 60
End: 2020-11-23
Payer: COMMERCIAL

## 2020-11-23 DIAGNOSIS — Z95.2 MECHANICAL HEART VALVE PRESENT: ICD-10-CM

## 2020-11-23 DIAGNOSIS — Z79.01 LONG TERM CURRENT USE OF ANTICOAGULANT THERAPY: ICD-10-CM

## 2020-11-23 DIAGNOSIS — I48.0 PAROXYSMAL ATRIAL FIBRILLATION (H): ICD-10-CM

## 2020-11-23 DIAGNOSIS — I48.11 LONGSTANDING PERSISTENT ATRIAL FIBRILLATION (H): ICD-10-CM

## 2020-11-23 PROCEDURE — 99207 PR NO CHARGE NURSE ONLY: CPT | Performed by: FAMILY MEDICINE

## 2020-11-23 NOTE — PROGRESS NOTES
ANTICOAGULATION FOLLOW-UP CLINIC VISIT    Patient Name:  Derek Stover  Date:  11/23/2020  Contact Type:  Telephone    SUBJECTIVE:  Patient Findings     Comments:  The patient was assessed for diet, medication, and activity level changes, missed or extra doses, bruising or bleeding, with no problem findings.          Clinical Outcomes     Negatives:  Major bleeding event, Thromboembolic event, Anticoagulation-related hospital admission, Anticoagulation-related ED visit, Anticoagulation-related fatality    Comments:  The patient was assessed for diet, medication, and activity level changes, missed or extra doses, bruising or bleeding, with no problem findings.             OBJECTIVE    Recent labs: (last 7 days)     11/21/20  0909   INR 3.1*       ASSESSMENT / PLAN  INR assessment THER    Recheck INR In: 4 WEEKS    INR Location Outside lab      Anticoagulation Summary  As of 11/23/2020    INR goal:  2.5-3.5   TTR:  54.5 % (11.7 mo)   INR used for dosing:  3.1 (11/21/2020)   Warfarin maintenance plan:  2.5 mg (5 mg x 0.5) every Mon, Wed, Fri; 5 mg (5 mg x 1) all other days   Full warfarin instructions:  2.5 mg every Mon, Wed, Fri; 5 mg all other days   Weekly warfarin total:  27.5 mg   No change documented:  Slim Preston, RN   Plan last modified:  Slim Preston RN (11/2/2020)   Next INR check:  12/19/2020   Priority:  Maintenance   Target end date:  Indefinite    Indications    Atrial fibrillation (H) [I48.91]  Atrial fibrillation (H) [I48.91] (Resolved) [I48.91]  Long term current use of anticoagulant therapy [Z79.01]  Mechanical heart valve present [Z95.2]  Longstanding persistent atrial fibrillation (H) [I48.11]             Anticoagulation Episode Summary     INR check location:      Preferred lab:      Send INR reminders to:  [facility]    Comments:  5 mg, PM dose, printout      Anticoagulation Care Providers     Provider Role Specialty Phone number    Osbaldo Renee MD Referring Family Medicine  [phone]    Carlos Archibald DO [facility] Internal Medicine [phone]            See the Encounter Report to view Anticoagulation Flowsheet and Dosing Calendar (Go to Encounters tab in chart review, and find the Anticoagulation Therapy Visit)    Dosage adjustment made based on physician directed care plan.    Slim Preston RN

## 2020-12-02 ENCOUNTER — VIRTUAL VISIT (OUTPATIENT)
Dept: CARDIOLOGY | Facility: CLINIC | Age: 60
End: 2020-12-02
Attending: PHYSICIAN ASSISTANT
Payer: COMMERCIAL

## 2020-12-02 DIAGNOSIS — I25.10 CORONARY ARTERY DISEASE INVOLVING NATIVE CORONARY ARTERY OF NATIVE HEART WITHOUT ANGINA PECTORIS: ICD-10-CM

## 2020-12-02 DIAGNOSIS — I48.92 ATRIAL FLUTTER, UNSPECIFIED TYPE (H): ICD-10-CM

## 2020-12-02 DIAGNOSIS — Z95.2 S/P MVR (MITRAL VALVE REPLACEMENT): ICD-10-CM

## 2020-12-02 DIAGNOSIS — Z72.0 TOBACCO ABUSE: ICD-10-CM

## 2020-12-02 DIAGNOSIS — I48.0 PAROXYSMAL ATRIAL FIBRILLATION (H): ICD-10-CM

## 2020-12-02 PROCEDURE — 99214 OFFICE O/P EST MOD 30 MIN: CPT | Mod: GT | Performed by: INTERNAL MEDICINE

## 2020-12-02 NOTE — PROGRESS NOTES
"The patient has been notified of following:     \"This video visit will be conducted via a call between you and your physician/provider. We have found that certain health care needs can be provided without the need for an in-person physical exam.  This service lets us provide the care you need with a video conversation.  If a prescription is necessary we can send it directly to your pharmacy.  If lab work is needed we can place an order for that and you can then stop by our lab to have the test done at a later time.    Video visits are billed at different rates depending on your insurance coverage.  Please reach out to your insurance provider with any questions.    If during the course of the call the physician/provider feels a video visit is not appropriate, you will not be charged for this service.\"    Patient has given verbal consent for Video visit? Yes    How would you like to obtain your AVS? [employer]    Patient would like the video invitation sent by: Text to cell phone: [phone]    Will anyone else be joining your video visit? No        CARDIOLOGY CLINIC VISIT  DATE OF SERVICE:  December 2, 2020      PRIMARY CARE PHYSICIAN:  Osbaldo Renee    HISTORY OF PRESENT ILLNESS:  Mr. Stover is a 61 y/o gentleman  with past medical history notable for coronary artery disease (recent admission in August for a NSTEMI having plaque rupture and severe proximal left PDA stenosis for which he received 2 drug-eluting stents; there appeared to be no significant mitral disease; started on Plavix 75 mg; no aspirin to avoid triple therapy; echo showed preserved LV function), history of mitral regurgitation (status post mechanical mitral valve replacement with 32 mm [employer] [place] in 9/2017; most recent echocardiogram showed stable valve function with mean valve gradient of 6.5 mmHg), hypertension, dyslipidemia, paroxysmal atrial fibrillation/atrial flutter (on no AV myriam blocking agents due to hypotension and " lightheadedness; already on Coumadin for his mechanical valve; recommendations were made for referral to EP if he had rate control issues for possible ablation), obesity, and history of tobacco use.  Due to the COVID 19 pandemic, this visit is conducted via video, with Roberto's consent.       In follow up today, Roberto reports feeling well overall.  He states he felt poorly on high intensity atorvastatin with shortness of breath and feeling like the ground was moving beneath him.  He is now on 40 mg daily and his symptoms have resolved.  He continues to work as a .  He is active deer hunting this fall.  He denies any exertional chest pain, chest discomfort or shortness of breath.  He is entirely without cardiovascular complaints.         PAST MEDICAL HISTORY:  Past Medical History:   Diagnosis Date     Chronic atrial fibrillation (H)     Valvular Afib.  Diagnosed 06/2017. Pt initiated on coumadin 7/18/17     Erectile dysfunction      Hyperlipidemia      Mitral regurgitation     rheumatic fever as a child.  Severe mitral valve regurgitation.  Underwent 32 mm [facility] Servando [place] mechanical mitral valve replacement in September 2017.     Prediabetes      Tobacco abuse      Warfarin anticoagulation        MEDICATIONS:  Current Outpatient Medications   Medication     acetaminophen (TYLENOL) 325 MG tablet     atorvastatin (LIPITOR) 80 MG tablet     clopidogrel (PLAVIX) 75 MG tablet     warfarin ANTICOAGULANT (COUMADIN) 5 MG tablet     Amoxicillin (AMOXIL PO)     nitroGLYcerin (NITROSTAT) 0.4 MG sublingual tablet     No current facility-administered medications for this visit.        ALLERGIES:  Allergies   Allergen Reactions     Hmg-Coa-R Inhibitors      No allergy, felt horrible on this drug.     No Known Drug Allergy      Pollen Extract      Seasonal Allergies        SOCIAL HISTORY:  I have reviewed this patient's social history and updated it with pertinent information if needed. Derek Stover  reports  that he has been smoking pipe and cigars. He started smoking about 43 years ago. He has a 78.00 pack-year smoking history. He has never used smokeless tobacco. He reports current alcohol use of about 4.0 standard drinks of alcohol per week. He reports that he does not use drugs.    FAMILY HISTORY:  I have reviewed this patient's family history and updated it with pertinent information if needed.   Family History   Problem Relation Age of Onset     Diabetes Mother      Obesity Mother      Diabetes Father      Hyperlipidemia Father        REVIEW OF SYSTEMS:  A complete ROS was obtained and the pertinent positives are outlined in the history of present illness above.  The remainder of systems is negative.      PHYSICAL EXAM:  Blood pressure: does not monitor at home   Pulse: NA  Weight: 245 #    General:  no apparent distress, normal body habitus, sitting upright.  ENT/Mouth:  membranes moist, no nasal discharge.  Normal head shape, no apparent injury or laceration.  Eyes:  no scleral icterus, normal conjunctivae.  No observed jaundice.  Neck:  no apparent neck swelling.   Chest/Lungs:  No breathing difficulty while speaking.  No audible wheezing.  No cough during conversation.  Cardiovascular:  No obviously elevated jugular venous pressure.  No apparent edema bilaterally in LE.   Abdomen:  no obvious abdominal distention.   Extremities:  no apparent cyanosis.  Skin:  no xanthelasma.  No facial lacerations.  Neurologic:  Normal arm motion bilateral, no tremors.    Psychiatric:  Alert and oriented x3, calm demeanor    The rest of the comprehensive physical examination is deferred due to public health emergency video visit restrictions.            ASSESSMENT:  1.  Coronary artery disease:  S/P NSTEMI 8/2020,  2 drug-eluting stents to proximal PDA branch.   2.  Hypertension  3.  Hyperlipidemia  4.  S/P mechanical MVR  5   Paroxysmal atrial fibrillation/flutter    RECOMMENDATIONS:  1. Fasting lipid panel due; if lipids at  goal, will renew rx for atorvastatin 40 mg daily   2.  Continue plavix, coumadin.  After 8/2021, will stop plavix and resume aspirin  3.  Plan for follow up in 6 months or before than as necessary.          No Mcghee MD  Cardiology - [facility]  December 2, 2020        Video-Visit Details    Type of service:  Video Visit    Video Start Time: 2:26 pm  Video End Time:  2:42 pm    Originating Location (pt. Location): Home    Distant Location (provider location):  [facility]     Platform used for Video Visit:  [employer]

## 2020-12-02 NOTE — LETTER
"12/2/2020    Osbaldo Renee MD  [address] [facility] [address]    RE: Derek Jaureguiberg       Dear Colleague,    I had the pleasure of seeing Derek Stover in the [facility].    The patient has been notified of following:     \"This video visit will be conducted via a call between you and your physician/provider. We have found that certain health care needs can be provided without the need for an in-person physical exam.  This service lets us provide the care you need with a video conversation.  If a prescription is necessary we can send it directly to your pharmacy.  If lab work is needed we can place an order for that and you can then stop by our lab to have the test done at a later time.    Video visits are billed at different rates depending on your insurance coverage.  Please reach out to your insurance provider with any questions.    If during the course of the call the physician/provider feels a video visit is not appropriate, you will not be charged for this service.\"    Patient has given verbal consent for Video visit? Yes    How would you like to obtain your AVS? [facility]    Patient would like the video invitation sent by: Text to cell phone: [phone]    Will anyone else be joining your video visit? No        CARDIOLOGY CLINIC VISIT  DATE OF SERVICE:  December 2, 2020      PRIMARY CARE PHYSICIAN:  Osbaldo Renee    HISTORY OF PRESENT ILLNESS:  Mr. Stover is a 61 y/o gentleman  with past medical history notable for coronary artery disease (recent admission in August for a NSTEMI having plaque rupture and severe proximal left PDA stenosis for which he received 2 drug-eluting stents; there appeared to be no significant mitral disease; started on Plavix 75 mg; no aspirin to avoid triple therapy; echo showed preserved LV function), history of mitral regurgitation (status post mechanical mitral valve replacement with 32 mm [employer] [place] in 9/2017; most recent " echocardiogram showed stable valve function with mean valve gradient of 6.5 mmHg), hypertension, dyslipidemia, paroxysmal atrial fibrillation/atrial flutter (on no AV myriam blocking agents due to hypotension and lightheadedness; already on Coumadin for his mechanical valve; recommendations were made for referral to EP if he had rate control issues for possible ablation), obesity, and history of tobacco use.  Due to the COVID 19 pandemic, this visit is conducted via video, with Roberto's consent.       In follow up today, Roberto reports feeling well overall.  He states he felt poorly on high intensity atorvastatin with shortness of breath and feeling like the ground was moving beneath him.  He is now on 40 mg daily and his symptoms have resolved.  He continues to work as a .  He is active deer hunting this fall.  He denies any exertional chest pain, chest discomfort or shortness of breath.  He is entirely without cardiovascular complaints.         PAST MEDICAL HISTORY:  Past Medical History:   Diagnosis Date     Chronic atrial fibrillation (H)     Valvular Afib.  Diagnosed 06/2017. Pt initiated on coumadin 7/18/17     Erectile dysfunction      Hyperlipidemia      Mitral regurgitation     rheumatic fever as a child.  Severe mitral valve regurgitation.  Underwent 32 mm [facility] [employer] [place] mechanical mitral valve replacement in September 2017.     Prediabetes      Tobacco abuse      Warfarin anticoagulation        MEDICATIONS:  Current Outpatient Medications   Medication     acetaminophen (TYLENOL) 325 MG tablet     atorvastatin (LIPITOR) 80 MG tablet     clopidogrel (PLAVIX) 75 MG tablet     warfarin ANTICOAGULANT (COUMADIN) 5 MG tablet     Amoxicillin (AMOXIL PO)     nitroGLYcerin (NITROSTAT) 0.4 MG sublingual tablet     No current facility-administered medications for this visit.        ALLERGIES:  Allergies   Allergen Reactions     Hmg-Coa-R Inhibitors      No allergy, felt horrible on this drug.     No  Known Drug Allergy      Pollen Extract      Seasonal Allergies        SOCIAL HISTORY:  I have reviewed this patient's social history and updated it with pertinent information if needed. Derek Stover  reports that he has been smoking pipe and cigars. He started smoking about 43 years ago. He has a 78.00 pack-year smoking history. He has never used smokeless tobacco. He reports current alcohol use of about 4.0 standard drinks of alcohol per week. He reports that he does not use drugs.    FAMILY HISTORY:  I have reviewed this patient's family history and updated it with pertinent information if needed.   Family History   Problem Relation Age of Onset     Diabetes Mother      Obesity Mother      Diabetes Father      Hyperlipidemia Father        REVIEW OF SYSTEMS:  A complete ROS was obtained and the pertinent positives are outlined in the history of present illness above.  The remainder of systems is negative.      PHYSICAL EXAM:  Blood pressure: does not monitor at home   Pulse: NA  Weight: 245 #    General:  no apparent distress, normal body habitus, sitting upright.  ENT/Mouth:  membranes moist, no nasal discharge.  Normal head shape, no apparent injury or laceration.  Eyes:  no scleral icterus, normal conjunctivae.  No observed jaundice.  Neck:  no apparent neck swelling.   Chest/Lungs:  No breathing difficulty while speaking.  No audible wheezing.  No cough during conversation.  Cardiovascular:  No obviously elevated jugular venous pressure.  No apparent edema bilaterally in LE.   Abdomen:  no obvious abdominal distention.   Extremities:  no apparent cyanosis.  Skin:  no xanthelasma.  No facial lacerations.  Neurologic:  Normal arm motion bilateral, no tremors.    Psychiatric:  Alert and oriented x3, calm demeanor    The rest of the comprehensive physical examination is deferred due to public health emergency video visit restrictions.            ASSESSMENT:  1.  Coronary artery disease:  S/P NSTEMI 8/2020,  2  drug-eluting stents to proximal PDA branch.   2.  Hypertension  3.  Hyperlipidemia  4.  S/P mechanical MVR  5   Paroxysmal atrial fibrillation/flutter    RECOMMENDATIONS:  1. Fasting lipid panel due; if lipids at goal, will renew rx for atorvastatin 40 mg daily   2.  Continue plavix, coumadin.  After 8/2021, will stop plavix and resume aspirin  3.  Plan for follow up in 6 months or before than as necessary.          No Mcghee MD  Cardiology - [facility]  December 2, 2020        Video-Visit Details    Type of service:  Video Visit    Video Start Time: 2:26 pm  Video End Time:  2:42 pm    Originating Location (pt. Location): Home    Distant Location (provider location):  [facility]     Platform used for Video Visit:  Dagoberto      Thank you for allowing me to participate in the care of your patient.    Sincerely,     No Mcghee MD     [facility]

## 2020-12-05 DIAGNOSIS — I25.10 CORONARY ARTERY DISEASE INVOLVING NATIVE CORONARY ARTERY OF NATIVE HEART WITHOUT ANGINA PECTORIS: ICD-10-CM

## 2020-12-05 LAB
ALT SERPL W P-5'-P-CCNC: 30 U/L (ref 0–70)
CHOLEST SERPL-MCNC: 109 MG/DL
HDLC SERPL-MCNC: 46 MG/DL
LDLC SERPL CALC-MCNC: 49 MG/DL
NONHDLC SERPL-MCNC: 63 MG/DL
TRIGL SERPL-MCNC: 68 MG/DL

## 2020-12-05 PROCEDURE — 84460 ALANINE AMINO (ALT) (SGPT): CPT | Performed by: INTERNAL MEDICINE

## 2020-12-05 PROCEDURE — 36415 COLL VENOUS BLD VENIPUNCTURE: CPT | Performed by: INTERNAL MEDICINE

## 2020-12-05 PROCEDURE — 80061 LIPID PANEL: CPT | Performed by: INTERNAL MEDICINE

## 2020-12-17 ENCOUNTER — MYC REFILL (OUTPATIENT)
Dept: FAMILY MEDICINE | Facility: OTHER | Age: 60
End: 2020-12-17

## 2020-12-17 DIAGNOSIS — Z79.01 LONG TERM CURRENT USE OF ANTICOAGULANT THERAPY: ICD-10-CM

## 2020-12-17 DIAGNOSIS — I48.20 CHRONIC ATRIAL FIBRILLATION (H): ICD-10-CM

## 2020-12-18 RX ORDER — WARFARIN SODIUM 5 MG/1
TABLET ORAL
Qty: 25 TABLET | Refills: 1 | Status: SHIPPED | OUTPATIENT
Start: 2020-12-18 | End: 2021-02-26

## 2020-12-19 ENCOUNTER — ANTICOAGULATION THERAPY VISIT (OUTPATIENT)
Dept: ANTICOAGULATION | Facility: CLINIC | Age: 60
End: 2020-12-19

## 2020-12-19 DIAGNOSIS — I48.20 CHRONIC ATRIAL FIBRILLATION (H): ICD-10-CM

## 2020-12-19 DIAGNOSIS — Z79.01 LONG TERM CURRENT USE OF ANTICOAGULANT THERAPY: ICD-10-CM

## 2020-12-19 DIAGNOSIS — I48.0 PAROXYSMAL ATRIAL FIBRILLATION (H): ICD-10-CM

## 2020-12-19 DIAGNOSIS — I48.11 LONGSTANDING PERSISTENT ATRIAL FIBRILLATION (H): ICD-10-CM

## 2020-12-19 DIAGNOSIS — Z95.2 MECHANICAL HEART VALVE PRESENT: ICD-10-CM

## 2020-12-19 LAB
CAPILLARY BLOOD COLLECTION: NORMAL
INR BLD: 3.6 (ref 0.86–1.14)

## 2020-12-19 PROCEDURE — 36416 COLLJ CAPILLARY BLOOD SPEC: CPT | Performed by: FAMILY MEDICINE

## 2020-12-19 PROCEDURE — 85610 PROTHROMBIN TIME: CPT | Performed by: FAMILY MEDICINE

## 2020-12-21 NOTE — PROGRESS NOTES
Anticoagulation Management    Unable to reach Roberto today.    Today's INR result of 3.6 is supratherapeutic (goal INR of 2.5-3.5).  Result received from: Clinic Lab    Follow up required to No Follow up needed as message was left with dosing instructions    Left message to add some Vit K into his diet and continue maintnenace dose. Recheck in 4 weeks   I left a detailed voicemail with the orders below. I have also requested a call back if there have been any missed doses, concerns, illness, fever, or if there have been any changes in medications, activity level, or diet        Anticoagulation clinic to follow up    Slim Preston RN

## 2021-01-11 ENCOUNTER — MYC MEDICAL ADVICE (OUTPATIENT)
Dept: CARDIOLOGY | Facility: CLINIC | Age: 61
End: 2021-01-11

## 2021-01-11 DIAGNOSIS — I21.4 NSTEMI (NON-ST ELEVATED MYOCARDIAL INFARCTION) (H): ICD-10-CM

## 2021-01-12 RX ORDER — ATORVASTATIN CALCIUM 40 MG/1
40 TABLET, FILM COATED ORAL EVERY EVENING
Qty: 90 TABLET | Refills: 3 | Status: SHIPPED | OUTPATIENT
Start: 2021-01-12 | End: 2022-01-11

## 2021-01-16 ENCOUNTER — ANTICOAGULATION THERAPY VISIT (OUTPATIENT)
Dept: ANTICOAGULATION | Facility: CLINIC | Age: 61
End: 2021-01-16

## 2021-01-16 DIAGNOSIS — Z79.01 LONG TERM CURRENT USE OF ANTICOAGULANT THERAPY: ICD-10-CM

## 2021-01-16 DIAGNOSIS — Z95.2 MECHANICAL HEART VALVE PRESENT: ICD-10-CM

## 2021-01-16 DIAGNOSIS — I48.20 CHRONIC ATRIAL FIBRILLATION (H): ICD-10-CM

## 2021-01-16 DIAGNOSIS — I48.0 PAROXYSMAL ATRIAL FIBRILLATION (H): ICD-10-CM

## 2021-01-16 DIAGNOSIS — I48.11 LONGSTANDING PERSISTENT ATRIAL FIBRILLATION (H): ICD-10-CM

## 2021-01-16 LAB
CAPILLARY BLOOD COLLECTION: NORMAL
INR BLD: 2.9 (ref 0.86–1.14)

## 2021-01-16 PROCEDURE — 36416 COLLJ CAPILLARY BLOOD SPEC: CPT | Performed by: FAMILY MEDICINE

## 2021-01-16 PROCEDURE — 85610 PROTHROMBIN TIME: CPT | Performed by: FAMILY MEDICINE

## 2021-01-18 NOTE — PROGRESS NOTES
ANTICOAGULATION MANAGEMENT     Patient Name:  Derek Stover  Date:  2021    ASSESSMENT /SUBJECTIVE:    Today's INR result of 2.9 is therapeutic. Goal INR of 2.5-3.5      Warfarin dose taken: Warfarin taken as instructed    Diet: No new diet changes affecting INR    Medication changes/ interactions: No new medications/supplements affecting INR    Previous INR: Therapeutic     S/S of bleeding or thromboembolism: No    New injury or illness: No    Upcoming surgery, procedure or cardioversion: No    Additional findings: None      PLAN:    Telephone call with Derek regarding INR result and instructed:     Warfarin Dosing Instructions: Continue your current warfarin dose 2.5 mg MWF and 5 mg all other days    Instructed patient to follow up no later than: 5 weeks  Patient offered & declined to schedule next visit    Education provided: None required      Roberto verbalizes understanding and agrees to warfarin dosing plan.    Instructed to call the Anticoagulation Clinic for any changes, questions or concerns. (#[phone])        Slim Preston RN      OBJECTIVE:  Recent labs: (last 7 days)     21  0919   INR 2.9*         No question data found.  Anticoagulation Summary  As of 2021    INR goal:  2.5-3.5   TTR:  --   INR used for dosin.9 (2021)   Warfarin maintenance plan:  2.5 mg (5 mg x 0.5) every Mon, Wed, Fri; 5 mg (5 mg x 1) all other days   Full warfarin instructions:  2.5 mg every Mon, Wed, Fri; 5 mg all other days   Weekly warfarin total:  27.5 mg   No change documented:  Slim Preston RN   Plan last modified:  Slim Preston RN (2020)   Next INR check:  2021   Priority:  Maintenance   Target end date:  Indefinite    Indications    Atrial fibrillation (H) [I48.91]  Atrial fibrillation (H) [I48.91] (Resolved) [I48.91]  Long term current use of anticoagulant therapy [Z79.01]  Mechanical heart valve present [Z95.2]  Longstanding persistent atrial fibrillation (H)  [I48.11]             Anticoagulation Episode Summary     INR check location:      Preferred lab:      Send INR reminders to:  [facility]    Comments:  5 mg, PM dose, printout      Anticoagulation Care Providers     Provider Role Specialty Phone number    Osbaldo Renee MD Referring Family Medicine [phone]    Carlos Archibald DO [facility] Internal Medicine [phone]

## 2021-02-20 DIAGNOSIS — I48.20 CHRONIC ATRIAL FIBRILLATION (H): ICD-10-CM

## 2021-02-20 DIAGNOSIS — Z79.01 LONG TERM CURRENT USE OF ANTICOAGULANT THERAPY: ICD-10-CM

## 2021-02-20 LAB
CAPILLARY BLOOD COLLECTION: NORMAL
INR BLD: 3.1 (ref 0.86–1.14)

## 2021-02-20 PROCEDURE — 36416 COLLJ CAPILLARY BLOOD SPEC: CPT | Performed by: FAMILY MEDICINE

## 2021-02-20 PROCEDURE — 85610 PROTHROMBIN TIME: CPT | Performed by: FAMILY MEDICINE

## 2021-02-22 ENCOUNTER — ANTICOAGULATION THERAPY VISIT (OUTPATIENT)
Dept: ANTICOAGULATION | Facility: CLINIC | Age: 61
End: 2021-02-22

## 2021-02-22 DIAGNOSIS — I48.11 LONGSTANDING PERSISTENT ATRIAL FIBRILLATION (H): ICD-10-CM

## 2021-02-22 DIAGNOSIS — I48.0 PAROXYSMAL ATRIAL FIBRILLATION (H): ICD-10-CM

## 2021-02-22 DIAGNOSIS — Z79.01 LONG TERM CURRENT USE OF ANTICOAGULANT THERAPY: ICD-10-CM

## 2021-02-22 DIAGNOSIS — Z95.2 MECHANICAL HEART VALVE PRESENT: ICD-10-CM

## 2021-02-22 NOTE — PROGRESS NOTES
ANTICOAGULATION MANAGEMENT     Patient Name:  Derek Stover  Date:  2/22/2021    ASSESSMENT /SUBJECTIVE:    Today's INR result of 3.1 is therapeutic. Goal INR of 2.5-3.5      Warfarin dose taken: Warfarin taken as instructed    Diet: No new diet changes affecting INR    Medication changes/ interactions: No new medications/supplements affecting INR    Previous INR: Therapeutic     S/S of bleeding or thromboembolism: No    New injury or illness: No    Upcoming surgery, procedure or cardioversion: No    Additional findings: None      PLAN:    Telephone call with Derek regarding INR result and instructed:     Warfarin Dosing Instructions: Continue your current warfarin dose 2.5 mg MWF and 5 mg all other days    Instructed patient to follow up no later than: 5 weeks  Lab visit scheduled    Education provided: None required      Roberto verbalizes understanding and agrees to warfarin dosing plan.    Instructed to call the Anticoagulation Clinic for any changes, questions or concerns. (#[phone])        Slim Preston RN      OBJECTIVE:  Recent labs: (last 7 days)     02/20/21  0828   INR 3.1*         No question data found.  Anticoagulation Summary  As of 2/22/2021    INR goal:  2.5-3.5   TTR:  57.2 % (11.7 mo)   INR used for dosing:  3.1 (2/20/2021)   Warfarin maintenance plan:  2.5 mg (5 mg x 0.5) every Mon, Wed, Fri; 5 mg (5 mg x 1) all other days   Full warfarin instructions:  2.5 mg every Mon, Wed, Fri; 5 mg all other days   Weekly warfarin total:  27.5 mg   No change documented:  Slim Prseton RN   Plan last modified:  Slim Preston RN (11/2/2020)   Next INR check:  3/27/2021   Priority:  Maintenance   Target end date:  Indefinite    Indications    Atrial fibrillation (H) [I48.91]  Atrial fibrillation (H) [I48.91] (Resolved) [I48.91]  Long term current use of anticoagulant therapy [Z79.01]  Mechanical heart valve present [Z95.2]  Longstanding persistent atrial fibrillation (H) [I48.11]              Anticoagulation Episode Summary     INR check location:      Preferred lab:      Send INR reminders to:  [facility]    Comments:  5 mg, PM dose, printout      Anticoagulation Care Providers     Provider Role Specialty Phone number    Osbaldo Renee MD Referring Family Medicine [phone]    Carlos Archibald DO [facility] Internal Medicine [phone]

## 2021-02-25 ENCOUNTER — MYC MEDICAL ADVICE (OUTPATIENT)
Dept: FAMILY MEDICINE | Facility: CLINIC | Age: 61
End: 2021-02-25

## 2021-02-25 DIAGNOSIS — I48.20 CHRONIC ATRIAL FIBRILLATION (H): ICD-10-CM

## 2021-02-25 DIAGNOSIS — Z79.01 LONG TERM CURRENT USE OF ANTICOAGULANT THERAPY: ICD-10-CM

## 2021-02-26 RX ORDER — WARFARIN SODIUM 5 MG/1
TABLET ORAL
Qty: 70 TABLET | Refills: 0 | Status: SHIPPED | OUTPATIENT
Start: 2021-02-26 | End: 2021-05-24

## 2021-03-27 DIAGNOSIS — I48.20 CHRONIC ATRIAL FIBRILLATION (H): ICD-10-CM

## 2021-03-27 DIAGNOSIS — Z79.01 LONG TERM CURRENT USE OF ANTICOAGULANT THERAPY: ICD-10-CM

## 2021-03-27 LAB
CAPILLARY BLOOD COLLECTION: NORMAL
INR BLD: 3 (ref 0.86–1.14)

## 2021-03-27 PROCEDURE — 85610 PROTHROMBIN TIME: CPT | Performed by: FAMILY MEDICINE

## 2021-03-27 PROCEDURE — 36416 COLLJ CAPILLARY BLOOD SPEC: CPT | Performed by: FAMILY MEDICINE

## 2021-03-29 ENCOUNTER — ANTICOAGULATION THERAPY VISIT (OUTPATIENT)
Dept: ANTICOAGULATION | Facility: CLINIC | Age: 61
End: 2021-03-29

## 2021-03-29 DIAGNOSIS — I48.11 LONGSTANDING PERSISTENT ATRIAL FIBRILLATION (H): ICD-10-CM

## 2021-03-29 DIAGNOSIS — I48.0 PAROXYSMAL ATRIAL FIBRILLATION (H): ICD-10-CM

## 2021-03-29 DIAGNOSIS — Z79.01 LONG TERM CURRENT USE OF ANTICOAGULANT THERAPY: ICD-10-CM

## 2021-03-29 DIAGNOSIS — Z95.2 MECHANICAL HEART VALVE PRESENT: ICD-10-CM

## 2021-04-16 DIAGNOSIS — Z95.2 MECHANICAL HEART VALVE PRESENT: Primary | ICD-10-CM

## 2021-04-16 DIAGNOSIS — Z79.01 LONG TERM CURRENT USE OF ANTICOAGULANT THERAPY: ICD-10-CM

## 2021-04-28 ENCOUNTER — HOSPITAL ENCOUNTER (OUTPATIENT)
Dept: CARDIOLOGY | Facility: CLINIC | Age: 61
Discharge: HOME | End: 2021-04-28
Attending: INTERNAL MEDICINE | Admitting: INTERNAL MEDICINE
Payer: COMMERCIAL

## 2021-04-28 DIAGNOSIS — Z98.890 S/P MVR (MITRAL VALVE REPAIR): ICD-10-CM

## 2021-04-28 PROCEDURE — 255N000002 HC RX 255 OP 636: Performed by: INTERNAL MEDICINE

## 2021-04-28 PROCEDURE — 999N000208 ECHOCARDIOGRAM COMPLETE

## 2021-04-28 PROCEDURE — 93306 TTE W/DOPPLER COMPLETE: CPT | Mod: 26 | Performed by: INTERNAL MEDICINE

## 2021-04-28 RX ADMIN — HUMAN ALBUMIN MICROSPHERES AND PERFLUTREN 3 ML: 10; .22 INJECTION, SOLUTION INTRAVENOUS at 09:32

## 2021-05-06 ENCOUNTER — ANTICOAGULATION THERAPY VISIT (OUTPATIENT)
Dept: ANTICOAGULATION | Facility: CLINIC | Age: 61
End: 2021-05-06

## 2021-05-06 ENCOUNTER — TELEPHONE (OUTPATIENT)
Dept: ANTICOAGULATION | Facility: CLINIC | Age: 61
End: 2021-05-06

## 2021-05-06 DIAGNOSIS — I48.0 PAROXYSMAL ATRIAL FIBRILLATION (H): ICD-10-CM

## 2021-05-06 DIAGNOSIS — Z95.2 MECHANICAL HEART VALVE PRESENT: ICD-10-CM

## 2021-05-06 DIAGNOSIS — Z79.01 LONG TERM CURRENT USE OF ANTICOAGULANT THERAPY: Primary | ICD-10-CM

## 2021-05-06 DIAGNOSIS — Z79.01 LONG TERM CURRENT USE OF ANTICOAGULANT THERAPY: ICD-10-CM

## 2021-05-06 DIAGNOSIS — I48.11 LONGSTANDING PERSISTENT ATRIAL FIBRILLATION (H): ICD-10-CM

## 2021-05-06 LAB
CAPILLARY BLOOD COLLECTION: NORMAL
INR BLD: 3.2 (ref 0.86–1.14)

## 2021-05-06 PROCEDURE — 36416 COLLJ CAPILLARY BLOOD SPEC: CPT | Performed by: FAMILY MEDICINE

## 2021-05-06 PROCEDURE — 85610 PROTHROMBIN TIME: CPT | Performed by: FAMILY MEDICINE

## 2021-05-06 NOTE — TELEPHONE ENCOUNTER
ANTICOAGULATION MANAGEMENT      Derek Stover due for annual renewal of referral to anticoagulation monitoring. Order pended for your review and signature.      ANTICOAGULATION SUMMARY      Warfarin indication(s)     Atrial fibrillation  Heart Valve Replacement    Heart valve present?  Mechanical MVR       Current goal range   INR: 2.5-3.5     Goal appropriate for indication? Yes, INR 2.5-3.5 appropriate for hx  Mechanical MVR, Mechanical AVR with risk factors or older generation (Mireya-Shiley (Tilting disc), Sanchez-Ngo (Caged Ball) or Monoleaflet valve)     Current duration of therapy Indefinite/long term therapy   Time in Therapeutic Range (TTR)  (Goal > 60%) 70.9 %       Office visit with referring provider's group within last year yes on 10/27/20       Slim Preston RN

## 2021-05-06 NOTE — PROGRESS NOTES
ANTICOAGULATION MANAGEMENT     Patient Name:  Derek Stover  Date:  5/6/2021    ASSESSMENT /SUBJECTIVE:    Today's INR result of 3.2 is therapeutic. Goal INR of 2.5-3.5      Warfarin dose taken: Warfarin taken as instructed    Diet: No new diet changes affecting INR    Medication changes/ interactions: No new medications/supplements affecting INR    Previous INR: Therapeutic     S/S of bleeding or thromboembolism: No    New injury or illness: No    Upcoming surgery, procedure or cardioversion: No    Additional findings: None      PLAN:    Telephone call with Derek regarding INR result and instructed:     Warfarin Dosing Instructions: Continue your current warfarin dose 2.5 mg MWF and 5 mg all other days    Instructed patient to follow up no later than: 6 weeks  Lab visit scheduled    Education provided: Please call back if any changes to your diet, medications or how you've been taking warfarin      Roberto verbalizes understanding and agrees to warfarin dosing plan.    Instructed to call the Anticoagulation Clinic for any changes, questions or concerns. (#[phone])        Slim Preston RN      OBJECTIVE:  Recent labs: (last 7 days)     05/06/21  0828   INR 3.2*         No question data found.  Anticoagulation Summary  As of 5/6/2021    INR goal:  2.5-3.5   TTR:  70.9 % (11.8 mo)   INR used for dosing:  3.2 (5/6/2021)   Warfarin maintenance plan:  2.5 mg (5 mg x 0.5) every Mon, Wed, Fri; 5 mg (5 mg x 1) all other days   Full warfarin instructions:  2.5 mg every Mon, Wed, Fri; 5 mg all other days   Weekly warfarin total:  27.5 mg   No change documented:  Slim Preston RN   Plan last modified:  Slim Preston RN (11/2/2020)   Next INR check:  6/17/2021   Priority:  Maintenance   Target end date:  Indefinite    Indications    Atrial fibrillation (H) [I48.91]  Atrial fibrillation (H) [I48.91] (Resolved) [I48.91]  Long term current use of anticoagulant therapy [Z79.01]  Mechanical heart valve present  [Z95.2]  Longstanding persistent atrial fibrillation (H) [I48.11]             Anticoagulation Episode Summary     INR check location:      Preferred lab:      Send INR reminders to:  [facility]    Comments:  5 mg, PM dose, printout      Anticoagulation Care Providers     Provider Role Specialty Phone number    Osbaldo Renee MD Referring Family Medicine [phone]    Carlos Archibald DO [facility] Internal Medicine [phone]

## 2021-05-24 ENCOUNTER — MYC REFILL (OUTPATIENT)
Dept: FAMILY MEDICINE | Facility: CLINIC | Age: 61
End: 2021-05-24

## 2021-05-24 DIAGNOSIS — I48.20 CHRONIC ATRIAL FIBRILLATION (H): ICD-10-CM

## 2021-05-24 DIAGNOSIS — Z79.01 LONG TERM CURRENT USE OF ANTICOAGULANT THERAPY: ICD-10-CM

## 2021-05-25 RX ORDER — WARFARIN SODIUM 5 MG/1
TABLET ORAL
Qty: 70 TABLET | Refills: 0 | Status: SHIPPED | OUTPATIENT
Start: 2021-05-25 | End: 2021-08-21

## 2021-06-02 ENCOUNTER — OFFICE VISIT (OUTPATIENT)
Dept: CARDIOLOGY | Facility: CLINIC | Age: 61
End: 2021-06-02
Attending: INTERNAL MEDICINE
Payer: COMMERCIAL

## 2021-06-02 VITALS
SYSTOLIC BLOOD PRESSURE: 102 MMHG | WEIGHT: 272.4 LBS | HEART RATE: 80 BPM | BODY MASS INDEX: 38.14 KG/M2 | DIASTOLIC BLOOD PRESSURE: 70 MMHG | OXYGEN SATURATION: 99 % | HEIGHT: 71 IN

## 2021-06-02 DIAGNOSIS — Z98.890 S/P MVR (MITRAL VALVE REPAIR): Primary | ICD-10-CM

## 2021-06-02 DIAGNOSIS — Z95.2 MECHANICAL HEART VALVE PRESENT: ICD-10-CM

## 2021-06-02 DIAGNOSIS — I25.10 CORONARY ARTERY DISEASE INVOLVING NATIVE CORONARY ARTERY OF NATIVE HEART WITHOUT ANGINA PECTORIS: ICD-10-CM

## 2021-06-02 DIAGNOSIS — F17.200 SMOKER: ICD-10-CM

## 2021-06-02 PROCEDURE — 99214 OFFICE O/P EST MOD 30 MIN: CPT | Performed by: INTERNAL MEDICINE

## 2021-06-02 ASSESSMENT — MIFFLIN-ST. JEOR: SCORE: 2069.79

## 2021-06-02 NOTE — LETTER
6/2/2021    Osbaldo Renee MD  [address] [place] [address]    RE: Derek Stover       Dear Colleague,    I had the pleasure of seeing Derek Stover in the [facility].    HISTORY OF PRESENT ILLNESS:  Allergies bother him, decongestants appear to affect INR. Navage helps a great deal. Dsypnea when first arises in past, now not an issue. Now 4 days week schedule. Had lost wt. With Nutrisystem.   No current palpitations    Orders this Visit:  No orders of the defined types were placed in this encounter.    No orders of the defined types were placed in this encounter.    There are no discontinued medications.    Encounter Diagnosis   Name Primary?     Coronary artery disease involving native coronary artery of native heart without angina pectoris        CURRENT MEDICATIONS:  Current Outpatient Medications   Medication Sig Dispense Refill     acetaminophen (TYLENOL) 325 MG tablet Take 2 tablets (650 mg) by mouth every 4 hours as needed for other (surgical pain) 100 tablet 0     Amoxicillin (AMOXIL PO) Take 1,500-3,000 mg by mouth daily as needed (patient takes 6 X 500 = 3,000 mg dose 1 hour before dental appointment and 3 X 500 = 1,500 mg dose 6 hours after dental appointment.)       atorvastatin (LIPITOR) 40 MG tablet Take 1 tablet (40 mg) by mouth every evening 90 tablet 3     clopidogrel (PLAVIX) 75 MG tablet Take 1 tablet (75 mg) by mouth daily 30 tablet 11     warfarin ANTICOAGULANT (COUMADIN) 5 MG tablet Take 2.5 mg Mon, Wed, Fri and 5 mg all other days, or as directed by the Coumadin Clinic. 70 tablet 0     nitroGLYcerin (NITROSTAT) 0.4 MG sublingual tablet For chest pain place 1 tablet under the tongue every 5 minutes for 3 doses. If symptoms persist 5 minutes after 1st dose call 911. (Patient not taking: Reported on 9/8/2020) 15 tablet 0       ALLERGIES     Allergies   Allergen Reactions     No Known Drug Allergy      Pollen Extract       "Seasonal Allergies      Statin Drugs [Hmg-Coa-R Inhibitors]      No allergy, felt horrible on this drug.       PAST MEDICAL, SURGICAL, FAMILY, SOCIAL HISTORY:  History was reviewed and updated as needed, see medical record.    Review of Systems:  A 12-point review of systems was completed, see medical record for detailed review of systems information.    Physical Exam:  Vitals: /70 (BP Location: Left arm, Patient Position: Sitting, Cuff Size: Adult Large)   Pulse 80   Ht 1.791 m (5' 10.5\")   Wt 123.6 kg (272 lb 6.4 oz)   SpO2 99%   BMI 38.53 kg/m      Constitutional:           Skin:           Head:           Eyes:           ENT:           Neck:           Chest:           Cardiac:                    Abdomen:           Vascular:                                        Extremities and Back:           Neurological:           ASSESSMENT: stable. Tolerating meds well, new schedule. Needs to stop smoking       RECOMMENDATIONS:   No tobacco  Wt.  Loss  TTE in one year with follow up  Lipids  Stop plavix, start asa 81 in August Recent Lab Results:  LIPID RESULTS:  Lab Results   Component Value Date    CHOL 109 12/05/2020    HDL 46 12/05/2020    LDL 49 12/05/2020    TRIG 68 12/05/2020    CHOLHDLRATIO 5.3 (H) 08/13/2014       LIVER ENZYME RESULTS:  Lab Results   Component Value Date    AST 18 08/25/2020    ALT 30 12/05/2020       CBC RESULTS:  Lab Results   Component Value Date    WBC 8.3 08/25/2020    RBC 5.00 08/25/2020    HGB 15.8 08/25/2020    HCT 47.6 08/25/2020    MCV 95 08/25/2020    MCH 31.6 08/25/2020    MCHC 33.2 08/25/2020    RDW 15.0 08/25/2020     08/25/2020       BMP RESULTS:  Lab Results   Component Value Date     08/25/2020    POTASSIUM 3.7 08/25/2020    CHLORIDE 109 08/25/2020    CO2 28 08/25/2020    ANIONGAP 4 08/25/2020    GLC 95 08/25/2020    BUN 14 08/25/2020    CR 0.94 08/25/2020    GFRESTIMATED 89 08/25/2020    GFRESTBLACK >90 08/25/2020    FIORELLA 8.9 08/25/2020        A1C " RESULTS:  Lab Results   Component Value Date    A1C 5.1 08/26/2020       INR RESULTS:  Lab Results   Component Value Date    INR 3.2 (H) 05/06/2021    INR 3.0 (H) 03/27/2021    INR 2.28 (H) 08/27/2020    INR 2.29 (H) 08/26/2020       We greatly appreciate the opportunity to be involved in the care of your patient, Derek Stover.    Sincerely,  Zenon Medrano MD        No Mcghee MD  [facility] HEART AT [address]  [place] [address]

## 2021-06-02 NOTE — PROGRESS NOTES
Service Date: 06/02/2021    HISTORY OF PRESENT ILLNESS:  Derek Stover, a 60-year-old man with a history of mitral regurgitation (status post mechanical mitral valve replacement with #32 [employer] valve 2017), coronary artery disease, paroxysmal atrial fibrillation/flutter, hypertension and cigarette smoking history was seen today at your request for followup.    Since last seen, Mr. Stover remains free of chest, arm, neck, jaw or back discomfort with exertion, syncope, palpitation or focal neurologic deficit.  He remains on warfarin with therapeutic INR levels.  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 .      The patient still struggles with tobacco use and weight gain, but informs me he is making good will efforts to abstain and is entering a weight loss program.    He is bothered by seasonal allergies, which he treated with a Navage device with satisfactory results.. He has avoided decongestants which in the past have altered his INR.    The patient is due to switch from clopidogrel to aspirin in 08/2021.    PAST MEDICAL HISTORY:    1.  Coronary artery disease.   a.  Status post non-ST-segment elevation MI 08/2020, treated with implantation of 2 drug-eluting stents in posterior descending branch of dominant circumflex.  No significant narrowing was seen in left main, LAD or nondominant right coronary.    2.  Mitral insufficiency, status post implantation of a #32 [employer] mechanical prosthesis.  Most recent echo shows normal function.  3.  History of paroxysmal atrial fibrillation/flutter, no recurrence recently.  4.  Obesity.  5.  Tobacco use.  6.  Dyslipidemia.    PHYSICAL EXAMINATION:  Physical exam today demonstrates a very  pleasant, cooperative 60-year-old man who is overweight.  His blood pressure was 102/78, heart rate 80 and regular.  His lungs are clear to percussion and auscultation with rare rhonchi.  His cardiac exam shows a prosthetic crisp first heart sound with a normal second heart sound.  There is no click or murmur.  His pulses are full and symmetrical.  Abdomen shows obesity.    LABORATORY STUDIES:  I have personally reviewed the patient's echo from 04/2021 and agree with the interpretation.  The mechanical mitral valve is functioning normally with a mean gradient of 5.7 mmHg.  There is well-preserved left ventricular function and biatrial enlargement.    Most recent INR was 2.3.  His most recent creatinine 0.94.  Most recent LDL cholesterol is 49.    MEDICATIONS:    1.  Atorvastatin 40 mg daily.  2.  Clopidogrel 75 daily.  3.  Warfarin.    ASSESSMENT:  Mr. Borjas is asymptomatic on guideline-directed medical therapy with optimal LDL cholesterol and systolic blood pressure.  His mitral valve prosthesis is functioning normally and remains on anticoagulation. He may require DOT mandated stress testing in the future    We have encouraged a regular exercise program and a Mediterranean-style weight loss diet and tobacco abstention      RECOMMENDATIONS:    1.  Abstain from tobacco.  2.  Regular exercise  45 minutes 4-7 times a week.  3.  Mediterranean-style weight loss diet.  4.  Maintain therapeutic INRs.  5.  Swittch from clopidogrel to aspirin 81 daily 08/2021.  6.  Followup visit with an echo and BMP in about 1 year.  We may switch from a regular TTE to a Stress echo if DOT mandates stress testing.    We greatly appreciate the opportunity to care for your patient, Mr. Hakeem Borjas.    cc:   [place] [address]     Zenon Medrano MD        D: 06/02/2021   T: 06/02/2021   MT: LARRY    Name:     HAKEEM BORJAS  MRN:      [id number]         Account:      [id number]   :      1960           Service Date: 2021       Document: [id number]

## 2021-06-02 NOTE — PROGRESS NOTES
HISTORY OF PRESENT ILLNESS:  Allergies bother him, decongestants appear to affect INR. Navage helps a great deal. Dsypnea when first arises in past, now not an issue. Now 4 days week schedule. Had lost wt. With Nutrisystem.   No current palpitations    Orders this Visit:  No orders of the defined types were placed in this encounter.    No orders of the defined types were placed in this encounter.    There are no discontinued medications.    Encounter Diagnosis   Name Primary?     Coronary artery disease involving native coronary artery of native heart without angina pectoris        CURRENT MEDICATIONS:  Current Outpatient Medications   Medication Sig Dispense Refill     acetaminophen (TYLENOL) 325 MG tablet Take 2 tablets (650 mg) by mouth every 4 hours as needed for other (surgical pain) 100 tablet 0     Amoxicillin (AMOXIL PO) Take 1,500-3,000 mg by mouth daily as needed (patient takes 6 X 500 = 3,000 mg dose 1 hour before dental appointment and 3 X 500 = 1,500 mg dose 6 hours after dental appointment.)       atorvastatin (LIPITOR) 40 MG tablet Take 1 tablet (40 mg) by mouth every evening 90 tablet 3     clopidogrel (PLAVIX) 75 MG tablet Take 1 tablet (75 mg) by mouth daily 30 tablet 11     warfarin ANTICOAGULANT (COUMADIN) 5 MG tablet Take 2.5 mg Mon, Wed, Fri and 5 mg all other days, or as directed by the Coumadin Clinic. 70 tablet 0     nitroGLYcerin (NITROSTAT) 0.4 MG sublingual tablet For chest pain place 1 tablet under the tongue every 5 minutes for 3 doses. If symptoms persist 5 minutes after 1st dose call 911. (Patient not taking: Reported on 9/8/2020) 15 tablet 0       ALLERGIES     Allergies   Allergen Reactions     No Known Drug Allergy      Pollen Extract      Seasonal Allergies      Statin Drugs [Hmg-Coa-R Inhibitors]      No allergy, felt horrible on this drug.       PAST MEDICAL, SURGICAL, FAMILY, SOCIAL HISTORY:  History was reviewed and updated as needed, see medical record.    Review of  "Systems:  A 12-point review of systems was completed, see medical record for detailed review of systems information.    Physical Exam:  Vitals: /70 (BP Location: Left arm, Patient Position: Sitting, Cuff Size: Adult Large)   Pulse 80   Ht 1.791 m (5' 10.5\")   Wt 123.6 kg (272 lb 6.4 oz)   SpO2 99%   BMI 38.53 kg/m      Constitutional:           Skin:           Head:           Eyes:           ENT:           Neck:           Chest:           Cardiac:                    Abdomen:           Vascular:                                        Extremities and Back:           Neurological:           ASSESSMENT: stable. Tolerating meds well, new schedule. Needs to stop smoking       RECOMMENDATIONS:   No tobacco  Wt.  Loss  TTE in one year with follow up  Lipids  Stop plavix, start asa 81 in August Recent Lab Results:  LIPID RESULTS:  Lab Results   Component Value Date    CHOL 109 12/05/2020    HDL 46 12/05/2020    LDL 49 12/05/2020    TRIG 68 12/05/2020    CHOLHDLRATIO 5.3 (H) 08/13/2014       LIVER ENZYME RESULTS:  Lab Results   Component Value Date    AST 18 08/25/2020    ALT 30 12/05/2020       CBC RESULTS:  Lab Results   Component Value Date    WBC 8.3 08/25/2020    RBC 5.00 08/25/2020    HGB 15.8 08/25/2020    HCT 47.6 08/25/2020    MCV 95 08/25/2020    MCH 31.6 08/25/2020    MCHC 33.2 08/25/2020    RDW 15.0 08/25/2020     08/25/2020       BMP RESULTS:  Lab Results   Component Value Date     08/25/2020    POTASSIUM 3.7 08/25/2020    CHLORIDE 109 08/25/2020    CO2 28 08/25/2020    ANIONGAP 4 08/25/2020    GLC 95 08/25/2020    BUN 14 08/25/2020    CR 0.94 08/25/2020    GFRESTIMATED 89 08/25/2020    GFRESTBLACK >90 08/25/2020    FIORELLA 8.9 08/25/2020        A1C RESULTS:  Lab Results   Component Value Date    A1C 5.1 08/26/2020       INR RESULTS:  Lab Results   Component Value Date    INR 3.2 (H) 05/06/2021    INR 3.0 (H) 03/27/2021    INR 2.28 (H) 08/27/2020    INR 2.29 (H) 08/26/2020       We " greatly appreciate the opportunity to be involved in the care of your patient, Derek Stover.    Sincerely,  MD EMANUEL Rodriguez MD  [facility] HEART AT [place]  [address]  [place] [address]

## 2021-06-17 ENCOUNTER — ANTICOAGULATION THERAPY VISIT (OUTPATIENT)
Dept: ANTICOAGULATION | Facility: CLINIC | Age: 61
End: 2021-06-17

## 2021-06-17 DIAGNOSIS — Z95.2 MECHANICAL HEART VALVE PRESENT: ICD-10-CM

## 2021-06-17 DIAGNOSIS — I48.0 PAROXYSMAL ATRIAL FIBRILLATION (H): ICD-10-CM

## 2021-06-17 DIAGNOSIS — I48.11 LONGSTANDING PERSISTENT ATRIAL FIBRILLATION (H): ICD-10-CM

## 2021-06-17 DIAGNOSIS — Z79.01 LONG TERM CURRENT USE OF ANTICOAGULANT THERAPY: ICD-10-CM

## 2021-06-17 LAB
CAPILLARY BLOOD COLLECTION: NORMAL
INR BLD: 3 (ref 0.86–1.14)

## 2021-06-17 PROCEDURE — 36416 COLLJ CAPILLARY BLOOD SPEC: CPT | Performed by: FAMILY MEDICINE

## 2021-06-17 PROCEDURE — 85610 PROTHROMBIN TIME: CPT | Performed by: FAMILY MEDICINE

## 2021-06-17 NOTE — PROGRESS NOTES
ANTICOAGULATION MANAGEMENT     Patient Name:  Derek Stover  Date:  6/17/2021    ASSESSMENT /SUBJECTIVE:    Today's INR result of 3.0 is therapeutic. Goal INR of 2.5-3.5      Warfarin dose taken: Warfarin taken as instructed    Diet: No new diet changes identified    Medication changes/ interactions: No new medications/supplements affecting INR    Previous INR: Therapeutic     S/S of bleeding or thromboembolism: No    New injury or illness: No    Upcoming surgery, procedure or cardioversion: No    Additional findings: None      PLAN:    Warfarin Dosing Instructions: Continue your current warfarin dose    Instructed patient to follow up no later than: 6 weeks  Lab visit scheduled    Education provided: None required    Telephone call with Derek whom verbalizes understanding and agrees to plan    Instructed to call the Anticoagulation Clinic for any changes, questions or concerns. (#[phone])        Slim Preston RN      OBJECTIVE:  Recent labs: (last 7 days)     06/17/21  0814   INR 3.0*         No question data found.  Anticoagulation Summary  As of 6/17/2021    INR goal:  2.5-3.5   TTR:  82.8 % (11.8 mo)   INR used for dosing:  3.0 (6/17/2021)   Warfarin maintenance plan:  2.5 mg (5 mg x 0.5) every Mon, Wed, Fri; 5 mg (5 mg x 1) all other days   Full warfarin instructions:  2.5 mg every Mon, Wed, Fri; 5 mg all other days   Weekly warfarin total:  27.5 mg   No change documented:  Slim Preston RN   Plan last modified:  Slim Preston RN (11/2/2020)   Next INR check:  7/29/2021   Priority:  Maintenance   Target end date:  Indefinite    Indications    Atrial fibrillation (H) [I48.91]  Atrial fibrillation (H) [I48.91] (Resolved) [I48.91]  Long term current use of anticoagulant therapy [Z79.01]  Mechanical heart valve present [Z95.2]  Longstanding persistent atrial fibrillation (H) [I48.11]             Anticoagulation Episode Summary     INR check location:      Preferred lab:      Send INR reminders  to:  [facility]    Comments:  5 mg, PM dose, printout      Anticoagulation Care Providers     Provider Role Specialty Phone number    Osbaldo Renee MD Referring Family Medicine [phone]    Carlos rAchibald DO Responsible Internal Medicine [phone]

## 2021-07-29 ENCOUNTER — LAB (OUTPATIENT)
Dept: LAB | Facility: CLINIC | Age: 61
End: 2021-07-29
Payer: COMMERCIAL

## 2021-07-29 ENCOUNTER — ANTICOAGULATION THERAPY VISIT (OUTPATIENT)
Dept: ANTICOAGULATION | Facility: CLINIC | Age: 61
End: 2021-07-29

## 2021-07-29 DIAGNOSIS — I48.91 ATRIAL FIBRILLATION (H): Primary | ICD-10-CM

## 2021-07-29 DIAGNOSIS — Z79.01 LONG TERM CURRENT USE OF ANTICOAGULANT THERAPY: ICD-10-CM

## 2021-07-29 DIAGNOSIS — Z95.2 MECHANICAL HEART VALVE PRESENT: ICD-10-CM

## 2021-07-29 DIAGNOSIS — I48.11 LONGSTANDING PERSISTENT ATRIAL FIBRILLATION (H): ICD-10-CM

## 2021-07-29 LAB — INR BLD: 1.5 (ref 0.9–1.1)

## 2021-07-29 PROCEDURE — 36416 COLLJ CAPILLARY BLOOD SPEC: CPT

## 2021-07-29 PROCEDURE — 85610 PROTHROMBIN TIME: CPT

## 2021-07-29 NOTE — PROGRESS NOTES
ANTICOAGULATION MANAGEMENT     Derek Stover 60 year old male is on warfarin with subtherapeutic INR result. (Goal INR 2.5-3.5)    Recent labs: (last 7 days)     07/29/21  0821   INR 1.5*       ASSESSMENT     Source(s): Chart Review and Patient/Caregiver Call       Warfarin doses taken: Warfarin taken as instructed    Diet: No new diet changes identified    New illness, injury, or hospitalization: No    Medication/supplement changes: Pt started a supplement drink called purelean, does not appear to contain vit K, but this is the only change he has made. Has done about 5 days    Signs or symptoms of bleeding or clotting: No    Previous INR: Therapeutic last 2(+) visits    Additional findings: None     PLAN     Recommended plan for ongoing change(s) affecting INR     Dosing Instructions: Booster dose then Increase your warfarin dose (18.2% change) with next INR in 1 week, Advised check in 4-5 days, but pt said he can only come on Thursday.    Summary  As of 7/29/2021    Full warfarin instructions:  7/29: 7.5 mg; Otherwise 2.5 mg every Wed; 5 mg all other days   Next INR check:  8/5/2021             Telephone call with Derek who verbalizes understanding and agrees to plan    Lab visit scheduled    Education provided: Target INR goal and significance of current INR result, Importance of therapeutic range, Monitoring for clotting signs and symptoms and When to seek medical attention/emergency care    Plan made per [facility] anticoagulation protocol    Danni Patton RN  Anticoagulation Clinic  7/29/2021    _______________________________________________________________________     Anticoagulation Episode Summary     Current INR goal:  2.5-3.5   TTR:  75.8 % (11.8 mo)   Target end date:  Indefinite   Send INR reminders to:  [facility]    Indications    Atrial fibrillation (H) [I48.91]  Atrial fibrillation (H) [I48.91] (Resolved) [I48.91]  Long term current use of anticoagulant therapy [Z79.01]  Mechanical heart  valve present [Z95.2]  Longstanding persistent atrial fibrillation (H) [I48.11]           Comments:  5 mg, PM dose, printout         Anticoagulation Care Providers     Provider Role Specialty Phone number    Osbaldo Renee MD Referring Family Medicine [phone]    Carlos Archibald DO [facility] Internal Medicine [phone]

## 2021-08-05 ENCOUNTER — APPOINTMENT (OUTPATIENT)
Dept: LAB | Facility: CLINIC | Age: 61
End: 2021-08-05
Payer: COMMERCIAL

## 2021-08-05 ENCOUNTER — ANTICOAGULATION THERAPY VISIT (OUTPATIENT)
Dept: ANTICOAGULATION | Facility: CLINIC | Age: 61
End: 2021-08-05

## 2021-08-05 DIAGNOSIS — Z79.01 LONG TERM CURRENT USE OF ANTICOAGULANT THERAPY: ICD-10-CM

## 2021-08-05 DIAGNOSIS — I48.91 ATRIAL FIBRILLATION (H): Primary | ICD-10-CM

## 2021-08-05 DIAGNOSIS — Z95.2 MECHANICAL HEART VALVE PRESENT: ICD-10-CM

## 2021-08-05 DIAGNOSIS — I48.11 LONGSTANDING PERSISTENT ATRIAL FIBRILLATION (H): ICD-10-CM

## 2021-08-05 NOTE — PROGRESS NOTES
ANTICOAGULATION MANAGEMENT     Derek Stover 60 year old male is on warfarin with supratherapeutic INR result. (Goal INR 2.5-3.5)    Recent labs: (last 7 days)     08/05/21  0824   INR 4.6*       ASSESSMENT     Source(s): Chart Review and Patient/Caregiver Call       Warfarin doses taken: Warfarin taken as instructed    Diet: No new diet changes identified    New illness, injury, or hospitalization: No    Medication/supplement changes: Allegra D as needed use which has potential for interaction; increasing INR    Signs or symptoms of bleeding or clotting: No    Previous INR: Subtherapeutic    Additional findings: None     PLAN     Recommended plan for ongoing change(s) affecting INR     Dosing Instructions:Hold today then  Decrease your warfarin dose (7.7% change) with next INR in 1 week   - pt states he only has Thrusday's off so needs to come back this day only    Summary  As of 8/5/2021    Full warfarin instructions:  8/5: Hold; Otherwise 2.5 mg every Wed, Sat; 5 mg all other days   Next INR check:  8/12/2021             Telephone call with Derek who verbalizes understanding and agrees to plan and who agrees to plan and repeated back plan correctly    Lab visit scheduled    Education provided: Target INR goal and significance of current INR result, Importance of therapeutic range, Importance of following up for INR monitoring at instructed interval, Potential interaction between warfarin and Allegra D - encouraged consistancy and Monitoring for bleeding signs and symptoms    Plan made per [facility] anticoagulation protocol    Slim Preston RN  Anticoagulation Clinic  8/5/2021    _______________________________________________________________________     Anticoagulation Episode Summary     Current INR goal:  2.5-3.5   TTR:  74.5 % (11.8 mo)   Target end date:  Indefinite   Send INR reminders to:  [facility] [employer]    Indications    Atrial fibrillation (H) [I48.91]  Atrial fibrillation (H) [I48.91] (Resolved)  [I48.91]  Long term current use of anticoagulant therapy [Z79.01]  Mechanical heart valve present [Z95.2]  Longstanding persistent atrial fibrillation (H) [I48.11]           Comments:  5 mg, PM dose, printout         Anticoagulation Care Providers     Provider Role Specialty Phone number    Osbaldo Renee MD Referring Family Medicine [phone]    Carlos Archibald DO [facility] Internal Medicine [phone]

## 2021-08-12 ENCOUNTER — ANTICOAGULATION THERAPY VISIT (OUTPATIENT)
Dept: ANTICOAGULATION | Facility: CLINIC | Age: 61
End: 2021-08-12

## 2021-08-12 ENCOUNTER — LAB (OUTPATIENT)
Dept: LAB | Facility: CLINIC | Age: 61
End: 2021-08-12
Payer: COMMERCIAL

## 2021-08-12 DIAGNOSIS — Z79.01 LONG TERM CURRENT USE OF ANTICOAGULANT THERAPY: ICD-10-CM

## 2021-08-12 DIAGNOSIS — Z95.2 MECHANICAL HEART VALVE PRESENT: ICD-10-CM

## 2021-08-12 DIAGNOSIS — I48.91 ATRIAL FIBRILLATION (H): Primary | ICD-10-CM

## 2021-08-12 DIAGNOSIS — I48.11 LONGSTANDING PERSISTENT ATRIAL FIBRILLATION (H): ICD-10-CM

## 2021-08-12 LAB — INR BLD: 4.6 (ref 0.9–1.1)

## 2021-08-12 PROCEDURE — 36416 COLLJ CAPILLARY BLOOD SPEC: CPT

## 2021-08-12 PROCEDURE — 85610 PROTHROMBIN TIME: CPT

## 2021-08-12 NOTE — PROGRESS NOTES
ANTICOAGULATION MANAGEMENT     Derek Stover 60 year old male is on warfarin with supratherapeutic INR result. (Goal INR 2.5-3.5)    Recent labs: (last 7 days)     08/12/21  0824   INR 4.6*       ASSESSMENT     Source(s): Chart Review and Patient/Caregiver Call       Warfarin doses taken: Warfarin taken as instructed    Diet: No new diet changes identified    New illness, injury, or hospitalization: No    Medication/supplement changes: Pt is still taking Allegra D but has kept this consistent the last 2 weeks    Signs or symptoms of bleeding or clotting: No    Previous INR: Supratherapeutic    Additional findings: None     PLAN     Recommended plan for ongoing change(s) affecting INR     Dosing Instructions:  Decrease your warfarin dose (8.3% change) with next INR in 1 week       Summary  As of 8/12/2021    Full warfarin instructions:  8/12: Hold; Otherwise 2.5 mg every Mon, Wed, Sat; 5 mg all other days   Next INR check:  8/19/2021             Telephone call with Derek who verbalizes understanding and agrees to plan and who agrees to plan and repeated back plan correctly    Lab visit scheduled    Education provided: Target INR goal and significance of current INR result, Importance of therapeutic range, Importance of following up for INR monitoring at instructed interval and Monitoring for bleeding signs and symptoms    Plan made per [facility] anticoagulation protocol    Slim Preston RN  Anticoagulation Clinic  8/12/2021    _______________________________________________________________________     Anticoagulation Episode Summary     Current INR goal:  2.5-3.5   TTR:  72.5 % (11.8 mo)   Target end date:  Indefinite   Send INR reminders to:  [facility]    Indications    Atrial fibrillation (H) [I48.91]  Atrial fibrillation (H) [I48.91] (Resolved) [I48.91]  Long term current use of anticoagulant therapy [Z79.01]  Mechanical heart valve present [Z95.2]  Longstanding persistent atrial fibrillation (H)  [I48.11]           Comments:  5 mg, PM dose, printout         Anticoagulation Care Providers     Provider Role Specialty Phone number    Osbaldo Renee MD Referring Family Medicine [phone]    Carlos Archibald DO [facility] Internal Medicine [phone]

## 2021-08-19 ENCOUNTER — LAB (OUTPATIENT)
Dept: LAB | Facility: CLINIC | Age: 61
End: 2021-08-19
Payer: COMMERCIAL

## 2021-08-19 ENCOUNTER — ANTICOAGULATION THERAPY VISIT (OUTPATIENT)
Dept: ANTICOAGULATION | Facility: CLINIC | Age: 61
End: 2021-08-19

## 2021-08-19 DIAGNOSIS — Z79.01 LONG TERM CURRENT USE OF ANTICOAGULANT THERAPY: ICD-10-CM

## 2021-08-19 DIAGNOSIS — I48.91 ATRIAL FIBRILLATION (H): Primary | ICD-10-CM

## 2021-08-19 DIAGNOSIS — Z95.2 MECHANICAL HEART VALVE PRESENT: ICD-10-CM

## 2021-08-19 DIAGNOSIS — I48.11 LONGSTANDING PERSISTENT ATRIAL FIBRILLATION (H): ICD-10-CM

## 2021-08-19 LAB — INR BLD: 3.6 (ref 0.9–1.1)

## 2021-08-19 PROCEDURE — 36416 COLLJ CAPILLARY BLOOD SPEC: CPT

## 2021-08-19 PROCEDURE — 85610 PROTHROMBIN TIME: CPT

## 2021-08-19 NOTE — PROGRESS NOTES
ANTICOAGULATION MANAGEMENT     Derek WHITAKER Ck 60 year old male is on warfarin with supratherapeutic INR result. (Goal INR 2.5-3.5)    Recent labs: (last 7 days)     08/19/21  0849   INR 3.6*       ASSESSMENT     Source(s): Chart Review and Patient/Caregiver Call       Warfarin doses taken: Warfarin taken as instructed    Diet: No new diet changes identified    New illness, injury, or hospitalization: No    Medication/supplement changes: None noted    Signs or symptoms of bleeding or clotting: No    Previous INR: Supratherapeutic    Additional findings: None     PLAN     Recommended plan for no diet, medication or health factor changes affecting INR     Dosing Instructions: Continue your current warfarin dose with next INR in 2 weeks       Summary  As of 8/19/2021    Full warfarin instructions:  2.5 mg every Mon, Wed, Sat; 5 mg all other days   Next INR check:  9/2/2021             Telephone call with Derek who verbalizes understanding and agrees to plan and who agrees to plan and repeated back plan correctly    Lab visit scheduled    Education provided: Target INR goal and significance of current INR result, Importance of therapeutic range and Importance of following up for INR monitoring at instructed interval    Plan made per [facility] anticoagulation protocol    Slim Preston RN  Anticoagulation Clinic  8/19/2021    _______________________________________________________________________     Anticoagulation Episode Summary     Current INR goal:  2.5-3.5   TTR:  70.5 % (11.8 mo)   Target end date:  Indefinite   Send INR reminders to:  [facility]    Indications    Atrial fibrillation (H) [I48.91]  Atrial fibrillation (H) [I48.91] (Resolved) [I48.91]  Long term current use of anticoagulant therapy [Z79.01]  Mechanical heart valve present [Z95.2]  Longstanding persistent atrial fibrillation (H) [I48.11]           Comments:  5 mg, PM dose, printout         Anticoagulation Care Providers     Provider Role  Specialty Phone number    Osbaldo Renee MD Referring Family Medicine [phone]    Carlos Archibald DO [facility] Internal Medicine [phone]

## 2021-08-21 ENCOUNTER — MYC REFILL (OUTPATIENT)
Dept: FAMILY MEDICINE | Facility: CLINIC | Age: 61
End: 2021-08-21

## 2021-08-21 DIAGNOSIS — Z79.01 LONG TERM CURRENT USE OF ANTICOAGULANT THERAPY: ICD-10-CM

## 2021-08-21 DIAGNOSIS — I48.20 CHRONIC ATRIAL FIBRILLATION (H): ICD-10-CM

## 2021-08-23 RX ORDER — WARFARIN SODIUM 5 MG/1
TABLET ORAL
Qty: 70 TABLET | Refills: 1 | Status: SHIPPED | OUTPATIENT
Start: 2021-08-23 | End: 2022-02-22

## 2021-09-02 ENCOUNTER — ANTICOAGULATION THERAPY VISIT (OUTPATIENT)
Dept: ANTICOAGULATION | Facility: CLINIC | Age: 61
End: 2021-09-02

## 2021-09-02 ENCOUNTER — APPOINTMENT (OUTPATIENT)
Dept: LAB | Facility: CLINIC | Age: 61
End: 2021-09-02
Payer: COMMERCIAL

## 2021-09-02 DIAGNOSIS — Z79.01 LONG TERM CURRENT USE OF ANTICOAGULANT THERAPY: ICD-10-CM

## 2021-09-02 DIAGNOSIS — Z95.2 MECHANICAL HEART VALVE PRESENT: ICD-10-CM

## 2021-09-02 DIAGNOSIS — I48.11 LONGSTANDING PERSISTENT ATRIAL FIBRILLATION (H): ICD-10-CM

## 2021-09-02 DIAGNOSIS — I48.91 ATRIAL FIBRILLATION (H): Primary | ICD-10-CM

## 2021-09-02 LAB — INR BLD: 2.8 (ref 0.9–1.1)

## 2021-09-02 PROCEDURE — 36416 COLLJ CAPILLARY BLOOD SPEC: CPT

## 2021-09-02 PROCEDURE — 85610 PROTHROMBIN TIME: CPT

## 2021-09-02 NOTE — PROGRESS NOTES
ANTICOAGULATION MANAGEMENT     Derek Stover 60 year old male is on warfarin with therapeutic INR result. (Goal INR 2.5-3.5)    Recent labs: (last 7 days)     09/02/21  0812   INR 2.8*       ASSESSMENT     Source(s): Chart Review and Patient/Caregiver Call       Warfarin doses taken: Warfarin taken as instructed    Diet: No new diet changes identified    New illness, injury, or hospitalization: No    Medication/supplement changes: Plavix stopped on 8/27/21 which has potential for interaction in future; decreasing subsequent INRs     Signs or symptoms of bleeding or clotting: No    Previous INR: Supratherapeutic    Additional findings: None     PLAN     Recommended plan for ongoing change(s) affecting INR     Dosing Instructions:  Increase your warfarin dose (9.1% change) with next INR in 2 weeks       Summary  As of 9/2/2021    Full warfarin instructions:  2.5 mg every Mon, Wed; 5 mg all other days   Next INR check:  9/16/2021             Telephone call with Derek who verbalizes understanding and agrees to plan and who agrees to plan and repeated back plan correctly    Lab visit scheduled    Education provided: Please call back if any changes to your diet, medications or how you've been taking warfarin and Potential interaction between warfarin and stopping plavix    Plan made per [facility] anticoagulation protocol    Slim Preston RN  Anticoagulation Clinic  9/2/2021    _______________________________________________________________________     Anticoagulation Episode Summary     Current INR goal:  2.5-3.5   TTR:  71.5 % (1 y)   Target end date:  Indefinite   Send INR reminders to:  [facility]    Indications    Atrial fibrillation (H) [I48.91]  Atrial fibrillation (H) [I48.91] (Resolved) [I48.91]  Long term current use of anticoagulant therapy [Z79.01]  Mechanical heart valve present [Z95.2]  Longstanding persistent atrial fibrillation (H) [I48.11]           Comments:  5 mg, PM dose, printout          Anticoagulation Care Providers     Provider Role Specialty Phone number    Osbaldo Renee MD Referring Family Medicine [phone]    Carlos Archibald DO [facility] Internal Medicine [phone]

## 2021-09-11 ENCOUNTER — HEALTH MAINTENANCE LETTER (OUTPATIENT)
Age: 61
End: 2021-09-11

## 2021-09-16 ENCOUNTER — LAB (OUTPATIENT)
Dept: LAB | Facility: CLINIC | Age: 61
End: 2021-09-16
Payer: COMMERCIAL

## 2021-09-16 ENCOUNTER — ANTICOAGULATION THERAPY VISIT (OUTPATIENT)
Dept: ANTICOAGULATION | Facility: CLINIC | Age: 61
End: 2021-09-16

## 2021-09-16 DIAGNOSIS — Z79.01 LONG TERM CURRENT USE OF ANTICOAGULANT THERAPY: ICD-10-CM

## 2021-09-16 DIAGNOSIS — I48.11 LONGSTANDING PERSISTENT ATRIAL FIBRILLATION (H): ICD-10-CM

## 2021-09-16 DIAGNOSIS — Z95.2 MECHANICAL HEART VALVE PRESENT: ICD-10-CM

## 2021-09-16 DIAGNOSIS — I48.91 ATRIAL FIBRILLATION (H): Primary | ICD-10-CM

## 2021-09-16 LAB — INR BLD: 4.8 (ref 0.9–1.1)

## 2021-09-16 PROCEDURE — 85610 PROTHROMBIN TIME: CPT

## 2021-09-16 PROCEDURE — 36416 COLLJ CAPILLARY BLOOD SPEC: CPT

## 2021-09-16 NOTE — PROGRESS NOTES
ANTICOAGULATION MANAGEMENT     Derek WHITAKER Ck 60 year old male is on warfarin with supratherapeutic INR result. (Goal INR 2.5-3.5)    Recent labs: (last 7 days)     09/16/21  1603   INR 4.8*       ASSESSMENT     Source(s): Chart Review and Patient/Caregiver Call       Warfarin doses taken: Warfarin taken as instructed    Diet: No new diet changes identified    New illness, injury, or hospitalization: Yes: Significant neck pain started mon, better today    Medication/supplement changes: Took tylenol a few times a day since monday    Signs or symptoms of bleeding or clotting: No    Previous INR: Therapeutic last visit; previously outside of goal range    Additional findings: None     PLAN     Recommended plan for temporary change(s) affecting INR     Dosing Instructions: Hold dose then continue your current warfarin dose with next INR in 2 weeks       Summary  As of 9/16/2021    Full warfarin instructions:  9/16: Hold; Otherwise 2.5 mg every Mon, Wed; 5 mg all other days   Next INR check:  9/30/2021             Telephone call with Derek who verbalizes understanding and agrees to plan    Lab visit scheduled    Education provided: Goal range and significance of current result and Potential interaction between warfarin and Tylenol    Plan made per [facility] anticoagulation protocol    Danni Patton RN  Anticoagulation Clinic  9/16/2021    _______________________________________________________________________     Anticoagulation Episode Summary     Current INR goal:  2.5-3.5   TTR:  71.9 % (1 y)   Target end date:  Indefinite   [facility]    Indications    Atrial fibrillation (H) [I48.91]  Atrial fibrillation (H) [I48.91] (Resolved) [I48.91]  Long term current use of anticoagulant therapy [Z79.01]  Mechanical heart valve present [Z95.2]  Longstanding persistent atrial fibrillation (H) [I48.11]           Comments:  5 mg, PM dose         Anticoagulation Care Providers     Provider Role  Specialty Phone number    Osbaldo Renee MD Referring Family Medicine [phone]    Carlos Archibald DO [facility] Internal Medicine [phone]

## 2021-09-30 ENCOUNTER — LAB (OUTPATIENT)
Dept: LAB | Facility: CLINIC | Age: 61
End: 2021-09-30
Payer: COMMERCIAL

## 2021-09-30 ENCOUNTER — ANTICOAGULATION THERAPY VISIT (OUTPATIENT)
Dept: ANTICOAGULATION | Facility: CLINIC | Age: 61
End: 2021-09-30

## 2021-09-30 DIAGNOSIS — Z95.2 MECHANICAL HEART VALVE PRESENT: ICD-10-CM

## 2021-09-30 DIAGNOSIS — Z79.01 LONG TERM CURRENT USE OF ANTICOAGULANT THERAPY: ICD-10-CM

## 2021-09-30 DIAGNOSIS — I48.11 LONGSTANDING PERSISTENT ATRIAL FIBRILLATION (H): ICD-10-CM

## 2021-09-30 DIAGNOSIS — I48.91 ATRIAL FIBRILLATION (H): Primary | ICD-10-CM

## 2021-09-30 LAB — INR BLD: 3.9 (ref 0.9–1.1)

## 2021-09-30 PROCEDURE — 36416 COLLJ CAPILLARY BLOOD SPEC: CPT

## 2021-09-30 PROCEDURE — 85610 PROTHROMBIN TIME: CPT

## 2021-09-30 NOTE — PROGRESS NOTES
ANTICOAGULATION MANAGEMENT     Derek Stover 60 year old male is on warfarin with supratherapeutic INR result. (Goal INR 2.5-3.5)    Recent labs: (last 7 days)     09/30/21  1552   INR 3.9*       ASSESSMENT     Source(s): Chart Review and Patient/Caregiver Call       Warfarin doses taken: Warfarin taken as instructed    Diet: No new diet changes identified    New illness, injury, or hospitalization: No    Medication/supplement changes: None noted    Signs or symptoms of bleeding or clotting: No    Previous INR: Supratherapeutic    Additional findings: None     PLAN     Recommended plan for no diet, medication or health factor changes affecting INR     Dosing Instructions:  Decrease your warfarin dose (8.3% change) with next INR in 2 weeks       Summary  As of 9/30/2021    Full warfarin instructions:  2.5 mg every Mon, Wed, Fri; 5 mg all other days   Next INR check:  10/14/2021             Telephone call with Derek who verbalizes understanding and agrees to plan    Lab visit scheduled    Education provided: Monitoring for bleeding signs and symptoms    Plan made per [facility] anticoagulation protocol    Danni Patton RN  Anticoagulation Clinic  9/30/2021    _______________________________________________________________________     Anticoagulation Episode Summary     Current INR goal:  2.5-3.5   TTR:  68.3 % (1 y)   Target end date:  Indefinite   [facility]    Indications    Atrial fibrillation (H) [I48.91]  Atrial fibrillation (H) [I48.91] (Resolved) [I48.91]  Long term current use of anticoagulant therapy [Z79.01]  Mechanical heart valve present [Z95.2]  Longstanding persistent atrial fibrillation (H) [I48.11]           Comments:  5 mg, PM dose         Anticoagulation Care Providers     Provider Role Specialty Phone number    Osbaldo Renee MD Referring Family Medicine [phone]    Carlos Archibald DO Responsible Internal Medicine [phone]

## 2021-10-14 ENCOUNTER — ANTICOAGULATION THERAPY VISIT (OUTPATIENT)
Dept: ANTICOAGULATION | Facility: CLINIC | Age: 61
End: 2021-10-14

## 2021-10-14 ENCOUNTER — LAB (OUTPATIENT)
Dept: LAB | Facility: CLINIC | Age: 61
End: 2021-10-14
Payer: COMMERCIAL

## 2021-10-14 DIAGNOSIS — Z95.2 MECHANICAL HEART VALVE PRESENT: ICD-10-CM

## 2021-10-14 DIAGNOSIS — I48.11 LONGSTANDING PERSISTENT ATRIAL FIBRILLATION (H): ICD-10-CM

## 2021-10-14 DIAGNOSIS — Z79.01 LONG TERM CURRENT USE OF ANTICOAGULANT THERAPY: ICD-10-CM

## 2021-10-14 DIAGNOSIS — I48.91 ATRIAL FIBRILLATION, UNSPECIFIED TYPE (H): Primary | ICD-10-CM

## 2021-10-14 LAB — INR BLD: 3.9 (ref 0.9–1.1)

## 2021-10-14 PROCEDURE — 36416 COLLJ CAPILLARY BLOOD SPEC: CPT

## 2021-10-14 PROCEDURE — 85610 PROTHROMBIN TIME: CPT

## 2021-10-14 NOTE — PROGRESS NOTES
ANTICOAGULATION MANAGEMENT     Derek Stover 60 year old male is on warfarin with supratherapeutic INR result. (Goal INR 2.5-3.5)    Recent labs: (last 7 days)     10/14/21  1622   INR 3.9*       ASSESSMENT     Source(s): Chart Review I left a detailed voicemail with the orders reflected in flowsheet. I have also requested a call back if there have been any missed doses, concerns, illness, fever, or if there have been any changes in medications, activity level, or diet          Previous INR: Supratherapeutic    Additional findings:  left     PLAN     Recommended plan for no diet, medication or health factor changes affecting INR     Dosing Instructions: Partial hold then Decrease your warfarin dose (9.1% change) with next INR in 2 weeks       Summary  As of 10/14/2021    Full warfarin instructions:  10/14: 2.5 mg; Otherwise 5 mg every Tue, Thu, Sat; 2.5 mg all other days   Next INR check:  10/28/2021             Detailed voice message left for Derek with dosing instructions and follow up date.     Contact [phone]  to schedule and with any changes, questions or concerns.     Education provided: Please call back if any changes to your diet, medications or how you've been taking warfarin    Plan made per [facility] anticoagulation protocol    Perla Walden, RN  Anticoagulation Clinic  10/14/2021    _______________________________________________________________________     Anticoagulation Episode Summary     Current INR goal:  2.5-3.5   TTR:  68.3 % (1 y)   Target end date:  Indefinite   Send INR [facility]ers to:  [facility]    Indications    Atrial fibrillation (H) [I48.91]  Atrial fibrillation (H) [I48.91] (Resolved) [I48.91]  Long term current use of anticoagulant therapy [Z79.01]  Mechanical heart valve present [Z95.2]  Longstanding persistent atrial fibrillation (H) [I48.11]           Comments:  5 mg, PM dose         Anticoagulation Care Providers     Provider Role Specialty Phone number    Thelma  MD Osbaldo [facility] Family Medicine [phone]    Carlos Archibald DO [facility] Internal Medicine [phone]

## 2021-10-28 ENCOUNTER — ANTICOAGULATION THERAPY VISIT (OUTPATIENT)
Dept: ANTICOAGULATION | Facility: CLINIC | Age: 61
End: 2021-10-28

## 2021-10-28 ENCOUNTER — LAB (OUTPATIENT)
Dept: LAB | Facility: CLINIC | Age: 61
End: 2021-10-28
Payer: COMMERCIAL

## 2021-10-28 DIAGNOSIS — Z79.01 LONG TERM CURRENT USE OF ANTICOAGULANT THERAPY: ICD-10-CM

## 2021-10-28 DIAGNOSIS — Z95.2 MECHANICAL HEART VALVE PRESENT: ICD-10-CM

## 2021-10-28 DIAGNOSIS — I48.11 LONGSTANDING PERSISTENT ATRIAL FIBRILLATION (H): ICD-10-CM

## 2021-10-28 DIAGNOSIS — I48.91 ATRIAL FIBRILLATION (H): Primary | ICD-10-CM

## 2021-10-28 LAB — INR BLD: 3.2 (ref 0.9–1.1)

## 2021-10-28 PROCEDURE — 36416 COLLJ CAPILLARY BLOOD SPEC: CPT

## 2021-10-28 PROCEDURE — 85610 PROTHROMBIN TIME: CPT

## 2021-10-28 NOTE — PROGRESS NOTES
ANTICOAGULATION MANAGEMENT     Derek Stover 60 year old male is on warfarin with therapeutic INR result. (Goal INR 2.5-3.5)    Recent labs: (last 7 days)     10/28/21  1556   INR 3.2*       ASSESSMENT     Source(s): Chart Review and Patient/Caregiver Call       Warfarin doses taken: Warfarin taken as instructed    Diet: No new diet changes identified    New illness, injury, or hospitalization: No    Medication/supplement changes: None noted    Signs or symptoms of bleeding or clotting: No    Previous INR: Supratherapeutic    Additional findings: None     PLAN     Recommended plan for no diet, medication or health factor changes affecting INR     Dosing Instructions: Continue your current warfarin dose with next INR in 3 weeks       Summary  As of 10/28/2021    Full warfarin instructions:  5 mg every Tue, Thu, Sat; 2.5 mg all other days   Next INR check:  11/18/2021             Telephone call with Derek who verbalizes understanding and agrees to plan    Lab visit scheduled    Education provided: None required    Plan made per [facility] anticoagulation protocol    Danni Patton RN  Anticoagulation Clinic  10/28/2021    _______________________________________________________________________     Anticoagulation Episode Summary     Current INR goal:  2.5-3.5   TTR:  70.0 % (1 y)   Target end date:  Indefinite   Send INR reminders to:  [facility]    Indications    Atrial fibrillation (H) [I48.91]  Atrial fibrillation (H) [I48.91] (Resolved) [I48.91]  Long term current use of anticoagulant therapy [Z79.01]  Mechanical heart valve present [Z95.2]  Longstanding persistent atrial fibrillation (H) [I48.11]           Comments:  5 mg, PM dose         Anticoagulation Care Providers     Provider Role Specialty Phone number    Osbaldo Renee MD Referring Family Medicine [phone]    Carlos Archibald DO Responsible Internal Medicine [phone]

## 2021-11-18 ENCOUNTER — ANTICOAGULATION THERAPY VISIT (OUTPATIENT)
Dept: ANTICOAGULATION | Facility: CLINIC | Age: 61
End: 2021-11-18

## 2021-11-18 ENCOUNTER — LAB (OUTPATIENT)
Dept: LAB | Facility: CLINIC | Age: 61
End: 2021-11-18
Payer: COMMERCIAL

## 2021-11-18 DIAGNOSIS — Z79.01 LONG TERM CURRENT USE OF ANTICOAGULANT THERAPY: ICD-10-CM

## 2021-11-18 DIAGNOSIS — Z95.2 MECHANICAL HEART VALVE PRESENT: ICD-10-CM

## 2021-11-18 DIAGNOSIS — I48.11 LONGSTANDING PERSISTENT ATRIAL FIBRILLATION (H): ICD-10-CM

## 2021-11-18 DIAGNOSIS — I48.91 ATRIAL FIBRILLATION (H): Primary | ICD-10-CM

## 2021-11-18 LAB — INR BLD: 3 (ref 0.9–1.1)

## 2021-11-18 PROCEDURE — 85610 PROTHROMBIN TIME: CPT

## 2021-11-18 PROCEDURE — 36416 COLLJ CAPILLARY BLOOD SPEC: CPT

## 2021-11-18 NOTE — PROGRESS NOTES
ANTICOAGULATION MANAGEMENT     Derek SHERMAN Stover 60 year old male is on warfarin with therapeutic INR result. (Goal INR 2.5-3.5)    Recent labs: (last 7 days)     11/18/21  1602   INR 3.0*       ASSESSMENT     Source(s): Chart Review and Patient/Caregiver Call       Warfarin doses taken: Warfarin taken as instructed    Diet: No new diet changes identified    New illness, injury, or hospitalization: No    Medication/supplement changes: None noted    Signs or symptoms of bleeding or clotting: No    Previous INR: Therapeutic last visit; previously outside of goal range    Additional findings: None     PLAN     Recommended plan for no diet, medication or health factor changes affecting INR     Dosing Instructions: Continue your current warfarin dose with next INR in 4 weeks       Summary  As of 11/18/2021    Full warfarin instructions:  5 mg every Tue, Thu, Sat; 2.5 mg all other days   Next INR check:  12/16/2021             Telephone call with Derek who verbalizes understanding and agrees to plan and who agrees to plan and repeated back plan correctly    Lab visit scheduled    Education provided: Please call back if any changes to your diet, medications or how you've been taking warfarin    Plan made per [facility] anticoagulation protocol    Slim Preston RN  Anticoagulation Clinic  11/18/2021    _______________________________________________________________________     Anticoagulation Episode Summary     Current INR goal:  2.5-3.5   TTR:  71.7 % (1 y)   Target end date:  Indefinite   Send INR reminders to:  [facility]    Indications    Atrial fibrillation (H) [I48.91]  Atrial fibrillation (H) [I48.91] (Resolved) [I48.91]  Long term current use of anticoagulant therapy [Z79.01]  Mechanical heart valve present [Z95.2]  Longstanding persistent atrial fibrillation (H) [I48.11]           Comments:  5 mg, PM dose         Anticoagulation Care Providers     Provider Role Specialty Phone number    Osbaldo Renee MD  [facility] Family Medicine [phone]    Carlos Archibald DO [facility] Internal Medicine [phone]

## 2021-12-16 ENCOUNTER — LAB (OUTPATIENT)
Dept: LAB | Facility: CLINIC | Age: 61
End: 2021-12-16
Payer: COMMERCIAL

## 2021-12-16 ENCOUNTER — ANTICOAGULATION THERAPY VISIT (OUTPATIENT)
Dept: ANTICOAGULATION | Facility: CLINIC | Age: 61
End: 2021-12-16

## 2021-12-16 DIAGNOSIS — I48.91 ATRIAL FIBRILLATION (H): Primary | ICD-10-CM

## 2021-12-16 DIAGNOSIS — Z95.2 MECHANICAL HEART VALVE PRESENT: ICD-10-CM

## 2021-12-16 DIAGNOSIS — I48.11 LONGSTANDING PERSISTENT ATRIAL FIBRILLATION (H): ICD-10-CM

## 2021-12-16 DIAGNOSIS — Z79.01 LONG TERM CURRENT USE OF ANTICOAGULANT THERAPY: ICD-10-CM

## 2021-12-16 LAB — INR BLD: 2.9 (ref 0.9–1.1)

## 2021-12-16 PROCEDURE — 36416 COLLJ CAPILLARY BLOOD SPEC: CPT

## 2021-12-16 PROCEDURE — 85610 PROTHROMBIN TIME: CPT

## 2021-12-16 NOTE — PROGRESS NOTES
ANTICOAGULATION MANAGEMENT     Derek Stover 61 year old male is on warfarin with therapeutic INR result. (Goal INR 2.5-3.5)    Recent labs: (last 7 days)     12/16/21  1621   INR 2.9*       ASSESSMENT     Source(s): Chart Review and Patient/Caregiver Call       Warfarin doses taken: Warfarin taken as instructed    Diet: No new diet changes identified    New illness, injury, or hospitalization: No    Medication/supplement changes: None noted    Signs or symptoms of bleeding or clotting: No    Previous INR: Therapeutic last 2(+) visits    Additional findings: None     PLAN     Recommended plan for no diet, medication or health factor changes affecting INR     Dosing Instructions: Continue your current warfarin dose with next INR in 4 weeks       Summary  As of 12/16/2021    Full warfarin instructions:  5 mg every Tue, Thu, Sat; 2.5 mg all other days   Next INR check:  1/13/2022             Telephone call with Derek who verbalizes understanding and agrees to plan and who agrees to plan and repeated back plan correctly    Lab visit scheduled    Education provided: Please call back if any changes to your diet, medications or how you've been taking warfarin    Plan made per [facility] anticoagulation protocol    Slim Preston RN  Anticoagulation Clinic  12/16/2021    _______________________________________________________________________     Anticoagulation Episode Summary     Current INR goal:  2.5-3.5   TTR:  72.3 % (1 y)   Target end date:  Indefinite   Send INR reminders to:  [facility]    Indications    Atrial fibrillation (H) [I48.91]  Atrial fibrillation (H) [I48.91] (Resolved) [I48.91]  Long term current use of anticoagulant therapy [Z79.01]  Mechanical heart valve present [Z95.2]  Longstanding persistent atrial fibrillation (H) [I48.11]           Comments:  5 mg, PM dose         Anticoagulation Care Providers     Provider Role Specialty Phone number    Osbaldo Renee MD Referring Family Medicine  [phone]    Carlos rAchibald DO [facility] Internal Medicine [phone]

## 2022-01-01 ENCOUNTER — HEALTH MAINTENANCE LETTER (OUTPATIENT)
Age: 62
End: 2022-01-01

## 2022-01-10 ENCOUNTER — MYC MEDICAL ADVICE (OUTPATIENT)
Dept: CARDIOLOGY | Facility: CLINIC | Age: 62
End: 2022-01-10
Payer: COMMERCIAL

## 2022-01-10 DIAGNOSIS — I21.4 NSTEMI (NON-ST ELEVATED MYOCARDIAL INFARCTION) (H): ICD-10-CM

## 2022-01-11 RX ORDER — ATORVASTATIN CALCIUM 40 MG/1
40 TABLET, FILM COATED ORAL EVERY EVENING
Qty: 90 TABLET | Refills: 2 | Status: SHIPPED | OUTPATIENT
Start: 2022-01-11 | End: 2022-10-04

## 2022-01-13 ENCOUNTER — ANTICOAGULATION THERAPY VISIT (OUTPATIENT)
Dept: ANTICOAGULATION | Facility: CLINIC | Age: 62
End: 2022-01-13

## 2022-01-13 ENCOUNTER — LAB (OUTPATIENT)
Dept: LAB | Facility: CLINIC | Age: 62
End: 2022-01-13
Payer: COMMERCIAL

## 2022-01-13 DIAGNOSIS — Z79.01 LONG TERM CURRENT USE OF ANTICOAGULANT THERAPY: ICD-10-CM

## 2022-01-13 DIAGNOSIS — I48.11 LONGSTANDING PERSISTENT ATRIAL FIBRILLATION (H): ICD-10-CM

## 2022-01-13 DIAGNOSIS — Z95.2 MECHANICAL HEART VALVE PRESENT: ICD-10-CM

## 2022-01-13 DIAGNOSIS — I48.91 ATRIAL FIBRILLATION (H): Primary | ICD-10-CM

## 2022-01-13 LAB — INR BLD: 3.4 (ref 0.9–1.1)

## 2022-01-13 PROCEDURE — 85610 PROTHROMBIN TIME: CPT

## 2022-01-13 PROCEDURE — 36416 COLLJ CAPILLARY BLOOD SPEC: CPT

## 2022-01-13 NOTE — PROGRESS NOTES
ANTICOAGULATION MANAGEMENT     Derek Stover 61 year old male is on warfarin with therapeutic INR result. (Goal INR 2.5-3.5)    Recent labs: (last 7 days)     01/13/22  1533   INR 3.4*       ASSESSMENT     Source(s): Chart Review and Patient/Caregiver Call       Warfarin doses taken: Warfarin taken as instructed    Diet: No new diet changes identified    New illness, injury, or hospitalization: No    Medication/supplement changes: None noted    Signs or symptoms of bleeding or clotting: No    Previous INR: Therapeutic last 2(+) visits    Additional findings: None     PLAN     Recommended plan for no diet, medication or health factor changes affecting INR     Dosing Instructions: Continue your current warfarin dose with next INR in 5 weeks       Summary  As of 1/13/2022    Full warfarin instructions:  5 mg every Tue, Thu, Sat; 2.5 mg all other days   Next INR check:  2/17/2022             Telephone call with Derek who verbalizes understanding and agrees to plan and who agrees to plan and repeated back plan correctly    Lab visit scheduled    Education provided: Please call back if any changes to your diet, medications or how you've been taking warfarin    Plan made per [facility] anticoagulation protocol    Slim Preston RN  Anticoagulation Clinic  1/13/2022    _______________________________________________________________________     Anticoagulation Episode Summary     Current INR goal:  2.5-3.5   TTR:  74.3 % (1 y)   Target end date:  Indefinite   Send INR reminders to:  [facility]    Indications    Atrial fibrillation (H) [I48.91]  Atrial fibrillation (H) [I48.91] (Resolved) [I48.91]  Long term current use of anticoagulant therapy [Z79.01]  Mechanical heart valve present [Z95.2]  Longstanding persistent atrial fibrillation (H) [I48.11]           Comments:  5 mg, PM dose         Anticoagulation Care Providers     Provider Role Specialty Phone number    Osbaldo Renee MD Referring Family Medicine  [phone]    Carlos Archibald DO [facility] Internal Medicine [phone]

## 2022-02-15 ENCOUNTER — TELEPHONE (OUTPATIENT)
Dept: FAMILY MEDICINE | Facility: CLINIC | Age: 62
End: 2022-02-15
Payer: COMMERCIAL

## 2022-02-15 NOTE — TELEPHONE ENCOUNTER
Pt needed to reschedule his appt. No further questions.     Danni Patton, ANANTN, RN- Coumadin Clinic RN

## 2022-02-22 ENCOUNTER — ANTICOAGULATION THERAPY VISIT (OUTPATIENT)
Dept: ANTICOAGULATION | Facility: CLINIC | Age: 62
End: 2022-02-22

## 2022-02-22 ENCOUNTER — LAB (OUTPATIENT)
Dept: LAB | Facility: CLINIC | Age: 62
End: 2022-02-22
Payer: COMMERCIAL

## 2022-02-22 DIAGNOSIS — Z95.2 MECHANICAL HEART VALVE PRESENT: ICD-10-CM

## 2022-02-22 DIAGNOSIS — I48.91 ATRIAL FIBRILLATION (H): Primary | ICD-10-CM

## 2022-02-22 DIAGNOSIS — I48.20 CHRONIC ATRIAL FIBRILLATION (H): ICD-10-CM

## 2022-02-22 DIAGNOSIS — I48.11 LONGSTANDING PERSISTENT ATRIAL FIBRILLATION (H): ICD-10-CM

## 2022-02-22 DIAGNOSIS — Z79.01 LONG TERM CURRENT USE OF ANTICOAGULANT THERAPY: ICD-10-CM

## 2022-02-22 LAB — INR BLD: 3 (ref 0.9–1.1)

## 2022-02-22 PROCEDURE — 36416 COLLJ CAPILLARY BLOOD SPEC: CPT

## 2022-02-22 PROCEDURE — 85610 PROTHROMBIN TIME: CPT

## 2022-02-22 RX ORDER — WARFARIN SODIUM 5 MG/1
TABLET ORAL
Qty: 60 TABLET | Refills: 1 | Status: SHIPPED | OUTPATIENT
Start: 2022-02-22 | End: 2022-08-16

## 2022-03-23 DIAGNOSIS — I48.91 ATRIAL FIBRILLATION, UNSPECIFIED TYPE (H): Primary | ICD-10-CM

## 2022-03-23 DIAGNOSIS — Z79.01 LONG TERM CURRENT USE OF ANTICOAGULANT THERAPY: ICD-10-CM

## 2022-04-05 ENCOUNTER — ANTICOAGULATION THERAPY VISIT (OUTPATIENT)
Dept: ANTICOAGULATION | Facility: CLINIC | Age: 62
End: 2022-04-05

## 2022-04-05 ENCOUNTER — LAB (OUTPATIENT)
Dept: LAB | Facility: CLINIC | Age: 62
End: 2022-04-05
Payer: COMMERCIAL

## 2022-04-05 DIAGNOSIS — I48.91 ATRIAL FIBRILLATION, UNSPECIFIED TYPE (H): Primary | ICD-10-CM

## 2022-04-05 DIAGNOSIS — Z79.01 LONG TERM CURRENT USE OF ANTICOAGULANT THERAPY: ICD-10-CM

## 2022-04-05 DIAGNOSIS — I48.11 LONGSTANDING PERSISTENT ATRIAL FIBRILLATION (H): ICD-10-CM

## 2022-04-05 DIAGNOSIS — I48.91 ATRIAL FIBRILLATION, UNSPECIFIED TYPE (H): ICD-10-CM

## 2022-04-05 DIAGNOSIS — Z95.2 MECHANICAL HEART VALVE PRESENT: ICD-10-CM

## 2022-04-05 LAB — INR BLD: 2.6 (ref 0.9–1.1)

## 2022-04-05 PROCEDURE — 36416 COLLJ CAPILLARY BLOOD SPEC: CPT

## 2022-04-05 PROCEDURE — 85610 PROTHROMBIN TIME: CPT

## 2022-04-05 NOTE — PROGRESS NOTES
ANTICOAGULATION MANAGEMENT     Derek Stover 61 year old male is on warfarin with therapeutic INR result. (Goal INR 2.5-3.5)    Recent labs: (last 7 days)     04/05/22  0816   INR 2.6*       ASSESSMENT       Source(s): Chart Review and Patient/Caregiver Call       Warfarin doses taken: Warfarin taken as instructed    Diet: No new diet changes identified    New illness, injury, or hospitalization: No    Medication/supplement changes: None noted    Signs or symptoms of bleeding or clotting: No    Previous INR: Therapeutic last 2(+) visits    Additional findings: None       PLAN     Recommended plan for no diet, medication or health factor changes affecting INR     Dosing Instructions: continue your current warfarin dose with next INR in 6 weeks       Summary  As of 4/5/2022    Full warfarin instructions:  5 mg every Tue, Thu, Sat; 2.5 mg all other days   Next INR check:  5/17/2022             Telephone call with Roberto who verbalizes understanding and agrees to plan    Lab visit scheduled    Education provided: Please call back if any changes to your diet, medications or how you've been taking warfarin    Plan made per [facility] anticoagulation protocol       Perla Walden, RN  Anticoagulation Clinic  4/5/2022    _______________________________________________________________________     Anticoagulation Episode Summary     Current INR goal:  2.5-3.5   TTR:  74.3 % (1 y)   Target end date:  Indefinite   Send INR reminders to:  [facility]    Indications    Atrial fibrillation (H) [I48.91]  Atrial fibrillation (H) [I48.91] (Resolved) [I48.91]  Long term current use of anticoagulant therapy [Z79.01]  Mechanical heart valve present [Z95.2]  Longstanding persistent atrial fibrillation (H) [I48.11]           Comments:  5 mg, PM dose         Anticoagulation Care Providers     Provider Role Specialty Phone number    Osbaldo Renee MD Referring Family Medicine [phone]    Carlos Archibald DO Responsible  Internal Medicine [phone]

## 2022-05-10 ENCOUNTER — TELEPHONE (OUTPATIENT)
Dept: ANTICOAGULATION | Facility: CLINIC | Age: 62
End: 2022-05-10
Payer: COMMERCIAL

## 2022-05-10 DIAGNOSIS — Z95.2 MECHANICAL HEART VALVE PRESENT: ICD-10-CM

## 2022-05-10 DIAGNOSIS — I48.91 ATRIAL FIBRILLATION, UNSPECIFIED TYPE (H): Primary | ICD-10-CM

## 2022-05-10 DIAGNOSIS — I48.20 CHRONIC ATRIAL FIBRILLATION (H): ICD-10-CM

## 2022-05-10 NOTE — TELEPHONE ENCOUNTER
ANTICOAGULATION MANAGEMENT      Derek Stover due for annual renewal of referral to anticoagulation monitoring. Order pended for your review and signature.      ANTICOAGULATION SUMMARY      Warfarin indication(s)     Atrial fibrillation  Heart Valve Replacement    Heart valve present?  Mechanical MVR       Current goal range   INR: 2.5-3.5     Goal appropriate for indication? Yes, INR 2-3 appropriate for hx of DVT, PE, hypercoagulable state, Afib, LVAD, or bileaflet AVR without risk factors     Current duration of therapy Indefinite/long term therapy   Time in Therapeutic Range (TTR)  (Goal > 60%) 74.3%       Office visit with referring provider's group within last year yes on 6/2/21       Danni Patton RN

## 2022-05-11 PROBLEM — I48.91 ATRIAL FIBRILLATION, UNSPECIFIED TYPE (H): Status: ACTIVE | Noted: 2022-05-11

## 2022-05-11 PROBLEM — I48.20 CHRONIC ATRIAL FIBRILLATION (H): Status: ACTIVE | Noted: 2022-05-11

## 2022-05-17 ENCOUNTER — LAB (OUTPATIENT)
Dept: LAB | Facility: CLINIC | Age: 62
End: 2022-05-17
Payer: COMMERCIAL

## 2022-05-17 ENCOUNTER — ANTICOAGULATION THERAPY VISIT (OUTPATIENT)
Dept: ANTICOAGULATION | Facility: CLINIC | Age: 62
End: 2022-05-17

## 2022-05-17 DIAGNOSIS — Z79.01 LONG TERM CURRENT USE OF ANTICOAGULANT THERAPY: Primary | ICD-10-CM

## 2022-05-17 DIAGNOSIS — I48.91 ATRIAL FIBRILLATION, UNSPECIFIED TYPE (H): ICD-10-CM

## 2022-05-17 DIAGNOSIS — I48.11 LONGSTANDING PERSISTENT ATRIAL FIBRILLATION (H): ICD-10-CM

## 2022-05-17 DIAGNOSIS — I48.20 CHRONIC ATRIAL FIBRILLATION (H): ICD-10-CM

## 2022-05-17 DIAGNOSIS — Z79.01 LONG TERM CURRENT USE OF ANTICOAGULANT THERAPY: ICD-10-CM

## 2022-05-17 DIAGNOSIS — Z95.2 MECHANICAL HEART VALVE PRESENT: ICD-10-CM

## 2022-05-17 LAB — INR BLD: 2.8 (ref 0.9–1.1)

## 2022-05-17 PROCEDURE — 85610 PROTHROMBIN TIME: CPT

## 2022-05-17 PROCEDURE — 36416 COLLJ CAPILLARY BLOOD SPEC: CPT

## 2022-05-17 NOTE — PROGRESS NOTES
"ANTICOAGULATION MANAGEMENT     Derek Stover 61 year old male is on warfarin with therapeutic INR result. (Goal INR 2.5-3.5)    Recent labs: (last 7 days)     05/17/22  0757   INR 2.8*       ASSESSMENT       Source(s): Chart Review and Patient/Caregiver Call       Warfarin doses taken: Warfarin taken as instructed    Diet: pt started eating \"[employer] One\" meals and has lost about 15 pounds. He has been doing this for about the last 6 weeks, per pt    New illness, injury, or hospitalization: No    Medication/supplement changes: None noted    Signs or symptoms of bleeding or clotting: No    Previous INR: Therapeutic last 2(+) visits    Additional findings: None       PLAN     Recommended plan for ongoing change(s) affecting INR     Dosing Instructions: continue your current warfarin dose with next INR in 6 weeks       Summary  As of 5/17/2022    Full warfarin instructions:  5 mg every Tue, Thu, Sat; 2.5 mg all other days   Next INR check:  6/28/2022             Telephone call with Roberto who verbalizes understanding and agrees to plan    Lab visit scheduled    Education provided: Please call back if any changes to your diet, medications or how you've been taking warfarin    Plan made per [facility] anticoagulation protocol       Perla Walden, RN  Anticoagulation Clinic  5/17/2022    _______________________________________________________________________     Anticoagulation Episode Summary     Current INR goal:  2.5-3.5   TTR:  74.3 % (1 y)   Target end date:  Indefinite   Send INR reminders to:  [facility]    Indications    Atrial fibrillation (H) [I48.91]  Atrial fibrillation (H) [I48.91] (Resolved) [I48.91]  Long term current use of anticoagulant therapy [Z79.01]  Mechanical heart valve present [Z95.2]  Longstanding persistent atrial fibrillation (H) [I48.11]  Chronic atrial fibrillation (H) [I48.20]  Atrial fibrillation  unspecified type (H) [I48.91]           Comments:  5 mg, PM dose         Anticoagulation " Care Providers     Provider Role Specialty Phone number    Osbaldo Renee MD Referring Family Medicine [phone]    Carlos Archibald DO Responsible Internal Medicine [phone]

## 2022-06-28 ENCOUNTER — LAB (OUTPATIENT)
Dept: LAB | Facility: CLINIC | Age: 62
End: 2022-06-28
Payer: COMMERCIAL

## 2022-06-28 ENCOUNTER — ANTICOAGULATION THERAPY VISIT (OUTPATIENT)
Dept: ANTICOAGULATION | Facility: CLINIC | Age: 62
End: 2022-06-28

## 2022-06-28 DIAGNOSIS — I48.91 ATRIAL FIBRILLATION, UNSPECIFIED TYPE (H): ICD-10-CM

## 2022-06-28 DIAGNOSIS — Z79.01 LONG TERM CURRENT USE OF ANTICOAGULANT THERAPY: ICD-10-CM

## 2022-06-28 DIAGNOSIS — I48.11 LONGSTANDING PERSISTENT ATRIAL FIBRILLATION (H): ICD-10-CM

## 2022-06-28 DIAGNOSIS — I48.20 CHRONIC ATRIAL FIBRILLATION (H): ICD-10-CM

## 2022-06-28 DIAGNOSIS — Z79.01 LONG TERM CURRENT USE OF ANTICOAGULANT THERAPY: Primary | ICD-10-CM

## 2022-06-28 DIAGNOSIS — Z95.2 MECHANICAL HEART VALVE PRESENT: ICD-10-CM

## 2022-06-28 LAB — INR BLD: 3.2 (ref 0.9–1.1)

## 2022-06-28 PROCEDURE — 36416 COLLJ CAPILLARY BLOOD SPEC: CPT

## 2022-06-28 PROCEDURE — 85610 PROTHROMBIN TIME: CPT

## 2022-06-28 NOTE — PROGRESS NOTES
ANTICOAGULATION MANAGEMENT     Derek Stover 61 year old male is on warfarin with therapeutic INR result. (Goal INR 2.5-3.5)    Recent labs: (last 7 days)     06/28/22  0800   INR 3.2*       ASSESSMENT       Source(s): Chart Review and Patient/Caregiver Call       Warfarin doses taken: Warfarin taken as instructed    Diet: No new diet changes identified    New illness, injury, or hospitalization: No    Medication/supplement changes: None noted    Signs or symptoms of bleeding or clotting: No    Previous INR: Therapeutic last 2(+) visits    Additional findings: None       PLAN     Recommended plan for no diet, medication or health factor changes affecting INR     Dosing Instructions: continue your current warfarin dose with next INR in 6 weeks       Summary  As of 6/28/2022    Full warfarin instructions:  5 mg every Tue, Thu, Sat; 2.5 mg all other days   Next INR check:  8/9/2022             Telephone call with Roberto who verbalizes understanding and agrees to plan    Lab visit scheduled    Education provided: Please call back if any changes to your diet, medications or how you've been taking warfarin    Plan made per [facility] anticoagulation protocol    Perla Walden RN  Anticoagulation Clinic  6/28/2022    _______________________________________________________________________     Anticoagulation Episode Summary     Current INR goal:  2.5-3.5   TTR:  74.3 % (1 y)   Target end date:  Indefinite   Send INR reminders to:  [facility]    Indications    Atrial fibrillation (H) [I48.91]  Atrial fibrillation (H) [I48.91] (Resolved) [I48.91]  Long term current use of anticoagulant therapy [Z79.01]  Mechanical heart valve present [Z95.2]  Longstanding persistent atrial fibrillation (H) [I48.11]  Chronic atrial fibrillation (H) [I48.20]  Atrial fibrillation  unspecified type (H) [I48.91]           Comments:  5 mg, PM dose         Anticoagulation Care Providers     Provider Role Specialty Phone number    Thelma  MD Osbaldo [facility] ([place], [place]) Family Medicine [phone]    Carlos Archibald DO [facility] Internal Medicine [phone]

## 2022-08-02 ENCOUNTER — E-VISIT (OUTPATIENT)
Dept: FAMILY MEDICINE | Facility: CLINIC | Age: 62
End: 2022-08-02
Payer: COMMERCIAL

## 2022-08-02 DIAGNOSIS — U07.1 INFECTION DUE TO 2019 NOVEL CORONAVIRUS: Primary | ICD-10-CM

## 2022-08-02 PROCEDURE — 99421 OL DIG E/M SVC 5-10 MIN: CPT | Performed by: PHYSICIAN ASSISTANT

## 2022-08-03 NOTE — PATIENT INSTRUCTIONS
Dear Roberto,        These guidelines are for isolating before returning to work, school or .     For employers, schools and day cares: This is an official notice for this person s medical guidelines for returning in-person.     For health care sites: The CDC gives different isolation and quarantine guidelines for healthcare sites, please check with these sites before arriving.     How do I self-isolate?  You isolate when you have symptoms of COVID or a test shows you have COVID, even if you don t have symptoms.     If you DO have symptoms:  o Stay home and away from others  - For at least 5 days after your symptoms started, AND   - You are fever free for 24 hours (with no medicine that reduces fever), AND  - Your other symptoms are better.  o Wear a mask for 10 full days any time you are around others.    If you DON T have symptoms:  o Stay at home and away from others for at least 5 days after your positive test.  o Wear a mask for 10 full days any time you are around others.    How can I take care of myself?  Over the counter medications may help with your symptoms such as runny or stuffy nose, cough, chills, or fever.  Talk to your care team about your options.     Some people are at high risk of severe illness (for example, you have a weak immune system, you re 65 years or older, or you have certain medical problems). If your risk is high and your symptoms started in the last 5 to 7 days, we strongly recommend for you to get COVID treatment as soon as possible. Paxlovid, Molnupiravir and the monoclonal antibody treatments are proven safe and effective, make you feel better faster, and prevent hospitalization and death.       To schedule an appointment to discuss COVID treatment, request an appointment on [facility] (select  COVID-19 Treatment ) or call [facility] ([phone]), press 7.      Get lots of rest. Drink extra fluids (unless a doctor has told you not to)    Take Tylenol (acetaminophen) or  ibuprofen for fever or pain. If you have liver or kidney problems, ask your family doctor if it's okay to take Tylenol or ibuprofen    Take over the counter medications for your symptoms, as directed by your doctor. You may also talk to your pharmacist.      If you have other health problems (like cancer, heart failure, an organ transplant or severe kidney disease): Call your specialty clinic if you don't feel better in the next 2 days.    Know when to call 911. Emergency warning signs include:  o Trouble breathing or shortness of breath  o Pain or pressure in the chest that doesn't go away  o Feeling confused like you haven't felt before, or not being able to wake up  o Bluish-colored lips or face    Where can I get more information?     [facility] - About COVID-19: [email or web address]     CDC - What to Do If You're Sick: https://www.cdc.gov/coronavirus/2019-ncov/if-you-are-sick/index.html     CDC - Quarantine & Isolation: https://www.cdc.gov/coronavirus/2019-ncov/your-health/quarantine-isolation.html     [facility] clinical trials (COVID-19 research studies): clinicalaffairs.[facility]-clinical-trials    Below are the COVID-19 hotlines at the [facility] ([facility]). Interpreters are available.  o For health questions: Call 697-854-0427 or 1-497.391.6401 (7 a.m. to 7 p.m.)  o For questions about schools and childcare: Call 398-357-3779 or 1-231.158.5928 (7 a.m. to 7 p.m.)  August 2, 2022  RE:  Derek Stover                                                                                                                  [address] [address] [place] [id number]      To whom it may concern:    I evaluated Derek Stover on August 2, 2022. Derek Stover should be excused from work/school.     They should let their workplace manager and staffing office know when their quarantine ends.    We can not give an exact date as it depends on the information below. They can  calculate this on their own or work with their manager/staffing office to calculate this. (For example if they were exposed on 10/04, they would have to quarantine for 14 full days. That would be through 10/18. They could return on 10/19.)    Quarantine Guidelines:    If patient receives a positive COVID-19 test result, they should follow the guidance of those who are giving the results. Usually the return to work is 10 (or in some cases 20 days from symptom onset.) If they work at [employer], they must be cleared by Employee Occupational Health and Safety to return to work.      If patient receives a negative COVID-19 test result and did not have a high risk exposure to someone with a known positive COVID-19 test, they can return to work once they're free of fever for 24 hours without fever-reducing medication and their symptoms are improving or resolved.    If patient receives a negative COVID-19 test and if they had a high risk exposure to someone who has tested positive for COVID-19 then they can return to work 14 days after their last contact with the positive individual    Note: If there is ongoing exposure to the covid positive person, this quarantine period may be longer than 14 days. (For example, if they are continually exposed to their child, who tested positive and cannot isolate from them, then the quarantine of 7-14 days can't start until their child is no longer contagious. This is typically 10 days from onset to the child's symptoms. So the total duration may be 17-24 days in this case.)     Sincerely,  VICKIE Kovacs

## 2022-08-05 ENCOUNTER — TELEPHONE (OUTPATIENT)
Dept: FAMILY MEDICINE | Facility: CLINIC | Age: 62
End: 2022-08-05

## 2022-08-05 ENCOUNTER — VIRTUAL VISIT (OUTPATIENT)
Dept: FAMILY MEDICINE | Facility: CLINIC | Age: 62
End: 2022-08-05
Payer: COMMERCIAL

## 2022-08-05 DIAGNOSIS — U07.1 INFECTION DUE TO 2019 NOVEL CORONAVIRUS: Primary | ICD-10-CM

## 2022-08-05 PROCEDURE — 99213 OFFICE O/P EST LOW 20 MIN: CPT | Mod: 95 | Performed by: PHYSICIAN ASSISTANT

## 2022-08-05 RX ORDER — ATORVASTATIN CALCIUM 80 MG/1
40 TABLET, FILM COATED ORAL
COMMUNITY
Start: 2020-10-16 | End: 2022-09-01

## 2022-08-05 RX ORDER — CLOPIDOGREL BISULFATE 75 MG/1
75 TABLET ORAL
COMMUNITY
Start: 2020-09-22 | End: 2022-08-05

## 2022-08-05 NOTE — TELEPHONE ENCOUNTER
Reason for Call:  Other     Detailed comments: Patient has covid and wants to know if he should keep his INR appointment on 08/09/22    Phone Number Patient can be reached at: Cell number on file:    Telephone Information:   Mobile [phone]       Best Time: any    Can we leave a detailed message on this number? YES    Call taken on 8/5/2022 at 8:40 AM by Tiffani Jimenez

## 2022-08-05 NOTE — PATIENT INSTRUCTIONS
I encourage you to purchase an oximeter and monitor oxygen saturation and go to emergency department if develop saturation below 95%. Go to emergency department with chest pain or shortness of breath    Instructions for Patients      What are the symptoms of COVID-19?  Symptoms can include fever, cough, shortness of breath, chills, headache, muscle pain sore throat, fatigue, runny or stuffy nose, and loss of taste and smell. Other less common symptoms include nausea, vomiting, or diarrhea (watery stools).    Know when to call 911. Emergency warning signs include:  Trouble breathing or shortness of breath  Pain or pressure in the chest that doesn't go away  Feeling confused like you haven't felt before, or not being able to wake up  Bluish-colored lips or face    How can I take care of myself?  Get lots of rest. Drink extra fluids (unless a doctor has told you not to).  Take Tylenol (acetaminophen) for fever or pain. If you have liver or kidney problems, ask your family doctor if it's okay to take Tylenol   Adults:   650 mg (two 325 mg pills or tablets) every 4 to 6 hours, or...   1,000 mg (two 500 mg pills or tablets) every 8 hours as needed.  Note: Don't take more than 3,000 mg in one day. Acetaminophen is found in many medicines (both prescribed and over the counter). Read all labels to be sure you don't take too much.  For children, check the Tylenol bottle for the right dose. The dose is based on the child's age or weight.  Take over the counter medicines for your symptoms as needed. Talk to your pharmacist.  If you have other health problems (like cancer, heart failure, an organ transplant, or severe kidney disease): Call your specialty clinic if you don't feel better in the next 2 days.    These guidelines are for isolating and quarantining before returning to work, school or .   For employers, schools and day cares: This is an official notice for this person s medical guidelines for returning  in-person.   For health care sites: The CDC gives different isolation and quarantine guidelines for healthcare sites, please check with these sites before arriving.     How do I self-isolate?  You isolate when you have symptoms of COVID or a test shows you have COVID, even if you don t have symptoms.   If you DO have symptoms:  Stay home and away from others  For at least 5 days after your symptoms started, AND   You are fever free for 24 hours (with no medicine that reduces fever), AND  Your other symptoms are better.  Wear a mask for 10 full days any time you are around others.  If you DON T have symptoms:  Stay at home and away from others for at least 5 days after your positive test.  Wear a mask for 10 full days any time you are around others.    How and when do I quarantine?  You quarantine when you may have been exposed to the virus and DON T have symptoms.   Stay home and away from others.   You must quarantine for 5 days after your last contact with a person who has COVID.  Note: If you are fully vaccinated, you don t need to quarantine. You should still follow the steps below.   Wear a mask for 10 full days any time you re around others.  Get tested at least 5 days after you were exposed, even if you don t have symptoms.   If you start to have symptoms, isolate right away and get tested.    Where can I get more information?  [facility] COVID-19 Resource Hub: [email or web address]   CDC Quarantine & Isolation: https://www.cdc.gov/coronavirus/2019-ncov/your-health/quarantine-isolation.html   CDC - What to Do If You're Sick: https://www.cdc.gov/coronavirus/2019-ncov/if-you-are-sick/index.html  [facility] clinical trials (COVID-19 research studies): clinicalaffairs.[facility]/umn-clinical-trials  [place] Department of Health COVID-19 Public Hotline: 1-645.599.7595

## 2022-08-05 NOTE — PROGRESS NOTES
Roberto is a 61 year old who is being evaluated via a billable video visit.      How would you like to obtain your AVS? MyChart  If the video visit is dropped, the invitation should be resent by: Text to cell phone: [phone]   Will anyone else be joining your video visit? No          Assessment & Plan     Infection due to 2019 novel coronavirus  After reviewing medications - both paxlovid and molnupiravir will affect INR   He feels symptoms are mild and doesn't want to take something that will interfere with coumadin.    Warning signs and symptoms warranting urgent evaluation reviewed.         15 minutes spent on the date of the encounter doing chart review, history and exam, documentation and further activities per the note       Nicotine/Tobacco Cessation:  He reports that he has been smoking cigarettes. He started smoking about 45 years ago. He has a 90.00 pack-year smoking history. He has never used smokeless tobacco.  Nicotine/Tobacco Cessation Plan:   Information offered: Patient not interested at this time      Patient Instructions   I encourage you to purchase an oximeter and monitor oxygen saturation and go to emergency department if develop saturation below 95%. Go to emergency department with chest pain or shortness of breath    Instructions for Patients      What are the symptoms of COVID-19?  Symptoms can include fever, cough, shortness of breath, chills, headache, muscle pain sore throat, fatigue, runny or stuffy nose, and loss of taste and smell. Other less common symptoms include nausea, vomiting, or diarrhea (watery stools).    Know when to call 911. Emergency warning signs include:    Trouble breathing or shortness of breath    Pain or pressure in the chest that doesn't go away    Feeling confused like you haven't felt before, or not being able to wake up    Bluish-colored lips or face    How can I take care of myself?  1. Get lots of rest. Drink extra fluids (unless a doctor has told you not  to).  2. Take Tylenol (acetaminophen) for fever or pain. If you have liver or kidney problems, ask your family doctor if it's okay to take Tylenol   Adults:   650 mg (two 325 mg pills or tablets) every 4 to 6 hours, or...   1,000 mg (two 500 mg pills or tablets) every 8 hours as needed.  Note: Don't take more than 3,000 mg in one day. Acetaminophen is found in many medicines (both prescribed and over the counter). Read all labels to be sure you don't take too much.  For children, check the Tylenol bottle for the right dose. The dose is based on the child's age or weight.  3. Take over the counter medicines for your symptoms as needed. Talk to your pharmacist.  4. If you have other health problems (like cancer, heart failure, an organ transplant, or severe kidney disease): Call your specialty clinic if you don't feel better in the next 2 days.    These guidelines are for isolating and quarantining before returning to work, school or .     For employers, schools and day cares: This is an official notice for this person s medical guidelines for returning in-person.     For health care sites: The CDC gives different isolation and quarantine guidelines for healthcare sites, please check with these sites before arriving.     How do I self-isolate?  You isolate when you have symptoms of COVID or a test shows you have COVID, even if you don t have symptoms.     If you DO have symptoms:  o Stay home and away from others  - For at least 5 days after your symptoms started, AND   - You are fever free for 24 hours (with no medicine that reduces fever), AND  - Your other symptoms are better.  o Wear a mask for 10 full days any time you are around others.    If you DON T have symptoms:  o Stay at home and away from others for at least 5 days after your positive test.  o Wear a mask for 10 full days any time you are around others.    How and when do I quarantine?  You quarantine when you may have been exposed to the virus  and DON T have symptoms.     Stay home and away from others.     You must quarantine for 5 days after your last contact with a person who has COVID.  o Note: If you are fully vaccinated, you don t need to quarantine. You should still follow the steps below.     Wear a mask for 10 full days any time you re around others.    Get tested at least 5 days after you were exposed, even if you don t have symptoms.     If you start to have symptoms, isolate right away and get tested.    Where can I get more information?    [facility] COVID-19 Resource Hub: [email or web address]     CDC Quarantine & Isolation: https://www.cdc.gov/coronavirus/2019-ncov/your-health/quarantine-isolation.html     CDC - What to Do If You're Sick: https://www.cdc.gov/coronavirus/2019-ncov/if-you-are-sick/index.html    [facility] clinical trials (COVID-19 research studies): clinicalaffairs.[facility]/umn-clinical-trials    [place] Department of Health COVID-19 Public Hotline: 1-680.795.3998      Return in about 10 days (around 8/15/2022), or if symptoms worsen or fail to improve, for using a video visit.    Anette Cheek PA-C  [facility] WILSON Mejia is a 61 year old, presenting for the following health issues:  Covid Concern      HPI       COVID-19 Symptom Review  How many days ago did these symptoms start? 4 days     Are any of the following symptoms significant for you?    New or worsening difficulty breathing? No    Worsening cough? Yes, I am coughing up mucus.    Fever or chills? Yes, I felt feverish or had chills.    Headache: YES    Sore throat: No    Chest pain: No    Diarrhea: No    Body aches? No    What treatments has patient tried? Antihistamines  and Decongestant - oral   Does patient live in a nursing home, group home, or shelter? No  Does patient have a way to get food/medications during quarantined? Yes, I have a friend or family member who can help  me.        patient unknown to me presents via video visit to discuss covid treatment options  History of atrial fibrillation and mitral valve replacement as well as NSTEMI on coumadin.    No chest pain or shortness of breath.   Rapid home test and 2 positive tests  tues night positive coughing all the time -4 days ago   Taking Allegra D at night and tylenol helpful.    Generic allergy pill helps with rhinorrhea  Chills improved   Feels symptoms are mild         Review of Systems   Constitutional, HEENT, cardiovascular, pulmonary, gi and gu systems are negative, except as otherwise noted.      Objective           Vitals:  No vitals were obtained today due to virtual visit.    Physical Exam   GENERAL: Healthy, alert and no distress  EYES: Eyes grossly normal to inspection.  No discharge or erythema, or obvious scleral/conjunctival abnormalities.  RESP: No audible wheeze, cough, or visible cyanosis.  No visible retractions or increased work of breathing.    SKIN: Visible skin clear. No significant rash, abnormal pigmentation or lesions.  NEURO: Cranial nerves grossly intact.  Mentation and speech appropriate for age.  PSYCH: Mentation appears normal, affect normal/bright, judgement and insight intact, normal speech and appearance well-groomed.                Video-Visit Details    Video Start Time: 821 AM    Type of service:  Video Visit    Video End Time:8:27 AM- lasted 6 minutes     Originating Location (pt. Location): Home    Distant Location (provider location):  [facility]     Platform used for Video Visit: XtremIO    .  ..

## 2022-08-05 NOTE — TELEPHONE ENCOUNTER
Informed patient to come in for INR on 8/9/22 as long as he is not running a fever or symptoms have worsened.  Patient is taking Tylenol and Allegra-D, declined to take antivirals.  Patient will call back with any other questions or changes.  Jenna Bennett RN

## 2022-08-09 ENCOUNTER — ANTICOAGULATION THERAPY VISIT (OUTPATIENT)
Dept: ANTICOAGULATION | Facility: CLINIC | Age: 62
End: 2022-08-09

## 2022-08-09 ENCOUNTER — LAB (OUTPATIENT)
Dept: LAB | Facility: CLINIC | Age: 62
End: 2022-08-09
Payer: COMMERCIAL

## 2022-08-09 DIAGNOSIS — I48.20 CHRONIC ATRIAL FIBRILLATION (H): ICD-10-CM

## 2022-08-09 DIAGNOSIS — I48.91 ATRIAL FIBRILLATION, UNSPECIFIED TYPE (H): ICD-10-CM

## 2022-08-09 DIAGNOSIS — Z79.01 LONG TERM CURRENT USE OF ANTICOAGULANT THERAPY: ICD-10-CM

## 2022-08-09 DIAGNOSIS — I48.11 LONGSTANDING PERSISTENT ATRIAL FIBRILLATION (H): ICD-10-CM

## 2022-08-09 DIAGNOSIS — Z95.2 MECHANICAL HEART VALVE PRESENT: ICD-10-CM

## 2022-08-09 DIAGNOSIS — Z79.01 LONG TERM CURRENT USE OF ANTICOAGULANT THERAPY: Primary | ICD-10-CM

## 2022-08-09 LAB — INR BLD: 3.7 (ref 0.9–1.1)

## 2022-08-09 PROCEDURE — 36416 COLLJ CAPILLARY BLOOD SPEC: CPT

## 2022-08-09 PROCEDURE — 85610 PROTHROMBIN TIME: CPT

## 2022-08-09 NOTE — PROGRESS NOTES
ANTICOAGULATION MANAGEMENT     Derek Stover 61 year old male is on warfarin with supratherapeutic INR result. (Goal INR 2.5-3.5)    Recent labs: (last 7 days)     08/09/22  0757   INR 3.7*       ASSESSMENT       Source(s): Chart Review and Patient/Caregiver Call       Warfarin doses taken: Warfarin taken as instructed    Diet: No new diet changes identified    New illness, injury, or hospitalization: Yes: pt states he had COVID and has been improving every day - had mild symptoms     Medication/supplement changes: None noted    Signs or symptoms of bleeding or clotting: No    Previous INR: Therapeutic last 2(+) visits    Additional findings: None       PLAN     Recommended plan for temporary change(s) affecting INR     Dosing Instructions: partial hold then continue your current warfarin dose with next INR in 3 weeks       Summary  As of 8/9/2022    Full warfarin instructions:  8/9: 2.5 mg; Otherwise 5 mg every Tue, Thu, Sat; 2.5 mg all other days   Next INR check:  8/30/2022             Telephone call with Roberto who verbalizes understanding and agrees to plan    Lab visit scheduled    Education provided: Please call back if any changes to your diet, medications or how you've been taking warfarin, Importance of consistent vitamin K intake, Goal range and significance of current result and Monitoring for bleeding signs and symptoms    Plan made per [facility] anticoagulation protocol    Perla Walden, RN  Anticoagulation Clinic  8/9/2022    _______________________________________________________________________     Anticoagulation Episode Summary     Current INR goal:  2.5-3.5   TTR:  79.7 % (1 y)   Target end date:  Indefinite   Send INR reminders to:  [facility]    Indications    Atrial fibrillation (H) [I48.91]  Atrial fibrillation (H) [I48.91] (Resolved) [I48.91]  Long term current use of anticoagulant therapy [Z79.01]  Mechanical heart valve present [Z95.2]  Longstanding persistent atrial fibrillation  (H) [I48.11]  Chronic atrial fibrillation (H) [I48.20]  Atrial fibrillation  unspecified type (H) [I48.91]           Comments:  5 mg, PM dose         Anticoagulation Care Providers     Provider Role Specialty Phone number    Osbaldo Renee MD Referring Family Medicine [phone]    Carlos Archibald DO [facility] Internal Medicine [phone]

## 2022-08-15 ENCOUNTER — MYC MEDICAL ADVICE (OUTPATIENT)
Dept: FAMILY MEDICINE | Facility: CLINIC | Age: 62
End: 2022-08-15

## 2022-08-15 DIAGNOSIS — Z79.01 LONG TERM CURRENT USE OF ANTICOAGULANT THERAPY: ICD-10-CM

## 2022-08-15 DIAGNOSIS — I48.20 CHRONIC ATRIAL FIBRILLATION (H): ICD-10-CM

## 2022-08-16 ENCOUNTER — TELEPHONE (OUTPATIENT)
Dept: FAMILY MEDICINE | Facility: CLINIC | Age: 62
End: 2022-08-16

## 2022-08-16 RX ORDER — WARFARIN SODIUM 5 MG/1
TABLET ORAL
Qty: 60 TABLET | Refills: 1 | Status: SHIPPED | OUTPATIENT
Start: 2022-08-16 | End: 2022-10-04

## 2022-08-16 NOTE — TELEPHONE ENCOUNTER
Reason for Call:  Other call back    Detailed comments: Need INR medication refilled - only have two pills left    Phone Number Patient can be reached at: Home number on file [phone] (home)    Best Time: anytime today    Can we leave a detailed message on this number? YES    Call taken on 8/16/2022 at 12:28 PM by Fani Knapp

## 2022-08-16 NOTE — TELEPHONE ENCOUNTER
ANTICOAGULATION MANAGEMENT:  Medication Refill    Anticoagulation Summary  As of 8/9/2022    Warfarin maintenance plan:  5 mg (5 mg x 1) every Tue, Thu, Sat; 2.5 mg (5 mg x 0.5) all other days   Next INR check:  8/30/2022   Target end date:  Indefinite    Indications    Atrial fibrillation (H) [I48.91]  Atrial fibrillation (H) [I48.91] (Resolved) [I48.91]  Long term current use of anticoagulant therapy [Z79.01]  Mechanical heart valve present [Z95.2]  Longstanding persistent atrial fibrillation (H) [I48.11]  Chronic atrial fibrillation (H) [I48.20]  Atrial fibrillation  unspecified type (H) [I48.91]             Anticoagulation Care Providers     Provider Role Specialty Phone number    Osbaldo Renee MD Referring Family Medicine [phone]    Carlos Archibald DO Responsible Internal Medicine [phone]          Visit with referring provider/group within last year: Yes    [facility] referral signed within last year: Yes    Derek meets all criteria for refill (current [facility] referral, office visit with referring provider/group in last year, lab monitoring up to date or not exceeding 2 weeks overdue). Rx instructions and quantity supplied updated to match patient's current dosing plan. Warfarin 90 day supply with 1 refill granted per [facility] protocol     Perla Walden RN  Anticoagulation Clinic

## 2022-08-30 ENCOUNTER — LAB (OUTPATIENT)
Dept: LAB | Facility: CLINIC | Age: 62
End: 2022-08-30
Payer: COMMERCIAL

## 2022-08-30 ENCOUNTER — HOSPITAL ENCOUNTER (OUTPATIENT)
Dept: CARDIOLOGY | Facility: CLINIC | Age: 62
Discharge: HOME | End: 2022-08-30
Attending: INTERNAL MEDICINE | Admitting: INTERNAL MEDICINE
Payer: COMMERCIAL

## 2022-08-30 ENCOUNTER — ANTICOAGULATION THERAPY VISIT (OUTPATIENT)
Dept: ANTICOAGULATION | Facility: CLINIC | Age: 62
End: 2022-08-30

## 2022-08-30 ENCOUNTER — APPOINTMENT (OUTPATIENT)
Dept: LAB | Facility: CLINIC | Age: 62
End: 2022-08-30
Payer: COMMERCIAL

## 2022-08-30 DIAGNOSIS — I25.10 CORONARY ARTERY DISEASE INVOLVING NATIVE CORONARY ARTERY OF NATIVE HEART WITHOUT ANGINA PECTORIS: ICD-10-CM

## 2022-08-30 DIAGNOSIS — Z98.890 S/P MVR (MITRAL VALVE REPAIR): ICD-10-CM

## 2022-08-30 DIAGNOSIS — I48.20 CHRONIC ATRIAL FIBRILLATION (H): ICD-10-CM

## 2022-08-30 DIAGNOSIS — F17.200 SMOKER: ICD-10-CM

## 2022-08-30 DIAGNOSIS — Z95.2 MECHANICAL HEART VALVE PRESENT: ICD-10-CM

## 2022-08-30 DIAGNOSIS — I48.91 ATRIAL FIBRILLATION, UNSPECIFIED TYPE (H): ICD-10-CM

## 2022-08-30 DIAGNOSIS — I48.91 ATRIAL FIBRILLATION (H): Primary | ICD-10-CM

## 2022-08-30 DIAGNOSIS — Z79.01 LONG TERM CURRENT USE OF ANTICOAGULANT THERAPY: ICD-10-CM

## 2022-08-30 DIAGNOSIS — I48.11 LONGSTANDING PERSISTENT ATRIAL FIBRILLATION (H): ICD-10-CM

## 2022-08-30 LAB
CHOLEST SERPL-MCNC: 128 MG/DL
FASTING STATUS PATIENT QL REPORTED: YES
HDLC SERPL-MCNC: 42 MG/DL
INR BLD: 3.6 (ref 0.9–1.1)
LDLC SERPL CALC-MCNC: 63 MG/DL
LVEF ECHO: NORMAL
NONHDLC SERPL-MCNC: 86 MG/DL
TRIGL SERPL-MCNC: 113 MG/DL

## 2022-08-30 PROCEDURE — 80061 LIPID PANEL: CPT | Performed by: INTERNAL MEDICINE

## 2022-08-30 PROCEDURE — 85610 PROTHROMBIN TIME: CPT

## 2022-08-30 PROCEDURE — 93306 TTE W/DOPPLER COMPLETE: CPT | Mod: 26 | Performed by: INTERNAL MEDICINE

## 2022-08-30 PROCEDURE — 36415 COLL VENOUS BLD VENIPUNCTURE: CPT

## 2022-08-30 PROCEDURE — 93306 TTE W/DOPPLER COMPLETE: CPT

## 2022-08-30 NOTE — PROGRESS NOTES
ANTICOAGULATION MANAGEMENT     Derek Stover 61 year old male is on warfarin with supratherapeutic INR result. (Goal INR 2.5-3.5)    Recent labs: (last 7 days)     08/30/22  0828   INR 3.6*       ASSESSMENT       Source(s): Chart Review       Warfarin doses taken: Reviewed in chart    Diet: No new diet changes identified    New illness, injury, or hospitalization: No    Medication/supplement changes: None noted    Signs or symptoms of bleeding or clotting: No    Previous INR: Supratherapeutic    Additional findings: None       PLAN     Recommended plan for no diet, medication or health factor changes affecting INR     Dosing Instructions: Continue your current warfarin dose with next INR in 2 weeks- increase greens slightly.      Summary  As of 8/30/2022    Full warfarin instructions:  5 mg every Tue, Thu, Sat; 2.5 mg all other days   Next INR check:  9/13/2022             Detailed voice message left for Roberto with dosing instructions and follow up date.     Contact [phone]  to schedule and with any changes, questions or concerns.     Education provided: Please call back if any changes to your diet, medications or how you've been taking warfarin    Plan made per [facility] anticoagulation protocol    Danni Patton RN  Anticoagulation Clinic  8/30/2022    _______________________________________________________________________     Anticoagulation Episode Summary     Current INR goal:  2.5-3.5   TTR:  77.2 % (1 y)   Target end date:  Indefinite   Send INR reminders to:  [facility]    Indications    Atrial fibrillation (H) [I48.91]  Atrial fibrillation (H) [I48.91] (Resolved) [I48.91]  Long term current use of anticoagulant therapy [Z79.01]  Mechanical heart valve present [Z95.2]  Longstanding persistent atrial fibrillation (H) [I48.11]  Chronic atrial fibrillation (H) [I48.20]  Atrial fibrillation  unspecified type (H) [I48.91]           Comments:           Anticoagulation Care Providers     Provider Role  Specialty Phone number    Osbaldo Renee MD Referring Family Medicine [phone]    Carlos Archibald DO [facility] Internal Medicine [phone]

## 2022-09-01 ENCOUNTER — OFFICE VISIT (OUTPATIENT)
Dept: CARDIOLOGY | Facility: CLINIC | Age: 62
End: 2022-09-01
Attending: INTERNAL MEDICINE
Payer: COMMERCIAL

## 2022-09-01 ENCOUNTER — MYC MEDICAL ADVICE (OUTPATIENT)
Dept: FAMILY MEDICINE | Facility: CLINIC | Age: 62
End: 2022-09-01

## 2022-09-01 VITALS
OXYGEN SATURATION: 98 % | BODY MASS INDEX: 36.92 KG/M2 | RESPIRATION RATE: 18 BRPM | DIASTOLIC BLOOD PRESSURE: 82 MMHG | SYSTOLIC BLOOD PRESSURE: 120 MMHG | WEIGHT: 261 LBS | HEART RATE: 81 BPM

## 2022-09-01 DIAGNOSIS — I25.10 CORONARY ARTERY DISEASE INVOLVING NATIVE CORONARY ARTERY OF NATIVE HEART WITHOUT ANGINA PECTORIS: ICD-10-CM

## 2022-09-01 DIAGNOSIS — Z98.890 S/P MVR (MITRAL VALVE REPAIR): ICD-10-CM

## 2022-09-01 DIAGNOSIS — Z95.2 MECHANICAL HEART VALVE PRESENT: ICD-10-CM

## 2022-09-01 DIAGNOSIS — F17.200 SMOKER: ICD-10-CM

## 2022-09-01 PROCEDURE — 99214 OFFICE O/P EST MOD 30 MIN: CPT | Performed by: INTERNAL MEDICINE

## 2022-09-01 RX ORDER — ASPIRIN 81 MG/1
81 TABLET, CHEWABLE ORAL DAILY
Qty: 90 TABLET | Refills: 11 | COMMUNITY
Start: 2022-09-01 | End: 2023-08-03

## 2022-09-01 ASSESSMENT — PAIN SCALES - GENERAL: PAINLEVEL: NO PAIN (0)

## 2022-09-01 NOTE — PROGRESS NOTES
Service Date: 2022    Osbaldo Renee MD   [facility]  [address] [place], [address]    RE:  Derek Stover  MRN:  [id number]  :  1960          Dear Dr. Renee:      Derek Stover, a 61-year-old man with a history of mitral regurgitation (status post mechanical mitral valve replacement with #32 [employer] [place] valve ), coronary artery disease, atrial fibrillation, hypertension, obesity and a smoking history, was seen today at your request for followup.    Since I last saw the patient on 2021, he has remained entirely free of chest, arm, neck, jaw or back discomfort with exertion, dyspnea, syncope or focal neurologic deficit.  He has been compliant with warfarin with therapeutic INRs.  According to our records, through diet and exercise he has lost 21 pounds.  He still smokes, but is making efforts to stop smoking.  An echocardiogram performed prior to this visit shows normal valve function with no change in left ventricular systolic performance and moderate aortic insufficiency.  He suffered from COVID in early 2022, but has entirely recovered.  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    The patient is interested in using sildenafil for ED.  He has used the medication safely in the past.    PAST MEDICAL HISTORY:       1. Mitral insufficiency -- status post implantation of #32 [employer] [place] mechanical prosthesis .  Echo prior to this visit shows normal valve function and no change in left ventricular systolic performance.     2. History of atrial fibrillation -- on warfarin anticoagulation with therapeutic INRs.     3. Obesity -- successful weight loss of 21 pounds since last seen.     4. Continued tobacco use, making efforts to stop.     5. Dyslipidemia -- optimal LDL cholesterol most recent blood test.      6. Coronary artery disease -- Non-ST segment MI 08/2020.  Treated with implantation of 2 drug-eluting stents in posterior descending branch of dominant circumflex.  No significant narrowing in left main, LAD or nondominant right coronary.    PHYSICAL EXAMINATION:    GENERAL:  Exam today demonstrates a very pleasant, cooperative and intelligent 61-year-old man who appears comfortable at rest.  VITAL SIGNS:  Blood pressure is 120/82, his heart rate is 81 and regular.  his respiratory rate 18.  His weight is 118.4 kilograms, down 20 pounds since last seen.  RESPIRATORY:  His lungs are clear to percussion and auscultation.  CARDIOVASCULAR:  Shows a normal crisp S1 with a normal S2.  There is a grade 1/6 systolic ejection murmur at left sternal border.  His pulses are full and symmetrical with no edema.    MEDICATIONS:       1. Aspirin 81 daily.     2. Atorvastatin 40 daily.     3. Warfarin to maintain INR range of 2.5     LABORATORY STUDIES:  I have personally reviewed his echocardiogram performed on 08/30/2022.  I agree with the interpretation.  The patient's mitral valve prosthesis is working normally.  There is moderate 2+ aortic insufficiency with ejection fraction of 50%.  There has really been no significant change since the patient's last echo.    His LDL cholesterol is 63.  His potassium is 3.7.    ASSESSMENT:  Mr. Stover is asymptomatic on guideline-directed medical therapy for atrial fibrillation, coronary artery disease and mechanical mitral valve with optimal LDL cholesterol/blood pressure and valve function on TTE. .  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     I believe the patient can safely use sildenafil with the usual precautions.  The drug should be prescribed by his primary care doctor.  I see no  contraindication for the patient to continue to work, but will await DOT recommendations.    I have encouraged the patient to stop smoking cigarettes.  I am pleased to see has successfully lost weight and have encouraged a regular exercise program.    RECOMMENDATIONS:       1. Continue Mediterranean-style weight loss diet.     2. Cessation of tobacco use.     3. Continue present medical therapy with target INR in the range of 2.5.     4. I see no contraindications to sildenafil at this time if the standard precautions are taken.     5. 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     6. Follow up with me in about 1 year.    We greatly appreciate the opportunity to care for your patient, Mr. Hakeem Borjas.    Sincerely,     Zenon Medrano MD        D: 2022   T: 2022   MT: daisha    Name:     HAKEEM BORJAS  MRN:      [id number]        Account:      [id number]   :      1960           Service Date: 2022       Document: [id number]

## 2022-09-01 NOTE — LETTER
9/1/2022    Osbaldo Renee MD  [address] [place] [address]    RE: Derek Stover       Dear Colleague,     I had the pleasure of seeing Derek Stover in the [facility].  HISTORY OF PRESENT ILLNESS:  No chest pain or dyspnea. Still working will see DOT today. INR therapeutic Has recovered from COVID    Orders this Visit:  No orders of the defined types were placed in this encounter.    No orders of the defined types were placed in this encounter.    Medications Discontinued During This Encounter   Medication Reason     atorvastatin (LIPITOR) 80 MG tablet Therapy completed       Encounter Diagnoses   Name Primary?     Coronary artery disease involving native coronary artery of native heart without angina pectoris      S/P MVR (mitral valve repair)      Mechanical heart valve present      Smoker        CURRENT MEDICATIONS:  Current Outpatient Medications   Medication Sig Dispense Refill     acetaminophen (TYLENOL) 325 MG tablet Take 2 tablets (650 mg) by mouth every 4 hours as needed for other (surgical pain) 100 tablet 0     Amoxicillin (AMOXIL PO) Take 1,500-3,000 mg by mouth daily as needed (patient takes 6 X 500 = 3,000 mg dose 1 hour before dental appointment and 3 X 500 = 1,500 mg dose 6 hours after dental appointment.)       atorvastatin (LIPITOR) 40 MG tablet Take 1 tablet (40 mg) by mouth every evening 90 tablet 2     warfarin ANTICOAGULANT (COUMADIN) 5 MG tablet Take 5 mg Tues, Thurs, Sat and 2.5 mg all other days or as directed by the Coumadin Clinic. 60 tablet 1     nitroGLYcerin (NITROSTAT) 0.4 MG sublingual tablet For chest pain place 1 tablet under the tongue every 5 minutes for 3 doses. If symptoms persist 5 minutes after 1st dose call 911. 15 tablet 0       ALLERGIES     Allergies   Allergen Reactions     Bee Pollen      Other reaction(s): Runny Nose     No Known Drug Allergy      Pollen Extract      Seasonal Allergies      Statin Drugs [Hmg-Coa-R Inhibitors]      No  allergy, felt horrible on this drug.       PAST MEDICAL, SURGICAL, FAMILY, SOCIAL HISTORY:  History was reviewed and updated as needed, see medical record.    Review of Systems:  A 12-point review of systems was completed, see medical record for detailed review of systems information.    Physical Exam:  Vitals: /82   Pulse 81   Resp 18   Wt 118.4 kg (261 lb)   SpO2 98%   BMI 36.92 kg/m      Constitutional:           Skin:           Head:           Eyes:           ENT:           Neck:           Chest:           Cardiac:                    Abdomen:           Vascular:                                        Extremities and Back:           Neurological:           ASSESSMENT:   Stable TTE ok       RECOMMENDATIONS:  Follow-up in one year  IF DOT requires will consider stress test. TTE looks good. Sildenafil is ok. Should stop smoking.       Recent Lab Results:  LIPID RESULTS:  Lab Results   Component Value Date    CHOL 128 08/30/2022    CHOL 109 12/05/2020    HDL 42 08/30/2022    HDL 46 12/05/2020    LDL 63 08/30/2022    LDL 49 12/05/2020    TRIG 113 08/30/2022    TRIG 68 12/05/2020    CHOLHDLRATIO 5.3 (H) 08/13/2014       LIVER ENZYME RESULTS:  Lab Results   Component Value Date    AST 18 08/25/2020    ALT 30 12/05/2020       CBC RESULTS:  Lab Results   Component Value Date    WBC 8.3 08/25/2020    RBC 5.00 08/25/2020    HGB 15.8 08/25/2020    HCT 47.6 08/25/2020    MCV 95 08/25/2020    MCH 31.6 08/25/2020    MCHC 33.2 08/25/2020    RDW 15.0 08/25/2020     08/25/2020       BMP RESULTS:  Lab Results   Component Value Date     08/25/2020    POTASSIUM 3.7 08/25/2020    CHLORIDE 109 08/25/2020    CO2 28 08/25/2020    ANIONGAP 4 08/25/2020    GLC 95 08/25/2020    BUN 14 08/25/2020    CR 0.94 08/25/2020    GFRESTIMATED 89 08/25/2020    GFRESTBLACK >90 08/25/2020    FIORELLA 8.9 08/25/2020        A1C RESULTS:  Lab Results   Component Value Date    A1C 5.1 08/26/2020       INR RESULTS:  Lab Results   Component  Value Date    INR 3.6 (H) 2022    INR 3.7 (H) 2022    INR 3.0 (H) 2021    INR 3.2 (H) 2021    INR 2.28 (H) 2020    INR 2.29 (H) 2020       We greatly appreciate the opportunity to be involved in the care of your patient, Derek Stover.    Sincerely,  Zenon Medrano MD        Zenon Medrano MD  [address]  [place] [address]                                                                       Service Date: 2022    Osbaldo Renee MD   [facility]  [address] [place], [address]    RE:  Derek Stover  MRN:  [id number]  :  1960          Dear Dr. Renee:      Derek Stover, a 61-year-old man with a history of mitral regurgitation (status post mechanical mitral valve replacement with #32 [employer] [place] valve 2017), coronary artery disease, atrial fibrillation, hypertension, obesity and a smoking history, was seen today at your request for followup.    Since I last saw the patient on 2021, he has remained entirely free of chest, arm, neck, jaw or back discomfort with exertion, dyspnea, syncope or focal neurologic deficit.  He has been compliant with warfarin with therapeutic INRs.  According to our records, through diet and exercise he has lost 21 pounds.  He still smokes, but is making efforts to stop smoking.  An echocardiogram performed prior to this visit shows normal valve function with no change in left ventricular systolic performance and moderate aortic insufficiency.  He suffered from COVID in early 2022, but has entirely recovered.  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    The patient is interested in using sildenafil for ED.  He has used the medication safely in the past.    PAST MEDICAL HISTORY:       1. Mitral insufficiency -- status post  implantation of #32 [employer] [place] mechanical prosthesis 2017.  Echo prior to this visit shows normal valve function and no change in left ventricular systolic performance.     2. History of atrial fibrillation -- on warfarin anticoagulation with therapeutic INRs.     3. Obesity -- successful weight loss of 21 pounds since last seen.     4. Continued tobacco use, making efforts to stop.     5. Dyslipidemia -- optimal LDL cholesterol most recent blood test.     6. Coronary artery disease -- Non-ST segment MI 08/2020.  Treated with implantation of 2 drug-eluting stents in posterior descending branch of dominant circumflex.  No significant narrowing in left main, LAD or nondominant right coronary.    PHYSICAL EXAMINATION:    GENERAL:  Exam today demonstrates a very pleasant, cooperative and intelligent 61-year-old man who appears comfortable at rest.  VITAL SIGNS:  Blood pressure is 120/82, his heart rate is 81 and regular.  his respiratory rate 18.  His weight is 118.4 kilograms, down 20 pounds since last seen.  RESPIRATORY:  His lungs are clear to percussion and auscultation.  CARDIOVASCULAR:  Shows a normal crisp S1 with a normal S2.  There is a grade 1/6 systolic ejection murmur at left sternal border.  His pulses are full and symmetrical with no edema.    MEDICATIONS:       1. Aspirin 81 daily.     2. Atorvastatin 40 daily.     3. Warfarin to maintain INR range of 2.5     LABORATORY STUDIES:  I have personally reviewed his echocardiogram performed on 08/30/2022.  I agree with the interpretation.  The patient's mitral valve prosthesis is working normally.  There is moderate 2+ aortic insufficiency with ejection fraction of 50%.  There has really been no significant change since the patient's last echo.    His LDL cholesterol is 63.  His potassium is 3.7.    ASSESSMENT:  Mr. Stover is asymptomatic on guideline-directed medical therapy for atrial fibrillation, coronary artery disease and mechanical mitral valve  with optimal LDL cholesterol/blood pressure and valve function on TTE. .  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     I believe the patient can safely use sildenafil with the usual precautions.  The drug should be prescribed by his primary care doctor.  I see no contraindication for the patient to continue to work, but will await DOT recommendations.    I have encouraged the patient to stop smoking cigarettes.  I am pleased to see has successfully lost weight and have encouraged a regular exercise program.    RECOMMENDATIONS:       1. Continue Mediterranean-style weight loss diet.     2. Cessation of tobacco use.     3. Continue present medical therapy with target INR in the range of 2.5.     4. I see no contraindications to sildenafil at this time if the standard precautions are taken.     5. 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     6. Follow up with me in about 1 year.    We greatly appreciate the opportunity to care for your patient, Mr. Hakeem Borjas.    Sincerely,     Zenon Medrano MD        D: 2022   T: 2022   MT: tb    Name:     HAKEEM BORJAS  MRN:      [id number]        Account:      [id number]   :      1960           Service Date: 2022       Document: [id number]      Thank you for allowing me to participate in the care of your patient.      Sincerely,     Zenon Medrano MD     [facility]  cc:   Zenon Medrano MD  [address]  [place] [address]

## 2022-09-01 NOTE — NURSING NOTE
Chief Complaint   Patient presents with     Follow Up     Coronary artery disease involving native coronary artery of native heart without angina pectoris

## 2022-09-01 NOTE — TELEPHONE ENCOUNTER
Advised patient to make an appointment where they can discuss restarting med.     Rosario Ludwig, ANANTN, RN

## 2022-09-01 NOTE — PROGRESS NOTES
HISTORY OF PRESENT ILLNESS:  No chest pain or dyspnea. Still working will see DOT today. INR therapeutic Has recovered from COVID    Orders this Visit:  No orders of the defined types were placed in this encounter.    No orders of the defined types were placed in this encounter.    Medications Discontinued During This Encounter   Medication Reason     atorvastatin (LIPITOR) 80 MG tablet Therapy completed       Encounter Diagnoses   Name Primary?     Coronary artery disease involving native coronary artery of native heart without angina pectoris      S/P MVR (mitral valve repair)      Mechanical heart valve present      Smoker        CURRENT MEDICATIONS:  Current Outpatient Medications   Medication Sig Dispense Refill     acetaminophen (TYLENOL) 325 MG tablet Take 2 tablets (650 mg) by mouth every 4 hours as needed for other (surgical pain) 100 tablet 0     Amoxicillin (AMOXIL PO) Take 1,500-3,000 mg by mouth daily as needed (patient takes 6 X 500 = 3,000 mg dose 1 hour before dental appointment and 3 X 500 = 1,500 mg dose 6 hours after dental appointment.)       atorvastatin (LIPITOR) 40 MG tablet Take 1 tablet (40 mg) by mouth every evening 90 tablet 2     warfarin ANTICOAGULANT (COUMADIN) 5 MG tablet Take 5 mg Tues, Thurs, Sat and 2.5 mg all other days or as directed by the [facility]. 60 tablet 1     nitroGLYcerin (NITROSTAT) 0.4 MG sublingual tablet For chest pain place 1 tablet under the tongue every 5 minutes for 3 doses. If symptoms persist 5 minutes after 1st dose call 911. 15 tablet 0       ALLERGIES     Allergies   Allergen Reactions     Bee Pollen      Other reaction(s): Runny Nose     No Known Drug Allergy      Pollen Extract      Seasonal Allergies      Statin Drugs [Hmg-Coa-R Inhibitors]      No allergy, felt horrible on this drug.       PAST MEDICAL, SURGICAL, FAMILY, SOCIAL HISTORY:  History was reviewed and updated as needed, see medical record.    Review of Systems:  A 12-point review of  systems was completed, see medical record for detailed review of systems information.    Physical Exam:  Vitals: /82   Pulse 81   Resp 18   Wt 118.4 kg (261 lb)   SpO2 98%   BMI 36.92 kg/m      Constitutional:           Skin:           Head:           Eyes:           ENT:           Neck:           Chest:           Cardiac:                    Abdomen:           Vascular:                                        Extremities and Back:           Neurological:           ASSESSMENT:   Stable TTE ok       RECOMMENDATIONS:  Follow-up in one year  IF DOT requires will consider stress test. TTE looks good. Sildenafil is ok. Should stop smoking.       Recent Lab Results:  LIPID RESULTS:  Lab Results   Component Value Date    CHOL 128 08/30/2022    CHOL 109 12/05/2020    HDL 42 08/30/2022    HDL 46 12/05/2020    LDL 63 08/30/2022    LDL 49 12/05/2020    TRIG 113 08/30/2022    TRIG 68 12/05/2020    CHOLHDLRATIO 5.3 (H) 08/13/2014       LIVER ENZYME RESULTS:  Lab Results   Component Value Date    AST 18 08/25/2020    ALT 30 12/05/2020       CBC RESULTS:  Lab Results   Component Value Date    WBC 8.3 08/25/2020    RBC 5.00 08/25/2020    HGB 15.8 08/25/2020    HCT 47.6 08/25/2020    MCV 95 08/25/2020    MCH 31.6 08/25/2020    MCHC 33.2 08/25/2020    RDW 15.0 08/25/2020     08/25/2020       BMP RESULTS:  Lab Results   Component Value Date     08/25/2020    POTASSIUM 3.7 08/25/2020    CHLORIDE 109 08/25/2020    CO2 28 08/25/2020    ANIONGAP 4 08/25/2020    GLC 95 08/25/2020    BUN 14 08/25/2020    CR 0.94 08/25/2020    GFRESTIMATED 89 08/25/2020    GFRESTBLACK >90 08/25/2020    FOIRELLA 8.9 08/25/2020        A1C RESULTS:  Lab Results   Component Value Date    A1C 5.1 08/26/2020       INR RESULTS:  Lab Results   Component Value Date    INR 3.6 (H) 08/30/2022    INR 3.7 (H) 08/09/2022    INR 3.0 (H) 06/17/2021    INR 3.2 (H) 05/06/2021    INR 2.28 (H) 08/27/2020    INR 2.29 (H) 08/26/2020       We greatly appreciate  the opportunity to be involved in the care of your patient, Derek Stover.    Sincerely,  Zenon Medrano MD      CC  Zenon Medrano MD  [address]  [place] [address]

## 2022-09-13 ENCOUNTER — TELEPHONE (OUTPATIENT)
Dept: FAMILY MEDICINE | Facility: CLINIC | Age: 62
End: 2022-09-13

## 2022-09-13 NOTE — TELEPHONE ENCOUNTER
Reason for Call:  Other call back    Detailed comments: Has questions regarding INR    Phone Number Patient can be reached at: Home number on file [phone] (home)    Best Time: anytime    Can we leave a detailed message on this number? YES    Call taken on 9/13/2022 at 9:22 AM by Fani Knapp

## 2022-09-20 ENCOUNTER — LAB (OUTPATIENT)
Dept: LAB | Facility: CLINIC | Age: 62
End: 2022-09-20
Payer: COMMERCIAL

## 2022-09-20 ENCOUNTER — ANTICOAGULATION THERAPY VISIT (OUTPATIENT)
Dept: ANTICOAGULATION | Facility: CLINIC | Age: 62
End: 2022-09-20

## 2022-09-20 DIAGNOSIS — I48.91 ATRIAL FIBRILLATION (H): Primary | ICD-10-CM

## 2022-09-20 DIAGNOSIS — I48.91 ATRIAL FIBRILLATION, UNSPECIFIED TYPE (H): ICD-10-CM

## 2022-09-20 DIAGNOSIS — I48.20 CHRONIC ATRIAL FIBRILLATION (H): ICD-10-CM

## 2022-09-20 DIAGNOSIS — Z79.01 LONG TERM CURRENT USE OF ANTICOAGULANT THERAPY: ICD-10-CM

## 2022-09-20 DIAGNOSIS — Z95.2 MECHANICAL HEART VALVE PRESENT: ICD-10-CM

## 2022-09-20 DIAGNOSIS — I48.11 LONGSTANDING PERSISTENT ATRIAL FIBRILLATION (H): ICD-10-CM

## 2022-09-20 LAB — INR BLD: 2.5 (ref 0.9–1.1)

## 2022-09-20 PROCEDURE — 85610 PROTHROMBIN TIME: CPT

## 2022-09-20 PROCEDURE — 36416 COLLJ CAPILLARY BLOOD SPEC: CPT

## 2022-09-20 NOTE — PROGRESS NOTES
ANTICOAGULATION MANAGEMENT     Derek Stover 61 year old male is on warfarin with therapeutic INR result. (Goal INR 2.5-3.5)    Recent labs: (last 7 days)     09/20/22  0807   INR 2.5*       ASSESSMENT       Source(s): Chart Review and Patient/Caregiver Call       Warfarin doses taken: Less warfarin taken than planned which may be affecting INR    Diet: No new diet changes identified    New illness, injury, or hospitalization: No    Medication/supplement changes: None noted    Signs or symptoms of bleeding or clotting: No    Previous INR: Supratherapeutic    Additional findings: None       PLAN     Recommended plan for no diet, medication or health factor changes affecting INR     Dosing Instructions: Continue your current warfarin dose with next INR in 4 weeks       Summary  As of 9/20/2022    Full warfarin instructions:  5 mg every Tue, Thu, Sat; 2.5 mg all other days   Next INR check:  10/18/2022             Telephone call with Roberto who verbalizes understanding and agrees to plan and who agrees to plan and repeated back plan correctly    Lab visit scheduled    Education provided: None required    Plan made per [facility] anticoagulation protocol    Danni Patton RN  Anticoagulation Clinic  9/20/2022    _______________________________________________________________________     Anticoagulation Episode Summary     Current INR goal:  2.5-3.5   TTR:  80.2 % (1 y)   Target end date:  Indefinite   Send INR reminders to:  [facility]    Indications    Atrial fibrillation (H) [I48.91]  Atrial fibrillation (H) [I48.91] (Resolved) [I48.91]  Long term current use of anticoagulant therapy [Z79.01]  Mechanical heart valve present [Z95.2]  Longstanding persistent atrial fibrillation (H) [I48.11]  Chronic atrial fibrillation (H) [I48.20]  Atrial fibrillation  unspecified type (H) [I48.91]           Comments:           Anticoagulation Care Providers     Provider Role Specialty Phone number    Osbaldo Renee MD Referring  Family Medicine [phone]    Carlos Archibald DO [facility] Internal Medicine [phone]

## 2022-10-04 ENCOUNTER — MYC REFILL (OUTPATIENT)
Dept: INTERNAL MEDICINE | Facility: CLINIC | Age: 62
End: 2022-10-04

## 2022-10-04 ENCOUNTER — OFFICE VISIT (OUTPATIENT)
Dept: INTERNAL MEDICINE | Facility: CLINIC | Age: 62
End: 2022-10-04
Payer: COMMERCIAL

## 2022-10-04 VITALS
TEMPERATURE: 97.9 F | DIASTOLIC BLOOD PRESSURE: 74 MMHG | BODY MASS INDEX: 36.86 KG/M2 | OXYGEN SATURATION: 97 % | HEIGHT: 71 IN | WEIGHT: 263.3 LBS | RESPIRATION RATE: 18 BRPM | HEART RATE: 76 BPM | SYSTOLIC BLOOD PRESSURE: 118 MMHG

## 2022-10-04 DIAGNOSIS — Z79.01 LONG TERM CURRENT USE OF ANTICOAGULANT THERAPY: ICD-10-CM

## 2022-10-04 DIAGNOSIS — E66.01 MORBID OBESITY (H): ICD-10-CM

## 2022-10-04 DIAGNOSIS — N52.9 VASCULOGENIC ERECTILE DYSFUNCTION, UNSPECIFIED VASCULOGENIC ERECTILE DYSFUNCTION TYPE: ICD-10-CM

## 2022-10-04 DIAGNOSIS — I48.20 CHRONIC ATRIAL FIBRILLATION (H): ICD-10-CM

## 2022-10-04 DIAGNOSIS — Z12.5 SCREENING FOR PROSTATE CANCER: ICD-10-CM

## 2022-10-04 DIAGNOSIS — Z00.00 ANNUAL PHYSICAL EXAM: Primary | ICD-10-CM

## 2022-10-04 DIAGNOSIS — I21.4 NSTEMI (NON-ST ELEVATED MYOCARDIAL INFARCTION) (H): ICD-10-CM

## 2022-10-04 LAB
ALBUMIN SERPL-MCNC: 3.6 G/DL (ref 3.4–5)
ALBUMIN UR-MCNC: NEGATIVE MG/DL
ALP SERPL-CCNC: 107 U/L (ref 40–150)
ALT SERPL W P-5'-P-CCNC: 26 U/L (ref 0–70)
ANION GAP SERPL CALCULATED.3IONS-SCNC: 3 MMOL/L (ref 3–14)
APPEARANCE UR: CLEAR
AST SERPL W P-5'-P-CCNC: 21 U/L (ref 0–45)
BILIRUB SERPL-MCNC: 0.6 MG/DL (ref 0.2–1.3)
BILIRUB UR QL STRIP: NEGATIVE
BUN SERPL-MCNC: 18 MG/DL (ref 7–30)
CALCIUM SERPL-MCNC: 8.6 MG/DL (ref 8.5–10.1)
CHLORIDE BLD-SCNC: 110 MMOL/L (ref 94–109)
CO2 SERPL-SCNC: 28 MMOL/L (ref 20–32)
COLOR UR AUTO: YELLOW
CREAT SERPL-MCNC: 0.93 MG/DL (ref 0.66–1.25)
ERYTHROCYTE [DISTWIDTH] IN BLOOD BY AUTOMATED COUNT: 14.9 % (ref 10–15)
GFR SERPL CREATININE-BSD FRML MDRD: >90 ML/MIN/1.73M2
GLUCOSE BLD-MCNC: 117 MG/DL (ref 70–99)
GLUCOSE UR STRIP-MCNC: NEGATIVE MG/DL
HCT VFR BLD AUTO: 43.1 % (ref 40–53)
HGB BLD-MCNC: 14.4 G/DL (ref 13.3–17.7)
HGB UR QL STRIP: ABNORMAL
KETONES UR STRIP-MCNC: NEGATIVE MG/DL
LEUKOCYTE ESTERASE UR QL STRIP: NEGATIVE
MCH RBC QN AUTO: 32.4 PG (ref 26.5–33)
MCHC RBC AUTO-ENTMCNC: 33.4 G/DL (ref 31.5–36.5)
MCV RBC AUTO: 97 FL (ref 78–100)
NITRATE UR QL: NEGATIVE
PH UR STRIP: 6 [PH] (ref 5–7)
PLATELET # BLD AUTO: 155 10E3/UL (ref 150–450)
POTASSIUM BLD-SCNC: 4.5 MMOL/L (ref 3.4–5.3)
PROT SERPL-MCNC: 7.1 G/DL (ref 6.8–8.8)
PSA SERPL-MCNC: 0.3 UG/L (ref 0–4)
RBC # BLD AUTO: 4.44 10E6/UL (ref 4.4–5.9)
RBC URINE: 3 /HPF
SODIUM SERPL-SCNC: 141 MMOL/L (ref 133–144)
SP GR UR STRIP: 1.01 (ref 1–1.03)
SQUAMOUS EPITHELIAL: <1 /HPF
UROBILINOGEN UR STRIP-MCNC: NORMAL MG/DL
WBC # BLD AUTO: 8.6 10E3/UL (ref 4–11)
WBC URINE: 1 /HPF

## 2022-10-04 PROCEDURE — 80053 COMPREHEN METABOLIC PANEL: CPT | Performed by: INTERNAL MEDICINE

## 2022-10-04 PROCEDURE — 99214 OFFICE O/P EST MOD 30 MIN: CPT | Mod: 25 | Performed by: INTERNAL MEDICINE

## 2022-10-04 PROCEDURE — 81001 URINALYSIS AUTO W/SCOPE: CPT | Performed by: INTERNAL MEDICINE

## 2022-10-04 PROCEDURE — 85027 COMPLETE CBC AUTOMATED: CPT | Performed by: INTERNAL MEDICINE

## 2022-10-04 PROCEDURE — G0103 PSA SCREENING: HCPCS | Performed by: INTERNAL MEDICINE

## 2022-10-04 PROCEDURE — 36415 COLL VENOUS BLD VENIPUNCTURE: CPT | Performed by: INTERNAL MEDICINE

## 2022-10-04 PROCEDURE — 99396 PREV VISIT EST AGE 40-64: CPT | Performed by: INTERNAL MEDICINE

## 2022-10-04 RX ORDER — WARFARIN SODIUM 5 MG/1
TABLET ORAL
Qty: 60 TABLET | Refills: 1 | Status: SHIPPED | OUTPATIENT
Start: 2022-10-04 | End: 2023-02-03

## 2022-10-04 RX ORDER — SILDENAFIL 50 MG/1
50 TABLET, FILM COATED ORAL DAILY PRN
Qty: 10 TABLET | Refills: 0 | Status: SHIPPED | OUTPATIENT
Start: 2022-10-04 | End: 2024-09-04

## 2022-10-04 RX ORDER — SILDENAFIL 50 MG/1
50 TABLET, FILM COATED ORAL DAILY PRN
Qty: 10 TABLET | Refills: 0 | Status: SHIPPED | OUTPATIENT
Start: 2022-10-04 | End: 2022-10-04

## 2022-10-04 RX ORDER — ATORVASTATIN CALCIUM 40 MG/1
40 TABLET, FILM COATED ORAL EVERY EVENING
Qty: 90 TABLET | Refills: 3 | Status: SHIPPED | OUTPATIENT
Start: 2022-10-04 | End: 2023-09-12

## 2022-10-04 ASSESSMENT — ENCOUNTER SYMPTOMS
DIARRHEA: 0
HEMATURIA: 0
HEARTBURN: 0
SORE THROAT: 0
DYSURIA: 0
PARESTHESIAS: 0
COUGH: 1
ABDOMINAL PAIN: 0
WEAKNESS: 0
ARTHRALGIAS: 0
CONSTIPATION: 0
SHORTNESS OF BREATH: 0
JOINT SWELLING: 0
CHILLS: 0
PALPITATIONS: 0
NAUSEA: 0
MYALGIAS: 0
FREQUENCY: 0
HEMATOCHEZIA: 0
HEADACHES: 0
NERVOUS/ANXIOUS: 0
EYE PAIN: 0
DIZZINESS: 0
FEVER: 0

## 2022-10-04 ASSESSMENT — PAIN SCALES - GENERAL: PAINLEVEL: NO PAIN (0)

## 2022-10-04 NOTE — PROGRESS NOTES
SUBJECTIVE:   CC: Roberto is an 61 year old who presents for preventative health visit.       Patient has been advised of split billing requirements and indicates understanding: Yes  Healthy Habits:     Getting at least 3 servings of Calcium per day:  NO    Bi-annual eye exam:  Yes    Dental care twice a year:  NO    Sleep apnea or symptoms of sleep apnea:  None    Diet:  Regular (no restrictions)    Frequency of exercise:  None    Taking medications regularly:  Yes    Medication side effects:  Lightheadedness    PHQ-2 Total Score: 0    Additional concerns today:  No            Today's PHQ-2 Score:   PHQ-2 (  [employer]) 10/4/2022   Q1: Little interest or pleasure in doing things 0   Q2: Feeling down, depressed or hopeless 0   PHQ-2 Score 0   PHQ-2 Total Score (12-17 Years)- Positive if 3 or more points; Administer PHQ-A if positive -   Q1: Little interest or pleasure in doing things Not at all   Q2: Feeling down, depressed or hopeless Not at all   PHQ-2 Score 0       Abuse: Current or Past(Physical, Sexual or Emotional)- No  Do you feel safe in your environment? Yes    Have you ever done Advance Care Planning? (For example, a Health Directive, POLST, or a discussion with a medical provider or your loved ones about your wishes): No, advance care planning information given to patient to review.  Patient plans to discuss their wishes with loved ones or provider.      Social History     Tobacco Use     Smoking status: Current Every Day Smoker     Packs/day: 2.00     Years: 45.00     Pack years: 90.00     Types: Cigarettes     Start date:      Last attempt to quit: 2017     Years since quittin.1     Smokeless tobacco: Never Used     Tobacco comment: started smoking at 17, smoking 2 ppd since    Substance Use Topics     Alcohol use: Yes     Alcohol/week: 4.0 standard drinks     Types: 4 Standard drinks or equivalent per week     Comment: occasional     If you drink alcohol do you typically have >3 drinks per  "day or >7 drinks per week? No    Alcohol Use 10/4/2022   Prescreen: >3 drinks/day or >7 drinks/week? No       Last PSA:   PSA   Date Value Ref Range Status   08/13/2014 0.28 0 - 4 ug/L Final         Reviewed and updated as needed this visit by clinical staff   Tobacco  Allergies  Meds   Med Hx  Surg Hx  Fam Hx  Soc Hx          Reviewed and updated as needed this visit by Provider      Review of Systems   Constitutional: Negative for chills and fever.   HENT: Positive for congestion. Negative for ear pain, hearing loss and sore throat.    Eyes: Negative for pain and visual disturbance.   Respiratory: Positive for cough. Negative for shortness of breath.    Cardiovascular: Negative for chest pain, palpitations and peripheral edema.   Gastrointestinal: Negative for abdominal pain, constipation, diarrhea, heartburn, hematochezia and nausea.   Genitourinary: Positive for impotence. Negative for dysuria, frequency, genital sores, hematuria, penile discharge and urgency.   Musculoskeletal: Negative for arthralgias, joint swelling and myalgias.   Skin: Negative for rash.   Neurological: Negative for dizziness, weakness, headaches and paresthesias.   Psychiatric/Behavioral: Negative for mood changes. The patient is not nervous/anxious.      The above symptoms were discussed with the patient.  Patient denies any of these current complaints today.    OBJECTIVE:   /74 (BP Location: Right arm, Patient Position: Chair, Cuff Size: Adult Large)   Pulse 76   Temp 97.9  F (36.6  C) (Temporal)   Resp 18   Ht 1.797 m (5' 10.75\")   Wt 119.4 kg (263 lb 4.8 oz)   SpO2 97%   BMI 36.98 kg/m      Physical Exam  GENERAL: healthy, alert and no distress  EYES: Eyes grossly normal to inspection, PERRL and conjunctivae and sclerae normal  HENT: ear canals and TM's normal, nose and mouth without ulcers or lesions  NECK: no adenopathy, no asymmetry, masses, or scars and thyroid normal to palpation  RESP: lungs clear to " auscultation - no rales, rhonchi or wheezes  CV: regular rate and rhythm with noted S3 click, no peripheral edema and peripheral pulses strong  ABDOMEN: soft, nontender, no hepatosplenomegaly, no masses and bowel sounds normal  MS: no gross musculoskeletal defects noted, no edema  SKIN: no suspicious lesions or rashes  NEURO: Normal strength and tone, mentation intact and speech normal  PSYCH: mentation appears normal, affect normal/bright        ASSESSMENT/PLAN:   (Z00.00) Annual physical exam  (primary encounter diagnosis)  Comment: Patient reports he has intentionally lost 20 lbs since last visit. Plans to continue losing 20 lbs more.   Pt still smokes and does not want to quit.  Pt. States he does not like water and drinks 5 diet mountain dews a day.     Plan: Continue to advise patient of proper diet, exercise, and risk of smoking.    (I21.4) NSTEMI (non-ST elevated myocardial infarction) (H)  Comment: Patient followed by cardiology.  Patient recently seen within a month for echocardiogram and all results were unremarkable  Plan: atorvastatin (LIPITOR) 40 MG tablet            (Z79.01) Long term current use of anticoagulant therapy  Comment: Last INR September 20 was 2.5  Plan: warfarin ANTICOAGULANT (COUMADIN) 5 MG tablet            (I48.20) Chronic atrial fibrillation (H)  Comment: Patient followed by cardiology.  Denies any new complaints.  Plan: warfarin ANTICOAGULANT (COUMADIN) 5 MG tablet,         CBC with platelets, UA Macro with Reflex to         Micro and Culture - lab collect, Comprehensive         metabolic panel (BMP + Alb, Alk Phos, ALT, AST,        Total. Bili, TP)            (E66.01) Morbid obesity (H)  Comment: Patient states he cannot and will not stop smoking.  States the last time he stopped smoking he gained too much weight.  Patient states smoking helps him maintain and lose weight.  Plan: Continue to advise patient of proper diet, exercise, and smoking cessation.    (N52.9) Vasculogenic  "erectile dysfunction, unspecified vasculogenic erectile dysfunction type  Comment: Patient requesting refill prescription of Viagra.  Patient reports last time he took 50 mg he became dizzy.  Patient advised to split his Viagra pill in half when needed and to stop and seek expert medical advice if the above symptoms return.  Plan: sildenafil (VIAGRA) 50 MG tablet        (Z12.5) Screening for prostate cancer  Comment: Awaiting lab results  Plan: PSA, screen        Patient has been advised of split billing requirements and indicates understanding: Yes    COUNSELING:   Reviewed preventive health counseling, as reflected in patient instructions    Estimated body mass index is 36.98 kg/m  as calculated from the following:    Height as of this encounter: 1.797 m (5' 10.75\").    Weight as of this encounter: 119.4 kg (263 lb 4.8 oz).     Weight management plan: Discussed healthy diet and exercise guidelines    He reports that he has been smoking cigarettes. He started smoking about 45 years ago. He has a 90.00 pack-year smoking history. He has never used smokeless tobacco.  Nicotine/Tobacco Cessation Plan:   Information offered: Patient not interested at this time        Carlos Archibald, DO  [facility]  "

## 2022-10-04 NOTE — TELEPHONE ENCOUNTER
Routing refill request to provider for review/approval because:  Drug interaction warning      Boo Pearce, RN, BSN

## 2022-10-18 ENCOUNTER — ANTICOAGULATION THERAPY VISIT (OUTPATIENT)
Dept: ANTICOAGULATION | Facility: CLINIC | Age: 62
End: 2022-10-18

## 2022-10-18 ENCOUNTER — LAB (OUTPATIENT)
Dept: LAB | Facility: CLINIC | Age: 62
End: 2022-10-18
Payer: COMMERCIAL

## 2022-10-18 DIAGNOSIS — I48.20 CHRONIC ATRIAL FIBRILLATION (H): ICD-10-CM

## 2022-10-18 DIAGNOSIS — Z79.01 LONG TERM CURRENT USE OF ANTICOAGULANT THERAPY: ICD-10-CM

## 2022-10-18 DIAGNOSIS — I48.11 LONGSTANDING PERSISTENT ATRIAL FIBRILLATION (H): ICD-10-CM

## 2022-10-18 DIAGNOSIS — I48.91 ATRIAL FIBRILLATION, UNSPECIFIED TYPE (H): ICD-10-CM

## 2022-10-18 DIAGNOSIS — Z95.2 MECHANICAL HEART VALVE PRESENT: ICD-10-CM

## 2022-10-18 DIAGNOSIS — I48.0 PAROXYSMAL ATRIAL FIBRILLATION (H): Primary | ICD-10-CM

## 2022-10-18 LAB — INR BLD: 3.5 (ref 0.9–1.1)

## 2022-10-18 PROCEDURE — 85610 PROTHROMBIN TIME: CPT

## 2022-10-18 PROCEDURE — 36416 COLLJ CAPILLARY BLOOD SPEC: CPT

## 2022-10-18 NOTE — PROGRESS NOTES
ANTICOAGULATION MANAGEMENT     Derek Stover 61 year old male is on warfarin with therapeutic INR result. (Goal INR 2.5-3.5)    Recent labs: (last 7 days)     10/18/22  0757   INR 3.5*       ASSESSMENT       Source(s): Chart Review and Patient/Caregiver Call       Warfarin doses taken: Warfarin taken as instructed    Diet: No new diet changes identified    New illness, injury, or hospitalization: No    Medication/supplement changes: None noted    Signs or symptoms of bleeding or clotting: No    Previous INR: Therapeutic last visit; previously outside of goal range    Additional findings: None       PLAN     Recommended plan for no diet, medication or health factor changes affecting INR     Dosing Instructions: Continue your current warfarin dose with next INR in 4 weeks       Summary  As of 10/18/2022    Full warfarin instructions:  5 mg every Tue, Thu, Sat; 2.5 mg all other days   Next INR check:  11/15/2022             Telephone call with Roberto who verbalizes understanding and agrees to plan    Lab visit scheduled    Education provided: None required    Plan made per [facility] anticoagulation protocol    Danni Patton RN  Anticoagulation Clinic  10/18/2022    _______________________________________________________________________     Anticoagulation Episode Summary     Current INR goal:  2.5-3.5   TTR:  87.8 % (1 y)   Target end date:  Indefinite   Send INR reminders to:  [facility]    Indications    Atrial fibrillation (H) [I48.91]  Atrial fibrillation (H) [I48.91] (Resolved) [I48.91]  Long term current use of anticoagulant therapy [Z79.01]  Mechanical heart valve present [Z95.2]  Longstanding persistent atrial fibrillation (H) [I48.11]  Chronic atrial fibrillation (H) [I48.20]  Atrial fibrillation  unspecified type (H) [I48.91]           Comments:           Anticoagulation Care Providers     Provider Role Specialty Phone number    Osbaldo Renee MD Referring Family Medicine [phone]    Dragan  Carlos Yarbrough DO [facility] Internal Medicine [phone]

## 2022-10-29 ENCOUNTER — HEALTH MAINTENANCE LETTER (OUTPATIENT)
Age: 62
End: 2022-10-29

## 2022-11-15 ENCOUNTER — LAB (OUTPATIENT)
Dept: LAB | Facility: CLINIC | Age: 62
End: 2022-11-15
Payer: COMMERCIAL

## 2022-11-15 ENCOUNTER — ANTICOAGULATION THERAPY VISIT (OUTPATIENT)
Dept: ANTICOAGULATION | Facility: CLINIC | Age: 62
End: 2022-11-15

## 2022-11-15 DIAGNOSIS — Z95.2 MECHANICAL HEART VALVE PRESENT: ICD-10-CM

## 2022-11-15 DIAGNOSIS — Z79.01 LONG TERM CURRENT USE OF ANTICOAGULANT THERAPY: ICD-10-CM

## 2022-11-15 DIAGNOSIS — I48.11 LONGSTANDING PERSISTENT ATRIAL FIBRILLATION (H): ICD-10-CM

## 2022-11-15 DIAGNOSIS — I48.91 ATRIAL FIBRILLATION, UNSPECIFIED TYPE (H): ICD-10-CM

## 2022-11-15 DIAGNOSIS — I48.0 PAROXYSMAL ATRIAL FIBRILLATION (H): Primary | ICD-10-CM

## 2022-11-15 DIAGNOSIS — I48.20 CHRONIC ATRIAL FIBRILLATION (H): ICD-10-CM

## 2022-11-15 LAB — INR BLD: 2 (ref 0.9–1.1)

## 2022-11-15 PROCEDURE — 36416 COLLJ CAPILLARY BLOOD SPEC: CPT

## 2022-11-15 PROCEDURE — 85610 PROTHROMBIN TIME: CPT

## 2022-11-15 NOTE — PROGRESS NOTES
ANTICOAGULATION MANAGEMENT     Derek Stover 61 year old male is on warfarin with subtherapeutic INR result. (Goal INR 2.5-3.5)    Recent labs: (last 7 days)     11/15/22  0754   INR 2.0*       ASSESSMENT       Source(s): Chart Review and Patient/Caregiver Call       Warfarin doses taken: Warfarin taken as instructed and pt missed Friday's dose     Diet: diet has been much different lately due to deer hunting     New illness, injury, or hospitalization: No    Medication/supplement changes: pt states he had a migraine last week so he ended up needing to take Tylenol around the clock pretty much for 4 days     Signs or symptoms of bleeding or clotting: No    Previous INR: Therapeutic last 2(+) visits    Additional findings: None       PLAN     Recommended plan for temporary change(s) affecting INR     Dosing Instructions: booster dose then continue your current warfarin dose with next INR in 1 week       Summary  As of 11/15/2022    Full warfarin instructions:  11/15: 7.5 mg; Otherwise 5 mg every Tue, Thu, Sat; 2.5 mg all other days; Starting 11/15/2022   Next INR check:  11/22/2022             Telephone call with Roberto who verbalizes understanding and agrees to plan    Lab visit scheduled    Education provided:     Please call back if any changes to your diet, medications or how you've been taking warfarin    Goal range and lab monitoring: goal range and significance of current result and Importance of therapeutic range    Interaction IS anticipated between warfarin and Tylenol     Symptom monitoring: monitoring for clotting signs and symptoms and monitoring for stroke signs and symptoms    Plan made per [facility] anticoagulation protocol    Perla Walden, RN  Anticoagulation Clinic  11/15/2022    _______________________________________________________________________     Anticoagulation Episode Summary     Current INR goal:  2.5-3.5   TTR:  86.6 % (1 y)   Target end date:  Indefinite   Send INR reminders to:   [facility]    Indications    Atrial fibrillation (H) [I48.91]  Atrial fibrillation (H) [I48.91] (Resolved) [I48.91]  Long term current use of anticoagulant therapy [Z79.01]  Mechanical heart valve present [Z95.2]  Longstanding persistent atrial fibrillation (H) [I48.11]  Chronic atrial fibrillation (H) [I48.20]  Atrial fibrillation  unspecified type (H) [I48.91]           Comments:           Anticoagulation Care Providers     Provider Role Specialty Phone number    Osbaldo Renee MD Referring Family Medicine [phone]    Carlos Archibald DO Responsible Internal Medicine [phone]

## 2022-11-22 ENCOUNTER — ANTICOAGULATION THERAPY VISIT (OUTPATIENT)
Dept: ANTICOAGULATION | Facility: CLINIC | Age: 62
End: 2022-11-22

## 2022-11-22 ENCOUNTER — LAB (OUTPATIENT)
Dept: LAB | Facility: CLINIC | Age: 62
End: 2022-11-22
Payer: COMMERCIAL

## 2022-11-22 DIAGNOSIS — Z79.01 LONG TERM CURRENT USE OF ANTICOAGULANT THERAPY: ICD-10-CM

## 2022-11-22 DIAGNOSIS — I48.91 ATRIAL FIBRILLATION, UNSPECIFIED TYPE (H): ICD-10-CM

## 2022-11-22 DIAGNOSIS — I48.20 CHRONIC ATRIAL FIBRILLATION (H): ICD-10-CM

## 2022-11-22 DIAGNOSIS — I48.0 PAROXYSMAL ATRIAL FIBRILLATION (H): Primary | ICD-10-CM

## 2022-11-22 DIAGNOSIS — Z95.2 MECHANICAL HEART VALVE PRESENT: ICD-10-CM

## 2022-11-22 DIAGNOSIS — I48.11 LONGSTANDING PERSISTENT ATRIAL FIBRILLATION (H): ICD-10-CM

## 2022-11-22 LAB — INR BLD: 3.2 (ref 0.9–1.1)

## 2022-11-22 PROCEDURE — 85610 PROTHROMBIN TIME: CPT

## 2022-11-22 PROCEDURE — 36416 COLLJ CAPILLARY BLOOD SPEC: CPT

## 2022-11-22 NOTE — PROGRESS NOTES
ANTICOAGULATION MANAGEMENT     Derek Stover 62 year old male is on warfarin with therapeutic INR result. (Goal INR 2.5-3.5)    Recent labs: (last 7 days)     11/22/22  0747   INR 3.2*       ASSESSMENT       Source(s): Chart Review and Patient/Caregiver Call       Warfarin doses taken: Warfarin taken as instructed    Diet: No new diet changes identified    New illness, injury, or hospitalization: No    Medication/supplement changes: None noted    Signs or symptoms of bleeding or clotting: No    Previous INR: Subtherapeutic    Additional findings: None       PLAN     Recommended plan for no diet, medication or health factor changes affecting INR     Dosing Instructions: Continue your current warfarin dose with next INR in 2 weeks       Summary  As of 11/22/2022    Full warfarin instructions:  5 mg every Tue, Thu, Sat; 2.5 mg all other days; Starting 11/22/2022   Next INR check:  12/6/2022             Telephone call with Roberto who verbalizes understanding and agrees to plan    Lab visit scheduled    Education provided:     Please call back if any changes to your diet, medications or how you've been taking warfarin    Plan made per [facility] anticoagulation protocol    Perla Walden RN  Anticoagulation Clinic  11/22/2022    _______________________________________________________________________     Anticoagulation Episode Summary     Current INR goal:  2.5-3.5   TTR:  85.8 % (1 y)   Target end date:  Indefinite   Send INR reminders to:  [facility]    Indications    Atrial fibrillation (H) [I48.91]  Atrial fibrillation (H) [I48.91] (Resolved) [I48.91]  Long term current use of anticoagulant therapy [Z79.01]  Mechanical heart valve present [Z95.2]  Longstanding persistent atrial fibrillation (H) [I48.11]  Chronic atrial fibrillation (H) [I48.20]  Atrial fibrillation  unspecified type (H) [I48.91]           Comments:           Anticoagulation Care Providers     Provider Role Specialty Phone number    Thelma  MD Osbaldo [facility] Family Medicine [phone]    Carlos Archibald DO [facility] Internal Medicine [phone]

## 2022-12-06 ENCOUNTER — ANTICOAGULATION THERAPY VISIT (OUTPATIENT)
Dept: ANTICOAGULATION | Facility: CLINIC | Age: 62
End: 2022-12-06

## 2022-12-06 ENCOUNTER — LAB (OUTPATIENT)
Dept: LAB | Facility: CLINIC | Age: 62
End: 2022-12-06
Payer: COMMERCIAL

## 2022-12-06 DIAGNOSIS — Z79.01 LONG TERM CURRENT USE OF ANTICOAGULANT THERAPY: ICD-10-CM

## 2022-12-06 DIAGNOSIS — I48.0 PAROXYSMAL ATRIAL FIBRILLATION (H): Primary | ICD-10-CM

## 2022-12-06 DIAGNOSIS — Z95.2 MECHANICAL HEART VALVE PRESENT: ICD-10-CM

## 2022-12-06 DIAGNOSIS — I48.91 ATRIAL FIBRILLATION, UNSPECIFIED TYPE (H): ICD-10-CM

## 2022-12-06 DIAGNOSIS — I48.20 CHRONIC ATRIAL FIBRILLATION (H): ICD-10-CM

## 2022-12-06 DIAGNOSIS — I48.11 LONGSTANDING PERSISTENT ATRIAL FIBRILLATION (H): ICD-10-CM

## 2022-12-06 LAB — INR BLD: 3.5 (ref 0.9–1.1)

## 2022-12-06 PROCEDURE — 85610 PROTHROMBIN TIME: CPT

## 2022-12-06 PROCEDURE — 36416 COLLJ CAPILLARY BLOOD SPEC: CPT

## 2022-12-27 ENCOUNTER — ANTICOAGULATION THERAPY VISIT (OUTPATIENT)
Dept: ANTICOAGULATION | Facility: CLINIC | Age: 62
End: 2022-12-27

## 2022-12-27 ENCOUNTER — LAB (OUTPATIENT)
Dept: LAB | Facility: CLINIC | Age: 62
End: 2022-12-27
Payer: COMMERCIAL

## 2022-12-27 DIAGNOSIS — Z79.01 LONG TERM CURRENT USE OF ANTICOAGULANT THERAPY: ICD-10-CM

## 2022-12-27 DIAGNOSIS — Z95.2 MECHANICAL HEART VALVE PRESENT: ICD-10-CM

## 2022-12-27 DIAGNOSIS — I48.91 ATRIAL FIBRILLATION, UNSPECIFIED TYPE (H): ICD-10-CM

## 2022-12-27 DIAGNOSIS — I48.11 LONGSTANDING PERSISTENT ATRIAL FIBRILLATION (H): ICD-10-CM

## 2022-12-27 DIAGNOSIS — I48.20 CHRONIC ATRIAL FIBRILLATION (H): ICD-10-CM

## 2022-12-27 DIAGNOSIS — I48.0 PAROXYSMAL ATRIAL FIBRILLATION (H): Primary | ICD-10-CM

## 2022-12-27 LAB — INR BLD: 4 (ref 0.9–1.1)

## 2022-12-27 PROCEDURE — 85610 PROTHROMBIN TIME: CPT

## 2022-12-27 PROCEDURE — 36415 COLL VENOUS BLD VENIPUNCTURE: CPT

## 2022-12-27 NOTE — PROGRESS NOTES
ANTICOAGULATION MANAGEMENT     Derek Stover 62 year old male is on warfarin with supratherapeutic INR result. (Goal INR 2.5-3.5)    Recent labs: (last 7 days)     12/27/22  0810   INR 4.0*       ASSESSMENT       Source(s): Chart Review and Patient/Caregiver Call       Warfarin doses taken: Warfarin taken as instructed    Diet: No new diet changes identified    New illness, injury, or hospitalization: No    Medication/supplement changes: None noted    Signs or symptoms of bleeding or clotting: No    Previous INR: Therapeutic last 2(+) visits    Additional findings: None       PLAN     Recommended plan for no diet, medication or health factor changes affecting INR     Dosing Instructions: partial hold then continue your current warfarin dose with next INR in 2 weeks       Summary  As of 12/27/2022    Full warfarin instructions:  12/27: 2.5 mg; Otherwise 5 mg every Tue, Thu, Sat; 2.5 mg all other days   Next INR check:  1/10/2023             Telephone call with Robetro who verbalizes understanding and agrees to plan and who agrees to plan and repeated back plan correctly    Lab visit scheduled    Education provided:     Symptom monitoring: monitoring for bleeding signs and symptoms    Plan made per [facility] anticoagulation protocol    Danni Patton RN  Anticoagulation Clinic  12/27/2022    _______________________________________________________________________     Anticoagulation Episode Summary     Current INR goal:  2.5-3.5   TTR:  80.0 % (1 y)   Target end date:  Indefinite   [facility]    Indications    Atrial fibrillation (H) [I48.91]  Atrial fibrillation (H) [I48.91] (Resolved) [I48.91]  Long term current use of anticoagulant therapy [Z79.01]  Mechanical heart valve present [Z95.2]  Longstanding persistent atrial fibrillation (H) [I48.11]  Chronic atrial fibrillation (H) [I48.20]  Atrial fibrillation  unspecified type (H) [I48.91]           Comments:           Anticoagulation Care  Providers     Provider Role Specialty Phone number    Osbaldo Renee MD Referring Family Medicine [phone]    Carlos Archibald DO [facility] Internal Medicine [phone]

## 2023-01-10 ENCOUNTER — ANTICOAGULATION THERAPY VISIT (OUTPATIENT)
Dept: ANTICOAGULATION | Facility: CLINIC | Age: 63
End: 2023-01-10

## 2023-01-10 ENCOUNTER — LAB (OUTPATIENT)
Dept: LAB | Facility: CLINIC | Age: 63
End: 2023-01-10
Payer: COMMERCIAL

## 2023-01-10 DIAGNOSIS — I48.11 LONGSTANDING PERSISTENT ATRIAL FIBRILLATION (H): ICD-10-CM

## 2023-01-10 DIAGNOSIS — Z95.2 MECHANICAL HEART VALVE PRESENT: ICD-10-CM

## 2023-01-10 DIAGNOSIS — I48.91 ATRIAL FIBRILLATION, UNSPECIFIED TYPE (H): ICD-10-CM

## 2023-01-10 DIAGNOSIS — I48.0 PAROXYSMAL ATRIAL FIBRILLATION (H): Primary | ICD-10-CM

## 2023-01-10 DIAGNOSIS — I48.20 CHRONIC ATRIAL FIBRILLATION (H): ICD-10-CM

## 2023-01-10 DIAGNOSIS — Z79.01 LONG TERM CURRENT USE OF ANTICOAGULANT THERAPY: ICD-10-CM

## 2023-01-10 LAB — INR BLD: 3.5 (ref 0.9–1.1)

## 2023-01-10 PROCEDURE — 36416 COLLJ CAPILLARY BLOOD SPEC: CPT

## 2023-01-10 PROCEDURE — 85610 PROTHROMBIN TIME: CPT

## 2023-01-10 NOTE — PROGRESS NOTES
ANTICOAGULATION MANAGEMENT     Derek Stover 62 year old male is on warfarin with therapeutic INR result. (Goal INR 2.5-3.5)    Recent labs: (last 7 days)     01/10/23  0825   INR 3.5*       ASSESSMENT       Source(s): Chart Review and Patient/Caregiver Call       Warfarin doses taken: Warfarin taken as instructed    Diet: Increased greens/vitamin K in diet; ongoing change is going to try to keep doing a little greens each to keep INR under 3.5    New illness, injury, or hospitalization: No    Medication/supplement changes: None noted    Signs or symptoms of bleeding or clotting: No    Previous INR: Supratherapeutic    Additional findings: None       PLAN     Recommended plan for no diet, medication or health factor changes affecting INR     Dosing Instructions: Continue your current warfarin dose with next INR in 4 weeks       Summary  As of 1/10/2023    Full warfarin instructions:  5 mg every Tue, Thu, Sat; 2.5 mg all other days   Next INR check:  2/7/2023             Telephone call with Roberto who verbalizes understanding and agrees to plan and who agrees to plan and repeated back plan correctly    Lab visit scheduled    Education provided:     Dietary considerations: importance of consistent vitamin K intake and impact of vitamin K foods on INR    Plan made per [facility] anticoagulation protocol    Danni Patton, RN  Anticoagulation Clinic  1/10/2023    _______________________________________________________________________     Anticoagulation Episode Summary     Current INR goal:  2.5-3.5   TTR:  76.2 % (1 y)   Target end date:  Indefinite   Send INR reminders to:  [facility]    Indications    Atrial fibrillation (H) [I48.91]  Atrial fibrillation (H) [I48.91] (Resolved) [I48.91]  Long term current use of anticoagulant therapy [Z79.01]  Mechanical heart valve present [Z95.2]  Longstanding persistent atrial fibrillation (H) [I48.11]  Chronic atrial fibrillation (H) [I48.20]  Atrial fibrillation  unspecified  type (H) [I48.91]           Comments:           Anticoagulation Care Providers     Provider Role Specialty Phone number    Osbaldo Renee MD Referring Family Medicine [phone]    Carlos Archibald DO [facility] Internal Medicine [phone]

## 2023-01-11 NOTE — PROGRESS NOTES
"The patient has been notified of following:     \"This video visit will be conducted via a call between you and your physician/provider. We have found that certain health care needs can be provided without the need for an in-person physical exam.  This service lets us provide the care you need with a video conversation.  If a prescription is necessary we can send it directly to your pharmacy.  If lab work is needed we can place an order for that and you can then stop by our lab to have the test done at a later time.    Video visits are billed at different rates depending on your insurance coverage.  Please reach out to your insurance provider with any questions.    If during the course of the call the physician/provider feels a video visit is not appropriate, you will not be charged for this service.\"    Patient has given verbal consent for Video visit? Yes    How would you like to obtain your AVS? Rambo    Patient would like the video invitation sent by: Text to cell phone: [phone]    Will anyone else be joining your video visit? No      Blood pressure: doesn't monitor at home, but does go to cardiac rehab   Pulse: NA  Weight: 240 #      -----------------------------------------------------------------------------------------------------------------------------    Primary Cardiologist: Dr. Mcghee    Reason for Video Visit: Hospital follow up    HPI:  This is a very pleasant 59-year-old male with past medical history notable for coronary artery disease (coronary angiogram thousand 17 showed no significant coronary artery disease; recent admission with an STEMI having plaque rupture and severe proximal left PDA stenosis for which he received 2 drug-eluting stents; there appeared to be no significant mitral disease; started on Plavix 75 mg; no aspirin to avoid triple therapy; echo showed preserved LV function), history of mitral regurgitation (status post mechanical mitral valve replacement with 32 mm [employer] " [employer] [place] in 9/2017; most recent echocardiogram showed stable valve function with mean valve gradient of 6.5 mmHg), hypertension, dyslipidemia, paroxysmal atrial fibrillation/atrial flutter (on no AV myriam blocking agents due to hypotension and lightheadedness; already on Coumadin for his mechanical valve; recommendations were made for referral to EP if he had rate control issues for possible ablation), obesity, and history of tobacco use.    He reports feeling well with no symptoms of recurrent chest discomfort, lightheadedness, palpitations, PND, peripheral edema, abdominal distention, or bleeding issues.  He is attending cardiac rehab and this is going well.  He did need a reduction in atorvastatin from 80 mg to 40 mg as he has significant mental fogginess and muscle aches.  All of the symptoms have resolved with the decrease.  He tells me in the past he could not tolerate statins and he was off of them completely.    ROS:  12-pt ROS is negative except for as noted above.     Physical Exam:  A limited exam was conducted via video.  Constitutional:  Patient is pleasant, alert, cooperative, and in NAD.  HEENT:  NCAT. EOM's intact.   Neck:  CVP appears normal.   Pulmonary: Normal respiratory effort.   Extremities: No edema, erythema, cyanosis appreciated.  Skin:  No rashes or lesions appreciated.   Neurological:  No gross motor or sensory deficits.   Psych: Appropriate affect.       A/P:   This is a pleasant 59-year-old gentleman with past medical history notable for coronary artery disease with recent admission for an STEMI receiving 2 drug-eluting stents to proximal PDA branch.  He also has hypertension, high for lipidemia, obesity, history of tobacco use, history of mitral valve replacement with mechanical valve, and paroxysmal atrial fibrillation/atrial flutter.    He appears to be doing well standpoint.  He is okay to resume work as a .  He will continue with cardiac rehab.  He will continue with  Plavix therapy for at least 1 year.  After that.  We will consider switching this to baby aspirin.    Video start time: 12:14 PM  Video end time: 12:21 PM   Originating Location (pt. Location): home  Distant Location (provider location):  [facility]   Mode of Communication:  Video Conference via Savor[employer] phone application       This visit is being conducted as a virtual visit due to the emphasis on mitigation of the COVID-19 virus pandemic. The clinician has decided that the risk of an in-office visit outweighs the benefit for this patient. The rest of the comprehensive physical examination is deferred due to public health emergency video visit restrictions.         Danny Mariee PA-C   9/8/2020  Pager: [phone]       Epidermal Sutures: 5-0 Fast Absorbing Gut

## 2023-02-02 ENCOUNTER — MYC REFILL (OUTPATIENT)
Dept: FAMILY MEDICINE | Facility: CLINIC | Age: 63
End: 2023-02-02
Payer: COMMERCIAL

## 2023-02-02 ENCOUNTER — MYC MEDICAL ADVICE (OUTPATIENT)
Dept: FAMILY MEDICINE | Facility: CLINIC | Age: 63
End: 2023-02-02
Payer: COMMERCIAL

## 2023-02-02 DIAGNOSIS — Z79.01 LONG TERM CURRENT USE OF ANTICOAGULANT THERAPY: ICD-10-CM

## 2023-02-02 DIAGNOSIS — I48.20 CHRONIC ATRIAL FIBRILLATION (H): ICD-10-CM

## 2023-02-02 DIAGNOSIS — I21.4 NSTEMI (NON-ST ELEVATED MYOCARDIAL INFARCTION) (H): ICD-10-CM

## 2023-02-03 RX ORDER — NITROGLYCERIN 0.4 MG/1
TABLET SUBLINGUAL
Qty: 15 TABLET | Refills: 0 | Status: SHIPPED | OUTPATIENT
Start: 2023-02-03 | End: 2024-09-04

## 2023-02-03 RX ORDER — WARFARIN SODIUM 5 MG/1
TABLET ORAL
Qty: 90 TABLET | Refills: 1 | Status: SHIPPED | OUTPATIENT
Start: 2023-02-03 | End: 2023-10-17

## 2023-02-03 NOTE — TELEPHONE ENCOUNTER
ANTICOAGULATION MANAGEMENT:  Medication Refill    Anticoagulation Summary  As of 1/10/2023    Warfarin maintenance plan:  5 mg (5 mg x 1) every Tue, Thu, Sat; 2.5 mg (5 mg x 0.5) all other days   Next INR check:  2/7/2023   Target end date:  Indefinite    Indications    Atrial fibrillation (H) [I48.91]  Atrial fibrillation (H) [I48.91] (Resolved) [I48.91]  Long term current use of anticoagulant therapy [Z79.01]  Mechanical heart valve present [Z95.2]  Longstanding persistent atrial fibrillation (H) [I48.11]  Chronic atrial fibrillation (H) [I48.20]  Atrial fibrillation  unspecified type (H) [I48.91]             Anticoagulation Care Providers     Provider Role Specialty Phone number    Osbaldo Renee MD Referring Family Medicine [phone]    Carlos Archibald DO Responsible Internal Medicine [phone]          Visit with referring provider/group within last year: Yes    [facility] referral signed within last year: Yes    Derek meets all criteria for refill (current [facility] referral, office visit with referring provider/group in last year, lab monitoring up to date or not exceeding 2 weeks overdue). Rx instructions and quantity supplied updated to match patient's current dosing plan. Warfarin 90 day supply with 1 refill granted per [facility] protocol     Perla Walden RN  Anticoagulation Clinic

## 2023-02-03 NOTE — TELEPHONE ENCOUNTER
"Requested Prescriptions   Pending Prescriptions Disp Refills    nitroGLYcerin (NITROSTAT) 0.4 MG sublingual tablet 15 tablet 0     Sig: For chest pain place 1 tablet under the tongue every 5 minutes for 3 doses. If symptoms persist 5 minutes after 1st dose call 911.       Nitrates Failed - 2/2/2023  9:17 PM        Failed - Pt is not on erectile dysfunction medications        Failed - Sublingual nitro order needs review     If refill exceeds 1 bottle per month, please forward request to provider.           Passed - Blood pressure under 140/90 in past 12 months     BP Readings from Last 3 Encounters:   10/04/22 118/74   09/01/22 120/82   06/02/21 102/70                 Passed - Recent (12 mo) or future (30 days) visit within the authorizing provider's specialty     Patient has had an office visit with the authorizing provider or a provider within the authorizing providers department within the previous 12 mos or has a future within next 30 days. See \"Patient Info\" tab in inbasket, or \"Choose Columns\" in Meds & Orders section of the refill encounter.              Passed - Medication is active on med list        Passed - Patient is age 18 or older             "

## 2023-02-07 ENCOUNTER — ANTICOAGULATION THERAPY VISIT (OUTPATIENT)
Dept: ANTICOAGULATION | Facility: CLINIC | Age: 63
End: 2023-02-07

## 2023-02-07 ENCOUNTER — LAB (OUTPATIENT)
Dept: LAB | Facility: CLINIC | Age: 63
End: 2023-02-07
Payer: COMMERCIAL

## 2023-02-07 DIAGNOSIS — I48.20 CHRONIC ATRIAL FIBRILLATION (H): ICD-10-CM

## 2023-02-07 DIAGNOSIS — Z79.01 LONG TERM CURRENT USE OF ANTICOAGULANT THERAPY: ICD-10-CM

## 2023-02-07 DIAGNOSIS — Z95.2 MECHANICAL HEART VALVE PRESENT: ICD-10-CM

## 2023-02-07 DIAGNOSIS — I48.11 LONGSTANDING PERSISTENT ATRIAL FIBRILLATION (H): ICD-10-CM

## 2023-02-07 DIAGNOSIS — I48.91 ATRIAL FIBRILLATION, UNSPECIFIED TYPE (H): ICD-10-CM

## 2023-02-07 DIAGNOSIS — I48.0 PAROXYSMAL ATRIAL FIBRILLATION (H): Primary | ICD-10-CM

## 2023-02-07 LAB — INR BLD: 2.8 (ref 0.9–1.1)

## 2023-02-07 PROCEDURE — 36416 COLLJ CAPILLARY BLOOD SPEC: CPT

## 2023-02-07 PROCEDURE — 85610 PROTHROMBIN TIME: CPT

## 2023-02-07 NOTE — PROGRESS NOTES
ANTICOAGULATION MANAGEMENT     Derek Stover 62 year old male is on warfarin with therapeutic INR result. (Goal INR 2.5-3.5)    Recent labs: (last 7 days)     02/07/23  0806   INR 2.8*       ASSESSMENT       Source(s): Chart Review and Patient/Caregiver Call       Warfarin doses taken: Warfarin taken as instructed    Diet: No new diet changes identified    New illness, injury, or hospitalization: No    Medication/supplement changes: None noted    Signs or symptoms of bleeding or clotting: No    Previous INR: Therapeutic last visit; previously outside of goal range    Additional findings: has been eating one serving of greens weekly, will keep consistent        PLAN     Recommended plan for no diet, medication or health factor changes affecting INR     Dosing Instructions: Continue your current warfarin dose with next INR in 4 weeks       Summary  As of 2/7/2023    Full warfarin instructions:  5 mg every Tue, Thu, Sat; 2.5 mg all other days   Next INR check:  3/7/2023             Telephone call with Roberto who verbalizes understanding and agrees to plan    Lab visit scheduled    Education provided:     Contact [phone]  with any changes, questions or concerns.     Plan made per [facility] anticoagulation protocol    Corie Mccall, RN  Anticoagulation Clinic  2/7/2023    _______________________________________________________________________     Anticoagulation Episode Summary     Current INR goal:  2.5-3.5   TTR:  76.2 % (1 y)   Target end date:  Indefinite   [facility]    Indications    Atrial fibrillation (H) [I48.91]  Atrial fibrillation (H) [I48.91] (Resolved) [I48.91]  Long term current use of anticoagulant therapy [Z79.01]  Mechanical heart valve present [Z95.2]  Longstanding persistent atrial fibrillation (H) [I48.11]  Chronic atrial fibrillation (H) [I48.20]  Atrial fibrillation  unspecified type (H) [I48.91]           Comments:           Anticoagulation Care Providers      Provider Role Specialty Phone number    Osbaldo Renee MD Referring Family Medicine [phone]    Carlos Archibald DO [facility] Internal Medicine [phone]

## 2023-03-07 ENCOUNTER — LAB (OUTPATIENT)
Dept: LAB | Facility: CLINIC | Age: 63
End: 2023-03-07
Payer: COMMERCIAL

## 2023-03-07 ENCOUNTER — ANTICOAGULATION THERAPY VISIT (OUTPATIENT)
Dept: ANTICOAGULATION | Facility: CLINIC | Age: 63
End: 2023-03-07

## 2023-03-07 DIAGNOSIS — I48.91 ATRIAL FIBRILLATION, UNSPECIFIED TYPE (H): ICD-10-CM

## 2023-03-07 DIAGNOSIS — Z79.01 LONG TERM CURRENT USE OF ANTICOAGULANT THERAPY: ICD-10-CM

## 2023-03-07 DIAGNOSIS — Z95.2 MECHANICAL HEART VALVE PRESENT: ICD-10-CM

## 2023-03-07 DIAGNOSIS — I48.0 PAROXYSMAL ATRIAL FIBRILLATION (H): Primary | ICD-10-CM

## 2023-03-07 DIAGNOSIS — I48.20 CHRONIC ATRIAL FIBRILLATION (H): ICD-10-CM

## 2023-03-07 DIAGNOSIS — I48.11 LONGSTANDING PERSISTENT ATRIAL FIBRILLATION (H): ICD-10-CM

## 2023-03-07 LAB — INR BLD: 3.2 (ref 0.9–1.1)

## 2023-03-07 PROCEDURE — 36416 COLLJ CAPILLARY BLOOD SPEC: CPT

## 2023-03-07 PROCEDURE — 85610 PROTHROMBIN TIME: CPT

## 2023-03-07 NOTE — PROGRESS NOTES
ANTICOAGULATION MANAGEMENT     Derek Stover 62 year old male is on warfarin with therapeutic INR result. (Goal INR 2.5-3.5)    Recent labs: (last 7 days)     03/07/23  0822   INR 3.2*       ASSESSMENT     Warfarin Lab Questionnaire    Dose in Tablet or Mg 3/7/2023   TAB or MG? milligram (mg)     No flowsheet data found.  Warfarin Lab Questionnaire 3/7/2023   Missed doses? No   Medication changes? No   Abnormal bleeding? No   Shortness of breath? No   Injuries or illness since last INR? No   Changes in diet or alcohol? No - Per assessment, patient has been eating one serving of greens/week    Upcoming surgery, procedure? No   Best number to call with results? [phone]       Additional findings: None       PLAN     Recommended plan for ongoing change(s) affecting INR     Dosing Instructions: Continue your current warfarin dose with next INR in 4 weeks       Summary  As of 3/7/2023    Full warfarin instructions:  5 mg every Tue, Thu, Sat; 2.5 mg all other days   Next INR check:  4/4/2023             Telephone call with Roberto who verbalizes understanding and agrees to plan    Patient elected to schedule next visit 4/18/23 - declined early visit    Education provided:     Please call back if any changes to your diet, medications or how you've been taking warfarin    Symptom monitoring: monitoring for bleeding signs and symptoms and monitoring for clotting signs and symptoms    Plan made per [facility] anticoagulation protocol    Edwige Michael RN  Anticoagulation Clinic  3/7/2023    _______________________________________________________________________     Anticoagulation Episode Summary     Current INR goal:  2.5-3.5   TTR:  76.2 % (1 y)   Target end date:  Indefinite   Send INR reminders to:  [facility]    Indications    Atrial fibrillation (H) [I48.91]  Atrial fibrillation (H) [I48.91] (Resolved) [I48.91]  Long term current use of anticoagulant therapy [Z79.01]  Mechanical heart valve present  Dr orville ba made aware of pt's cont'd severe pain, verbal order for 2mg ivp versed rcvd.    [Z95.2]  Longstanding persistent atrial fibrillation (H) [I48.11]  Chronic atrial fibrillation (H) [I48.20]  Atrial fibrillation  unspecified type (H) [I48.91]           Comments:           Anticoagulation Care Providers     Provider Role Specialty Phone number    Osbaldo Renee MD Referring Family Medicine [phone]    Carlos Archibald DO [facility] Internal Medicine [phone]

## 2023-04-18 ENCOUNTER — ANTICOAGULATION THERAPY VISIT (OUTPATIENT)
Dept: ANTICOAGULATION | Facility: CLINIC | Age: 63
End: 2023-04-18

## 2023-04-18 ENCOUNTER — TELEPHONE (OUTPATIENT)
Dept: ANTICOAGULATION | Facility: CLINIC | Age: 63
End: 2023-04-18

## 2023-04-18 ENCOUNTER — LAB (OUTPATIENT)
Dept: LAB | Facility: CLINIC | Age: 63
End: 2023-04-18
Payer: COMMERCIAL

## 2023-04-18 DIAGNOSIS — I48.91 ATRIAL FIBRILLATION, UNSPECIFIED TYPE (H): ICD-10-CM

## 2023-04-18 DIAGNOSIS — Z79.01 LONG TERM CURRENT USE OF ANTICOAGULANT THERAPY: ICD-10-CM

## 2023-04-18 DIAGNOSIS — I48.20 CHRONIC ATRIAL FIBRILLATION (H): ICD-10-CM

## 2023-04-18 DIAGNOSIS — I48.0 PAROXYSMAL ATRIAL FIBRILLATION (H): Primary | ICD-10-CM

## 2023-04-18 DIAGNOSIS — I48.11 LONGSTANDING PERSISTENT ATRIAL FIBRILLATION (H): ICD-10-CM

## 2023-04-18 DIAGNOSIS — I48.91 ATRIAL FIBRILLATION (H): Primary | ICD-10-CM

## 2023-04-18 DIAGNOSIS — Z95.2 MECHANICAL HEART VALVE PRESENT: ICD-10-CM

## 2023-04-18 DIAGNOSIS — I48.0 PAROXYSMAL ATRIAL FIBRILLATION (H): ICD-10-CM

## 2023-04-18 DIAGNOSIS — Z95.2 S/P MVR (MITRAL VALVE REPLACEMENT): ICD-10-CM

## 2023-04-18 LAB — INR BLD: 3.2 (ref 0.9–1.1)

## 2023-04-18 PROCEDURE — 36416 COLLJ CAPILLARY BLOOD SPEC: CPT

## 2023-04-18 PROCEDURE — 85610 PROTHROMBIN TIME: CPT

## 2023-04-18 NOTE — PROGRESS NOTES
ANTICOAGULATION MANAGEMENT     Derek Stover 62 year old male is on warfarin with therapeutic INR result. (Goal INR 2.5-3.5)    Recent labs: (last 7 days)     04/18/23  0809   INR 3.2*       ASSESSMENT       Source(s): Chart Review       Warfarin doses taken: Warfarin taken as instructed    Diet: No new diet changes identified    Medication/supplement changes: None noted    New illness, injury, or hospitalization: No    Signs or symptoms of bleeding or clotting: No    Previous INR: Therapeutic last 2(+) visits    Additional findings: None         PLAN     Recommended plan for no diet, medication or health factor changes affecting INR     Dosing Instructions: Continue your current warfarin dose with next INR in 6 weeks       Summary  As of 4/18/2023    Full warfarin instructions:  5 mg every Tue, Thu, Sat; 2.5 mg all other days   Next INR check:  5/30/2023             Telephone call with Roberto who verbalizes understanding and agrees to plan    Lab visit scheduled    Education provided:     Please call back if any changes to your diet, medications or how you've been taking warfarin    Plan made per [facility] anticoagulation protocol    Chyna Keene RN  Anticoagulation Clinic  4/18/2023    _______________________________________________________________________     Anticoagulation Episode Summary     Current INR goal:  2.5-3.5   TTR:  76.2 % (1 y)   Target end date:  Indefinite   Send INR reminders to:  [facility]    Indications    Atrial fibrillation (H) [I48.91]  Atrial fibrillation (H) [I48.91] (Resolved) [I48.91]  Long term current use of anticoagulant therapy [Z79.01]  Mechanical heart valve present [Z95.2]  Longstanding persistent atrial fibrillation (H) [I48.11]  Chronic atrial fibrillation (H) [I48.20]  Atrial fibrillation  unspecified type (H) [I48.91]           Comments:           Anticoagulation Care Providers     Provider Role Specialty Phone number    Osbaldo Renee MD Referring Family Medicine  [phone]    Carlos Archibald DO [facility] Internal Medicine [phone]

## 2023-04-18 NOTE — TELEPHONE ENCOUNTER
ANTICOAGULATION CLINIC REFERRAL RENEWAL REQUEST       An annual renewal order is required for all patients referred to [facility] Anticoagulation [facility].?  Please review and sign the pended referral order for Derek Stover.       ANTICOAGULATION SUMMARY      Warfarin indication(s)   Mechanical MVR    Mechanical heart valve present?  Mechanical MVR       Current goal range   INR: 2.5-3.5     Goal appropriate for indication? Goal INR 2.5-3.5 standard for indication(s) above     Time in Therapeutic Range (TTR)  (Goal > 60%) 70%       Office visit with referring provider's group within last year yes on 10/04/2022       Chyna Keene, RN  [facility] Anticoagulation Clinic

## 2023-05-30 ENCOUNTER — LAB (OUTPATIENT)
Dept: LAB | Facility: CLINIC | Age: 63
End: 2023-05-30
Payer: COMMERCIAL

## 2023-05-30 ENCOUNTER — ANTICOAGULATION THERAPY VISIT (OUTPATIENT)
Dept: ANTICOAGULATION | Facility: CLINIC | Age: 63
End: 2023-05-30

## 2023-05-30 DIAGNOSIS — Z79.01 LONG TERM CURRENT USE OF ANTICOAGULANT THERAPY: ICD-10-CM

## 2023-05-30 DIAGNOSIS — Z95.2 S/P MVR (MITRAL VALVE REPLACEMENT): ICD-10-CM

## 2023-05-30 DIAGNOSIS — Z95.2 MECHANICAL HEART VALVE PRESENT: ICD-10-CM

## 2023-05-30 DIAGNOSIS — I48.0 PAROXYSMAL ATRIAL FIBRILLATION (H): Primary | ICD-10-CM

## 2023-05-30 DIAGNOSIS — I48.91 ATRIAL FIBRILLATION, UNSPECIFIED TYPE (H): ICD-10-CM

## 2023-05-30 DIAGNOSIS — I48.0 PAROXYSMAL ATRIAL FIBRILLATION (H): ICD-10-CM

## 2023-05-30 DIAGNOSIS — I48.20 CHRONIC ATRIAL FIBRILLATION (H): ICD-10-CM

## 2023-05-30 DIAGNOSIS — I48.11 LONGSTANDING PERSISTENT ATRIAL FIBRILLATION (H): ICD-10-CM

## 2023-05-30 LAB — INR BLD: 2.9 (ref 0.9–1.1)

## 2023-05-30 PROCEDURE — 36416 COLLJ CAPILLARY BLOOD SPEC: CPT

## 2023-05-30 PROCEDURE — 85610 PROTHROMBIN TIME: CPT

## 2023-05-30 NOTE — PROGRESS NOTES
ANTICOAGULATION MANAGEMENT     Derek Stover 62 year old male is on warfarin with therapeutic INR result. (Goal INR 2.5-3.5)    Recent labs: (last 7 days)     05/30/23  0818   INR 2.9*       ASSESSMENT     Warfarin Lab Questionnaire    Warfarin Doses Last 7 Days      5/30/2023     7:47 AM   Dose in Tablet or Mg   TAB or MG? milligram (mg)     Pt Rptd Dose SUNDAY MONDAY TUESDAY WED THURS FRIDAY SATURDAY 5/30/2023   7:47 AM 2.5 2.5 5 2.5 5 2.5 5         5/30/2023   Warfarin Lab Questionnaire   Missed doses within past 14 days? No   Changes in diet or alcohol within past 14 days? No   Medication changes since last result? No   Injuries or illness since last result? No   New shortness of breath, severe headaches or sudden changes in vision since last result? No   Abnormal bleeding since last result? No   Upcoming surgery, procedure? No        Previous result: Therapeutic last 2(+) visits  Additional findings: None       PLAN     Recommended plan for no diet, medication or health factor changes affecting INR     Dosing Instructions: Continue your current warfarin dose with next INR in 6 weeks       Summary  As of 5/30/2023    Full warfarin instructions:  5 mg every Tue, Thu, Sat; 2.5 mg all other days   Next INR check:  7/11/2023             Telephone call with Roberto who verbalizes understanding and agrees to plan and who agrees to plan and repeated back plan correctly    Lab visit scheduled    Education provided:     None required    Plan made per [facility] anticoagulation protocol    Danni Patton RN  Anticoagulation Clinic  5/30/2023    _______________________________________________________________________     Anticoagulation Episode Summary     Current INR goal:  2.5-3.5   TTR:  76.2 % (1 y)   Target end date:  Indefinite   Send INR reminders to:  [facility]    Indications    Atrial fibrillation (H) [I48.91]  Atrial fibrillation (H) [I48.91] (Resolved) [I48.91]  Long term current use of anticoagulant therapy  [Z79.01]  Mechanical heart valve present [Z95.2]  Longstanding persistent atrial fibrillation (H) [I48.11]  Chronic atrial fibrillation (H) [I48.20]  Atrial fibrillation  unspecified type (H) [I48.91]  Paroxysmal atrial fibrillation (H) [I48.0]  S/P MVR (mitral valve replacement) [Z95.2]           Comments:           Anticoagulation Care Providers     Provider Role Specialty Phone number    Osbaldo Renee MD Referring Family Medicine [phone]    Carlos Archibald DO [facility] Internal Medicine [phone]

## 2023-07-11 ENCOUNTER — LAB (OUTPATIENT)
Dept: LAB | Facility: CLINIC | Age: 63
End: 2023-07-11
Payer: COMMERCIAL

## 2023-07-11 ENCOUNTER — ANTICOAGULATION THERAPY VISIT (OUTPATIENT)
Dept: ANTICOAGULATION | Facility: CLINIC | Age: 63
End: 2023-07-11

## 2023-07-11 DIAGNOSIS — Z79.01 LONG TERM CURRENT USE OF ANTICOAGULANT THERAPY: ICD-10-CM

## 2023-07-11 DIAGNOSIS — I48.11 LONGSTANDING PERSISTENT ATRIAL FIBRILLATION (H): ICD-10-CM

## 2023-07-11 DIAGNOSIS — I48.0 PAROXYSMAL ATRIAL FIBRILLATION (H): Primary | ICD-10-CM

## 2023-07-11 DIAGNOSIS — Z95.2 MECHANICAL HEART VALVE PRESENT: ICD-10-CM

## 2023-07-11 DIAGNOSIS — Z95.2 S/P MVR (MITRAL VALVE REPLACEMENT): ICD-10-CM

## 2023-07-11 DIAGNOSIS — I48.20 CHRONIC ATRIAL FIBRILLATION (H): ICD-10-CM

## 2023-07-11 DIAGNOSIS — I48.91 ATRIAL FIBRILLATION, UNSPECIFIED TYPE (H): ICD-10-CM

## 2023-07-11 DIAGNOSIS — I48.0 PAROXYSMAL ATRIAL FIBRILLATION (H): ICD-10-CM

## 2023-07-11 LAB — INR BLD: 2.5 (ref 0.9–1.1)

## 2023-07-11 PROCEDURE — 85610 PROTHROMBIN TIME: CPT

## 2023-07-11 PROCEDURE — 36416 COLLJ CAPILLARY BLOOD SPEC: CPT

## 2023-07-11 NOTE — PROGRESS NOTES
ANTICOAGULATION MANAGEMENT     Derek Stover 62 year old male is on warfarin with therapeutic INR result. (Goal INR 2.5-3.5)    Recent labs: (last 7 days)     07/11/23  0803   INR 2.5*       ASSESSMENT     Warfarin Lab Questionnaire    Warfarin Doses Last 7 Days          7/10/2023   Warfarin Lab Questionnaire   Missed doses within past 14 days? No   Changes in diet or alcohol within past 14 days? No   Medication changes since last result? No   Injuries or illness since last result? No   New shortness of breath, severe headaches or sudden changes in vision since last result? No   Abnormal bleeding since last result? No   Upcoming surgery, procedure? No     Previous result: Therapeutic last 2(+) visits  Additional findings: None       PLAN     Recommended plan for no diet, medication or health factor changes affecting INR     Dosing Instructions: Continue your current warfarin dose with next INR in 6 weeks       Summary  As of 7/11/2023    Full warfarin instructions:  5 mg every Tue, Thu, Sat; 2.5 mg all other days   Next INR check:  8/22/2023             Telephone call with Roberto who verbalizes understanding and agrees to plan    Lab visit scheduled    Education provided:     Contact [phone]  with any changes, questions or concerns.     Plan made per [facility] anticoagulation protocol    Corie Mccall, RN  Anticoagulation Clinic  7/11/2023    _______________________________________________________________________     Anticoagulation Episode Summary     Current INR goal:  2.5-3.5   TTR:  76.2 % (1 y)   Target end date:  Indefinite   Send INR reminders to:  [facility]    Indications    Atrial fibrillation (H) [I48.91]  Atrial fibrillation (H) [I48.91] (Resolved) [I48.91]  Long term current use of anticoagulant therapy [Z79.01]  Mechanical heart valve present [Z95.2]  Longstanding persistent atrial fibrillation (H) [I48.11]  Chronic atrial fibrillation (H) [I48.20]  Atrial fibrillation  unspecified type (H)  [I48.91]  Paroxysmal atrial fibrillation (H) [I48.0]  S/P MVR (mitral valve replacement) [Z95.2]           Comments:           Anticoagulation Care Providers     Provider Role Specialty Phone number    Osbaldo Renee MD [facility] Family Medicine [phone]    Carlos Archibald DO [facility] Internal Medicine [phone]

## 2023-08-01 ENCOUNTER — HOSPITAL ENCOUNTER (OUTPATIENT)
Dept: CARDIOLOGY | Facility: CLINIC | Age: 63
Discharge: HOME | End: 2023-08-01
Attending: INTERNAL MEDICINE | Admitting: INTERNAL MEDICINE
Payer: COMMERCIAL

## 2023-08-01 DIAGNOSIS — Z98.890 S/P MVR (MITRAL VALVE REPAIR): ICD-10-CM

## 2023-08-01 DIAGNOSIS — F17.200 SMOKER: ICD-10-CM

## 2023-08-01 DIAGNOSIS — Z95.2 MECHANICAL HEART VALVE PRESENT: ICD-10-CM

## 2023-08-01 DIAGNOSIS — I25.10 CORONARY ARTERY DISEASE INVOLVING NATIVE CORONARY ARTERY OF NATIVE HEART WITHOUT ANGINA PECTORIS: ICD-10-CM

## 2023-08-01 LAB — LVEF ECHO: NORMAL

## 2023-08-01 PROCEDURE — 93306 TTE W/DOPPLER COMPLETE: CPT

## 2023-08-01 PROCEDURE — 93306 TTE W/DOPPLER COMPLETE: CPT | Mod: 26 | Performed by: INTERNAL MEDICINE

## 2023-08-03 ENCOUNTER — OFFICE VISIT (OUTPATIENT)
Dept: CARDIOLOGY | Facility: CLINIC | Age: 63
End: 2023-08-03
Payer: COMMERCIAL

## 2023-08-03 VITALS
HEART RATE: 62 BPM | DIASTOLIC BLOOD PRESSURE: 76 MMHG | WEIGHT: 262.3 LBS | SYSTOLIC BLOOD PRESSURE: 124 MMHG | HEIGHT: 71 IN | BODY MASS INDEX: 36.72 KG/M2 | OXYGEN SATURATION: 99 %

## 2023-08-03 DIAGNOSIS — I25.10 CORONARY ARTERY DISEASE INVOLVING NATIVE CORONARY ARTERY OF NATIVE HEART WITHOUT ANGINA PECTORIS: ICD-10-CM

## 2023-08-03 DIAGNOSIS — Z95.2 MECHANICAL HEART VALVE PRESENT: ICD-10-CM

## 2023-08-03 DIAGNOSIS — F17.200 SMOKER: ICD-10-CM

## 2023-08-03 DIAGNOSIS — Z98.890 S/P MVR (MITRAL VALVE REPAIR): ICD-10-CM

## 2023-08-03 PROCEDURE — 99214 OFFICE O/P EST MOD 30 MIN: CPT | Performed by: INTERNAL MEDICINE

## 2023-08-03 NOTE — PROGRESS NOTES
HISTORY OF PRESENT ILLNESS:    Derek Stover, a 61-year-old man with a history of mitral regurgitation (status post mechanical mitral valve replacement with #32 [employer] valve 2017), coronary artery disease, atrial fibrillation, hypertension, obesity and a smoking history, was seen today at your request for followup.     Since last seen 9/1/2022 Mr. Stover remains free of cardiovascular symptoms, compliant with medical therapy and with a TTE 8/1/2023 demonstrating normal LV systolic performance with normal mechanical mitral valve function. He specifically denies chest pain, worsening dyspnea or neurologic deficit. He has regained some of the weight he lost last year, but is making efforts to resume his diet and activity program. 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       PAST MEDICAL HISTORY:       1. Mitral insufficiency -- status post implantation of #32 [employer] mechanical prosthesis 2017.  Echo prior to this visit shows normal mechanical valve function ( MDG 8 mmHg) LVEF 55%     2. History of atrial fibrillation -- on warfarin anticoagulation with therapeutic INRs.     3. Obesity -- successful weight loss of 21 pounds since last seen.     4. Continued tobacco use, making efforts to stop.     5. Dyslipidemia -- optimal LDL cholesterol most recent blood test.     6. Coronary artery disease -- Non-ST segment MI 08/2020.  Treated with implantation of 2 drug-eluting stents in posterior descending branch of dominant circumflex.  No significant narrowing in left main, LAD or nondominant right coronary.        Orders this Visit:  No orders of the defined types were placed in this encounter.    No orders of the defined types were placed in this encounter.    Medications Discontinued During This Encounter   Medication Reason    aspirin (ASA) 81 MG chewable tablet Therapy completed (No AVS)       Encounter Diagnoses   Name Primary?    Coronary artery disease involving native coronary  "artery of native heart without angina pectoris     S/P MVR (mitral valve repair)     Mechanical heart valve present     Smoker        CURRENT MEDICATIONS:  Current Outpatient Medications   Medication Sig Dispense Refill    atorvastatin (LIPITOR) 40 MG tablet Take 1 tablet (40 mg) by mouth every evening 90 tablet 3    sildenafil (VIAGRA) 50 MG tablet Take 1 tablet (50 mg) by mouth daily as needed As needed 10 tablet 0    warfarin ANTICOAGULANT (COUMADIN) 5 MG tablet Take 5 mg Tues, Thurs, Sat and 2.5 mg all other days or as directed by the Coumadin Clinic. 90 tablet 1    acetaminophen (TYLENOL) 325 MG tablet Take 2 tablets (650 mg) by mouth every 4 hours as needed for other (surgical pain) (Patient not taking: Reported on 10/4/2022) 100 tablet 0    Amoxicillin (AMOXIL PO) Take 1,500-3,000 mg by mouth daily as needed (patient takes 6 X 500 = 3,000 mg dose 1 hour before dental appointment and 3 X 500 = 1,500 mg dose 6 hours after dental appointment.) (Patient not taking: Reported on 10/4/2022)      nitroGLYcerin (NITROSTAT) 0.4 MG sublingual tablet For chest pain place 1 tablet under the tongue every 5 minutes for 3 doses. If symptoms persist 5 minutes after 1st dose call 911. (Patient not taking: Reported on 8/3/2023) 15 tablet 0       ALLERGIES     Allergies   Allergen Reactions    Bee Pollen      Other reaction(s): Runny Nose    No Known Drug Allergy     Pollen Extract     Seasonal Allergies     Statin Drugs [Statins]      No allergy, felt horrible on this drug.       PAST MEDICAL, SURGICAL, FAMILY, SOCIAL HISTORY:  History was reviewed and updated as needed, see medical record.    Review of Systems:  A 12-point review of systems was completed, see medical record for detailed review of systems information.    Physical Exam:  Vitals: /76 (BP Location: Right arm, Patient Position: Sitting, Cuff Size: Adult Large)   Pulse 62   Ht 1.797 m (5' 10.75\")   Wt 119 kg (262 lb 4.8 oz)   SpO2 99%   BMI 36.84 kg/m  "     Pleasant, cooperative, intelligent, comfortable at rest  Lungs: Clear to percussion auscultation    Cardiovascular: Crisp prosthetic first heart sound, normal second heart sound.  No murmur appreciated    Legs: No edema    ASSESSMENT: After Tracy remains asymptomatic, compliant with medical therapy, and there have been no changes on his physical examination or echocardiogram since last year.  I am uncertain whether the DOT will require any other testing, but we will be happy to provide it for him as directed by the DOT to help him maintain his 's license.  I have encouraged him to resume his exercise program and weight loss diet.       RECOMMENDATIONS:  1)  Follow up visit with TTE in one year  2) Will await recommendations for DOT  3) If DOT requires stress test will proceed.   4. Mediterranean style weight loss diet / regular exercise program      Recent Lab Results:  LIPID RESULTS:  Lab Results   Component Value Date    CHOL 128 08/30/2022    CHOL 109 12/05/2020    HDL 42 08/30/2022    HDL 46 12/05/2020    LDL 63 08/30/2022    LDL 49 12/05/2020    TRIG 113 08/30/2022    TRIG 68 12/05/2020    CHOLHDLRATIO 5.3 (H) 08/13/2014       LIVER ENZYME RESULTS:  Lab Results   Component Value Date    AST 21 10/04/2022    AST 18 08/25/2020    ALT 26 10/04/2022    ALT 30 12/05/2020       CBC RESULTS:  Lab Results   Component Value Date    WBC 8.6 10/04/2022    WBC 8.3 08/25/2020    RBC 4.44 10/04/2022    RBC 5.00 08/25/2020    HGB 14.4 10/04/2022    HGB 15.8 08/25/2020    HCT 43.1 10/04/2022    HCT 47.6 08/25/2020    MCV 97 10/04/2022    MCV 95 08/25/2020    MCH 32.4 10/04/2022    MCH 31.6 08/25/2020    MCHC 33.4 10/04/2022    MCHC 33.2 08/25/2020    RDW 14.9 10/04/2022    RDW 15.0 08/25/2020     10/04/2022     08/25/2020       BMP RESULTS:  Lab Results   Component Value Date     10/04/2022     08/25/2020    POTASSIUM 4.5 10/04/2022    POTASSIUM 3.7 08/25/2020    CHLORIDE 110 (H)  10/04/2022    CHLORIDE 109 08/25/2020    CO2 28 10/04/2022    CO2 28 08/25/2020    ANIONGAP 3 10/04/2022    ANIONGAP 4 08/25/2020     (H) 10/04/2022    GLC 95 08/25/2020    BUN 18 10/04/2022    BUN 14 08/25/2020    CR 0.93 10/04/2022    CR 0.94 08/25/2020    GFRESTIMATED >90 10/04/2022    GFRESTIMATED 89 08/25/2020    GFRESTBLACK >90 08/25/2020    FIORELLA 8.6 10/04/2022    FIORELLA 8.9 08/25/2020        A1C RESULTS:  Lab Results   Component Value Date    A1C 5.1 08/26/2020       INR RESULTS:  Lab Results   Component Value Date    INR 2.5 (H) 07/11/2023    INR 2.9 (H) 05/30/2023    INR 3.0 (H) 06/17/2021    INR 3.2 (H) 05/06/2021    INR 2.28 (H) 08/27/2020    INR 2.29 (H) 08/26/2020       We greatly appreciate the opportunity to be involved in the care of your patient, Derek Stover.    Sincerely,  Zenon Medrano MD        Zenon Medrano MD  [address]  [place] [address]

## 2023-08-03 NOTE — LETTER
8/3/2023    Osbaldo Renee MD  [address] [place] [address]    RE: Derek Stover       Dear Colleague,     I had the pleasure of seeing Derek Stover in the [facility].  HISTORY OF PRESENT ILLNESS:  Allergies troublesome. Has gained some weight back. TTE     Orders this Visit:  No orders of the defined types were placed in this encounter.    No orders of the defined types were placed in this encounter.    Medications Discontinued During This Encounter   Medication Reason    aspirin (ASA) 81 MG chewable tablet Therapy completed (No AVS)       Encounter Diagnoses   Name Primary?    Coronary artery disease involving native coronary artery of native heart without angina pectoris     S/P MVR (mitral valve repair)     Mechanical heart valve present     Smoker        CURRENT MEDICATIONS:  Current Outpatient Medications   Medication Sig Dispense Refill    atorvastatin (LIPITOR) 40 MG tablet Take 1 tablet (40 mg) by mouth every evening 90 tablet 3    sildenafil (VIAGRA) 50 MG tablet Take 1 tablet (50 mg) by mouth daily as needed As needed 10 tablet 0    warfarin ANTICOAGULANT (COUMADIN) 5 MG tablet Take 5 mg Tues, Thurs, Sat and 2.5 mg all other days or as directed by the Coumadin Clinic. 90 tablet 1    acetaminophen (TYLENOL) 325 MG tablet Take 2 tablets (650 mg) by mouth every 4 hours as needed for other (surgical pain) (Patient not taking: Reported on 10/4/2022) 100 tablet 0    Amoxicillin (AMOXIL PO) Take 1,500-3,000 mg by mouth daily as needed (patient takes 6 X 500 = 3,000 mg dose 1 hour before dental appointment and 3 X 500 = 1,500 mg dose 6 hours after dental appointment.) (Patient not taking: Reported on 10/4/2022)      nitroGLYcerin (NITROSTAT) 0.4 MG sublingual tablet For chest pain place 1 tablet under the tongue every 5 minutes for 3 doses. If symptoms persist 5 minutes after 1st dose call 911. (Patient not taking: Reported on 8/3/2023) 15 tablet 0       ALLERGIES     Allergies  "  Allergen Reactions    Bee Pollen      Other reaction(s): Runny Nose    No Known Drug Allergy     Pollen Extract     Seasonal Allergies     Statin Drugs [Statins]      No allergy, felt horrible on this drug.       PAST MEDICAL, SURGICAL, FAMILY, SOCIAL HISTORY:  History was reviewed and updated as needed, see medical record.    Review of Systems:  A 12-point review of systems was completed, see medical record for detailed review of systems information.    Physical Exam:  Vitals: /76 (BP Location: Right arm, Patient Position: Sitting, Cuff Size: Adult Large)   Pulse 62   Ht 1.797 m (5' 10.75\")   Wt 119 kg (262 lb 4.8 oz)   SpO2 99%   BMI 36.84 kg/m      Constitutional:           Skin:           Head:           Eyes:           ENT:           Neck:           Chest:           Cardiac:                    Abdomen:           Vascular:                                        Extremities and Back:           Neurological:           ASSESSMENT: stable.  LVValve function.        RECOMMENDATIONS:  1) TTE in one year  2) Will await recommendations for DOT  3) If DOT requires stress test will proceed.       Recent Lab Results:  LIPID RESULTS:  Lab Results   Component Value Date    CHOL 128 08/30/2022    CHOL 109 12/05/2020    HDL 42 08/30/2022    HDL 46 12/05/2020    LDL 63 08/30/2022    LDL 49 12/05/2020    TRIG 113 08/30/2022    TRIG 68 12/05/2020    CHOLHDLRATIO 5.3 (H) 08/13/2014       LIVER ENZYME RESULTS:  Lab Results   Component Value Date    AST 21 10/04/2022    AST 18 08/25/2020    ALT 26 10/04/2022    ALT 30 12/05/2020       CBC RESULTS:  Lab Results   Component Value Date    WBC 8.6 10/04/2022    WBC 8.3 08/25/2020    RBC 4.44 10/04/2022    RBC 5.00 08/25/2020    HGB 14.4 10/04/2022    HGB 15.8 08/25/2020    HCT 43.1 10/04/2022    HCT 47.6 08/25/2020    MCV 97 10/04/2022    MCV 95 08/25/2020    MCH 32.4 10/04/2022    MCH 31.6 08/25/2020    MCHC 33.4 10/04/2022    MCHC 33.2 08/25/2020    RDW 14.9 10/04/2022    " RDW 15.0 08/25/2020     10/04/2022     08/25/2020       BMP RESULTS:  Lab Results   Component Value Date     10/04/2022     08/25/2020    POTASSIUM 4.5 10/04/2022    POTASSIUM 3.7 08/25/2020    CHLORIDE 110 (H) 10/04/2022    CHLORIDE 109 08/25/2020    CO2 28 10/04/2022    CO2 28 08/25/2020    ANIONGAP 3 10/04/2022    ANIONGAP 4 08/25/2020     (H) 10/04/2022    GLC 95 08/25/2020    BUN 18 10/04/2022    BUN 14 08/25/2020    CR 0.93 10/04/2022    CR 0.94 08/25/2020    GFRESTIMATED >90 10/04/2022    GFRESTIMATED 89 08/25/2020    GFRESTBLACK >90 08/25/2020    FIORELLA 8.6 10/04/2022    FIORELLA 8.9 08/25/2020        A1C RESULTS:  Lab Results   Component Value Date    A1C 5.1 08/26/2020       INR RESULTS:  Lab Results   Component Value Date    INR 2.5 (H) 07/11/2023    INR 2.9 (H) 05/30/2023    INR 3.0 (H) 06/17/2021    INR 3.2 (H) 05/06/2021    INR 2.28 (H) 08/27/2020    INR 2.29 (H) 08/26/2020       We greatly appreciate the opportunity to be involved in the care of your patient, Derek Stover.    Sincerely,  Zenon Medrano MD        Zenon Medrano MD  [address]  [place] [address]

## 2023-08-22 ENCOUNTER — ANTICOAGULATION THERAPY VISIT (OUTPATIENT)
Dept: ANTICOAGULATION | Facility: CLINIC | Age: 63
End: 2023-08-22

## 2023-08-22 ENCOUNTER — LAB (OUTPATIENT)
Dept: LAB | Facility: CLINIC | Age: 63
End: 2023-08-22
Payer: COMMERCIAL

## 2023-08-22 DIAGNOSIS — Z79.01 LONG TERM CURRENT USE OF ANTICOAGULANT THERAPY: ICD-10-CM

## 2023-08-22 DIAGNOSIS — I48.20 CHRONIC ATRIAL FIBRILLATION (H): ICD-10-CM

## 2023-08-22 DIAGNOSIS — I48.91 ATRIAL FIBRILLATION (H): Primary | ICD-10-CM

## 2023-08-22 DIAGNOSIS — Z95.2 MECHANICAL HEART VALVE PRESENT: ICD-10-CM

## 2023-08-22 DIAGNOSIS — I48.0 PAROXYSMAL ATRIAL FIBRILLATION (H): ICD-10-CM

## 2023-08-22 DIAGNOSIS — I48.91 ATRIAL FIBRILLATION, UNSPECIFIED TYPE (H): ICD-10-CM

## 2023-08-22 DIAGNOSIS — Z95.2 S/P MVR (MITRAL VALVE REPLACEMENT): ICD-10-CM

## 2023-08-22 DIAGNOSIS — I48.11 LONGSTANDING PERSISTENT ATRIAL FIBRILLATION (H): ICD-10-CM

## 2023-08-22 LAB — INR BLD: 3.6 (ref 0.9–1.1)

## 2023-08-22 PROCEDURE — 85610 PROTHROMBIN TIME: CPT

## 2023-08-22 PROCEDURE — 36416 COLLJ CAPILLARY BLOOD SPEC: CPT

## 2023-08-22 NOTE — PROGRESS NOTES
ANTICOAGULATION MANAGEMENT     Derek Stover 62 year old male is on warfarin with supratherapeutic INR result. (Goal INR 2.5-3.5)    Recent labs: (last 7 days)     08/22/23  0802   INR 3.6*       ASSESSMENT     Source(s): Chart Review and Patient/Caregiver Call     Warfarin doses taken: Warfarin taken as instructed  Diet: No new diet changes identified  Medication/supplement changes: Yes taking mucinex DM for allergy and cold symptoms.  New illness, injury, or hospitalization: No  Signs or symptoms of bleeding or clotting: No  Previous result: Therapeutic last 2(+) visits  Additional findings: None       PLAN     Recommended plan for temporary change(s) affecting INR     Dosing Instructions: Continue your current warfarin dose with next INR in 2 weeks       Summary  As of 8/22/2023      Full warfarin instructions:  5 mg every Tue, Thu, Sat; 2.5 mg all other days   Next INR check:  9/5/2023               Telephone call with Roberto who verbalizes understanding and agrees to plan    Lab visit scheduled    Education provided:   Please call back if any changes to your diet, medications or how you've been taking warfarin    Plan made per [facility] anticoagulation protocol    Chyna Keene, RN  Anticoagulation Clinic  8/22/2023    _______________________________________________________________________     Anticoagulation Episode Summary       Current INR goal:  2.5-3.5   TTR:  83.2 % (1 y)   Target end date:  Indefinite   Send INR [facility]ers [facility]:  [facility]    Indications    Atrial fibrillation (H) [I48.91]  Atrial fibrillation (H) [I48.91] (Resolved) [I48.91]  Long term current use of anticoagulant therapy [Z79.01]  Mechanical heart valve present [Z95.2]  Longstanding persistent atrial fibrillation (H) [I48.11]  Chronic atrial fibrillation (H) [I48.20]  Atrial fibrillation  unspecified type (H) [I48.91]  Paroxysmal atrial fibrillation (H) [I48.0]  S/P MVR (mitral valve replacement) [Z95.2]             Comments:                Anticoagulation Care Providers       Provider Role Specialty Phone number    Osbaldo Renee MD Referring Family Medicine [phone]    Carlos Archibald DO [facility] Internal Medicine [phone]

## 2023-09-05 ENCOUNTER — LAB (OUTPATIENT)
Dept: LAB | Facility: CLINIC | Age: 63
End: 2023-09-05
Payer: COMMERCIAL

## 2023-09-05 ENCOUNTER — ANTICOAGULATION THERAPY VISIT (OUTPATIENT)
Dept: ANTICOAGULATION | Facility: CLINIC | Age: 63
End: 2023-09-05

## 2023-09-05 DIAGNOSIS — Z95.2 MECHANICAL HEART VALVE PRESENT: ICD-10-CM

## 2023-09-05 DIAGNOSIS — Z79.01 LONG TERM CURRENT USE OF ANTICOAGULANT THERAPY: ICD-10-CM

## 2023-09-05 DIAGNOSIS — I48.11 LONGSTANDING PERSISTENT ATRIAL FIBRILLATION (H): ICD-10-CM

## 2023-09-05 DIAGNOSIS — I48.91 ATRIAL FIBRILLATION (H): Primary | ICD-10-CM

## 2023-09-05 DIAGNOSIS — I48.0 PAROXYSMAL ATRIAL FIBRILLATION (H): ICD-10-CM

## 2023-09-05 DIAGNOSIS — Z95.2 S/P MVR (MITRAL VALVE REPLACEMENT): ICD-10-CM

## 2023-09-05 DIAGNOSIS — I48.91 ATRIAL FIBRILLATION, UNSPECIFIED TYPE (H): ICD-10-CM

## 2023-09-05 DIAGNOSIS — I48.20 CHRONIC ATRIAL FIBRILLATION (H): ICD-10-CM

## 2023-09-05 LAB — INR BLD: 2.4 (ref 0.9–1.1)

## 2023-09-05 PROCEDURE — 85610 PROTHROMBIN TIME: CPT

## 2023-09-05 PROCEDURE — 36416 COLLJ CAPILLARY BLOOD SPEC: CPT

## 2023-09-05 NOTE — PROGRESS NOTES
ANTICOAGULATION MANAGEMENT     Derek Stover 62 year old male is on warfarin with subtherapeutic INR result. (Goal INR 2.5-3.5)    Recent labs: (last 7 days)     09/05/23  0732   INR 2.4*       ASSESSMENT     Source(s): Chart Review     Warfarin doses taken: Warfarin taken as instructed  Diet: Increased greens/vitamin K in diet; plans to resume previous intake  Medication/supplement changes:  less allegra d OTC  New illness, injury, or hospitalization: No  Signs or symptoms of bleeding or clotting: No  Previous result: Supratherapeutic  Additional findings: None       PLAN     Recommended plan for temporary change(s) affecting INR     Dosing Instructions: booster dose then continue your current warfarin dose with next INR in 2 weeks       Summary  As of 9/5/2023      Full warfarin instructions:  9/5: 7.5 mg; Otherwise 5 mg every Tue, Thu, Sat; 2.5 mg all other days   Next INR check:                 Telephone call with Roberto who verbalizes understanding and agrees to plan    Lab visit scheduled    Education provided:   Please call back if any changes to your diet, medications or how you've been taking warfarin  Symptom monitoring: monitoring for clotting signs and symptoms    Plan made per [facility] anticoagulation protocol    Chyna Keene RN  Anticoagulation Clinic  9/5/2023    _______________________________________________________________________     Anticoagulation Episode Summary       Current INR goal:  2.5-3.5   TTR:  85.4 % (1 y)   Target end date:  Indefinite   Send INR reminders to:  [facility]    Indications    Atrial fibrillation (H) [I48.91]  Atrial fibrillation (H) [I48.91] (Resolved) [I48.91]  Long term current use of anticoagulant therapy [Z79.01]  Mechanical heart valve present [Z95.2]  Longstanding persistent atrial fibrillation (H) [I48.11]  Chronic atrial fibrillation (H) [I48.20]  Atrial fibrillation  unspecified type (H) [I48.91]  Paroxysmal atrial fibrillation (H) [I48.0]  S/P MVR  (mitral valve replacement) [Z95.2]             Comments:               Anticoagulation Care Providers       Provider Role Specialty Phone number    Osbaldo Renee MD Referring Family Medicine [phone]    Carlos Archibald DO [facility] Internal Medicine [phone]

## 2023-09-12 ENCOUNTER — OFFICE VISIT (OUTPATIENT)
Dept: FAMILY MEDICINE | Facility: CLINIC | Age: 63
End: 2023-09-12
Payer: COMMERCIAL

## 2023-09-12 VITALS
SYSTOLIC BLOOD PRESSURE: 120 MMHG | OXYGEN SATURATION: 99 % | BODY MASS INDEX: 39.98 KG/M2 | TEMPERATURE: 97 F | RESPIRATION RATE: 20 BRPM | HEART RATE: 78 BPM | WEIGHT: 285.56 LBS | DIASTOLIC BLOOD PRESSURE: 62 MMHG | HEIGHT: 71 IN

## 2023-09-12 DIAGNOSIS — Z95.2 S/P MVR (MITRAL VALVE REPLACEMENT): ICD-10-CM

## 2023-09-12 DIAGNOSIS — R73.09 ELEVATED GLUCOSE: ICD-10-CM

## 2023-09-12 DIAGNOSIS — Z12.5 SCREENING FOR PROSTATE CANCER: ICD-10-CM

## 2023-09-12 DIAGNOSIS — Z00.00 ROUTINE GENERAL MEDICAL EXAMINATION AT A HEALTH CARE FACILITY: Primary | ICD-10-CM

## 2023-09-12 DIAGNOSIS — Z79.01 LONG TERM CURRENT USE OF ANTICOAGULANT THERAPY: ICD-10-CM

## 2023-09-12 DIAGNOSIS — E66.01 MORBID OBESITY (H): ICD-10-CM

## 2023-09-12 DIAGNOSIS — R91.8 PULMONARY NODULES: ICD-10-CM

## 2023-09-12 DIAGNOSIS — E78.5 HYPERLIPIDEMIA WITH TARGET LDL LESS THAN 130: ICD-10-CM

## 2023-09-12 DIAGNOSIS — I48.20 CHRONIC ATRIAL FIBRILLATION (H): ICD-10-CM

## 2023-09-12 DIAGNOSIS — B36.0 TINEA VERSICOLOR: ICD-10-CM

## 2023-09-12 DIAGNOSIS — Z95.2 MECHANICAL HEART VALVE PRESENT: ICD-10-CM

## 2023-09-12 DIAGNOSIS — I21.4 NSTEMI (NON-ST ELEVATED MYOCARDIAL INFARCTION) (H): ICD-10-CM

## 2023-09-12 LAB
ALBUMIN SERPL BCG-MCNC: 4.3 G/DL (ref 3.5–5.2)
ALP SERPL-CCNC: 109 U/L (ref 40–129)
ALT SERPL W P-5'-P-CCNC: 23 U/L (ref 0–70)
ANION GAP SERPL CALCULATED.3IONS-SCNC: 9 MMOL/L (ref 7–15)
AST SERPL W P-5'-P-CCNC: 27 U/L (ref 0–45)
BILIRUB SERPL-MCNC: 0.9 MG/DL
BUN SERPL-MCNC: 12.6 MG/DL (ref 8–23)
CALCIUM SERPL-MCNC: 9 MG/DL (ref 8.8–10.2)
CHLORIDE SERPL-SCNC: 102 MMOL/L (ref 98–107)
CHOLEST SERPL-MCNC: 142 MG/DL
CREAT SERPL-MCNC: 1.08 MG/DL (ref 0.67–1.17)
DEPRECATED HCO3 PLAS-SCNC: 26 MMOL/L (ref 22–29)
EGFRCR SERPLBLD CKD-EPI 2021: 79 ML/MIN/1.73M2
ERYTHROCYTE [DISTWIDTH] IN BLOOD BY AUTOMATED COUNT: 14.7 % (ref 10–15)
GLUCOSE SERPL-MCNC: 102 MG/DL (ref 70–99)
HBA1C MFR BLD: 5.9 %
HCT VFR BLD AUTO: 47.1 % (ref 40–53)
HDLC SERPL-MCNC: 46 MG/DL
HGB BLD-MCNC: 15.5 G/DL (ref 13.3–17.7)
LDLC SERPL CALC-MCNC: 70 MG/DL
MCH RBC QN AUTO: 31.8 PG (ref 26.5–33)
MCHC RBC AUTO-ENTMCNC: 32.9 G/DL (ref 31.5–36.5)
MCV RBC AUTO: 97 FL (ref 78–100)
NONHDLC SERPL-MCNC: 96 MG/DL
PLATELET # BLD AUTO: 161 10E3/UL (ref 150–450)
POTASSIUM SERPL-SCNC: 4.8 MMOL/L (ref 3.4–5.3)
PROT SERPL-MCNC: 7.2 G/DL (ref 6.4–8.3)
PSA SERPL DL<=0.01 NG/ML-MCNC: 0.27 NG/ML (ref 0–4.5)
RBC # BLD AUTO: 4.87 10E6/UL (ref 4.4–5.9)
SODIUM SERPL-SCNC: 137 MMOL/L (ref 136–145)
TRIGL SERPL-MCNC: 128 MG/DL
WBC # BLD AUTO: 8.9 10E3/UL (ref 4–11)

## 2023-09-12 PROCEDURE — 80061 LIPID PANEL: CPT | Performed by: STUDENT IN AN ORGANIZED HEALTH CARE EDUCATION/TRAINING PROGRAM

## 2023-09-12 PROCEDURE — G0103 PSA SCREENING: HCPCS | Performed by: STUDENT IN AN ORGANIZED HEALTH CARE EDUCATION/TRAINING PROGRAM

## 2023-09-12 PROCEDURE — 83036 HEMOGLOBIN GLYCOSYLATED A1C: CPT | Performed by: STUDENT IN AN ORGANIZED HEALTH CARE EDUCATION/TRAINING PROGRAM

## 2023-09-12 PROCEDURE — 80053 COMPREHEN METABOLIC PANEL: CPT | Performed by: STUDENT IN AN ORGANIZED HEALTH CARE EDUCATION/TRAINING PROGRAM

## 2023-09-12 PROCEDURE — 85027 COMPLETE CBC AUTOMATED: CPT | Performed by: STUDENT IN AN ORGANIZED HEALTH CARE EDUCATION/TRAINING PROGRAM

## 2023-09-12 PROCEDURE — 99396 PREV VISIT EST AGE 40-64: CPT | Mod: 25 | Performed by: STUDENT IN AN ORGANIZED HEALTH CARE EDUCATION/TRAINING PROGRAM

## 2023-09-12 PROCEDURE — 90471 IMMUNIZATION ADMIN: CPT | Performed by: STUDENT IN AN ORGANIZED HEALTH CARE EDUCATION/TRAINING PROGRAM

## 2023-09-12 PROCEDURE — 90715 TDAP VACCINE 7 YRS/> IM: CPT | Performed by: STUDENT IN AN ORGANIZED HEALTH CARE EDUCATION/TRAINING PROGRAM

## 2023-09-12 PROCEDURE — 36415 COLL VENOUS BLD VENIPUNCTURE: CPT | Performed by: STUDENT IN AN ORGANIZED HEALTH CARE EDUCATION/TRAINING PROGRAM

## 2023-09-12 RX ORDER — ATORVASTATIN CALCIUM 40 MG/1
40 TABLET, FILM COATED ORAL EVERY EVENING
Qty: 90 TABLET | Refills: 3 | Status: SHIPPED | OUTPATIENT
Start: 2023-09-12 | End: 2024-09-04

## 2023-09-12 ASSESSMENT — ENCOUNTER SYMPTOMS
FREQUENCY: 0
HEMATURIA: 0
SHORTNESS OF BREATH: 0
PARESTHESIAS: 0
MYALGIAS: 0
DIARRHEA: 0
NERVOUS/ANXIOUS: 0
HEADACHES: 0
COUGH: 0
DIZZINESS: 0
PALPITATIONS: 0
SORE THROAT: 0
FEVER: 0
HEMATOCHEZIA: 0
ARTHRALGIAS: 0
DYSURIA: 0
WEAKNESS: 0
ABDOMINAL PAIN: 0
JOINT SWELLING: 0
EYE PAIN: 0
NAUSEA: 0
CHILLS: 0
HEARTBURN: 0
CONSTIPATION: 0

## 2023-09-12 ASSESSMENT — PAIN SCALES - GENERAL: PAINLEVEL: NO PAIN (0)

## 2023-09-12 NOTE — NURSING NOTE
Prior to immunization administration, verified patients identity using patient s name and date of birth. Please see Immunization Activity for additional information.     Screening Questionnaire for Adult Immunization    Are you sick today?   No   Do you have allergies to medications, food, a vaccine component or latex?   No   Have you ever had a serious reaction after receiving a vaccination?   No   Do you have a long-term health problem with heart, lung, kidney, or metabolic disease (e.g., diabetes), asthma, a blood disorder, no spleen, complement component deficiency, a cochlear implant, or a spinal fluid leak?  Are you on long-term aspirin therapy?   Yes   Do you have cancer, leukemia, HIV/AIDS, or any other immune system problem?   No   Do you have a parent, brother, or sister with an immune system problem?   No   In the past 3 months, have you taken medications that affect  your immune system, such as prednisone, other steroids, or anticancer drugs; drugs for the treatment of rheumatoid arthritis, Crohn s disease, or psoriasis; or have you had radiation treatments?   No   Have you had a seizure, or a brain or other nervous system problem?   No   During the past year, have you received a transfusion of blood or blood    products, or been given immune (gamma) globulin or antiviral drug?   Yes   For women: Are you pregnant or is there a chance you could become       pregnant during the next month?   No   Have you received any vaccinations in the past 4 weeks?   No     Immunization questionnaire was positive for at least one answer.  Notified Dr. Mendoza.      Patient instructed to remain in clinic for 15 minutes afterwards, and to report any adverse reactions.     Screening performed by Eliza Roman LPN on 9/12/2023 at 10:03 AM.

## 2023-09-12 NOTE — PROGRESS NOTES
SUBJECTIVE:   CC: Roberto is an 62 year old who presents for preventative health visit.       2023     9:20 AM   Additional Questions   Roomed by Rema JETER       Healthy Habits:     Getting at least 3 servings of Calcium per day:  NO    Bi-annual eye exam:  Yes    Dental care twice a year:  NO    Sleep apnea or symptoms of sleep apnea:  None    Diet:  Regular (no restrictions)    Frequency of exercise:  None    Taking medications regularly:  Yes    Medication side effects:  Lightheadedness    Additional concerns today:  No      Today's PHQ-2 Score:       2023     9:20 AM   PHQ-2 (  [employer])   Q1: Little interest or pleasure in doing things 0   Q2: Feeling down, depressed or hopeless 0   PHQ-2 Score 0   Q1: Little interest or pleasure in doing things Not at all   Q2: Feeling down, depressed or hopeless Not at all   PHQ-2 Score 0       Social History     Tobacco Use    Smoking status: Every Day     Packs/day: 2.00     Years: 45.00     Pack years: 90.00     Types: Cigarettes     Start date:      Last attempt to quit: 2017     Years since quittin.1    Smokeless tobacco: Never    Tobacco comments:     started smoking at 17, smoking 2 ppd since    Substance Use Topics    Alcohol use: Yes     Alcohol/week: 4.0 standard drinks of alcohol     Types: 4 Standard drinks or equivalent per week     Comment: occasional             2023     9:20 AM   Alcohol Use   Prescreen: >3 drinks/day or >7 drinks/week? No       Last PSA:   PSA   Date Value Ref Range Status   2014 0.28 0 - 4 ug/L Final     Prostate Specific Antigen Screen   Date Value Ref Range Status   2023 0.27 0.00 - 4.50 ng/mL Final   10/04/2022 0.30 0.00 - 4.00 ug/L Final       Reviewed orders with patient. Reviewed health maintenance and updated orders accordingly - Yes  Lab work is in process    Reviewed and updated as needed this visit by clinical staff   Tobacco  Allergies  Meds              Reviewed and updated as needed  this visit by Provider                 Past Medical History:   Diagnosis Date    Arthritis 2011    left hand pointer finger    Chronic atrial fibrillation (H)     Valvular Afib.  Diagnosed 06/2017. Pt initiated on coumadin 7/18/17    Erectile dysfunction     Hyperlipidemia     Mitral regurgitation     rheumatic fever as a child.  Severe mitral valve regurgitation.  Underwent 32 mm [employer] [place] mechanical mitral valve replacement in September 2017.    Prediabetes     Tobacco abuse     Warfarin anticoagulation       Past Surgical History:   Procedure Laterality Date    COLONOSCOPY N/A 9/8/2014    Procedure: COMBINED COLONOSCOPY, SINGLE BIOPSY/POLYPECTOMY BY BIOPSY;  Surgeon: Kai Bailey MD;  Location:  GI    CV HEART CATHETERIZATION WITH POSSIBLE INTERVENTION N/A 8/26/2020    Procedure: Heart Catheterization with Possible Intervention;  Surgeon: Marin Hagen MD;  Location:  HEART CARDIAC CATH LAB    EXTRACTION(S) DENTAL      ORTHOPEDIC SURGERY      RIght knee scope    REPLACE VALVE MITRAL N/A 9/12/2017    Procedure: REPLACE VALVE MITRAL;  MITRAL VALVE REPLACEMENT  [facility] Masters Series Mechanical Heart Valve 33mm  Ref, [id number] Serial [id number]   ON PUMP, MITCHELL;  Surgeon: Ivana Marie MD;  Location:  OR       Review of Systems   Constitutional:  Negative for chills and fever.   HENT:  Positive for congestion. Negative for ear pain, hearing loss and sore throat.    Eyes:  Negative for pain and visual disturbance.   Respiratory:  Negative for cough and shortness of breath.    Cardiovascular:  Positive for peripheral edema. Negative for chest pain and palpitations.   Gastrointestinal:  Negative for abdominal pain, constipation, diarrhea, heartburn, hematochezia and nausea.   Genitourinary:  Positive for impotence. Negative for dysuria, frequency, genital sores, hematuria, penile discharge and urgency.   Musculoskeletal:  Negative for arthralgias, joint swelling and myalgias.   Skin:  Negative for  "rash.   Neurological:  Negative for dizziness, weakness, headaches and paresthesias.   Psychiatric/Behavioral:  Negative for mood changes. The patient is not nervous/anxious.      OBJECTIVE:   /62   Pulse 78   Temp 97  F (36.1  C) (Temporal)   Resp 20   Ht 1.793 m (5' 10.6\")   Wt 129.5 kg (285 lb 9 oz)   SpO2 99%   BMI 40.28 kg/m      Physical Exam  GENERAL: healthy, alert and no distress  EYES: Eyes grossly normal to inspection, PERRL and conjunctivae and sclerae normal  HENT: ear canals and TM's normal, nose and mouth without ulcers or lesions  NECK: no adenopathy, no asymmetry, masses, or scars and thyroid normal to palpation  RESP: lungs clear to auscultation - no rales, rhonchi or wheezes  CV: regular rate and irregular rhythm, systolic murmur with click, trace peripheral edema   ABDOMEN: soft, nontender, no hepatosplenomegaly, no masses and bowel sounds normal  MS: no gross musculoskeletal defects noted, no edema  SKIN: no suspicious lesions or rashes, hypopigmented patches on scalp, hands and chest  NEURO: Normal strength and tone, mentation intact and speech normal  PSYCH: mentation appears normal, affect normal/bright    ASSESSMENT/PLAN:   Derek was seen today for physical.    Diagnoses and all orders for this visit:    Routine general medical examination at a health care facility  Age-appropriate immunization and cancer screening discussed.  Repeat colonoscopy next year.  Patient will get Tdap but does not want pneumonia or shingles despite shingles infection in the spring.    Long term current use of anticoagulant therapy  Chronic atrial fibrillation (H)  Patient not on rate control and does well with warfarin.    S/P MVR (mitral valve replacement)  Mechanical heart valve present  Patient on long-term anticoagulation follows with cardiology regularly.  Yearly echo.    NSTEMI (non-ST elevated myocardial infarction) (H)  Patient continues on atorvastatin but not on aspirin due to " "anticoagulation.  -     atorvastatin (LIPITOR) 40 MG tablet; Take 1 tablet (40 mg) by mouth every evening    Hyperlipidemia with target LDL less than 130  -     Lipid panel reflex to direct LDL Fasting; Future  -     Comprehensive metabolic panel (BMP + Alb, Alk Phos, ALT, AST, Total. Bili, TP); Future    Morbid obesity (H)  Portance of diet and exercise discussed today    Elevated glucose  -     Hemoglobin A1c; Future    Pulmonary nodules  Previously stable nodule last imaged in 2019.  We did discuss repeating CT but he does not want further imaging at this time.    Screening for prostate cancer  -     PSA, screen; Future    Tinea versicolor  Areas of hypopigmentation.  Previously diagnosed with tinea versicolor but some concern for  Vitiligo given spots on his hands and potential family history.  Patient not wanting further intervention at this time.    Other orders  -     TDAP 10-64Y (ADACEL,BOOSTRIX)  -     REVIEW OF HEALTH MAINTENANCE PROTOCOL ORDERS        Patient has been advised of split billing requirements and indicates understanding: Yes      COUNSELING:   Reviewed preventive health counseling, as reflected in patient instructions       Regular exercise       Healthy diet/nutrition       Vision screening       Hearing screening       Pneumococcal Vaccine        Immunizations       Aspirin prophylaxis        Alcohol Use        Safe sex practices/STD prevention       Consider Hep C screening for all patients one time for ages 18-79 years       HIV screeninx in teen years, 1x in adult years, and at intervals if high risk       Colorectal cancer screening       Prostate cancer screening       Consider lung cancer screening for ages 55-80 years (77 for Medicare) and 20 pack-year smoking history       BMI:   Estimated body mass index is 40.28 kg/m  as calculated from the following:    Height as of this encounter: 1.793 m (5' 10.6\").    Weight as of this encounter: 129.5 kg (285 lb 9 oz).   Weight management " plan: Discussed healthy diet and exercise guidelines      He reports that he has been smoking cigarettes. He started smoking about 46 years ago. He has a 90.00 pack-year smoking history. He has never used smokeless tobacco.  Nicotine/Tobacco Cessation Plan:   Self help information given to patient  Consider Chantix again as this worked in the past.  He does not want further intervention now.            Corey Mendoza MD  [facility]

## 2023-09-19 ENCOUNTER — ANTICOAGULATION THERAPY VISIT (OUTPATIENT)
Dept: ANTICOAGULATION | Facility: CLINIC | Age: 63
End: 2023-09-19

## 2023-09-19 ENCOUNTER — LAB (OUTPATIENT)
Dept: LAB | Facility: CLINIC | Age: 63
End: 2023-09-19
Payer: COMMERCIAL

## 2023-09-19 DIAGNOSIS — I48.91 ATRIAL FIBRILLATION (H): Primary | ICD-10-CM

## 2023-09-19 DIAGNOSIS — I48.11 LONGSTANDING PERSISTENT ATRIAL FIBRILLATION (H): ICD-10-CM

## 2023-09-19 DIAGNOSIS — I48.0 PAROXYSMAL ATRIAL FIBRILLATION (H): ICD-10-CM

## 2023-09-19 DIAGNOSIS — I48.20 CHRONIC ATRIAL FIBRILLATION (H): ICD-10-CM

## 2023-09-19 DIAGNOSIS — Z95.2 MECHANICAL HEART VALVE PRESENT: ICD-10-CM

## 2023-09-19 DIAGNOSIS — Z79.01 LONG TERM CURRENT USE OF ANTICOAGULANT THERAPY: ICD-10-CM

## 2023-09-19 DIAGNOSIS — I48.91 ATRIAL FIBRILLATION, UNSPECIFIED TYPE (H): ICD-10-CM

## 2023-09-19 DIAGNOSIS — Z95.2 S/P MVR (MITRAL VALVE REPLACEMENT): ICD-10-CM

## 2023-09-19 LAB — INR BLD: 3.5 (ref 0.9–1.1)

## 2023-09-19 PROCEDURE — 85610 PROTHROMBIN TIME: CPT

## 2023-09-19 PROCEDURE — 36416 COLLJ CAPILLARY BLOOD SPEC: CPT

## 2023-09-19 NOTE — PROGRESS NOTES
ANTICOAGULATION MANAGEMENT     Derek Stover 62 year old male is on warfarin with therapeutic INR result. (Goal INR 2.5-3.5)    Recent labs: (last 7 days)     09/19/23  0832   INR 3.5*       ASSESSMENT     Source(s): Chart Review and Patient/Caregiver Call     Warfarin doses taken: Warfarin taken as instructed  Diet: No new diet changes identified  Medication/supplement changes: None noted  New illness, injury, or hospitalization: No  Signs or symptoms of bleeding or clotting: No  Previous result: Subtherapeutic  Additional findings: changed form cigarette smoke to pipe tobacco       PLAN     Recommended plan for no diet, medication or health factor changes affecting INR     Dosing Instructions: Continue your current warfarin dose with next INR in 4 weeks       Summary  As of 9/19/2023      Full warfarin instructions:  5 mg every Tue, Thu, Sat; 2.5 mg all other days   Next INR check:  10/17/2023               Telephone call with Roberto who verbalizes understanding and agrees to plan    Lab visit scheduled    Education provided:   Please call back if any changes to your diet, medications or how you've been taking warfarin    Plan made per [facility] anticoagulation protocol    Chyna Kenee RN  Anticoagulation Clinic  9/19/2023    _______________________________________________________________________     Anticoagulation Episode Summary       Current INR goal:  2.5-3.5   TTR:  85.0 % (1 y)   Target end date:  Indefinite   Send INR reminders to:  [facility]    Indications    Atrial fibrillation (H) [I48.91]  Atrial fibrillation (H) [I48.91] (Resolved) [I48.91]  Long term current use of anticoagulant therapy [Z79.01]  Mechanical heart valve present [Z95.2]  Longstanding persistent atrial fibrillation (H) [I48.11]  Chronic atrial fibrillation (H) [I48.20]  Atrial fibrillation  unspecified type (H) [I48.91]  Paroxysmal atrial fibrillation (H) [I48.0]  S/P MVR (mitral valve replacement) [Z95.2]              Comments:               Anticoagulation Care Providers       Provider Role Specialty Phone number    Osbaldo Renee MD Referring Family Medicine [phone]    Carlos Archibald DO [facility] Internal Medicine [phone]

## 2023-10-16 ENCOUNTER — MYC MEDICAL ADVICE (OUTPATIENT)
Dept: FAMILY MEDICINE | Facility: CLINIC | Age: 63
End: 2023-10-16
Payer: COMMERCIAL

## 2023-10-16 DIAGNOSIS — Z79.01 LONG TERM CURRENT USE OF ANTICOAGULANT THERAPY: ICD-10-CM

## 2023-10-16 DIAGNOSIS — I48.20 CHRONIC ATRIAL FIBRILLATION (H): ICD-10-CM

## 2023-10-17 ENCOUNTER — LAB (OUTPATIENT)
Dept: LAB | Facility: CLINIC | Age: 63
End: 2023-10-17
Payer: COMMERCIAL

## 2023-10-17 ENCOUNTER — ANTICOAGULATION THERAPY VISIT (OUTPATIENT)
Dept: ANTICOAGULATION | Facility: CLINIC | Age: 63
End: 2023-10-17

## 2023-10-17 DIAGNOSIS — I48.20 CHRONIC ATRIAL FIBRILLATION (H): ICD-10-CM

## 2023-10-17 DIAGNOSIS — I48.11 LONGSTANDING PERSISTENT ATRIAL FIBRILLATION (H): ICD-10-CM

## 2023-10-17 DIAGNOSIS — I48.91 ATRIAL FIBRILLATION (H): Primary | ICD-10-CM

## 2023-10-17 DIAGNOSIS — I48.91 ATRIAL FIBRILLATION, UNSPECIFIED TYPE (H): ICD-10-CM

## 2023-10-17 DIAGNOSIS — I48.0 PAROXYSMAL ATRIAL FIBRILLATION (H): ICD-10-CM

## 2023-10-17 DIAGNOSIS — Z95.2 S/P MVR (MITRAL VALVE REPLACEMENT): ICD-10-CM

## 2023-10-17 DIAGNOSIS — Z95.2 MECHANICAL HEART VALVE PRESENT: ICD-10-CM

## 2023-10-17 DIAGNOSIS — Z79.01 LONG TERM CURRENT USE OF ANTICOAGULANT THERAPY: ICD-10-CM

## 2023-10-17 LAB — INR BLD: 3.1 (ref 0.9–1.1)

## 2023-10-17 PROCEDURE — 36416 COLLJ CAPILLARY BLOOD SPEC: CPT

## 2023-10-17 PROCEDURE — 85610 PROTHROMBIN TIME: CPT

## 2023-10-17 RX ORDER — WARFARIN SODIUM 5 MG/1
TABLET ORAL
Qty: 90 TABLET | Refills: 1 | Status: SHIPPED | OUTPATIENT
Start: 2023-10-17 | End: 2024-04-09

## 2023-10-17 NOTE — PROGRESS NOTES
ANTICOAGULATION MANAGEMENT     Derek Stover 62 year old male is on warfarin with therapeutic INR result. (Goal INR 2.5-3.5)    Recent labs: (last 7 days)     10/17/23  0803   INR 3.1*       ASSESSMENT     Warfarin Lab Questionnaire    Warfarin Doses Last 7 Days      10/16/2023     7:43 PM   Dose in Tablet or Mg   TAB or MG? milligram (mg)     Pt Rptd Dose ADRIÁN MONDAY TUESDAY WED THURS FRIDAY SATURDAY   10/16/2023   7:43 PM 2.5 2.5 5 2.5 5 2.5 5         10/16/2023   Warfarin Lab Questionnaire   Missed doses within past 14 days? No   Changes in diet or alcohol within past 14 days? No   Medication changes since last result? No   Injuries or illness since last result? No   New shortness of breath, severe headaches or sudden changes in vision since last result? No   Abnormal bleeding since last result? No   Upcoming surgery, procedure? No   Best number to call with results? [phone]     Previous result: Therapeutic last visit; previously outside of goal range  Additional findings: Refill needed today. Derek meets all criteria for refill (current [facility] referral, office visit with referring provider/group in last 1 year unless directed to return in 2 years in last referring provider visit note, lab monitoring up to date or not exceeding 2 weeks overdue). Rx instructions and quantity supplied updated to match patient's current dosing plan. Warfarin 90 day supply with 1 refill granted per [facility] protocol        PLAN     Recommended plan for no diet, medication or health factor changes affecting INR     Dosing Instructions: Continue your current warfarin dose with next INR in 6 weeks       Summary  As of 10/17/2023      Full warfarin instructions:  5 mg every Tue, Thu, Sat; 2.5 mg all other days   Next INR check:                 Telephone call with Roberto who verbalizes understanding and agrees to plan    Lab visit scheduled    Education provided:   Please call back if any changes to your diet, medications or how you've  been taking warfarin    Plan made per [facility] anticoagulation protocol    Chyna Keene RN  Anticoagulation Clinic  10/17/2023    _______________________________________________________________________     Anticoagulation Episode Summary       Current INR goal:  2.5-3.5   TTR:  85.0% (1 y)   Target end date:  Indefinite   Send INR reminders to:  [facility]    Indications    Atrial fibrillation (H) [I48.91]  Atrial fibrillation (H) [I48.91] (Resolved) [I48.91]  Long term current use of anticoagulant therapy [Z79.01]  Mechanical heart valve present [Z95.2]  Longstanding persistent atrial fibrillation (H) [I48.11]  Chronic atrial fibrillation (H) [I48.20]  Atrial fibrillation  unspecified type (H) [I48.91]  Paroxysmal atrial fibrillation (H) [I48.0]  S/P MVR (mitral valve replacement) [Z95.2]             Comments:               Anticoagulation Care Providers       Provider Role Specialty Phone number    Osbaldo Renee MD Referring Family Medicine [phone]    Carlos Archibald DO Responsible Internal Medicine [phone]

## 2023-11-28 ENCOUNTER — ANTICOAGULATION THERAPY VISIT (OUTPATIENT)
Dept: ANTICOAGULATION | Facility: CLINIC | Age: 63
End: 2023-11-28

## 2023-11-28 ENCOUNTER — LAB (OUTPATIENT)
Dept: LAB | Facility: CLINIC | Age: 63
End: 2023-11-28
Payer: COMMERCIAL

## 2023-11-28 DIAGNOSIS — I48.20 CHRONIC ATRIAL FIBRILLATION (H): ICD-10-CM

## 2023-11-28 DIAGNOSIS — I48.91 ATRIAL FIBRILLATION, UNSPECIFIED TYPE (H): ICD-10-CM

## 2023-11-28 DIAGNOSIS — I48.0 PAROXYSMAL ATRIAL FIBRILLATION (H): Primary | ICD-10-CM

## 2023-11-28 DIAGNOSIS — Z95.2 S/P MVR (MITRAL VALVE REPLACEMENT): ICD-10-CM

## 2023-11-28 DIAGNOSIS — Z79.01 LONG TERM CURRENT USE OF ANTICOAGULANT THERAPY: ICD-10-CM

## 2023-11-28 DIAGNOSIS — Z95.2 MECHANICAL HEART VALVE PRESENT: ICD-10-CM

## 2023-11-28 DIAGNOSIS — I48.11 LONGSTANDING PERSISTENT ATRIAL FIBRILLATION (H): ICD-10-CM

## 2023-11-28 DIAGNOSIS — I48.0 PAROXYSMAL ATRIAL FIBRILLATION (H): ICD-10-CM

## 2023-11-28 LAB — INR BLD: 3.3 (ref 0.9–1.1)

## 2023-11-28 PROCEDURE — 36416 COLLJ CAPILLARY BLOOD SPEC: CPT

## 2023-11-28 PROCEDURE — 85610 PROTHROMBIN TIME: CPT

## 2023-11-28 NOTE — PROGRESS NOTES
ANTICOAGULATION MANAGEMENT     Derek Stover 63 year old male is on warfarin with therapeutic INR result. (Goal INR 2.5-3.5)    Recent labs: (last 7 days)     11/28/23  0800   INR 3.3*       ASSESSMENT     Source(s): Chart Review and Patient/Caregiver Call     Warfarin doses taken: Warfarin taken as instructed  Diet: No new diet changes identified  Medication/supplement changes: None noted  New illness, injury, or hospitalization: No  Signs or symptoms of bleeding or clotting: No  Previous result: Therapeutic last 2(+) visits  Additional findings: None       PLAN     Recommended plan for no diet, medication or health factor changes affecting INR     Dosing Instructions: Continue your current warfarin dose with next INR in 6 weeks       Summary  As of 11/28/2023      Full warfarin instructions:  5 mg every Tue, Thu, Sat; 2.5 mg all other days   Next INR check:  1/9/2024               Telephone call with Roberto who verbalizes understanding and agrees to plan    Lab visit scheduled    Education provided:   None required  Contact [phone]  with any changes, questions or concerns.     Plan made per [facility] anticoagulation protocol    Corie Mccall RN  Anticoagulation Clinic  11/28/2023    _______________________________________________________________________     Anticoagulation Episode Summary       Current INR goal:  2.5-3.5   TTR:  88.4% (1 y)   Target end date:  Indefinite   Send INR reminders to:  [facility]    Indications    Atrial fibrillation (H) [I48.91]  Atrial fibrillation (H) [I48.91] (Resolved) [I48.91]  Long term current use of anticoagulant therapy [Z79.01]  Mechanical heart valve present [Z95.2]  Longstanding persistent atrial fibrillation (H) [I48.11]  Chronic atrial fibrillation (H) [I48.20]  Atrial fibrillation  unspecified type (H) [I48.91]  Paroxysmal atrial fibrillation (H) [I48.0]  S/P MVR (mitral valve replacement) [Z95.2]             Comments:               Anticoagulation Care  Providers       Provider Role Specialty Phone number    Osbaldo Renee MD Referring Family Medicine [phone]    Carlos Archibald DO [facility] Internal Medicine [phone]

## 2024-01-09 ENCOUNTER — ANTICOAGULATION THERAPY VISIT (OUTPATIENT)
Dept: ANTICOAGULATION | Facility: CLINIC | Age: 64
End: 2024-01-09

## 2024-01-09 ENCOUNTER — LAB (OUTPATIENT)
Dept: LAB | Facility: CLINIC | Age: 64
End: 2024-01-09
Payer: COMMERCIAL

## 2024-01-09 DIAGNOSIS — Z95.2 MECHANICAL HEART VALVE PRESENT: ICD-10-CM

## 2024-01-09 DIAGNOSIS — I48.20 CHRONIC ATRIAL FIBRILLATION (H): ICD-10-CM

## 2024-01-09 DIAGNOSIS — I48.91 ATRIAL FIBRILLATION, UNSPECIFIED TYPE (H): ICD-10-CM

## 2024-01-09 DIAGNOSIS — Z95.2 S/P MVR (MITRAL VALVE REPLACEMENT): ICD-10-CM

## 2024-01-09 DIAGNOSIS — Z79.01 LONG TERM CURRENT USE OF ANTICOAGULANT THERAPY: ICD-10-CM

## 2024-01-09 DIAGNOSIS — I48.11 LONGSTANDING PERSISTENT ATRIAL FIBRILLATION (H): ICD-10-CM

## 2024-01-09 DIAGNOSIS — I48.0 PAROXYSMAL ATRIAL FIBRILLATION (H): Primary | ICD-10-CM

## 2024-01-09 DIAGNOSIS — I48.0 PAROXYSMAL ATRIAL FIBRILLATION (H): ICD-10-CM

## 2024-01-09 LAB — INR BLD: 3.3 (ref 0.9–1.1)

## 2024-01-09 PROCEDURE — 85610 PROTHROMBIN TIME: CPT

## 2024-01-09 PROCEDURE — 36416 COLLJ CAPILLARY BLOOD SPEC: CPT

## 2024-01-09 NOTE — PROGRESS NOTES
ANTICOAGULATION MANAGEMENT     Derek Stover 63 year old male is on warfarin with therapeutic INR result. (Goal INR 2.5-3.5)    Recent labs: (last 7 days)     01/09/24  0758   INR 3.3*       ASSESSMENT     Source(s): Chart Review and Patient/Caregiver Call     Warfarin doses taken: Warfarin taken as instructed  Diet: No new diet changes identified  Medication/supplement changes: None noted  New illness, injury, or hospitalization: No  Signs or symptoms of bleeding or clotting: No  Previous result: Therapeutic last 2(+) visits  Additional findings: None       PLAN     Recommended plan for no diet, medication or health factor changes affecting INR     Dosing Instructions: Continue your current warfarin dose with next INR in 6 weeks       Summary  As of 1/9/2024      Full warfarin instructions:  5 mg every Tue, Thu, Sat; 2.5 mg all other days   Next INR check:  2/20/2024               Telephone call with Roberto who verbalizes understanding and agrees to plan    Lab visit scheduled    Education provided:   Contact [phone]  with any changes, questions or concerns.     Plan made per [facility] anticoagulation protocol    Corie Mccall RN  Anticoagulation Clinic  1/9/2024    _______________________________________________________________________     Anticoagulation Episode Summary       Current INR goal:  2.5-3.5   TTR:  97.6% (1 y)   Target end date:  Indefinite   Send INR reminders [facility]:  [facility]    Indications    Atrial fibrillation (H) [I48.91]  Atrial fibrillation (H) [I48.91] (Resolved) [I48.91]  Long term current use of anticoagulant therapy [Z79.01]  Mechanical heart valve present [Z95.2]  Longstanding persistent atrial fibrillation (H) [I48.11]  Chronic atrial fibrillation (H) [I48.20]  Atrial fibrillation  unspecified type (H) [I48.91]  Paroxysmal atrial fibrillation (H) [I48.0]  S/P MVR (mitral valve replacement) [Z95.2]             Comments:               Anticoagulation Care Providers        Provider Role Specialty Phone number    Osbaldo Renee MD Referring Family Medicine [phone]    Carlos Archibald DO [facility] Internal Medicine [phone]

## 2024-02-20 ENCOUNTER — ANTICOAGULATION THERAPY VISIT (OUTPATIENT)
Dept: ANTICOAGULATION | Facility: CLINIC | Age: 64
End: 2024-02-20

## 2024-02-20 ENCOUNTER — LAB (OUTPATIENT)
Dept: LAB | Facility: CLINIC | Age: 64
End: 2024-02-20
Payer: COMMERCIAL

## 2024-02-20 DIAGNOSIS — I48.91 ATRIAL FIBRILLATION, UNSPECIFIED TYPE (H): ICD-10-CM

## 2024-02-20 DIAGNOSIS — Z95.2 MECHANICAL HEART VALVE PRESENT: ICD-10-CM

## 2024-02-20 DIAGNOSIS — I48.91 ATRIAL FIBRILLATION (H): Primary | ICD-10-CM

## 2024-02-20 DIAGNOSIS — I48.20 CHRONIC ATRIAL FIBRILLATION (H): ICD-10-CM

## 2024-02-20 DIAGNOSIS — Z79.01 LONG TERM CURRENT USE OF ANTICOAGULANT THERAPY: ICD-10-CM

## 2024-02-20 DIAGNOSIS — I48.11 LONGSTANDING PERSISTENT ATRIAL FIBRILLATION (H): ICD-10-CM

## 2024-02-20 DIAGNOSIS — I48.0 PAROXYSMAL ATRIAL FIBRILLATION (H): ICD-10-CM

## 2024-02-20 DIAGNOSIS — Z95.2 S/P MVR (MITRAL VALVE REPLACEMENT): ICD-10-CM

## 2024-02-20 LAB — INR BLD: 3.5 (ref 0.9–1.1)

## 2024-02-20 PROCEDURE — 36416 COLLJ CAPILLARY BLOOD SPEC: CPT

## 2024-02-20 PROCEDURE — 85610 PROTHROMBIN TIME: CPT

## 2024-02-20 NOTE — PROGRESS NOTES
ANTICOAGULATION MANAGEMENT     Derek Stover 63 year old male is on warfarin with therapeutic INR result. (Goal INR 2.5-3.5)    Recent labs: (last 7 days)     02/20/24  0759   INR 3.5*       ASSESSMENT     Source(s): Chart Review and Patient/Caregiver Call     Warfarin doses taken: Warfarin taken as instructed  Diet: No new diet changes identified  Medication/supplement changes:  taking ny quil for cold symptoms   New illness, injury, or hospitalization: Yes: Has a head cold   Signs or symptoms of bleeding or clotting: No  Previous result: Therapeutic last 2(+) visits  Additional findings: None       PLAN     Recommended plan for temporary change(s) affecting INR     Dosing Instructions: Continue your current warfarin dose with next INR in 2 weeks       Summary  As of 2/20/2024      Full warfarin instructions:  5 mg every Tue, Thu, Sat; 2.5 mg all other days   Next INR check:                 Telephone call with Roberto who verbalizes understanding and agrees to plan    Lab visit scheduled    Education provided:   Please call back if any changes to your diet, medications or how you've been taking warfarin    Plan made per [facility] anticoagulation protocol    Chyna Keene, RN  Anticoagulation Clinic  2/20/2024    _______________________________________________________________________     Anticoagulation Episode Summary       Current INR goal:  2.5-3.5   TTR:  98.0% (1 y)   Target end date:  Indefinite   Send INR reminders to:  [facility]    Indications    Atrial fibrillation (H) [I48.91]  Atrial fibrillation (H) [I48.91] (Resolved) [I48.91]  Long term current use of anticoagulant therapy [Z79.01]  Mechanical heart valve present [Z95.2]  Longstanding persistent atrial fibrillation (H) [I48.11]  Chronic atrial fibrillation (H) [I48.20]  Atrial fibrillation  unspecified type (H) [I48.91]  Paroxysmal atrial fibrillation (H) [I48.0]  S/P MVR (mitral valve replacement) [Z95.2]             Comments:                Anticoagulation Care Providers       Provider Role Specialty Phone number    Osbaldo Renee MD Referring Family Medicine [phone]    Carlos Archbiald DO [facility] Internal Medicine [phone]

## 2024-03-05 ENCOUNTER — ANTICOAGULATION THERAPY VISIT (OUTPATIENT)
Dept: ANTICOAGULATION | Facility: CLINIC | Age: 64
End: 2024-03-05

## 2024-03-05 ENCOUNTER — LAB (OUTPATIENT)
Dept: LAB | Facility: CLINIC | Age: 64
End: 2024-03-05
Payer: COMMERCIAL

## 2024-03-05 ENCOUNTER — DOCUMENTATION ONLY (OUTPATIENT)
Dept: ANTICOAGULATION | Facility: CLINIC | Age: 64
End: 2024-03-05

## 2024-03-05 DIAGNOSIS — I48.0 PAROXYSMAL ATRIAL FIBRILLATION (H): Primary | ICD-10-CM

## 2024-03-05 DIAGNOSIS — I48.11 LONGSTANDING PERSISTENT ATRIAL FIBRILLATION (H): ICD-10-CM

## 2024-03-05 DIAGNOSIS — Z95.2 S/P MVR (MITRAL VALVE REPLACEMENT): ICD-10-CM

## 2024-03-05 DIAGNOSIS — Z79.01 LONG TERM CURRENT USE OF ANTICOAGULANT THERAPY: ICD-10-CM

## 2024-03-05 DIAGNOSIS — Z95.2 MECHANICAL HEART VALVE PRESENT: ICD-10-CM

## 2024-03-05 DIAGNOSIS — I48.91 ATRIAL FIBRILLATION, UNSPECIFIED TYPE (H): ICD-10-CM

## 2024-03-05 DIAGNOSIS — I48.0 PAROXYSMAL ATRIAL FIBRILLATION (H): ICD-10-CM

## 2024-03-05 DIAGNOSIS — I48.20 CHRONIC ATRIAL FIBRILLATION (H): ICD-10-CM

## 2024-03-05 LAB — INR BLD: 3.1 (ref 0.9–1.1)

## 2024-03-05 PROCEDURE — 36416 COLLJ CAPILLARY BLOOD SPEC: CPT

## 2024-03-05 PROCEDURE — 85610 PROTHROMBIN TIME: CPT

## 2024-03-05 NOTE — PROGRESS NOTES
ANTICOAGULATION CLINIC REFERRAL RENEWAL REQUEST       An annual renewal order is required for all patients referred to [facility] Anticoagulation [facility].?  Please review and sign the pended referral order for Derek Stover.       ANTICOAGULATION SUMMARY      Warfarin indication(s)   Atrial Fibrillation and Mechanical MVR    Mechanical heart valve present?  NO and Mechanical MVR       Current goal range   INR: 2.5-3.5     Goal appropriate for indication? Goal INR 2.5-3.5 standard for indication(s) above     Time in Therapeutic Range (TTR)  (Goal > 60%) 98%       Office visit with referring provider's group within last year yes on 9/12/23       Corie Mccall, RN  [facility]ticoagulation [facility]

## 2024-04-09 ENCOUNTER — ANTICOAGULATION THERAPY VISIT (OUTPATIENT)
Dept: ANTICOAGULATION | Facility: CLINIC | Age: 64
End: 2024-04-09

## 2024-04-09 ENCOUNTER — LAB (OUTPATIENT)
Dept: LAB | Facility: CLINIC | Age: 64
End: 2024-04-09
Payer: COMMERCIAL

## 2024-04-09 DIAGNOSIS — I48.0 PAROXYSMAL ATRIAL FIBRILLATION (H): ICD-10-CM

## 2024-04-09 DIAGNOSIS — I48.20 CHRONIC ATRIAL FIBRILLATION (H): ICD-10-CM

## 2024-04-09 DIAGNOSIS — Z95.2 MECHANICAL HEART VALVE PRESENT: ICD-10-CM

## 2024-04-09 DIAGNOSIS — Z79.01 LONG TERM CURRENT USE OF ANTICOAGULANT THERAPY: ICD-10-CM

## 2024-04-09 LAB — INR BLD: 3.5 (ref 0.9–1.1)

## 2024-04-09 PROCEDURE — 85610 PROTHROMBIN TIME: CPT

## 2024-04-09 PROCEDURE — 36416 COLLJ CAPILLARY BLOOD SPEC: CPT

## 2024-04-09 RX ORDER — WARFARIN SODIUM 5 MG/1
TABLET ORAL
Qty: 100 TABLET | Refills: 0 | Status: SHIPPED | OUTPATIENT
Start: 2024-04-09 | End: 2024-07-01

## 2024-04-09 NOTE — PROGRESS NOTES
ANTICOAGULATION MANAGEMENT     Derek Stover 63 year old male is on warfarin with therapeutic INR result. (Goal INR 2.5-3.5)    Recent labs: (last 7 days)     04/09/24  0805   INR 3.5*       ASSESSMENT     Source(s): Chart Review and Patient/Caregiver Call     Warfarin doses taken: Warfarin taken as instructed  Diet: No new diet changes identified  Medication/supplement changes: None noted  New illness, injury, or hospitalization: No  Signs or symptoms of bleeding or clotting: No  Previous result: Therapeutic last 2(+) visits  Additional findings: refill sent to pharmacy       PLAN     Recommended plan for no diet, medication or health factor changes affecting INR     Dosing Instructions: Continue your current warfarin dose with next INR in 6 weeks       Summary  As of 4/9/2024      Full warfarin instructions:  5 mg every Tue, Thu, Sat; 2.5 mg all other days   Next INR check:  5/21/2024               Telephone call with Roberto who verbalizes understanding and agrees to plan    Lab visit scheduled    Education provided:   Contact [phone]  with any changes, questions or concerns.     Plan made per [facility] anticoagulation protocol    Karine Bullock RN  Anticoagulation Clinic  4/9/2024    _______________________________________________________________________     Anticoagulation Episode Summary       Current INR goal:  2.5-3.5   TTR:  98.0% (1 y)   Target end date:  Indefinite   Send INR reminders to:  [facility]    Indications    Atrial fibrillation (H) [I48.91]  S/P MVR (mitral valve replacement) [Z95.2]  Mechanical heart valve present [Z95.2]  Chronic atrial fibrillation (H) [I48.20]  Paroxysmal atrial fibrillation (H) [I48.0]             Comments:               Anticoagulation Care Providers       Provider Role Specialty Phone number    Osbaldo Renee MD Referring Family Medicine [phone]    Carlos Archibald DO Responsible Internal Medicine [phone]

## 2024-05-21 ENCOUNTER — ANTICOAGULATION THERAPY VISIT (OUTPATIENT)
Dept: ANTICOAGULATION | Facility: CLINIC | Age: 64
End: 2024-05-21

## 2024-05-21 ENCOUNTER — LAB (OUTPATIENT)
Dept: LAB | Facility: CLINIC | Age: 64
End: 2024-05-21
Payer: COMMERCIAL

## 2024-05-21 DIAGNOSIS — I48.20 CHRONIC ATRIAL FIBRILLATION (H): ICD-10-CM

## 2024-05-21 DIAGNOSIS — I48.0 PAROXYSMAL ATRIAL FIBRILLATION (H): ICD-10-CM

## 2024-05-21 DIAGNOSIS — Z95.2 S/P MVR (MITRAL VALVE REPLACEMENT): ICD-10-CM

## 2024-05-21 DIAGNOSIS — Z95.2 MECHANICAL HEART VALVE PRESENT: ICD-10-CM

## 2024-05-21 DIAGNOSIS — I48.91 ATRIAL FIBRILLATION (H): Primary | ICD-10-CM

## 2024-05-21 LAB — INR BLD: 4.9 (ref 0.9–1.1)

## 2024-05-21 PROCEDURE — 85610 PROTHROMBIN TIME: CPT

## 2024-05-21 PROCEDURE — 36416 COLLJ CAPILLARY BLOOD SPEC: CPT

## 2024-05-21 NOTE — PROGRESS NOTES
ANTICOAGULATION MANAGEMENT     Derek Stover 63 year old male is on warfarin with supratherapeutic INR result. (Goal INR 2.5-3.5)    Recent labs: (last 7 days)     05/21/24  0812   INR 4.9*       ASSESSMENT     Source(s): Chart Review and Patient/Caregiver Call     Warfarin doses taken: Warfarin taken as instructed  Diet: No new diet changes identified  Medication/supplement changes:  Taking decongestant meds Zyrtec d and cough med night time cold medicine and Robitussin  New illness, injury, or hospitalization: Yes: Covid positive  Signs or symptoms of bleeding or clotting: No  Previous result: Therapeutic last 2(+) visits  Additional findings: None and covid positive       PLAN     Recommended plan for temporary change(s) affecting INR     Dosing Instructions: hold dose then continue your current warfarin dose with next INR in 1 week       Summary  As of 5/21/2024      Full warfarin instructions:  5/21: Hold; Otherwise 5 mg every Tue, Thu, Sat; 2.5 mg all other days   Next INR check:  5/28/2024               Telephone call with Roberto who verbalizes understanding and agrees to plan    Lab visit scheduled    Education provided:   Symptom monitoring: monitoring for bleeding signs and symptoms    Plan made per [facility] anticoagulation protocol    Chyna Keene RN  Anticoagulation Clinic  5/21/2024    _______________________________________________________________________     Anticoagulation Episode Summary       Current INR goal:  2.5-3.5   TTR:  86.5% (1 y)   Target end date:  Indefinite   Send INR reminders to:  [facility]    Indications    Atrial fibrillation (H) [I48.91]  S/P MVR (mitral valve replacement) [Z95.2]  Mechanical heart valve present [Z95.2]  Chronic atrial fibrillation (H) [I48.20]  Paroxysmal atrial fibrillation (H) [I48.0]             Comments:               Anticoagulation Care Providers       Provider Role Specialty Phone number    Osbaldo Renee MD Referring Family Medicine  [phone]    Carlos Archibald DO [facility] Internal Medicine [phone]

## 2024-05-29 ENCOUNTER — ANTICOAGULATION THERAPY VISIT (OUTPATIENT)
Dept: ANTICOAGULATION | Facility: CLINIC | Age: 64
End: 2024-05-29

## 2024-05-29 ENCOUNTER — LAB (OUTPATIENT)
Dept: LAB | Facility: CLINIC | Age: 64
End: 2024-05-29
Payer: COMMERCIAL

## 2024-05-29 DIAGNOSIS — I48.0 PAROXYSMAL ATRIAL FIBRILLATION (H): ICD-10-CM

## 2024-05-29 DIAGNOSIS — Z95.2 S/P MVR (MITRAL VALVE REPLACEMENT): ICD-10-CM

## 2024-05-29 DIAGNOSIS — I48.0 PAROXYSMAL ATRIAL FIBRILLATION (H): Primary | ICD-10-CM

## 2024-05-29 DIAGNOSIS — Z95.2 MECHANICAL HEART VALVE PRESENT: ICD-10-CM

## 2024-05-29 DIAGNOSIS — I48.20 CHRONIC ATRIAL FIBRILLATION (H): ICD-10-CM

## 2024-05-29 LAB — INR BLD: 2.8 (ref 0.9–1.1)

## 2024-05-29 PROCEDURE — 36416 COLLJ CAPILLARY BLOOD SPEC: CPT

## 2024-05-29 PROCEDURE — 85610 PROTHROMBIN TIME: CPT

## 2024-05-29 NOTE — PROGRESS NOTES
ANTICOAGULATION MANAGEMENT     Derek Stover 63 year old male is on warfarin with therapeutic INR result. (Goal INR 2.5-3.5)    Recent labs: (last 7 days)     05/29/24  0815   INR 2.8*       ASSESSMENT     Source(s): Chart Review  Previous INR was Supratherapeutic  Medication, diet, health changes since last INR chart reviewed; none identified         PLAN     Recommended plan for no diet, medication or health factor changes affecting INR     Dosing Instructions: Continue your current warfarin dose with next INR in 3 weeks       Summary  As of 5/29/2024      Full warfarin instructions:  5 mg every Tue, Thu, Sat; 2.5 mg all other days   Next INR check:  6/19/2024               Detailed voice message left for Roberto with dosing instructions and follow up date.     Contact [phone]  to schedule and with any changes, questions or concerns.     Education provided:   Please call back if any changes to your diet, medications or how you've been taking warfarin    Plan made per [facility] anticoagulation protocol    Danni Patton RN  Anticoagulation Clinic  5/29/2024    _______________________________________________________________________     Anticoagulation Episode Summary       Current INR goal:  2.5-3.5   TTR:  85.0% (1 y)   Target end date:  Indefinite   Send INR reminders to:  [facility]    Indications    Atrial fibrillation (H) [I48.91]  S/P MVR (mitral valve replacement) [Z95.2]  Mechanical heart valve present [Z95.2]  Chronic atrial fibrillation (H) [I48.20]  Paroxysmal atrial fibrillation (H) [I48.0]             Comments:               Anticoagulation Care Providers       Provider Role Specialty Phone number    Osbaldo Renee MD Referring Family Medicine [phone]    Carlos Archibald DO Responsible Internal Medicine [phone]

## 2024-06-18 ENCOUNTER — LAB (OUTPATIENT)
Dept: LAB | Facility: CLINIC | Age: 64
End: 2024-06-18
Payer: COMMERCIAL

## 2024-06-18 ENCOUNTER — ANTICOAGULATION THERAPY VISIT (OUTPATIENT)
Dept: ANTICOAGULATION | Facility: CLINIC | Age: 64
End: 2024-06-18

## 2024-06-18 DIAGNOSIS — Z95.2 MECHANICAL HEART VALVE PRESENT: ICD-10-CM

## 2024-06-18 DIAGNOSIS — Z95.2 S/P MVR (MITRAL VALVE REPLACEMENT): ICD-10-CM

## 2024-06-18 DIAGNOSIS — I48.20 CHRONIC ATRIAL FIBRILLATION (H): ICD-10-CM

## 2024-06-18 DIAGNOSIS — I48.0 PAROXYSMAL ATRIAL FIBRILLATION (H): ICD-10-CM

## 2024-06-18 DIAGNOSIS — I48.0 PAROXYSMAL ATRIAL FIBRILLATION (H): Primary | ICD-10-CM

## 2024-06-18 LAB — INR BLD: 3.7 (ref 0.9–1.1)

## 2024-06-18 PROCEDURE — 36416 COLLJ CAPILLARY BLOOD SPEC: CPT

## 2024-06-18 PROCEDURE — 85610 PROTHROMBIN TIME: CPT

## 2024-06-18 NOTE — PROGRESS NOTES
ANTICOAGULATION MANAGEMENT     Derek Stover 63 year old male is on warfarin with supratherapeutic INR result. (Goal INR 2.5-3.5)    Recent labs: (last 7 days)     06/18/24  0828   INR 3.7*       ASSESSMENT     Source(s): Chart Review and Patient/Caregiver Call     Warfarin doses taken: Warfarin taken as instructed  Diet: Decreased greens/vitamin K in diet; plans to resume previous intake  Medication/supplement changes: None noted  New illness, injury, or hospitalization: No  Signs or symptoms of bleeding or clotting: No  Previous result: Therapeutic last visit; previously outside of goal range  Additional findings: None       PLAN     Recommended plan for temporary change(s) affecting INR     Dosing Instructions: partial hold then continue your current warfarin dose with next INR in 2 weeks       Summary  As of 6/18/2024      Full warfarin instructions:  6/18: 2.5 mg; Otherwise 5 mg every Tue, Thu, Sat; 2.5 mg all other days   Next INR check:  7/2/2024               Telephone call with Roberto who verbalizes understanding and agrees to plan and who agrees to plan and repeated back plan correctly    Lab visit scheduled    Education provided:   Dietary considerations: importance of consistent vitamin K intake    Plan made per [facility] anticoagulation protocol    Danni Patton RN  Anticoagulation Clinic  6/18/2024    _______________________________________________________________________     Anticoagulation Episode Summary       Current INR goal:  2.5-3.5   TTR:  83.8% (1 y)   Target end date:  Indefinite   Send INR reminders to:  [facility]    Indications    Atrial fibrillation (H) [I48.91]  S/P MVR (mitral valve replacement) [Z95.2]  Mechanical heart valve present [Z95.2]  Chronic atrial fibrillation (H) [I48.20]  Paroxysmal atrial fibrillation (H) [I48.0]             Comments:               Anticoagulation Care Providers       Provider Role Specialty Phone number    Osbaldo Renee MD Referring Family Medicine  [phone]    Carlos Archibald DO [facility] Internal Medicine [phone]

## 2024-07-01 ENCOUNTER — TELEPHONE (OUTPATIENT)
Dept: ANTICOAGULATION | Facility: CLINIC | Age: 64
End: 2024-07-01
Payer: COMMERCIAL

## 2024-07-01 DIAGNOSIS — Z79.01 LONG TERM CURRENT USE OF ANTICOAGULANT THERAPY: ICD-10-CM

## 2024-07-01 DIAGNOSIS — I48.20 CHRONIC ATRIAL FIBRILLATION (H): ICD-10-CM

## 2024-07-01 RX ORDER — WARFARIN SODIUM 5 MG/1
TABLET ORAL
Qty: 100 TABLET | Refills: 1 | Status: SHIPPED | OUTPATIENT
Start: 2024-07-01 | End: 2024-09-04

## 2024-07-01 NOTE — TELEPHONE ENCOUNTER
ANTICOAGULATION MANAGEMENT:  Medication Refill    Anticoagulation Summary  As of 6/18/2024      Warfarin maintenance plan:  5 mg (5 mg x 1) every Tue, Thu, Sat; 2.5 mg (5 mg x 0.5) all other days   Next INR check:  7/2/2024   Target end date:  Indefinite    Indications    Atrial fibrillation (H) [I48.91]  S/P MVR (mitral valve replacement) [Z95.2]  Mechanical heart valve present [Z95.2]  Chronic atrial fibrillation (H) [I48.20]  Paroxysmal atrial fibrillation (H) [I48.0]                 Anticoagulation Care Providers       Provider Role Specialty Phone number    Osbaldo Renee MD Referring Family Medicine [phone]    Carlos Archibald DO Responsible Internal Medicine [phone]            Refill Criteria    Visit with referring provider/group: Meets criteria: office visit within referring provider group in the last 1 year on 9/12/2023    [facility] referral last signed: 03/05/2024; within last year: Yes    Lab monitoring not exceeding 2 weeks overdue: Yes    Derek meets all criteria for refill. Rx instructions and quantity supplied updated to match patient's current dosing plan. Warfarin 90 day supply with 1 refill granted per [facility] protocol     Chyna Keene RN  Anticoagulation Clinic

## 2024-07-02 ENCOUNTER — ANTICOAGULATION THERAPY VISIT (OUTPATIENT)
Dept: ANTICOAGULATION | Facility: CLINIC | Age: 64
End: 2024-07-02

## 2024-07-02 ENCOUNTER — HOSPITAL ENCOUNTER (OUTPATIENT)
Dept: CARDIOLOGY | Facility: CLINIC | Age: 64
Discharge: HOME | End: 2024-07-02
Attending: INTERNAL MEDICINE | Admitting: INTERNAL MEDICINE
Payer: COMMERCIAL

## 2024-07-02 ENCOUNTER — LAB (OUTPATIENT)
Dept: LAB | Facility: CLINIC | Age: 64
End: 2024-07-02
Payer: COMMERCIAL

## 2024-07-02 DIAGNOSIS — Z95.2 S/P MVR (MITRAL VALVE REPLACEMENT): ICD-10-CM

## 2024-07-02 DIAGNOSIS — Z95.2 MECHANICAL HEART VALVE PRESENT: ICD-10-CM

## 2024-07-02 DIAGNOSIS — I48.20 CHRONIC ATRIAL FIBRILLATION (H): ICD-10-CM

## 2024-07-02 DIAGNOSIS — I48.0 PAROXYSMAL ATRIAL FIBRILLATION (H): Primary | ICD-10-CM

## 2024-07-02 DIAGNOSIS — I48.0 PAROXYSMAL ATRIAL FIBRILLATION (H): ICD-10-CM

## 2024-07-02 DIAGNOSIS — F17.200 SMOKER: ICD-10-CM

## 2024-07-02 DIAGNOSIS — I25.10 CORONARY ARTERY DISEASE INVOLVING NATIVE CORONARY ARTERY OF NATIVE HEART WITHOUT ANGINA PECTORIS: ICD-10-CM

## 2024-07-02 DIAGNOSIS — Z98.890 S/P MVR (MITRAL VALVE REPAIR): ICD-10-CM

## 2024-07-02 LAB
INR BLD: 2.8 (ref 0.9–1.1)
LVEF ECHO: NORMAL

## 2024-07-02 PROCEDURE — 36416 COLLJ CAPILLARY BLOOD SPEC: CPT

## 2024-07-02 PROCEDURE — 93306 TTE W/DOPPLER COMPLETE: CPT

## 2024-07-02 PROCEDURE — 93306 TTE W/DOPPLER COMPLETE: CPT | Mod: 26 | Performed by: INTERNAL MEDICINE

## 2024-07-02 PROCEDURE — 85610 PROTHROMBIN TIME: CPT

## 2024-07-02 NOTE — PROGRESS NOTES
ANTICOAGULATION MANAGEMENT     Derek Stover 63 year old male is on warfarin with therapeutic INR result. (Goal INR 2.5-3.5)    Recent labs: (last 7 days)     07/02/24  0840   INR 2.8*       ASSESSMENT     Source(s): Chart Review and Patient/Caregiver Call     Warfarin doses taken: Warfarin taken as instructed  Diet: No new diet changes identified  Medication/supplement changes: None noted  New illness, injury, or hospitalization: No  Signs or symptoms of bleeding or clotting: No  Previous result: Supratherapeutic  Additional findings: None       PLAN     Recommended plan for no diet, medication or health factor changes affecting INR     Dosing Instructions: Continue your current warfarin dose with next INR in 3 weeks       Summary  As of 7/2/2024      Full warfarin instructions:  5 mg every Tue, Thu, Sat; 2.5 mg all other days   Next INR check:  7/23/2024               Telephone call with Roberto who verbalizes understanding and agrees to plan    Lab visit scheduled    Education provided: Contact [phone] with any changes, questions or concerns.     Plan made per [facility] anticoagulation protocol    Chidi WHITAKER RN  Anticoagulation Clinic  7/2/2024    _______________________________________________________________________     Anticoagulation Episode Summary       Current INR goal:  2.5-3.5   TTR:  82.9% (1 y)   Target end date:  Indefinite   Send INR reminders to:  [facility]    Indications    Atrial fibrillation (H) [I48.91]  S/P MVR (mitral valve replacement) [Z95.2]  Mechanical heart valve present [Z95.2]  Chronic atrial fibrillation (H) [I48.20]  Paroxysmal atrial fibrillation (H) [I48.0]             Comments:               Anticoagulation Care Providers       Provider Role Specialty Phone number    Osbaldo Renee MD Referring Family Medicine [phone]    Carlos Archibald DO Responsible Internal Medicine [phone]

## 2024-07-10 ENCOUNTER — TELEPHONE (OUTPATIENT)
Dept: CARDIOLOGY | Facility: CLINIC | Age: 64
End: 2024-07-10
Payer: COMMERCIAL

## 2024-07-10 NOTE — TELEPHONE ENCOUNTER
Maximo gordon spoke with the patient. Soonest is 7/16/24 for both [place] and [place]. 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 Patient already has an appt with Dr. Medrano for November.

## 2024-07-10 NOTE — TELEPHONE ENCOUNTER
[facility] Call Center    Phone Message    May a detailed message be left on voicemail: yes     Reason for Call: Other: Pt requesting a call back to see if he can be worked in the schedule with any MD's in [place] for a dot appointment. Pt needs to have his paperwork done by 07/16/24. Please return call to discuss.      Action Taken: Other: Cardiology    Travel Screening: Not Applicable     Date of Service:             Thank you!  [facility]

## 2024-07-10 NOTE — TELEPHONE ENCOUNTER
There are no available appointment spots even urgent and or hold spots with any cardiologist here in [place] within that time frame. If patient willing to drive to another location then could check [place] or [facility].

## 2024-07-23 ENCOUNTER — ANTICOAGULATION THERAPY VISIT (OUTPATIENT)
Dept: ANTICOAGULATION | Facility: CLINIC | Age: 64
End: 2024-07-23

## 2024-07-23 ENCOUNTER — LAB (OUTPATIENT)
Dept: LAB | Facility: CLINIC | Age: 64
End: 2024-07-23
Payer: COMMERCIAL

## 2024-07-23 DIAGNOSIS — Z95.2 MECHANICAL HEART VALVE PRESENT: ICD-10-CM

## 2024-07-23 DIAGNOSIS — I48.20 CHRONIC ATRIAL FIBRILLATION (H): ICD-10-CM

## 2024-07-23 DIAGNOSIS — I48.0 PAROXYSMAL ATRIAL FIBRILLATION (H): ICD-10-CM

## 2024-07-23 DIAGNOSIS — Z95.2 S/P MVR (MITRAL VALVE REPLACEMENT): ICD-10-CM

## 2024-07-23 DIAGNOSIS — I48.0 PAROXYSMAL ATRIAL FIBRILLATION (H): Primary | ICD-10-CM

## 2024-07-23 LAB — INR BLD: 3.5 (ref 0.9–1.1)

## 2024-07-23 PROCEDURE — 36416 COLLJ CAPILLARY BLOOD SPEC: CPT

## 2024-07-23 PROCEDURE — 85610 PROTHROMBIN TIME: CPT

## 2024-07-23 NOTE — PROGRESS NOTES
ANTICOAGULATION MANAGEMENT     Derek Stover 63 year old male is on warfarin with therapeutic INR result. (Goal INR 2.5-3.5)    Recent labs: (last 7 days)     07/23/24  0756   INR 3.5*       ASSESSMENT     Source(s): Chart Review and Patient/Caregiver Call     Warfarin doses taken: Warfarin taken as instructed  Diet: No new diet changes identified  Medication/supplement changes: None noted  New illness, injury, or hospitalization: No  Signs or symptoms of bleeding or clotting: No  Previous result: Therapeutic last visit; previously outside of goal range  Additional findings: None       PLAN     Recommended plan for no diet, medication or health factor changes affecting INR     Dosing Instructions: Continue your current warfarin dose with next INR in 4 weeks       Summary  As of 7/23/2024      Full warfarin instructions:  5 mg every Tue, Thu, Sat; 2.5 mg all other days   Next INR check:  8/20/2024               Telephone call with Roberto who verbalizes understanding and agrees to plan and who agrees to plan and repeated back plan correctly    Lab visit scheduled    Education provided: None required    Plan made per [facility] anticoagulation protocol    Danni Patton RN  Anticoagulation Clinic  7/23/2024    _______________________________________________________________________     Anticoagulation Episode Summary       Current INR goal:  2.5-3.5   TTR:  82.9% (1 y)   Target end date:  Indefinite   Send INR reminders to:  [facility]    Indications    Atrial fibrillation (H) [I48.91]  S/P MVR (mitral valve replacement) [Z95.2]  Mechanical heart valve present [Z95.2]  Chronic atrial fibrillation (H) [I48.20]  Paroxysmal atrial fibrillation (H) [I48.0]             Comments:               Anticoagulation Care Providers       Provider Role Specialty Phone number    Osbaldo Renee MD Referring Family Medicine [phone]    Carlos Archibald DO Responsible Internal Medicine [phone]

## 2024-08-23 ENCOUNTER — ANTICOAGULATION THERAPY VISIT (OUTPATIENT)
Dept: ANTICOAGULATION | Facility: CLINIC | Age: 64
End: 2024-08-23

## 2024-08-23 ENCOUNTER — LAB (OUTPATIENT)
Dept: LAB | Facility: CLINIC | Age: 64
End: 2024-08-23
Payer: COMMERCIAL

## 2024-08-23 DIAGNOSIS — I48.20 CHRONIC ATRIAL FIBRILLATION (H): ICD-10-CM

## 2024-08-23 DIAGNOSIS — I48.0 PAROXYSMAL ATRIAL FIBRILLATION (H): ICD-10-CM

## 2024-08-23 DIAGNOSIS — Z95.2 MECHANICAL HEART VALVE PRESENT: ICD-10-CM

## 2024-08-23 DIAGNOSIS — I48.0 PAROXYSMAL ATRIAL FIBRILLATION (H): Primary | ICD-10-CM

## 2024-08-23 DIAGNOSIS — Z95.2 S/P MVR (MITRAL VALVE REPLACEMENT): ICD-10-CM

## 2024-08-23 LAB — INR BLD: 3 (ref 0.9–1.1)

## 2024-08-23 PROCEDURE — 36416 COLLJ CAPILLARY BLOOD SPEC: CPT

## 2024-08-23 PROCEDURE — 85610 PROTHROMBIN TIME: CPT

## 2024-08-23 NOTE — PROGRESS NOTES
ANTICOAGULATION MANAGEMENT     Derek Stover 63 year old male is on warfarin with therapeutic INR result. (Goal INR 2.5-3.5)    Recent labs: (last 7 days)     08/23/24  1515   INR 3.0*       ASSESSMENT     Source(s): Chart Review  Previous INR was Therapeutic last 2(+) visits  Medication, diet, health changes since last INR chart reviewed; none identified         PLAN     Recommended plan for no diet, medication or health factor changes affecting INR     Dosing Instructions: Continue your current warfarin dose with next INR in 5 weeks       Summary  As of 8/23/2024      Full warfarin instructions:  5 mg every Tue, Thu, Sat; 2.5 mg all other days   Next INR check:  9/27/2024               Sent [employer]oopia message with dosing and follow up instructions    Contact [phone] to schedule and with any changes, questions or concerns.     Education provided: Contact [phone] with any changes, questions or concerns.     Plan made per [facility] anticoagulation protocol    Chidi WHITAKER RN  Anticoagulation Clinic  8/23/2024    _______________________________________________________________________     Anticoagulation Episode Summary       Current INR goal:  2.5-3.5   TTR:  84.3% (1 y)   Target end date:  Indefinite   Send INR reminders to:  [facility]    Indications    Atrial fibrillation (H) [I48.91]  S/P MVR (mitral valve replacement) [Z95.2]  Mechanical heart valve present [Z95.2]  Chronic atrial fibrillation (H) [I48.20]  Paroxysmal atrial fibrillation (H) [I48.0]             Comments:               Anticoagulation Care Providers       Provider Role Specialty Phone number    Osbaldo Renee MD Referring Family Medicine [phone]    Carlos Archibald DO Responsible Internal Medicine [phone]

## 2024-09-04 ENCOUNTER — ORDERS ONLY (AUTO-RELEASED) (OUTPATIENT)
Dept: FAMILY MEDICINE | Facility: CLINIC | Age: 64
End: 2024-09-04

## 2024-09-04 ENCOUNTER — OFFICE VISIT (OUTPATIENT)
Dept: FAMILY MEDICINE | Facility: CLINIC | Age: 64
End: 2024-09-04
Payer: COMMERCIAL

## 2024-09-04 VITALS
RESPIRATION RATE: 14 BRPM | BODY MASS INDEX: 38.93 KG/M2 | OXYGEN SATURATION: 98 % | DIASTOLIC BLOOD PRESSURE: 84 MMHG | HEART RATE: 65 BPM | HEIGHT: 71 IN | TEMPERATURE: 97.2 F | WEIGHT: 278.1 LBS | SYSTOLIC BLOOD PRESSURE: 123 MMHG

## 2024-09-04 DIAGNOSIS — Z79.01 LONG TERM CURRENT USE OF ANTICOAGULANT THERAPY: ICD-10-CM

## 2024-09-04 DIAGNOSIS — Z12.11 SCREEN FOR COLON CANCER: ICD-10-CM

## 2024-09-04 DIAGNOSIS — N52.9 VASCULOGENIC ERECTILE DYSFUNCTION, UNSPECIFIED VASCULOGENIC ERECTILE DYSFUNCTION TYPE: ICD-10-CM

## 2024-09-04 DIAGNOSIS — Z00.00 ROUTINE GENERAL MEDICAL EXAMINATION AT A HEALTH CARE FACILITY: Primary | ICD-10-CM

## 2024-09-04 DIAGNOSIS — I21.4 NSTEMI (NON-ST ELEVATED MYOCARDIAL INFARCTION) (H): ICD-10-CM

## 2024-09-04 DIAGNOSIS — E78.5 HYPERLIPIDEMIA WITH TARGET LDL LESS THAN 130: ICD-10-CM

## 2024-09-04 DIAGNOSIS — I48.20 CHRONIC ATRIAL FIBRILLATION (H): ICD-10-CM

## 2024-09-04 DIAGNOSIS — Z87.891 PERSONAL HISTORY OF TOBACCO USE: ICD-10-CM

## 2024-09-04 LAB
ALBUMIN SERPL BCG-MCNC: 4.2 G/DL (ref 3.5–5.2)
ALP SERPL-CCNC: 118 U/L (ref 40–150)
ALT SERPL W P-5'-P-CCNC: 20 U/L (ref 0–70)
ANION GAP SERPL CALCULATED.3IONS-SCNC: 9 MMOL/L (ref 7–15)
AST SERPL W P-5'-P-CCNC: 26 U/L (ref 0–45)
BILIRUB SERPL-MCNC: 0.8 MG/DL
BUN SERPL-MCNC: 14.6 MG/DL (ref 8–23)
CALCIUM SERPL-MCNC: 9.1 MG/DL (ref 8.8–10.4)
CHLORIDE SERPL-SCNC: 103 MMOL/L (ref 98–107)
CHOLEST SERPL-MCNC: 126 MG/DL
CREAT SERPL-MCNC: 1.06 MG/DL (ref 0.67–1.17)
EGFRCR SERPLBLD CKD-EPI 2021: 80 ML/MIN/1.73M2
FASTING STATUS PATIENT QL REPORTED: YES
FASTING STATUS PATIENT QL REPORTED: YES
GLUCOSE SERPL-MCNC: 92 MG/DL (ref 70–99)
HBA1C MFR BLD: 5.7 %
HCO3 SERPL-SCNC: 27 MMOL/L (ref 22–29)
HDLC SERPL-MCNC: 42 MG/DL
LDLC SERPL CALC-MCNC: 62 MG/DL
NONHDLC SERPL-MCNC: 84 MG/DL
POTASSIUM SERPL-SCNC: 4.5 MMOL/L (ref 3.4–5.3)
PROT SERPL-MCNC: 7.1 G/DL (ref 6.4–8.3)
SODIUM SERPL-SCNC: 139 MMOL/L (ref 135–145)
TRIGL SERPL-MCNC: 110 MG/DL

## 2024-09-04 PROCEDURE — 90677 PCV20 VACCINE IM: CPT | Performed by: FAMILY MEDICINE

## 2024-09-04 PROCEDURE — 80053 COMPREHEN METABOLIC PANEL: CPT | Performed by: FAMILY MEDICINE

## 2024-09-04 PROCEDURE — 99396 PREV VISIT EST AGE 40-64: CPT | Mod: 25 | Performed by: FAMILY MEDICINE

## 2024-09-04 PROCEDURE — 83036 HEMOGLOBIN GLYCOSYLATED A1C: CPT | Performed by: FAMILY MEDICINE

## 2024-09-04 PROCEDURE — 99214 OFFICE O/P EST MOD 30 MIN: CPT | Mod: 25 | Performed by: FAMILY MEDICINE

## 2024-09-04 PROCEDURE — 80061 LIPID PANEL: CPT | Performed by: FAMILY MEDICINE

## 2024-09-04 PROCEDURE — 90471 IMMUNIZATION ADMIN: CPT | Performed by: FAMILY MEDICINE

## 2024-09-04 PROCEDURE — G0296 VISIT TO DETERM LDCT ELIG: HCPCS | Performed by: FAMILY MEDICINE

## 2024-09-04 PROCEDURE — 36415 COLL VENOUS BLD VENIPUNCTURE: CPT | Performed by: FAMILY MEDICINE

## 2024-09-04 RX ORDER — ATORVASTATIN CALCIUM 40 MG/1
40 TABLET, FILM COATED ORAL EVERY EVENING
Qty: 90 TABLET | Refills: 3 | Status: SHIPPED | OUTPATIENT
Start: 2024-09-04

## 2024-09-04 RX ORDER — WARFARIN SODIUM 5 MG/1
TABLET ORAL
Qty: 100 TABLET | Refills: 1 | Status: SHIPPED | OUTPATIENT
Start: 2024-09-04

## 2024-09-04 RX ORDER — NITROGLYCERIN 0.4 MG/1
TABLET SUBLINGUAL
Qty: 15 TABLET | Refills: 0 | Status: SHIPPED | OUTPATIENT
Start: 2024-09-04

## 2024-09-04 RX ORDER — SILDENAFIL 50 MG/1
50 TABLET, FILM COATED ORAL DAILY PRN
Qty: 10 TABLET | Refills: 0 | Status: SHIPPED | OUTPATIENT
Start: 2024-09-04

## 2024-09-04 ASSESSMENT — PAIN SCALES - GENERAL: PAINLEVEL: NO PAIN (0)

## 2024-09-04 NOTE — NURSING NOTE
Prior to injection, verified patient identity using patient's name and date of birth.  Due to injection administration, patient instructed to remain in clinic for 15 minutes  afterwards, and to report any adverse reaction to me immediately.    Screening Questionnaire for Adult Immunization     Are you sick today?   No    Do you have allergies to medications, food or any vaccine?   No    Have you ever had a serious reaction after receiving a vaccination?   No    Do you have a long-term health problem with heart disease, lung disease,  asthma, kidney disease, diabetes, anemia, metabolic or blood disease?   No    Do you have cancer, leukemia, AIDS, or any immune system problem?   No    Do you take cortisone, prednisone, other steroids, or anticancer drugs, or  have you had any x-ray (radiation) treatments?   No    Have you had a seizure, brain, or other nervous system problem?   No    During the past year, have you received a transfusion of blood or blood       products, or been given a medicine called immune (gamma) globulin?   Don't Know    For women: Are you pregnant or is there a chance you could become         pregnant during the next month?   No    Have you received any vaccinations in the past 4 weeks?   No     Immunization questionnaire was positive for at least one answer.  Notified Dr Renee .      MNVFC doesn't apply on this patient    Per orders of Dr. Renee, injection of PCV 20 was  given by Vida Kim MA. Patient instructed to remain in clinic for 20 minutes afterwards, and to report any adverse reaction to me immediately.       Screening performed by Vida Kim MA on 9/4/2024 at 1:41 PM.

## 2024-09-04 NOTE — PATIENT INSTRUCTIONS
Patient Education   Preventive Care Advice   This is general advice given by our system to help you stay healthy. However, your care team may have specific advice just for you. Please talk to your care team about your preventive care needs.  Nutrition  Eat 5 or more servings of fruits and vegetables each day.  Try wheat bread, brown rice and whole grain pasta (instead of white bread, rice, and pasta).  Get enough calcium and vitamin D. Check the label on foods and aim for 100% of the RDA (recommended daily allowance).  Lifestyle  Exercise at least 150 minutes each week  (30 minutes a day, 5 days a week).  Do muscle strengthening activities 2 days a week. These help control your weight and prevent disease.  No smoking.  Wear sunscreen to prevent skin cancer.  Have a dental exam and cleaning every 6 months.  Yearly exams  See your health care team every year to talk about:  Any changes in your health.  Any medicines your care team has prescribed.  Preventive care, family planning, and ways to prevent chronic diseases.  Shots (vaccines)   HPV shots (up to age 26), if you've never had them before.  Hepatitis B shots (up to age 59), if you've never had them before.  COVID-19 shot: Get this shot when it's due.  Flu shot: Get a flu shot every year.  Tetanus shot: Get a tetanus shot every 10 years.  Pneumococcal, hepatitis A, and RSV shots: Ask your care team if you need these based on your risk.  Shingles shot (for age 50 and up)  General health tests  Diabetes screening:  Starting at age 35, Get screened for diabetes at least every 3 years.  If you are younger than age 35, ask your care team if you should be screened for diabetes.  Cholesterol test: At age 39, start having a cholesterol test every 5 years, or more often if advised.  Bone density scan (DEXA): At age 50, ask your care team if you should have this scan for osteoporosis (brittle bones).  Hepatitis C: Get tested at least once in your life.  STIs (sexually  transmitted infections)  Before age 24: Ask your care team if you should be screened for STIs.  After age 24: Get screened for STIs if you're at risk. You are at risk for STIs (including HIV) if:  You are sexually active with more than one person.  You don't use condoms every time.  You or a partner was diagnosed with a sexually transmitted infection.  If you are at risk for HIV, ask about PrEP medicine to prevent HIV.  Get tested for HIV at least once in your life, whether you are at risk for HIV or not.  Cancer screening tests  Cervical cancer screening: If you have a cervix, begin getting regular cervical cancer screening tests starting at age 21.  Breast cancer scan (mammogram): If you've ever had breasts, begin having regular mammograms starting at age 40. This is a scan to check for breast cancer.  Colon cancer screening: It is important to start screening for colon cancer at age 45.  Have a colonoscopy test every 10 years (or more often if you're at risk) Or, ask your provider about stool tests like a FIT test every year or Cologuard test every 3 years.  To learn more about your testing options, visit:   .  For help making a decision, visit:   https://bit.ly/ru32459.  Prostate cancer screening test: If you have a prostate, ask your care team if a prostate cancer screening test (PSA) at age 55 is right for you.  Lung cancer screening: If you are a current or former smoker ages 50 to 80, ask your care team if ongoing lung cancer screenings are right for you.  For informational purposes only. Not to replace the advice of your health care provider. Copyright   2023 [facility]. All rights reserved. Clinically reviewed by the [facility]. [employer] 652003 - REV 01/24.  Learning About Being Physically Active  What is physical activity?     Being physically active means doing any kind of activity that gets your body moving.  The types of physical activity that can help you get  "fit and stay healthy include:  Aerobic or \"cardio\" activities. These make your heart beat faster and make you breathe harder, such as brisk walking, riding a bike, or running. They strengthen your heart and lungs and build up your endurance.  Strength training activities. These make your muscles work against, or \"resist,\" something. Examples include lifting weights or doing push-ups. These activities help tone and strengthen your muscles and bones.  Stretches. These let you move your joints and muscles through their full range of motion. Stretching helps you be more flexible.  Reaching a balance between these three types of physical activity is important because each one contributes to your overall fitness.  What are the benefits of being active?  Being active is one of the best things you can do for your health. It helps you to:  Feel stronger and have more energy to do all the things you like to do.  Focus better at school or work.  Feel, think, and sleep better.  Reach and stay at a healthy weight.  Lose fat and build lean muscle.  Lower your risk for serious health problems, including diabetes, heart attack, high blood pressure, and some cancers.  Keep your heart, lungs, bones, muscles, and joints strong and healthy.  How can you make being active part of your life?  Start slowly. Make it your long-term goal to get at least 30 minutes of exercise on most days of the week. Walking is a good choice. You also may want to do other activities, such as running, swimming, cycling, or playing tennis or team sports.  Pick activities that you like--ones that make your heart beat faster, your muscles stronger, and your muscles and joints more flexible. If you find more than one thing you like doing, do them all. You don't have to do the same thing every day.  Get your heart pumping every day. Any activity that makes your heart beat faster and keeps it at that rate for a while counts.  Here are some great ways to get your " "heart beating faster:  Go for a brisk walk, run, or hike.  Go for a swim or bike ride.  Take an online exercise class or dance.  Play a game of touch football, basketball, or soccer.  Play tennis, pickleball, or racquetball.  Climb stairs.  Even some household chores can be aerobic. Just do them at a faster pace. Raking or mowing the lawn, sweeping the garage, and vacuuming and cleaning your home all can help get your heart rate up.  Strengthen your muscles during the week. You don't have to lift heavy weights or grow big, bulky muscles to get stronger. Doing a few simple activities that make your muscles work against, or \"resist,\" something can help you get stronger. Aim for at least twice a week.  For example, you can:  Do push-ups or sit-ups, which use your own body weight as resistance.  Lift weights or dumbbells or use stretch bands at home or in a gym or community center.  Stretch your muscles often. Stretching will help you as you become more active. It can help you stay flexible and loosen tight muscles. It can also help improve your balance and posture and can be a great way to relax.  Be sure to stretch the muscles you'll be using when you work out. It's best to warm your muscles slightly before you stretch them. Walk or do some other light aerobic activity for a few minutes. Then start stretching.  When you stretch your muscles:  Do it slowly. Stretching is not about going fast or making sudden movements.  Don't push or bounce during a stretch.  Hold each stretch for at least 15 to 30 seconds, if you can. You should feel a stretch in the muscle, but not pain.  Breathe out as you do the stretch. Then breathe in as you hold the stretch. Don't hold your breath.  If you're worried about how more activity might affect your health, have a checkup before you start. Follow any special advice your doctor gives you for getting a smart start.  Where can you learn more?  Go to " "https://www.[employer].net/patiented  Enter W332 in the search box to learn more about \"Learning About Being Physically Active.\"  Current as of: June 5, 2023  Content Version: 14.1 2006-2024 [employer].   Care instructions adapted under license by your healthcare professional. If you have questions about a medical condition or this instruction, always ask your healthcare professional. [employer] disclaims any warranty or liability for your use of this information.    Eating Healthy Foods: Care Instructions  With every meal, you can make healthy food choices. Try to eat a variety of fruits, vegetables, whole grains, lean proteins, and low-fat dairy products. This can help you get the right balance of nutrients, including vitamins and minerals. Small changes add up over time. You can start by adding one healthy food to your meals each day.    Try to make half your plate fruits and vegetables, one-fourth whole grains, and one-fourth lean proteins. Try including dairy with your meals.   Eat more fruits and vegetables. Try to have them with most meals and snacks.   Foods for healthy eating    Fruits    These can be fresh, frozen, canned, or dried.  Try to choose whole fruit rather than fruit juice.  Eat a variety of colors.    Vegetables    These can be fresh, frozen, canned, or dried.  Beans, peas, and lentils count too.    Whole grains    Choose whole-grain breads, cereals, and noodles.  Try brown rice.    Lean proteins    These can include lean meat, poultry, fish, and eggs.  You can also have tofu, beans, peas, lentils, nuts, and seeds.    Dairy    Try milk, yogurt, and cheese.  Choose low-fat or fat-free when you can.  If you need to, use lactose-free milk or fortified plant-based milk products, such as soy milk.    Water    Drink water when you're thirsty.  Limit sugar-sweetened drinks, including soda, fruit drinks, and sports drinks.  Where can you learn more?  Go to " "https://www.[employer].net/patiented  Enter T756 in the search box to learn more about \"Eating Healthy Foods: Care Instructions.\"  Current as of: September 20, 2023               Content Version: 14.0    [id number] [employer].   Care instructions adapted under license by your healthcare professional. If you have questions about a medical condition or this instruction, always ask your healthcare professional. [employer] disclaims any warranty or liability for your use of this information.         Lung Cancer Screening   Frequently Asked Questions  If you are at high-risk for lung cancer, getting screened with low-dose computed tomography (LDCT) every year can help save your life. This handout offers answers to some of the most common questions about lung cancer screening. If you have other questions, please call [phone] ([phone]).     What is it?  Lung cancer screening uses special X-ray technology to create an image of your lung tissue. The exam is quick and easy and takes less than 10 seconds. We don t give you any medicine or use any needles. You can eat before and after the exam. You don t need to change your clothes as long as the clothing on your chest doesn t contain metal. But, you do need to be able to hold your breath for at least 6 seconds during the exam.    What is the goal of lung cancer screening?  The goal of lung cancer screening is to save lives. Many times, lung cancer is not found until a person starts having physical symptoms. Lung cancer screening can help detect lung cancer in the earliest stages when it may be easier to treat.    Who should be screened for lung cancer?  We suggest lung cancer screening for anyone who is at high-risk for lung cancer. You are in the high-risk group if you:      are between the ages of 55 and 79, and    have smoked at least 1 pack of cigarettes a day for 20 or more years, and    still smoke or have quit within the past " 15 years.    However, if you have a new cough or shortness of breath, you should talk to your doctor before being screened.    Why does it matter if I have symptoms?  Certain symptoms can be a sign that you have a condition in your lungs that should be checked and treated by your doctor. These symptoms include fever, chest pain, a new or changing cough, shortness of breath that you have never felt before, coughing up blood or unexplained weight loss. Having any of these symptoms can greatly affect the results of lung cancer screening.       Should all smokers get an LDCT lung cancer screening exam?  It depends. Lung cancer screening is for a very specific group of men and women who have a history of heavy smoking over a long period of time (see  Who should be screened for lung cancer  above).  I am in the high-risk group, but have been diagnosed with cancer in the past. Is LDCT lung cancer screening right for me?  In some cases, you should not have LDCT lung screening, such as when your doctor is already following your cancer with CT scan studies. Your doctor will help you decide if LDCT lung screening is right for you.  Do I need to have a screening exam every year?  Yes. If you are in the high-risk group described earlier, you should get an LDCT lung cancer screening exam every year until you are 79, or are no longer willing or able to undergo screening and possible procedures to diagnose and treat lung cancer.  How effective is LDCT at preventing death from lung cancer?  Studies have shown that LDCT lung cancer screening can lower the risk of death from lung cancer by 20 percent in people who are at high-risk.  What are the risks?  There are some risks and limitations of LDCT lung cancer screening. We want to make sure you understand the risks and benefits, so please let us know if you have any questions. Your doctor may want to talk with you more about these risks.    Radiation exposure: As with any exam that  uses radiation, there is a very small increased risk of cancer. The amount of radiation in LDCT is small--about the same amount a person would get from a mammogram. Your doctor orders the exam when he or she feels the potential benefits outweigh the risks.    False negatives: No test is perfect, including LDCT. It is possible that you may have a medical condition, including lung cancer, that is not found during your exam. This is called a false negative result.    False positives and more testing: LDCT very often finds something in the lung that could be cancer, but in fact is not. This is called a false positive result. False positive tests often cause anxiety. To make sure these findings are not cancer, you may need to have more tests. These tests will be done only if you give us permission. Sometimes patients need a treatment that can have side effects, such as a biopsy. For more information on false positives, see  What can I expect from the results?     Findings not related to lung cancer: Your LDCT exam also takes pictures of areas of your body next to your lungs. In a very small number of cases, the CT scan will show an abnormal finding in one of these areas, such as your kidneys, adrenal glands, liver or thyroid. This finding may not be serious, but you may need more tests. Your doctor can help you decide what other tests you may need, if any.  What can I expect from the results?  About 1 out of 4 LDCT exams will find something that may need more tests. Most of the time, these findings are lung nodules. Lung nodules are very small collections of tissue in the lung. These nodules are very common, and the vast majority--more than 97 percent--are not cancer (benign). Most are normal lymph nodes or small areas of scarring from past infections.  But, if a small lung nodule is found to be cancer, the cancer can be cured more than 90 percent of the time. To know if the nodule is cancer, we may need to get more  images before your next yearly screening exam. If the nodule has suspicious features (for example, it is large, has an odd shape or grows over time), we will refer you to a specialist for further testing.  Will my doctor also get the results?  Yes. Your doctor will get a copy of your results.  Is it okay to keep smoking now that there s a cancer screening exam?  No. Tobacco is one of the strongest cancer-causing agents. It causes not only lung cancer, but other cancers and cardiovascular (heart) diseases as well. The damage caused by smoking builds over time. This means that the longer you smoke, the higher your risk of disease. While it is never too late to quit, the sooner you quit, the better.  Where can I find help to quit smoking?  The best way to prevent lung cancer is to stop smoking. If you have already quit smoking, congratulations and keep it up! For help on quitting smoking, please call [employer] at 0-966-QUITNOW (1-816.135.5037) or the American [employer] Society at 1-337.880.1741 to find local resources near you.  One-on-one health coaching:  If you d prefer to work individually with a health care provider on tobacco cessation, we offer:      Medication Therapy Management:  Our specially trained pharmacists work closely with you and your doctor to help you quit smoking.  Call [phone] or [phone] (toll free).

## 2024-09-04 NOTE — PROGRESS NOTES
Preventive Care Visit  [facility], located within [facility]  Osbaldo Renee MD, Family Medicine  Sep 4, 2024      Assessment & Plan     Routine general medical examination at a health care facility  Derek Stover 63-year-old male who presents to clinic for annual preventive visit.  His past medical history is primarily pertinent for chronic atrial fibrillation and status post mitral valve replacement.  He is currently followed by cardiology.  He is currently on long-term anticoagulation with Coumadin.  He returns to clinic today in is feeling generally at his baseline.  Has been a challenging year with death of both parents.  He has been trying to manage therapist states.  There is been quite a bit of stress associated with this.    All age and gender specific screening recommendations were reviewed.  He is an active smoker and has been smoking for 45 pack years.  He did previously have lung cancer screening last in 2019.  He is an active smoker now.  We talked about risks and benefits of ongoing screening and he would like to proceed with lung cancer screening.    Last colonoscopy was in 2014.  This was normal.  He is due for 10-year follow-up.  There is still no family history for colon cancer.  He has had no change in his bowel function.  Colonoscopy at this time would be challenging so we discussed the benefits and downside of Cologuard screening.  He would like to proceed with Cologuard and if positive would then follow-up with colonoscopy.    We discussed all vaccine recommendations and schedules.  He declines COVID vaccine as well as influenza and RSV vaccine today.  I asked him to review his insurance coverage for Shingrix vaccine and would consider this.  Because of his smoking history and his chronic heart condition I recommended Prevnar 20 today.  Which she completed.    On exam he is a obese middle-age male in no acute distress.  Blood pressure 123/84.  Pulse is 65 and regular.  He is afebrile.   Respiratory rate is 14 with oxygen saturations of 98%.  Weight is 278 pounds.    HEENT exam: Pupils are equally round and reactive to light and accommodation.  TMs are clear bilaterally.  Oropharynx is clear.  Neck is supple without adenopathy.  No carotid bruits or thyromegaly.  Lungs are clear to auscultation.  Cardiac exam is regular with a crisp click of his mitral valve.  There is no murmur.  Abdomen is obese, nontender, nondistended with good bowel sounds throughout.  Extremities show 1+ edema with chronic stasis dermatitis to mid shins bilaterally.  Neurologic exam is grossly nonfocal.  Mood and affect are good.    Assessment: 63-year-old male who presents for annual preventive visit.  He is past due for colon cancer screening and lung cancer screening.  Will arrange for follow-up of these.  He is due for several vaccinations but will complete the Prevnar 20 today and consider Shingrix vaccine if covered by his insurance.  He declines COVID and influenza vaccine at this time.    Will obtain repeat lipid profile, comprehensive metabolic profile and A1c today.  Continue current medications and plan with follow-up in 1 year.    Long term current use of anticoagulant therapy  Chronic, stable.  Continue with Coumadin clinic.  - warfarin ANTICOAGULANT (COUMADIN) 5 MG tablet; Take 1 tablet (5 mg) on Tuesdays, Thursdays, Saturdays and take 0.5 tablet (2.5 mg) all other days or as directed by the INR clinic    Chronic atrial fibrillation (H)  Chronic, stable.  He is currently anticoagulated due to his mitral valve replacement.  He maintains excellent control.  He is in a sinus rhythm at this time with a repeat well-controlled rate.  Continue with follow-up with cardiology later this fall as scheduled.  - warfarin ANTICOAGULANT (COUMADIN) 5 MG tablet; Take 1 tablet (5 mg) on Tuesdays, Thursdays, Saturdays and take 0.5 tablet (2.5 mg) all other days or as directed by the INR clinic    Vasculogenic erectile dysfunction,  unspecified vasculogenic erectile dysfunction type  Chronic, stable.  Continue medication and plan.  - sildenafil (VIAGRA) 50 MG tablet; Take 1 tablet (50 mg) by mouth daily as needed. As needed    Screen for colon cancer  Prior colonoscopy in 2014 normal.  No family history for colon cancer and no personal history for inflammatory bowel disease.  He would like to screen with Cologuard this year as he does not really have time for colonoscopy.  If the Cologuard is positive he will followed up with a colonoscopy.  - COLOGUARD([employer]); Future    Hyperlipidemia with target LDL less than 130  Chronic, stable.  Fasting lipid profile today is excellent continue current medication and plan.  - Lipid panel reflex to direct LDL Non-fasting; Future    NSTEMI (non-ST elevated myocardial infarction) (H)  Chronic, stable.  He has had no recurrent chest discomfort or shortness of breath.  Is on high-dose statin therapy.  He is not on aspirin as that he is on Coumadin.  He is on no rate or rhythm control.  Is not on a beta-blocker or ACE inhibitor/ARB.  He has follow-up appointment with cardiology in 1 month.- atorvastatin (LIPITOR) 40 MG tablet; Take 1 tablet (40 mg) by mouth every evening.  - nitroGLYcerin (NITROSTAT) 0.4 MG sublingual tablet; For chest pain place 1 tablet under the tongue every 5 minutes for 3 doses. If symptoms persist 5 minutes after 1st dose call 911.  - Comprehensive metabolic panel (BMP + Alb, Alk Phos, ALT, AST, Total. Bili, TP); Future  - Hemoglobin A1c; Future    Personal history of tobacco use  Chronic, persistent.  He would like to proceed with ongoing lung cancer screening but has very little interest in smoking cessation at this time.  - SMOKING CESSATION COUNSELING 3-10 MIN  - Prof fee: Shared Decision Making for Lung Cancer Screening  - CT Chest Lung Cancer Scrn Low Dose wo; Future    Patient has been advised of split billing requirements and indicates understanding: Yes    Results for  orders placed or performed in visit on 09/04/24   Lipid panel reflex to direct LDL Non-fasting     Status: None   Result Value Ref Range    Cholesterol 126 <200 mg/dL    Triglycerides 110 <150 mg/dL    Direct Measure HDL 42 >=40 mg/dL    LDL Cholesterol Calculated 62 <=100 mg/dL    Non HDL Cholesterol 84 <130 mg/dL    Patient Fasting > 8hrs? Yes     Narrative    Cholesterol  Desirable:  <200 mg/dL    Triglycerides  Normal:  Less than 150 mg/dL  Borderline High:  150-199 mg/dL  High:  200-499 mg/dL  Very High:  Greater than or equal to 500 mg/dL    Direct Measure HDL  Female:  Greater than or equal to 50 mg/dL   Male:  Greater than or equal to 40 mg/dL    LDL Cholesterol  Desirable:  <100mg/dL  Above Desirable:  100-129 mg/dL   Borderline High:  130-159 mg/dL   High:  160-189 mg/dL   Very High:  >= 190 mg/dL    Non HDL Cholesterol  Desirable:  130 mg/dL  Above Desirable:  130-159 mg/dL  Borderline High:  160-189 mg/dL  High:  190-219 mg/dL  Very High:  Greater than or equal to 220 mg/dL   Comprehensive metabolic panel (BMP + Alb, Alk Phos, ALT, AST, Total. Bili, TP)     Status: None   Result Value Ref Range    Sodium 139 135 - 145 mmol/L    Potassium 4.5 3.4 - 5.3 mmol/L    Carbon Dioxide (CO2) 27 22 - 29 mmol/L    Anion Gap 9 7 - 15 mmol/L    Urea Nitrogen 14.6 8.0 - 23.0 mg/dL    Creatinine 1.06 0.67 - 1.17 mg/dL    GFR Estimate 80 >60 mL/min/1.73m2    Calcium 9.1 8.8 - 10.4 mg/dL    Chloride 103 98 - 107 mmol/L    Glucose 92 70 - 99 mg/dL    Alkaline Phosphatase 118 40 - 150 U/L    AST 26 0 - 45 U/L    ALT 20 0 - 70 U/L    Protein Total 7.1 6.4 - 8.3 g/dL    Albumin 4.2 3.5 - 5.2 g/dL    Bilirubin Total 0.8 <=1.2 mg/dL    Patient Fasting > 8hrs? Yes    Hemoglobin A1c     Status: Abnormal   Result Value Ref Range    Hemoglobin A1C 5.7 (H) <5.7 %         Nicotine/Tobacco Cessation  He reports that he has been smoking cigarettes and pipe. He started smoking about 47 years ago. He has a 90 pack-year smoking history.  "He has never used smokeless tobacco.  Nicotine/Tobacco Cessation Plan  Information offered: Patient not interested at this time      BMI  Estimated body mass index is 38.5 kg/m  as calculated from the following:    Height as of this encounter: 1.81 m (5' 11.26\").    Weight as of this encounter: 126.1 kg (278 lb 1.6 oz).   Weight management plan: Discussed healthy diet and exercise guidelines Specific weight management program called [employer] discussed    Counseling  Appropriate preventive services were addressed with this patient via screening, questionnaire, or discussion as appropriate for fall prevention, nutrition, physical activity, Tobacco-use cessation, social engagement, weight loss and cognition.  Checklist reviewing preventive services available has been given to the patient.  Reviewed patient's diet, addressing concerns and/or questions.   The patient was instructed to see the dentist every 6 months.       FURTHER TESTING:       - CT chest low dose  FUTURE APPOINTMENTS:       - Make appointment with Cardiology specialist  Work on weight loss  Regular exercise   Follow-up Visit   Expected date:  Sep 04, 2025 (Approximate)      Follow Up Appointment Details:     Follow-up with whom?: PCP    Follow-Up for what?: Adult Preventive    How?: In Person                     Subjective   Roberto is a 63 year old, presenting for the following:  Recheck Medication and Physical        9/4/2024    12:38 PM   Additional Questions   Roomed by [facility] Care Directive  Patient does not have a Health Care Directive or Living Will: Discussed advance care planning with patient; however, patient declined at this time.    HPI      Hyperlipidemia Follow-Up    Are you regularly taking any medication or supplement to lower your cholesterol?   Yes- Lipitor 40  Are you having muscle aches or other side effects that you think could be caused by your cholesterol lowering medication?  No    Hypertension Follow-up    Do you " check your blood pressure regularly outside of the clinic?  No  Are you following a low salt diet?  No  Are your blood pressures ever more than 140 on the top number (systolic) OR more   than 90 on the bottom number (diastolic), for example 140/90?  No    Vascular Disease Follow-up    How often do you take nitroglycerin?  Never  Do you take an aspirin every day?  No        8/29/2024   General Health   How would you rate your overall physical health? Good   Feel stress (tense, anxious, or unable to sleep) Not at all            8/29/2024   Nutrition   Three or more servings of calcium each day? (!) NO   Diet: Regular (no restrictions)    Breakfast skipped   How many servings of fruit and vegetables per day? (!) 0-1   How many sweetened beverages each day? 0-1       Multiple values from one day are sorted in reverse-chronological order         8/29/2024   Exercise   Days per week of moderate/strenous exercise 0 days   Average minutes spent exercising at this level 0 min      (!) EXERCISE CONCERN      8/29/2024   Social Factors   Frequency of gathering with friends or relatives Once a week   Worry food won't last until get money to buy more No   Food not last or not have enough money for food? No   Do you have housing? (Housing is defined as stable permanent housing and does not include staying ouside in a car, in a tent, in an abandoned building, in an overnight shelter, or couch-surfing.) Yes   Are you worried about losing your housing? No   Lack of transportation? No   Unable to get utilities (heat,electricity)? No            8/29/2024   Fall Risk   Fallen 2 or more times in the past year? No   Trouble with walking or balance? No             8/29/2024   Dental   Dentist two times every year? (!) NO            8/29/2024   TB Screening   Were you born outside of the [place]? No            Today's PHQ-2 Score:       9/4/2024    12:32 PM   PHQ-2 ( 1999 [employer])   Q1: Little interest or pleasure in doing things 0   Q2: Feeling  down, depressed or hopeless 0   PHQ-2 Score 0   Q1: Little interest or pleasure in doing things Not at all   Q2: Feeling down, depressed or hopeless Not at all   PHQ-2 Score 0           2024   Substance Use   Alcohol more than 3/day or more than 7/wk No   Do you use any other substances recreationally? No        Social History     Tobacco Use    Smoking status: Every Day     Current packs/day: 0.00     Average packs/day: 2.0 packs/day for 45.0 years (90.0 ttl pk-yrs)     Types: Cigarettes, Pipe     Start date: 1977     Last attempt to quit: 2017     Years since quittin.0    Smokeless tobacco: Never    Tobacco comments:     started smoking at 17, smoking 2 ppd since    Vaping Use    Vaping status: Never Used   Substance Use Topics    Alcohol use: Yes     Alcohol/week: 4.0 standard drinks of alcohol     Types: 4 Standard drinks or equivalent per week     Comment: occasional    Drug use: No           2024   STI Screening   New sexual partner(s) since last STI/HIV test? No      Last PSA:   PSA   Date Value Ref Range Status   2014 0.28 0 - 4 ug/L Final     Prostate Specific Antigen Screen   Date Value Ref Range Status   2023 0.27 0.00 - 4.50 ng/mL Final   10/04/2022 0.30 0.00 - 4.00 ug/L Final     ASCVD Risk   The ASCVD Risk score (Noemi HARRIS, et al., 2019) failed to calculate for the following reasons:    The patient has a prior MI or stroke diagnosis           Reviewed and updated as needed this visit by Provider                    Lab work is in process  Labs reviewed in EPIC  BP Readings from Last 3 Encounters:   24 123/84   23 120/62   23 124/76    Wt Readings from Last 3 Encounters:   24 126.1 kg (278 lb 1.6 oz)   23 129.5 kg (285 lb 9 oz)   23 119 kg (262 lb 4.8 oz)                  Patient Active Problem List   Diagnosis    Smoker    Tinea versicolor    Erectile dysfunction    Hyperlipidemia with target LDL less than 130    Morbid  obesity (H)    Long term current use of anticoagulant therapy    Mitral regurgitation    Atrial fibrillation (H)    Severe mitral regurgitation    S/P MVR (mitral valve replacement)    Fluid overload    Transient hyperglycemia post procedure    Anticoagulated on Coumadin    Mechanical heart valve present    Longstanding persistent atrial fibrillation (H)    NSTEMI (non-ST elevated myocardial infarction) (H)    Chronic atrial fibrillation (H)    Atrial fibrillation, unspecified type (H)    Paroxysmal atrial fibrillation (H)     Past Surgical History:   Procedure Laterality Date    COLONOSCOPY N/A 2014    Procedure: COMBINED COLONOSCOPY, SINGLE BIOPSY/POLYPECTOMY BY BIOPSY;  Surgeon: Kai Bailey MD;  Location:  GI    CV HEART CATHETERIZATION WITH POSSIBLE INTERVENTION N/A 2020    Procedure: Heart Catheterization with Possible Intervention;  Surgeon: Marin Hagen MD;  Location:  HEART CARDIAC CATH LAB    EXTRACTION(S) DENTAL      ORTHOPEDIC SURGERY      RIght knee scope    REPLACE VALVE MITRAL N/A 2017    Procedure: REPLACE VALVE MITRAL;  MITRAL VALVE REPLACEMENT  SJM Masters Series Mechanical Heart Valve 33mm  Ref, [id number] Serial [id number]   ON PUMP, MITCHELL;  Surgeon: Ivana Marie MD;  Location:  OR       Social History     Tobacco Use    Smoking status: Every Day     Current packs/day: 0.00     Average packs/day: 2.0 packs/day for 45.0 years (90.0 ttl pk-yrs)     Types: Cigarettes, Pipe     Start date: 1977     Last attempt to quit: 2017     Years since quittin.0    Smokeless tobacco: Never    Tobacco comments:     started smoking at 17, smoking 2 ppd since    Substance Use Topics    Alcohol use: Yes     Alcohol/week: 4.0 standard drinks of alcohol     Types: 4 Standard drinks or equivalent per week     Comment: occasional     Family History   Problem Relation Age of Onset    Diabetes Mother     Obesity Mother     Diabetes Father     Hyperlipidemia Father      Obesity Father     Obesity Maternal Grandfather     Obesity Brother     Diabetes Brother     Hyperlipidemia Brother     Hyperlipidemia Brother     Substance Abuse Brother     Obesity Brother          Current Outpatient Medications   Medication Sig Dispense Refill    acetaminophen (TYLENOL) 325 MG tablet Take 2 tablets (650 mg) by mouth every 4 hours as needed for other (surgical pain) 100 tablet 0    Amoxicillin (AMOXIL PO) Take 1,500-3,000 mg by mouth daily as needed (patient takes 6 X 500 = 3,000 mg dose 1 hour before dental appointment and 3 X 500 = 1,500 mg dose 6 hours after dental appointment.)      atorvastatin (LIPITOR) 40 MG tablet Take 1 tablet (40 mg) by mouth every evening 90 tablet 3    nitroGLYcerin (NITROSTAT) 0.4 MG sublingual tablet For chest pain place 1 tablet under the tongue every 5 minutes for 3 doses. If symptoms persist 5 minutes after 1st dose call 911. 15 tablet 0    sildenafil (VIAGRA) 50 MG tablet Take 1 tablet (50 mg) by mouth daily as needed As needed 10 tablet 0    warfarin ANTICOAGULANT (COUMADIN) 5 MG tablet Take 1 tablet (5 mg) on Tuesdays, Thursdays, Saturdays and take 0.5 tablet (2.5 mg) all other days or as directed by the INR clinic 100 tablet 1    ASPIRIN NOT PRESCRIBED (INTENTIONAL) Please choose reason not prescribed from choices below.       Allergies   Allergen Reactions    Bee Pollen      Other reaction(s): Runny Nose    No Known Drug Allergy     Pollen Extract     Seasonal Allergies     Statin Drugs [Statins]      No allergy, felt horrible on this drug.     Recent Labs   Lab Test 09/12/23  1018 10/04/22  1341 08/30/22  0828 12/05/20  0855 08/26/20  0242 08/25/20  0005 01/15/18  0801 11/07/17  0855 09/14/17  0810 09/13/17  0407 08/14/17  0842 05/31/17  1811   A1C 5.9*  --   --   --  5.1  --   --   --   --  6.1*  --   --    LDL 70  --  63 49 108*  --    < > 129*  --   --   --   --    HDL 46  --  42 46 37*  --    < > 56  --   --   --   --    TRIG 128  --  113 68 112  --    <  "> 246*  --   --   --   --    ALT 23 26  --  30  --  21   < > 69  --   --    < >  --    CR 1.08 0.93  --   --   --  0.94  --  1.03   < > 1.13   < > 0.97   GFRESTIMATED 79 >90  --   --   --  89  --  75   < > 67   < > 80   GFRESTBLACK  --   --   --   --   --  >90  --  >90   < > 82   < > >90   GFR Calc     POTASSIUM 4.8 4.5  --   --   --  3.7  --  4.0   < > 5.0   < > 4.1   TSH  --   --   --   --   --   --   --   --   --   --   --  2.92    < > = values in this interval not displayed.          Review of Systems  CONSTITUTIONAL: NEGATIVE for fever, chills, change in weight  ENT/MOUTH: NEGATIVE for ear, mouth and throat problems  RESP: NEGATIVE for significant cough or SOB  CV: NEGATIVE for chest pain, palpitations or peripheral edema  ROS otherwise negative     Objective    Exam  /84 (BP Location: Left arm, Patient Position: Sitting, Cuff Size: Adult Large)   Pulse 65   Temp 97.2  F (36.2  C) (Temporal)   Resp 14   Ht 1.81 m (5' 11.26\")   Wt 126.1 kg (278 lb 1.6 oz)   SpO2 98%   BMI 38.50 kg/m     Estimated body mass index is 38.5 kg/m  as calculated from the following:    Height as of this encounter: 1.81 m (5' 11.26\").    Weight as of this encounter: 126.1 kg (278 lb 1.6 oz).    Physical Exam  GENERAL: alert, no distress, and obese  EYES: Eyes grossly normal to inspection, PERRL and conjunctivae and sclerae normal  HENT: ear canals and TM's normal, nose and mouth without ulcers or lesions  NECK: no adenopathy, no asymmetry, masses, or scars  RESP: lungs clear to auscultation - no rales, rhonchi or wheezes  CV: regular rates and rhythm, normal S1 S2, no S3 or S4, no murmur, click or rub, peripheral pulses strong, no peripheral edema, and crisp MV   ABDOMEN: soft, nontender, no hepatosplenomegaly, no masses and bowel sounds normal  MS: no gross musculoskeletal defects noted, no edema  NEURO: Normal strength and tone, mentation intact and speech normal  PSYCH: mentation appears normal, affect " normal/bright  LYMPH: no cervical, supraclavicular, axillary, or inguinal adenopathy        Signed Electronically by: Osbaldo Renee MD  Lung Cancer Screening Shared Decision Making Visit     Derek Stover, a 63 year old male, is eligible for lung cancer screening    History   Smoking Status    Every Day    Types: Cigarettes, Pipe   Smokeless Tobacco    Never       I have discussed with patient the risks and benefits of screening for lung cancer with low-dose CT.     The risks include:    radiation exposure: one low dose chest CT has as much ionizing radiation as about 15 chest x-rays, or 6 months of background radiation living in [place]      false positives: most findings/nodules are NOT cancer, but some might still require additional diagnostic evaluation, including biopsy    over-diagnosis: some slow growing cancers that might never have been clinically significant will be detected and treated unnecessarily     The benefit of early detection of lung cancer is contingent upon adherence to annual screening or more frequent follow up if indicated.     Furthermore, to benefit from screening, Derek must be willing and able to undergo diagnostic procedures, if indicated. Although no specific guide is available for determining severity of comorbidities, it is reasonable to withhold screening in patients who have greater mortality risk from other diseases.     We did discuss that the best way to prevent lung cancer is to not smoke.    Some patients may value a numeric estimation of lung cancer risk when evaluating if lung cancer screening is right for them, here is one calculator:    ShouldIScreen

## 2024-09-23 LAB — NONINV COLON CA DNA+OCC BLD SCRN STL QL: POSITIVE

## 2024-09-25 ENCOUNTER — ANTICOAGULATION THERAPY VISIT (OUTPATIENT)
Dept: ANTICOAGULATION | Facility: CLINIC | Age: 64
End: 2024-09-25

## 2024-09-25 ENCOUNTER — LAB (OUTPATIENT)
Dept: LAB | Facility: CLINIC | Age: 64
End: 2024-09-25
Payer: COMMERCIAL

## 2024-09-25 DIAGNOSIS — Z95.2 MECHANICAL HEART VALVE PRESENT: ICD-10-CM

## 2024-09-25 DIAGNOSIS — I48.0 PAROXYSMAL ATRIAL FIBRILLATION (H): Primary | ICD-10-CM

## 2024-09-25 DIAGNOSIS — I48.20 CHRONIC ATRIAL FIBRILLATION (H): ICD-10-CM

## 2024-09-25 DIAGNOSIS — Z95.2 S/P MVR (MITRAL VALVE REPLACEMENT): ICD-10-CM

## 2024-09-25 DIAGNOSIS — I48.0 PAROXYSMAL ATRIAL FIBRILLATION (H): ICD-10-CM

## 2024-09-25 LAB — INR BLD: 2.7 (ref 0.9–1.1)

## 2024-09-25 PROCEDURE — 36416 COLLJ CAPILLARY BLOOD SPEC: CPT

## 2024-09-25 PROCEDURE — 85610 PROTHROMBIN TIME: CPT

## 2024-09-25 NOTE — PROGRESS NOTES
ANTICOAGULATION MANAGEMENT     Derek Stover 63 year old male is on warfarin with therapeutic INR result. (Goal INR 2.5-3.5)    Recent labs: (last 7 days)     09/25/24  1551   INR 2.7*       ASSESSMENT     Source(s): Chart Review  Previous INR was Therapeutic last visit; previously outside of goal range  Medication, diet, health changes since last INR chart reviewed; none identified         PLAN     Recommended plan for no diet, medication or health factor changes affecting INR     Dosing Instructions: Continue your current warfarin dose with next INR in 6 weeks       Summary  As of 9/25/2024      Full warfarin instructions:  5 mg every Tue, Thu, Sat; 2.5 mg all other days   Next INR check:  11/6/2024               Sent [email or web address] message with dosing and follow up instructions    Contact [phone] to schedule and with any changes, questions or concerns.     Education provided: None required    Plan made per [facility] anticoagulation protocol    Chidi WHITAKER RN  9/25/2024  Anticoagulation Clinic  [employer] for routing messages: benji MOODY  [facility] patient phone line: [phone]        _______________________________________________________________________     Anticoagulation Episode Summary       Current INR goal:  2.5-3.5   TTR:  85.0% (1 y)   Target end date:  Indefinite   Send INR reminders to:  SOPHIA MOODY    Indications    Atrial fibrillation (H) [I48.91]  S/P MVR (mitral valve replacement) [Z95.2]  Mechanical heart valve present [Z95.2]  Chronic atrial fibrillation (H) [I48.20]  Paroxysmal atrial fibrillation (H) [I48.0]             Comments:               Anticoagulation Care Providers       Provider Role Specialty Phone number    Osbaldo Renee MD Referring Family Medicine [phone]    Carlos Archibald DO Responsible Internal Medicine [phone]

## 2024-10-29 ENCOUNTER — TELEPHONE (OUTPATIENT)
Dept: GASTROENTEROLOGY | Facility: CLINIC | Age: 64
End: 2024-10-29
Payer: COMMERCIAL

## 2024-10-29 NOTE — TELEPHONE ENCOUNTER
"Endoscopy Scheduling Screen    Have you had any respiratory illness or flu-like symptoms in the last 10 days?  No    What is your communication preference for Instructions and/or Bowel Prep?   MyChart    What insurance is in the chart?  Other:   [employer]    Ordering/Referring Provider: AMAIRANI MOJICA    (If ordering provider performs procedure, schedule with ordering provider unless otherwise instructed. )    BMI: Estimated body mass index is 38.5 kg/m  as calculated from the following:    Height as of 9/4/24: 1.81 m (5' 11.26\").    Weight as of 9/4/24: 126.1 kg (278 lb 1.6 oz).     Sedation Ordered  moderate sedation.   If patient BMI > 50 do not schedule in [facility].    If patient BMI > 45 do not schedule at [facility].    Are you taking methadone or Suboxone?  NO, No RN review required.    Have you been diagnosed and are being treated for severe PTSD or severe anxiety?  NO, No RN review required.    Are you taking any prescription medications for pain 3 or more times per week?   NO, No RN review required.    Do you have a history of malignant hyperthermia?  No    (Females) Are you currently pregnant?   No     Have you been diagnosed or told you have pulmonary hypertension?   No    Do you have an LVAD?  No    Have you been told you have moderate to severe sleep apnea?  No.    Have you been told you have COPD, asthma, or any other lung disease?  No    Do you have any heart conditions?  Yes     In the past year, have you had any hospitalizations for heart related issues including cardiomyopathy, heart attack, or stent placement?  No    Do you have any implantable devices in your body (pacemaker, ICD)?  No    Do you take nitroglycerine?  No    Have you ever had or are you waiting for an organ transplant?  No. Continue scheduling, no site restrictions.    Have you had a stroke or transient ischemic attack (TIA aka \"mini stroke\" in the last 6 months?   No    Have you been diagnosed with or been told you have cirrhosis of the " "liver?   No.    Are you currently on dialysis?   No    Do you need assistance transferring?   No    BMI: Estimated body mass index is 38.5 kg/m  as calculated from the following:    Height as of 9/4/24: 1.81 m (5' 11.26\").    Weight as of 9/4/24: 126.1 kg (278 lb 1.6 oz).     Is patients BMI > 40 and scheduling location [facility]?  No    Do you take an injectable or oral medication for weight loss or diabetes (excluding insulin)?  No    Do you take the medication Naltrexone?  No    Do you take blood thinners?  Yes, you must contact your prescribing provider for directions on holding or bridging with a different medication.       Prep   Are you currently on dialysis or do you have chronic kidney disease?  No    Do you have a diagnosis of diabetes?  No    Do you have a diagnosis of cystic fibrosis (CF)?  No    On a regular basis do you go 3 -5 days between bowel movements?  Yes (Extended Prep)    BMI > 40?  No    Preferred Pharmacy:    [facility] [address] [place] [address]  [address] [place] [id number]  Phone: [phone] Fax: [phone]          Final Scheduling Details     Procedure scheduled  Colonoscopy    Surgeon:  CLOVER     Date of procedure:  1/13/25     Pre-OP / PAC:   No - Not required for this site.    Location  [facility] - Patient preference.    Sedation   MAC/Deep Sedation  Per location      Patient Reminders:   You will receive a call from a Nurse to review instructions and health history.  This assessment must be completed prior to your procedure.  Failure to complete the Nurse assessment may result in the procedure being cancelled.      On the day of your procedure, please designate an adult(s) who can drive you home stay with you for the next 24 hours. The medicines used in the exam will make you sleepy. You will not be able to drive.      You cannot take public transportation, ride share services, or non-medical taxi service without a responsible caregiver.  Medical transport services " are allowed with the requirement that a responsible caregiver will receive you at your destination.  We require that drivers and caregivers are confirmed prior to your procedure.

## 2024-11-07 ENCOUNTER — ANTICOAGULATION THERAPY VISIT (OUTPATIENT)
Dept: ANTICOAGULATION | Facility: CLINIC | Age: 64
End: 2024-11-07

## 2024-11-07 ENCOUNTER — LAB (OUTPATIENT)
Dept: LAB | Facility: CLINIC | Age: 64
End: 2024-11-07
Payer: COMMERCIAL

## 2024-11-07 DIAGNOSIS — Z95.2 MECHANICAL HEART VALVE PRESENT: ICD-10-CM

## 2024-11-07 DIAGNOSIS — I48.20 CHRONIC ATRIAL FIBRILLATION (H): ICD-10-CM

## 2024-11-07 DIAGNOSIS — I48.0 PAROXYSMAL ATRIAL FIBRILLATION (H): Primary | ICD-10-CM

## 2024-11-07 DIAGNOSIS — I48.0 PAROXYSMAL ATRIAL FIBRILLATION (H): ICD-10-CM

## 2024-11-07 DIAGNOSIS — Z95.2 S/P MVR (MITRAL VALVE REPLACEMENT): ICD-10-CM

## 2024-11-07 LAB — INR BLD: 3.4 (ref 0.9–1.1)

## 2024-11-07 PROCEDURE — 85610 PROTHROMBIN TIME: CPT

## 2024-11-07 PROCEDURE — 36416 COLLJ CAPILLARY BLOOD SPEC: CPT

## 2024-11-07 NOTE — PROGRESS NOTES
ANTICOAGULATION MANAGEMENT     Derek Stover 63 year old male is on warfarin with therapeutic INR result. (Goal INR 2.5-3.5)    Recent labs: (last 7 days)     11/07/24  1607   INR 3.4*       ASSESSMENT     Source(s): Chart Review and Patient/Caregiver Call     Warfarin doses taken: Warfarin taken as instructed  Diet: No new diet changes identified  Medication/supplement changes: None noted  New illness, injury, or hospitalization: No  Signs or symptoms of bleeding or clotting: No  Previous result: Therapeutic last 2(+) visits  Additional findings: None       PLAN     Recommended plan for no diet, medication or health factor changes affecting INR     Dosing Instructions: Continue your current warfarin dose with next INR in 6 weeks       Summary  As of 11/7/2024      Full warfarin instructions:  5 mg every Tue, Thu, Sat; 2.5 mg all other days   Next INR check:  12/20/2024               Telephone call with Roberto who verbalizes understanding and agrees to plan and who agrees to plan and repeated back plan correctly    Lab visit scheduled    Education provided: Contact [phone] with any changes, questions or concerns.     Plan made per [facility] anticoagulation protocol    Slim Preston RN  11/7/2024  Anticoagulation Clinic  Amimon for routing messages: benji MOODY  [facility] patient phone line: [phone]        _______________________________________________________________________     Anticoagulation Episode Summary       Current INR goal:  2.5-3.5   TTR:  85.0% (1 y)   Target end date:  Indefinite   Send INR reminders to:  SOPHIA MOODY    Indications    Atrial fibrillation (H) [I48.91]  S/P MVR (mitral valve replacement) [Z95.2]  Mechanical heart valve present [Z95.2]  Chronic atrial fibrillation (H) [I48.20]  Paroxysmal atrial fibrillation (H) [I48.0]             Comments:  --             Anticoagulation Care Providers       Provider Role Specialty Phone number    Osbaldo Renee MD Referring Family  Medicine [phone]    Carlos Archibald DO [facility] Internal Medicine [phone]

## 2024-11-26 ENCOUNTER — OFFICE VISIT (OUTPATIENT)
Dept: CARDIOLOGY | Facility: CLINIC | Age: 64
End: 2024-11-26
Payer: COMMERCIAL

## 2024-11-26 VITALS
SYSTOLIC BLOOD PRESSURE: 122 MMHG | BODY MASS INDEX: 38.64 KG/M2 | RESPIRATION RATE: 18 BRPM | WEIGHT: 276 LBS | OXYGEN SATURATION: 97 % | DIASTOLIC BLOOD PRESSURE: 84 MMHG | HEART RATE: 71 BPM | HEIGHT: 71 IN

## 2024-11-26 DIAGNOSIS — I48.19 PERSISTENT ATRIAL FIBRILLATION (H): Primary | ICD-10-CM

## 2024-11-26 DIAGNOSIS — Z98.890 S/P MVR (MITRAL VALVE REPAIR): ICD-10-CM

## 2024-11-26 DIAGNOSIS — F17.200 SMOKER: ICD-10-CM

## 2024-11-26 DIAGNOSIS — I25.10 CORONARY ARTERY DISEASE INVOLVING NATIVE CORONARY ARTERY OF NATIVE HEART WITHOUT ANGINA PECTORIS: ICD-10-CM

## 2024-11-26 DIAGNOSIS — Z95.2 MECHANICAL HEART VALVE PRESENT: ICD-10-CM

## 2024-11-26 PROCEDURE — G2211 COMPLEX E/M VISIT ADD ON: HCPCS | Performed by: INTERNAL MEDICINE

## 2024-11-26 PROCEDURE — 99214 OFFICE O/P EST MOD 30 MIN: CPT | Performed by: INTERNAL MEDICINE

## 2024-11-26 ASSESSMENT — PAIN SCALES - GENERAL: PAINLEVEL_OUTOF10: NO PAIN (0)

## 2024-11-26 NOTE — LETTER
11/26/2024    Osbaldo Renee MD  [address] [place] [address]    RE: Derek Stover       Dear Colleague,     I had the pleasure of seeing Derek Stover in the [facility].  HISTORY OF PRESENT ILLNESS:  Derek Stover a 64 year old male with  a history of mitral regurgitation (status post mechanical mitral valve replacement with #32 [employer] [place] valve 2017), coronary artery disease, atrial fibrillation, hypertension, obesity and a smoking history, was seen today at your request for followup.    Gained weight with stress. Uses a pipe not cigarettes.      PAST MEDICAL HISTORY:       1. Mitral insufficiency -- status post implantation of #32 [employer] [place] mechanical prosthesis 2017.  Echo 7/2/2024 demonstrates normal mechanical mitral valve prosthetic function with MDG 7 mmHg  @ HR 59 and LVEF 55%     2. History of atrial fibrillation -- on warfarin anticoagulation with therapeutic INRs.     3. Obesity -- successful weight loss of 21 pounds since last seen.     4. Continued tobacco use, making efforts to stop.     5. Dyslipidemia -- optimal LDL cholesterol most recent blood test.     6. Coronary artery disease -- Non-ST segment MI 08/2020.  Treated with implantation of 2 drug-eluting stents in posterior descending branch of dominant circumflex.  No significant narrowing in left main, LAD or nondominant right coronary.    Orders this Visit:  No orders of the defined types were placed in this encounter.    No orders of the defined types were placed in this encounter.    There are no discontinued medications.    Encounter Diagnoses   Name Primary?     Coronary artery disease involving native coronary artery of native heart without angina pectoris      S/P MVR (mitral valve repair)      Mechanical heart valve present      Smoker        CURRENT MEDICATIONS:  Current Outpatient Medications   Medication Sig Dispense Refill     acetaminophen (TYLENOL) 325 MG tablet Take 2 tablets (650  "mg) by mouth every 4 hours as needed for other (surgical pain) 100 tablet 0     atorvastatin (LIPITOR) 40 MG tablet Take 1 tablet (40 mg) by mouth every evening. 90 tablet 3     sildenafil (VIAGRA) 50 MG tablet Take 1 tablet (50 mg) by mouth daily as needed. As needed 10 tablet 0     warfarin ANTICOAGULANT (COUMADIN) 5 MG tablet Take 1 tablet (5 mg) on Tuesdays, Thursdays, Saturdays and take 0.5 tablet (2.5 mg) all other days or as directed by the INR clinic 100 tablet 1     Amoxicillin (AMOXIL PO) Take 1,500-3,000 mg by mouth daily as needed (patient takes 6 X 500 = 3,000 mg dose 1 hour before dental appointment and 3 X 500 = 1,500 mg dose 6 hours after dental appointment.) (Patient not taking: Reported on 11/26/2024)       ASPIRIN NOT PRESCRIBED (INTENTIONAL) Please choose reason not prescribed from choices below. (Patient not taking: Reported on 11/26/2024)       nitroGLYcerin (NITROSTAT) 0.4 MG sublingual tablet For chest pain place 1 tablet under the tongue every 5 minutes for 3 doses. If symptoms persist 5 minutes after 1st dose call 911. (Patient not taking: Reported on 11/26/2024) 15 tablet 0       ALLERGIES     Allergies   Allergen Reactions     Bee Pollen      Other reaction(s): Runny Nose     No Known Drug Allergy      Pollen Extract      Seasonal Allergies      Statin Drugs [Statins]      No allergy, felt horrible on this drug.       PAST MEDICAL, SURGICAL, FAMILY, SOCIAL HISTORY:  History was reviewed and updated as needed, see medical record.        Review of Systems:  A 12-point review of systems was completed, see medical record for detailed review of systems information.    Physical Exam:  Vitals: /84 (BP Location: Right arm, Patient Position: Sitting, Cuff Size: Adult Large)   Pulse 71   Resp 18   Ht 1.81 m (5' 11.26\")   Wt 125.2 kg (276 lb)   SpO2 97%   BMI 38.21 kg/m      Constitutional: No apparent distress    HEENT: pupils equal round reactive to light, thyroid normal size, JVP is " normal    Chest: Clear to percussion and auscultation    Cardiac: Normal S1, normal S2 no S3, no murmur or click    Abdomen: Scaphoid.  Liver percusses to 6 cm spleen is not palpable aorta is not tender or enlarged    Extremitiies: no edema.    Neurological:  Cranial nerves II through XII are intact, strength equal and symmetrical, displays normal insight and judgment        ASSESSMENT: Derek Stover is a 64 year old male with          We reviewed the benefits of a regular exercise program, a Mediterranean-style weight loss diet, and contacting us for any changes in between now and the time of her next visit.     RECOMMENDATIONS:   1) discontinue all tobacco use  2) Mediterranean style weight loss diet  3) Regular exercise program  4) TTE in July 2024  5) Follow up visit in one year        Recent Lab Results:  LIPID RESULTS:  Lab Results   Component Value Date    CHOL 126 09/04/2024    CHOL 109 12/05/2020    HDL 42 09/04/2024    HDL 46 12/05/2020    LDL 62 09/04/2024    LDL 49 12/05/2020    TRIG 110 09/04/2024    TRIG 68 12/05/2020    CHOLHDLRATIO 5.3 (H) 08/13/2014       LIVER ENZYME RESULTS:  Lab Results   Component Value Date    AST 26 09/04/2024    AST 18 08/25/2020    ALT 20 09/04/2024    ALT 30 12/05/2020       CBC RESULTS:  Lab Results   Component Value Date    WBC 8.9 09/12/2023    WBC 8.3 08/25/2020    RBC 4.87 09/12/2023    RBC 5.00 08/25/2020    HGB 15.5 09/12/2023    HGB 15.8 08/25/2020    HCT 47.1 09/12/2023    HCT 47.6 08/25/2020    MCV 97 09/12/2023    MCV 95 08/25/2020    MCH 31.8 09/12/2023    MCH 31.6 08/25/2020    MCHC 32.9 09/12/2023    MCHC 33.2 08/25/2020    RDW 14.7 09/12/2023    RDW 15.0 08/25/2020     09/12/2023     08/25/2020       BMP RESULTS:  Lab Results   Component Value Date     09/04/2024     08/25/2020    POTASSIUM 4.5 09/04/2024    POTASSIUM 4.5 10/04/2022    POTASSIUM 3.7 08/25/2020    CHLORIDE 103 09/04/2024    CHLORIDE 110 (H) 10/04/2022    CHLORIDE  109 08/25/2020    CO2 27 09/04/2024    CO2 28 10/04/2022    CO2 28 08/25/2020    ANIONGAP 9 09/04/2024    ANIONGAP 3 10/04/2022    ANIONGAP 4 08/25/2020    GLC 92 09/04/2024     (H) 10/04/2022    GLC 95 08/25/2020    BUN 14.6 09/04/2024    BUN 18 10/04/2022    BUN 14 08/25/2020    CR 1.06 09/04/2024    CR 0.94 08/25/2020    GFRESTIMATED 80 09/04/2024    GFRESTIMATED 89 08/25/2020    GFRESTBLACK >90 08/25/2020    FIORELLA 9.1 09/04/2024    FIORELLA 8.9 08/25/2020        A1C RESULTS:  Lab Results   Component Value Date    A1C 5.7 (H) 09/04/2024    A1C 5.1 08/26/2020       INR RESULTS:  Lab Results   Component Value Date    INR 3.4 (H) 11/07/2024    INR 2.7 (H) 09/25/2024    INR 3.0 (H) 06/17/2021    INR 3.2 (H) 05/06/2021    INR 2.28 (H) 08/27/2020    INR 2.29 (H) 08/26/2020       We greatly appreciate the opportunity to be involved in the care of your patient, Derek Stover.    Sincerely,  Zenon Medrano MD      CC  Zenon Medrano MD  [address]  [place] [address]                                                                       Thank you for allowing me to participate in the care of your patient.      Sincerely,     Zenon Medrano MD     [facility]  cc:   Zenon Medrano MD  [address]  [place] [address]

## 2024-11-26 NOTE — PROGRESS NOTES
HISTORY OF PRESENT ILLNESS:  Derek Stover a 64 year old manwith  a history of mitral regurgitation (status post mechanical mitral valve replacement with #32 [employer] [place] valve 2017), coronary artery disease, atrial fibrillation, hypertension, obesity and a smoking history, was seen today at your request for followup.    Since last seen in August 2023 the patient remains free of new cardiovascular symptoms.  He denies chest pain dyspnea syncope palpitation or focal neurologic deficit.  He remains compliant with all medical therapy and has therapeutic INRs.  He has been under a great deal of emotional stress this last year with the death of his father and having to place his mother in a assisted living care facility.  The stress he has gained some weight is now resuming a regular exercise program.  He has switched from cigarettes to pipe.  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  I have personally reviewed his last echocardiogram that shows normal prosthetic valve function and normal left ventricular systolic performance.    He receives excellent attentive medical care from his primary care physician Dr. Renee.  CT scanning to screen for lung cancer and colonoscopy have been scheduled.         PAST MEDICAL HISTORY:       1. Mitral insufficiency -- status post implantation of #32 [employer] Reg[employer] mechanical prosthesis 2017.  Echo 7/2/2024 demonstrates normal mechanical mitral valve prosthetic function with MDG 7 mmHg  @ HR 59 and LVEF 55%     2. History of permanent atrial fibrillation -- on warfarin anticoagulation with therapeutic INRs.     3. Obesity -- successful weight loss of 21 pounds since last seen.     4. Continued tobacco use, making efforts to stop.     5. Dyslipidemia -- optimal LDL cholesterol most recent blood test.     6. Coronary artery disease -- Non-ST segment MI 08/2020.  Treated with implantation of 2 drug-eluting stents in posterior descending branch of  dominant circumflex.  No significant narrowing in left main, LAD or nondominant right coronary.    Orders this Visit:  No orders of the defined types were placed in this encounter.    No orders of the defined types were placed in this encounter.    There are no discontinued medications.    Encounter Diagnoses   Name Primary?    Coronary artery disease involving native coronary artery of native heart without angina pectoris     S/P MVR (mitral valve repair)     Mechanical heart valve present     Smoker        CURRENT MEDICATIONS:  Current Outpatient Medications   Medication Sig Dispense Refill    acetaminophen (TYLENOL) 325 MG tablet Take 2 tablets (650 mg) by mouth every 4 hours as needed for other (surgical pain) 100 tablet 0    atorvastatin (LIPITOR) 40 MG tablet Take 1 tablet (40 mg) by mouth every evening. 90 tablet 3    sildenafil (VIAGRA) 50 MG tablet Take 1 tablet (50 mg) by mouth daily as needed. As needed 10 tablet 0    warfarin ANTICOAGULANT (COUMADIN) 5 MG tablet Take 1 tablet (5 mg) on Tuesdays, Thursdays, Saturdays and take 0.5 tablet (2.5 mg) all other days or as directed by the INR clinic 100 tablet 1    Amoxicillin (AMOXIL PO) Take 1,500-3,000 mg by mouth daily as needed (patient takes 6 X 500 = 3,000 mg dose 1 hour before dental appointment and 3 X 500 = 1,500 mg dose 6 hours after dental appointment.) (Patient not taking: Reported on 11/26/2024)      ASPIRIN NOT PRESCRIBED (INTENTIONAL) Please choose reason not prescribed from choices below. (Patient not taking: Reported on 11/26/2024)      nitroGLYcerin (NITROSTAT) 0.4 MG sublingual tablet For chest pain place 1 tablet under the tongue every 5 minutes for 3 doses. If symptoms persist 5 minutes after 1st dose call 911. (Patient not taking: Reported on 11/26/2024) 15 tablet 0       ALLERGIES     Allergies   Allergen Reactions    Bee Pollen      Other reaction(s): Runny Nose    No Known Drug Allergy     Pollen Extract     Seasonal Allergies      "Statin Drugs [Statins]      No allergy, felt horrible on this drug.       PAST MEDICAL, SURGICAL, FAMILY, SOCIAL HISTORY:  History was reviewed and updated as needed, see medical record.        Review of Systems:  A 12-point review of systems was completed, see medical record for detailed review of systems information.    Physical Exam:  Vitals: /84 (BP Location: Right arm, Patient Position: Sitting, Cuff Size: Adult Large)   Pulse 71   Resp 18   Ht 1.81 m (5' 11.26\")   Wt 125.2 kg (276 lb)   SpO2 97%   BMI 38.21 kg/m      Constitutional: No apparent distress pleasant, cooperative, overweight.    HEENT: pupils equal round reactive to light, thyroid normal size, JVP is normal    Chest: Clear to percussion and auscultation    Cardiac: Normal  crisp prosthetic S1, normal S2 no S3, no murmur or click    ASSESSMENT: I am pleased the patient remains asymptomatic with optimal blood pressure and LDL cholesterol levels.  We have reviewed the benefits of total tobacco abstention, a Mediterranean-style weight loss diet, and a regular exercise program.  He would like his echocardiogram scheduled for next July for his next DOT physical exam.  I believe the patient can safely operate a motor vehicle without limitations.    RECOMMENDATIONS:   1) discontinue all tobacco use  2) Mediterranean style weight loss diet  3) Regular exercise program  4) TTE in July 2024  5) Follow up visit in one year        Recent Lab Results:  LIPID RESULTS:  Lab Results   Component Value Date    CHOL 126 09/04/2024    CHOL 109 12/05/2020    HDL 42 09/04/2024    HDL 46 12/05/2020    LDL 62 09/04/2024    LDL 49 12/05/2020    TRIG 110 09/04/2024    TRIG 68 12/05/2020    CHOLHDLRATIO 5.3 (H) 08/13/2014       LIVER ENZYME RESULTS:  Lab Results   Component Value Date    AST 26 09/04/2024    AST 18 08/25/2020    ALT 20 09/04/2024    ALT 30 12/05/2020       CBC RESULTS:  Lab Results   Component Value Date    WBC 8.9 09/12/2023    WBC 8.3 08/25/2020 "    RBC 4.87 09/12/2023    RBC 5.00 08/25/2020    HGB 15.5 09/12/2023    HGB 15.8 08/25/2020    HCT 47.1 09/12/2023    HCT 47.6 08/25/2020    MCV 97 09/12/2023    MCV 95 08/25/2020    MCH 31.8 09/12/2023    MCH 31.6 08/25/2020    MCHC 32.9 09/12/2023    MCHC 33.2 08/25/2020    RDW 14.7 09/12/2023    RDW 15.0 08/25/2020     09/12/2023     08/25/2020       BMP RESULTS:  Lab Results   Component Value Date     09/04/2024     08/25/2020    POTASSIUM 4.5 09/04/2024    POTASSIUM 4.5 10/04/2022    POTASSIUM 3.7 08/25/2020    CHLORIDE 103 09/04/2024    CHLORIDE 110 (H) 10/04/2022    CHLORIDE 109 08/25/2020    CO2 27 09/04/2024    CO2 28 10/04/2022    CO2 28 08/25/2020    ANIONGAP 9 09/04/2024    ANIONGAP 3 10/04/2022    ANIONGAP 4 08/25/2020    GLC 92 09/04/2024     (H) 10/04/2022    GLC 95 08/25/2020    BUN 14.6 09/04/2024    BUN 18 10/04/2022    BUN 14 08/25/2020    CR 1.06 09/04/2024    CR 0.94 08/25/2020    GFRESTIMATED 80 09/04/2024    GFRESTIMATED 89 08/25/2020    GFRESTBLACK >90 08/25/2020    FIORELLA 9.1 09/04/2024    FIORELLA 8.9 08/25/2020        A1C RESULTS:  Lab Results   Component Value Date    A1C 5.7 (H) 09/04/2024    A1C 5.1 08/26/2020       INR RESULTS:  Lab Results   Component Value Date    INR 3.4 (H) 11/07/2024    INR 2.7 (H) 09/25/2024    INR 3.0 (H) 06/17/2021    INR 3.2 (H) 05/06/2021    INR 2.28 (H) 08/27/2020    INR 2.29 (H) 08/26/2020       We greatly appreciate the opportunity to be involved in the care of your patient, Derek Stover.    Sincerely,  Zenon Medrano MD      CC  Zenon Medrano MD  [address]  [place] [address]

## 2024-12-24 ENCOUNTER — MYC MEDICAL ADVICE (OUTPATIENT)
Dept: CARDIOLOGY | Facility: CLINIC | Age: 64
End: 2024-12-24
Payer: COMMERCIAL

## 2024-12-26 ENCOUNTER — TELEPHONE (OUTPATIENT)
Dept: ANTICOAGULATION | Facility: CLINIC | Age: 64
End: 2024-12-26
Payer: COMMERCIAL

## 2024-12-26 DIAGNOSIS — I48.91 ATRIAL FIBRILLATION (H): Primary | ICD-10-CM

## 2024-12-26 DIAGNOSIS — Z95.2 MECHANICAL HEART VALVE PRESENT: ICD-10-CM

## 2024-12-26 DIAGNOSIS — I48.0 PAROXYSMAL ATRIAL FIBRILLATION (H): ICD-10-CM

## 2024-12-26 DIAGNOSIS — I48.20 CHRONIC ATRIAL FIBRILLATION (H): ICD-10-CM

## 2024-12-26 DIAGNOSIS — Z95.2 S/P MVR (MITRAL VALVE REPLACEMENT): ICD-10-CM

## 2024-12-26 RX ORDER — ENOXAPARIN SODIUM 150 MG/ML
1 INJECTION SUBCUTANEOUS EVERY 12 HOURS
Qty: 16 ML | Refills: 1 | Status: SHIPPED | OUTPATIENT
Start: 2024-12-26

## 2024-12-26 NOTE — TELEPHONE ENCOUNTER
MARY-PROCEDURAL ANTICOAGULATION  MANAGEMENT    ASSESSMENT     Warfarin interruption plan for colonoscopy on 2025.    Indication for Anticoagulation: Atrial Fibrillation and Mechanical MVR    [id number] = 1 (Vascular- NSTEMI)  St [employer] MVR placed 2017  Bridging history  Has not interrupted warfarin therapy/bridged since valve surgery    Mary-Procedure Risk stratification for thromboembolism: high ( Chest guidelines)    MVR:  AHA/ACC Management of Valvular Heart disease guidelines suggests bridging is reasonable on individual basis with risk of bleeding weighed against risks of clotting for mechanical MVR patients  AVR/MVR:  Chest Perioperative Management guideline suggests no bridging for mechanical heart valves except select patients at high thromboembolic risk such as with an older-generation mechanical heart valve, MVR with one more more risk factors for thromboembolism, a recent (within 3 months) thromboembolic event, or with prior perioperative stroke    RECOMMENDATION     Pre-Procedure:  Hold warfarin for 5 days, until after procedure startin2025   Enoxaparin (Lovenox) 120 mg subq Q 12 hrs (1 mg/kg Q 12 hrs for CrCl >= 60 ml/min and BMI <= 40 kg/m2)   Start enoxaparin: 01/10/2025 AM  Last dose of enoxaparin prior to procedure:  AM  (~24 hours prior to procedure)    Post-Procedure:  Resume warfarin dose if okay with provider doing procedure on night of procedure, 2025 PM: 10mg  Resume enoxaparin (Lovenox) ~ 24 hrs post procedure when okay with provider doing procedure. Continue until INR >= 2.0  Recheck INR ~5-6 days after resuming warfarin   ?     Plan routed to referring provider for approval  ?   Nenita Berry [facility]    SUBJECTIVE/OBJECTIVE     Derek Stover, a 64 year old male    Goal INR Range: 2.5-3.5     Patient bridged in past: No    Wt Readings from Last 3 Encounters:   24 125.2 kg (276 lb)   24 126.1 kg (278 lb 1.6 oz)  "  09/12/23 129.5 kg (285 lb 9 oz)      Ideal body weight: 75.9 kg (167 lb 5.2 oz)  Adjusted ideal body weight: 95.6 kg (210 lb 12.7 oz)     Estimated body mass index is 38.21 kg/m  as calculated from the following:    Height as of 11/26/24: 1.81 m (5' 11.26\").    Weight as of 11/26/24: 125.2 kg (276 lb).    Lab Results   Component Value Date    INR 2.9 (H) 12/20/2024    INR 3.4 (H) 11/07/2024    INR 2.7 (H) 09/25/2024     Lab Results   Component Value Date    HGB 15.5 09/12/2023    HCT 47.1 09/12/2023     09/12/2023     Lab Results   Component Value Date    CR 1.06 09/04/2024    CR 1.08 09/12/2023    CR 0.93 10/04/2022     Estimated Creatinine Clearance: 97.7 mL/min (based on SCr of 1.06 mg/dL).    "

## 2024-12-26 NOTE — TELEPHONE ENCOUNTER
PAYTON-PROCEDURAL ANTICOAGULATION  MANAGEMENT    Derek requesting pre-procedure hold orders for warfarin and review for bridging      Procedure date: 1/13/25       Procedure: Colonoscopy      Procedure location and phone number (if external): [place]     Number of warfarin hold days requested and/or target INR: 5 days    Pre-op date: Not applicable      Routing to Anticoagulation Pharmacist for review.     ACC pool: benji Preston RN

## 2024-12-26 NOTE — TELEPHONE ENCOUNTER
Spoke with pt regarding procedure instructions below. [employer] message sent with plan. [facility] calendar updated. Patient verbalized understanding and agrees with plan of care. Pt had no further questions or concerns at this time.     Slim Preston RN, BSN  [facility] Anticoagulation Team

## 2025-01-12 ENCOUNTER — ANESTHESIA EVENT (OUTPATIENT)
Dept: GASTROENTEROLOGY | Facility: CLINIC | Age: 65
End: 2025-01-12
Payer: COMMERCIAL

## 2025-01-12 ASSESSMENT — ENCOUNTER SYMPTOMS: DYSRHYTHMIAS: 1

## 2025-01-12 ASSESSMENT — LIFESTYLE VARIABLES: TOBACCO_USE: 1

## 2025-01-12 NOTE — ANESTHESIA PREPROCEDURE EVALUATION
Anesthesia Pre-Procedure Evaluation    Patient: Derek Stover   MRN: [id number] : 1960        Procedure : Procedure(s):  Colonoscopy          Past Medical History:   Diagnosis Date     Arthritis     left hand pointer finger     Chronic atrial fibrillation (H)     Valvular Afib.  Diagnosed 2017. Pt initiated on coumadin 17     Erectile dysfunction      Hyperlipidemia      Mitral regurgitation     rheumatic fever as a child.  Severe mitral valve regurgitation.  Underwent 32 mm St Servando [place] mechanical mitral valve replacement in 2017.     Prediabetes      Tobacco abuse      Warfarin anticoagulation       Past Surgical History:   Procedure Laterality Date     COLONOSCOPY N/A 2014    Procedure: COMBINED COLONOSCOPY, SINGLE BIOPSY/POLYPECTOMY BY BIOPSY;  Surgeon: Kai Bailey MD;  Location:  GI     CV HEART CATHETERIZATION WITH POSSIBLE INTERVENTION N/A 2020    Procedure: Heart Catheterization with Possible Intervention;  Surgeon: Marin Hagen MD;  Location:  HEART CARDIAC CATH LAB     EXTRACTION(S) DENTAL       ORTHOPEDIC SURGERY      RIght knee scope     REPLACE VALVE MITRAL N/A 2017    Procedure: REPLACE VALVE MITRAL;  MITRAL VALVE REPLACEMENT  [facility] Masters Series Mechanical Heart Valve 33mm  Ref, 33MJ-501 Serial [id number]   ON PUMP, MITCHELL;  Surgeon: Ivana Marie MD;  Location:  OR      Allergies   Allergen Reactions     Bee Pollen      Other reaction(s): Runny Nose     No Known Drug Allergy      Pollen Extract      Seasonal Allergies      Statin Drugs [Statins]      No allergy, felt horrible on this drug.      Social History     Tobacco Use     Smoking status: Every Day     Current packs/day: 0.00     Average packs/day: 2.0 packs/day for 45.0 years (90.0 ttl pk-yrs)     Types: Cigarettes, Pipe     Start date: 1977     Last attempt to quit: 2017     Years since quittin.4     Smokeless tobacco: Never     Tobacco comments:     started  smoking at 17, smoking 2 ppd since 2010   Substance Use Topics     Alcohol use: Yes     Alcohol/week: 4.0 standard drinks of alcohol     Types: 4 Standard drinks or equivalent per week     Comment: occasional      Wt Readings from Last 1 Encounters:   11/26/24 125.2 kg (276 lb)        Anesthesia Evaluation   Pt has had prior anesthetic. Type: General and MAC.        ROS/MED HX  ENT/Pulmonary:     (+)                tobacco use, Current use,                       Neurologic:  - neg neurologic ROS     Cardiovascular: Comment: NSTEMI (non-ST elevated myocardial infarction)     S/P MVR (mitral valve replacement)    (+) Dyslipidemia - -   -  - -   Taking blood thinners                     dysrhythmias, a-fib and 3rd Deg Heart Block,  valvular problems/murmurs type: MR S/P MVR (mitral valve replacement) AR noted on Echo.    Previous cardiac testing   Echo: Date: 7/2/2024 Results:  Left ventricular systolic function is borderline reduced. The visual ejection  fraction is 50-55%.  Septal motion is consistent with conduction abnormality.  The right ventricle is mild to moderately dilated. The right ventricular  systolic function is normal.  The aortic valve is trileaflet with aortic valve sclerosis.  Mild to moderate (1-2+) aortic regurgitation.  There is a mechanical mitral valve. The prosthetic mitral valve is well-  seated. Elevated mean gradient 7 mmHg at 59 bpm.  Dilation of the inferior vena cava is present with abnormal respiratory  variation in diameter.     This study was compared to a TTE from 8/1/2023. There has been no significant  change.    Stress Test:  Date: Results:    ECG Reviewed:  Date: 8/1/2023 Results:  A-fib  Cath:  Date: Results:      METS/Exercise Tolerance:     Hematologic:       Musculoskeletal:   (+)  arthritis,             GI/Hepatic:     (+)        bowel prep,            Renal/Genitourinary:  - neg Renal ROS     Endo:     (+)               Obesity,       Psychiatric/Substance Use:  - neg  psychiatric ROS     Infectious Disease:  - neg infectious disease ROS     Malignancy:  - neg malignancy ROS     Other:            Physical Exam    Airway  airway exam normal      Mallampati: II   TM distance: > 3 FB   Neck ROM: full   Mouth opening: > 3 cm    Respiratory Devices and Support         Dental  no notable dental history         Cardiovascular   cardiovascular exam normal       Rhythm and rate: regular and normal     Pulmonary   pulmonary exam normal        breath sounds clear to auscultation       OUTSIDE LABS:  CBC:   Lab Results   Component Value Date    WBC 8.9 09/12/2023    WBC 8.6 10/04/2022    HGB 15.5 09/12/2023    HGB 14.4 10/04/2022    HCT 47.1 09/12/2023    HCT 43.1 10/04/2022     09/12/2023     10/04/2022     BMP:   Lab Results   Component Value Date     09/04/2024     09/12/2023    POTASSIUM 4.5 09/04/2024    POTASSIUM 4.8 09/12/2023    CHLORIDE 103 09/04/2024    CHLORIDE 102 09/12/2023    CO2 27 09/04/2024    CO2 26 09/12/2023    BUN 14.6 09/04/2024    BUN 12.6 09/12/2023    CR 1.06 09/04/2024    CR 1.08 09/12/2023    GLC 92 09/04/2024     (H) 09/12/2023     COAGS:   Lab Results   Component Value Date    PTT 48 (H) 09/12/2017    INR 2.9 (H) 12/20/2024    FIBR 245 09/12/2017     POC:   Lab Results   Component Value Date    BGM 95 09/17/2017     HEPATIC:   Lab Results   Component Value Date    ALBUMIN 4.2 09/04/2024    PROTTOTAL 7.1 09/04/2024    ALT 20 09/04/2024    AST 26 09/04/2024    ALKPHOS 118 09/04/2024    BILITOTAL 0.8 09/04/2024     OTHER:   Lab Results   Component Value Date    PH 7.32 (L) 09/12/2017    LACT 2.5 (H) 09/12/2017    A1C 5.7 (H) 09/04/2024    FIORELLA 9.1 09/04/2024    PHOS 3.4 09/13/2017    MAG 2.4 (H) 09/13/2017    TSH 2.92 05/31/2017       Anesthesia Plan    ASA Status:  3    NPO Status:  NPO Appropriate    Anesthesia Type: MAC.     - Reason for MAC: straight local not clinically adequate   Induction: Intravenous, Propofol.   Maintenance:  TIVA.        Consents    Anesthesia Plan(s) and associated risks, benefits, and realistic alternatives discussed. Questions answered and patient/representative(s) expressed understanding.     - Discussed:     - Discussed with:  Patient      - Extended Intubation/Ventilatory Support Discussed: No.      - Patient is DNR/DNI Status: No     Use of blood products discussed: No .     Postoperative Care    Pain management: Oral pain medications.   PONV prophylaxis: Background Propofol Infusion     Comments:    Other Comments: The risks and benefits of anesthesia, and the alternatives where applicable, have been discussed with the patient, and they wish to proceed.            JESSIE Harper CRNA    I have reviewed the pertinent notes and labs in the chart from the past 30 days and (re)examined the patient.  Any updates or changes from those notes are reflected in this note.

## 2025-01-13 ENCOUNTER — ANESTHESIA (OUTPATIENT)
Dept: GASTROENTEROLOGY | Facility: CLINIC | Age: 65
End: 2025-01-13
Payer: COMMERCIAL

## 2025-01-13 ENCOUNTER — HOSPITAL ENCOUNTER (OUTPATIENT)
Facility: CLINIC | Age: 65
Discharge: HOME | End: 2025-01-13
Attending: SPECIALIST | Admitting: SPECIALIST
Payer: COMMERCIAL

## 2025-01-13 ENCOUNTER — DOCUMENTATION ONLY (OUTPATIENT)
Dept: ANTICOAGULATION | Facility: CLINIC | Age: 65
End: 2025-01-13
Payer: COMMERCIAL

## 2025-01-13 VITALS
DIASTOLIC BLOOD PRESSURE: 69 MMHG | SYSTOLIC BLOOD PRESSURE: 120 MMHG | HEART RATE: 75 BPM | BODY MASS INDEX: 38.21 KG/M2 | OXYGEN SATURATION: 98 % | RESPIRATION RATE: 16 BRPM | TEMPERATURE: 97.7 F | WEIGHT: 276 LBS

## 2025-01-13 LAB — COLONOSCOPY: NORMAL

## 2025-01-13 PROCEDURE — 45380 COLONOSCOPY AND BIOPSY: CPT | Performed by: SPECIALIST

## 2025-01-13 PROCEDURE — 370N000017 HC ANESTHESIA TECHNICAL FEE, PER MIN: Performed by: SPECIALIST

## 2025-01-13 PROCEDURE — 250N000009 HC RX 250: Performed by: NURSE ANESTHETIST, CERTIFIED REGISTERED

## 2025-01-13 PROCEDURE — 88305 TISSUE EXAM BY PATHOLOGIST: CPT | Mod: TC | Performed by: SPECIALIST

## 2025-01-13 PROCEDURE — 250N000011 HC RX IP 250 OP 636: Performed by: NURSE ANESTHETIST, CERTIFIED REGISTERED

## 2025-01-13 PROCEDURE — 250N000009 HC RX 250: Performed by: SPECIALIST

## 2025-01-13 RX ORDER — LIDOCAINE HYDROCHLORIDE 10 MG/ML
INJECTION, SOLUTION INFILTRATION; PERINEURAL PRN
Status: DISCONTINUED | OUTPATIENT
Start: 2025-01-13 | End: 2025-01-13

## 2025-01-13 RX ORDER — NALOXONE HYDROCHLORIDE 0.4 MG/ML
0.1 INJECTION, SOLUTION INTRAMUSCULAR; INTRAVENOUS; SUBCUTANEOUS
Status: DISCONTINUED | OUTPATIENT
Start: 2025-01-13 | End: 2025-01-13 | Stop reason: HOSPADM

## 2025-01-13 RX ORDER — ONDANSETRON 2 MG/ML
4 INJECTION INTRAMUSCULAR; INTRAVENOUS EVERY 30 MIN PRN
Status: DISCONTINUED | OUTPATIENT
Start: 2025-01-13 | End: 2025-01-13 | Stop reason: HOSPADM

## 2025-01-13 RX ORDER — PROPOFOL 10 MG/ML
INJECTION, EMULSION INTRAVENOUS CONTINUOUS PRN
Status: DISCONTINUED | OUTPATIENT
Start: 2025-01-13 | End: 2025-01-13

## 2025-01-13 RX ORDER — DEXAMETHASONE SODIUM PHOSPHATE 10 MG/ML
4 INJECTION, SOLUTION INTRAMUSCULAR; INTRAVENOUS
Status: DISCONTINUED | OUTPATIENT
Start: 2025-01-13 | End: 2025-01-13 | Stop reason: HOSPADM

## 2025-01-13 RX ORDER — ONDANSETRON 4 MG/1
4 TABLET, ORALLY DISINTEGRATING ORAL EVERY 30 MIN PRN
Status: DISCONTINUED | OUTPATIENT
Start: 2025-01-13 | End: 2025-01-13 | Stop reason: HOSPADM

## 2025-01-13 RX ORDER — PROPOFOL 10 MG/ML
INJECTION, EMULSION INTRAVENOUS PRN
Status: DISCONTINUED | OUTPATIENT
Start: 2025-01-13 | End: 2025-01-13

## 2025-01-13 RX ORDER — LIDOCAINE 40 MG/G
CREAM TOPICAL
Status: DISCONTINUED | OUTPATIENT
Start: 2025-01-13 | End: 2025-01-13 | Stop reason: HOSPADM

## 2025-01-13 RX ADMIN — LIDOCAINE HYDROCHLORIDE 0.5 ML: 10 INJECTION, SOLUTION EPIDURAL; INFILTRATION; INTRACAUDAL; PERINEURAL at 08:40

## 2025-01-13 RX ADMIN — PROPOFOL 200 MCG/KG/MIN: 10 INJECTION, EMULSION INTRAVENOUS at 09:01

## 2025-01-13 RX ADMIN — LIDOCAINE HYDROCHLORIDE 30 MG: 10 INJECTION, SOLUTION INFILTRATION; PERINEURAL at 09:01

## 2025-01-13 RX ADMIN — PROPOFOL 100 MG: 10 INJECTION, EMULSION INTRAVENOUS at 09:02

## 2025-01-13 ASSESSMENT — ACTIVITIES OF DAILY LIVING (ADL)
ADLS_ACUITY_SCORE: 46

## 2025-01-13 NOTE — PROGRESS NOTES
"ANTICOAGULATION  MANAGEMENT: Discharge Review    Derek Stover chart reviewed for anticoagulation continuity of care    Outpatient surgery/procedure on 1/13/25 for Colonoscopy.    Discharge disposition: Home    Results:    No results for input(s): \"INR\", \"[id number]\", \"F2\", \"ALMWH\", \"AAUFH\" in the last 168 hours.  Anticoagulation inpatient management:     not applicable     Anticoagulation discharge instructions:     Warfarin dosing: 10 mg 1/13/25, then home regimen continued   Bridging: bridging with enoxaparin (Lovenox)   INR goal change: No      Medication changes affecting anticoagulation: No    Additional factors affecting anticoagulation: No     PLAN     No adjustment to anticoagulation plan needed    Patient not contacted    No adjustment to Anticoagulation Calendar was required    Danni Patton, RN  1/13/2025  Anticoagulation Clinic  [facility] for routing messages: benji MOODY  [facility] patient phone line: [phone]    "

## 2025-01-13 NOTE — H&P
[facility] History and Physical    Derek Stover MRN# [id number]   Age: 64 year old YOB: 1960     Date of Admission:  (Not on file)    Home clinic: [facility]  Primary care provider: Osbaldo Renee          Impression and Plan:   Impression:   Screen for colon cancer [Z12.11]  Last colonoscopy 2014 - small ulcers.  No polyps      Plan:   Proceed to Colonoscopy as planned.  The procedure, risks(bleeding, perforation), benefits and alternatives were discussed and the patient agrees to proceed. Cleared for Anesthesia             Chief Complaint:   Screen for colon cancer [Z12.11]    History is obtained from the patient          History of Present Illness:   This 64 year old male is being seen at this time for evaluation for colonoscopy.  (+)cologuard.   Diarrhea 2nd to meds. No family hx            Past Medical History:     Past Medical History:   Diagnosis Date    Arthritis 2011    left hand pointer finger    Chronic atrial fibrillation (H)     Valvular Afib.  Diagnosed 06/2017. Pt initiated on coumadin 7/18/17    Erectile dysfunction     Hyperlipidemia     Mitral regurgitation     rheumatic fever as a child.  Severe mitral valve regurgitation.  Underwent 32 mm St Servando [place] mechanical mitral valve replacement in September 2017.    Prediabetes     Tobacco abuse     Warfarin anticoagulation             Past Surgical History:     Past Surgical History:   Procedure Laterality Date    COLONOSCOPY N/A 9/8/2014    Procedure: COMBINED COLONOSCOPY, SINGLE BIOPSY/POLYPECTOMY BY BIOPSY;  Surgeon: Kai Bailey MD;  Location:  GI    CV HEART CATHETERIZATION WITH POSSIBLE INTERVENTION N/A 8/26/2020    Procedure: Heart Catheterization with Possible Intervention;  Surgeon: Marin Hagen MD;  Location:  HEART CARDIAC CATH LAB    EXTRACTION(S) DENTAL      ORTHOPEDIC SURGERY      RIght knee scope    REPLACE VALVE MITRAL N/A 9/12/2017    Procedure: REPLACE VALVE MITRAL;   MITRAL VALVE REPLACEMENT  [facility] Masters Series Mechanical Heart Valve 33mm  Ref, 33MJ-501 Serial [id number]   ON PUMP, MITCHELL;  Surgeon: Ivana Marie MD;  Location:  OR            Social History:     Social History     Tobacco Use    Smoking status: Every Day     Current packs/day: 0.00     Average packs/day: 2.0 packs/day for 45.0 years (90.0 ttl pk-yrs)     Types: Cigarettes, Pipe     Start date: 1977     Last attempt to quit: 2017     Years since quittin.4    Smokeless tobacco: Never    Tobacco comments:     started smoking at 17, smoking 2 ppd since    Substance Use Topics    Alcohol use: Yes     Alcohol/week: 4.0 standard drinks of alcohol     Types: 4 Standard drinks or equivalent per week     Comment: occasional            Family History:     Family History   Problem Relation Age of Onset    Diabetes Mother     Obesity Mother     Diabetes Father     Hyperlipidemia Father     Obesity Father     Obesity Maternal Grandfather     Obesity Brother     Diabetes Brother     Hyperlipidemia Brother     Hyperlipidemia Brother     Substance Abuse Brother     Obesity Brother             Immunizations:     VACCINE/DOSE   Diptheria   DPT   DTAP   HBIG   Hepatitis A   Hepatitis B   HIB   Influenza   Measles   Meningococcal   MMR   Mumps   Pneumococcal   Polio   Rubella   Small Pox   TDAP   Varicella   Zoster            Allergies:     Allergies   Allergen Reactions    Bee Pollen      Other reaction(s): Runny Nose    No Known Drug Allergy     Pollen Extract     Seasonal Allergies     Statin Drugs [Statins]      No allergy, felt horrible on this drug.            Medications:     No current facility-administered medications for this encounter.     Current Outpatient Medications   Medication Sig Dispense Refill    acetaminophen (TYLENOL) 325 MG tablet Take 2 tablets (650 mg) by mouth every 4 hours as needed for other (surgical pain) 100 tablet 0    Amoxicillin (AMOXIL PO) Take 1,500-3,000 mg by mouth daily  as needed (patient takes 6 X 500 = 3,000 mg dose 1 hour before dental appointment and 3 X 500 = 1,500 mg dose 6 hours after dental appointment.) (Patient not taking: Reported on 11/26/2024)      ASPIRIN NOT PRESCRIBED (INTENTIONAL) Please choose reason not prescribed from choices below. (Patient not taking: Reported on 11/26/2024)      atorvastatin (LIPITOR) 40 MG tablet Take 1 tablet (40 mg) by mouth every evening. 90 tablet 3    bisacodyl (DULCOLAX) 5 MG EC tablet Two days prior to exam take two (2) tablets at 4pm. One day prior to exam take two (2) tablets at 4pm 4 tablet 0    enoxaparin ANTICOAGULANT (LOVENOX) 120 MG/0.8ML syringe Inject 0.8 mLs (120 mg) subcutaneously every 12 hours. As directed by INR clinic 16 mL 1    nitroGLYcerin (NITROSTAT) 0.4 MG sublingual tablet For chest pain place 1 tablet under the tongue every 5 minutes for 3 doses. If symptoms persist 5 minutes after 1st dose call 911. (Patient not taking: Reported on 11/26/2024) 15 tablet 0    polyethylene glycol (GOLYTELY) 236 g suspension Two days before procedure at 5PM fill first container with water. Mix and drink an 8 oz glass every 15 minutes until HALF of the container is gone. Place the remainder in the refrigerator. One day before procedure at 5PM drink second half of bowel prep. Drink an 8 oz glass every 15 minutes until it is gone. Day of procedure 6 hours before arrival time fill the 2nd container with water. Mix and drink an 8 oz glass every 15 minutes until HALF of the container is gone. Discard the remaining solution. 8000 mL 0    sildenafil (VIAGRA) 50 MG tablet Take 1 tablet (50 mg) by mouth daily as needed. As needed 10 tablet 0    warfarin ANTICOAGULANT (COUMADIN) 5 MG tablet Take 1 tablet (5 mg) on Tuesdays, Thursdays, Saturdays and take 0.5 tablet (2.5 mg) all other days or as directed by the INR clinic 100 tablet 1             Review of Systems:   The review of systems was positive for the following findings.  None.  The  remainder of the review of systems was unremarkable.          Physical Exam:   All vitals have been reviewed  There were no vitals taken for this visit.  No intake or output data in the 24 hours ending 01/12/25 2143  SHEENT examination revealed NC/AT, EOMI.  Examination of the chest revealed CTA.  Examination of the heart revealed RRR.  Examination of the abdomen revealed Soft, non tender.  The neuromuscular examination was NL.          Data:   All laboratory data reviewed  No results found for any visits on 01/13/25.  -     Lee Grant MD, FACS

## 2025-01-13 NOTE — ANESTHESIA POSTPROCEDURE EVALUATION
Patient: Derek Stover    Procedure: Procedure(s):  COLONOSCOPY, WITH BIOPSY       Anesthesia Type:  MAC    Note:  Disposition: Outpatient   Postop Pain Control: Uneventful            Sign Out: Well controlled pain   PONV: No   Neuro/Psych: Uneventful            Sign Out: Acceptable/Baseline neuro status   Airway/Respiratory: Uneventful            Sign Out: Acceptable/Baseline resp. status   CV/Hemodynamics: Uneventful            Sign Out: Acceptable CV status   Other NRE: NONE   DID A NON-ROUTINE EVENT OCCUR? No    Event details/Postop Comments:  Pt was happy with anesthesia care.  No complications.  I will follow up with the pt if needed.       Last vitals:  Vitals Value Taken Time   /51 01/13/25 0930   Temp     Pulse 74 01/13/25 0930   Resp     SpO2 95 % 01/13/25 0938   Vitals shown include unfiled device data.    Electronically Signed By: JESSIE Harper CRNA  January 13, 2025  9:39 AM

## 2025-01-13 NOTE — ANESTHESIA CARE TRANSFER NOTE
Patient: Derek Stover    Procedure: Procedure(s):  COLONOSCOPY, WITH BIOPSY       Diagnosis: Screen for colon cancer [Z12.11]  Diagnosis Additional Information: No value filed.    Anesthesia Type:   MAC     Note:    Oropharynx: oropharynx clear of all foreign objects and spontaneously breathing  Level of Consciousness: drowsy  Oxygen Supplementation: room air    Independent Airway: airway patency satisfactory and stable  Dentition: dentition unchanged  Vital Signs Stable: post-procedure vital signs reviewed and stable  Report to RN Given: handoff report given  Patient transferred to: Phase II    Handoff Report: Identifed the Patient, Identified the Reponsible Provider, Reviewed the pertinent medical history, Discussed the surgical course, Reviewed Intra-OP anesthesia mangement and issues during anesthesia, Set expectations for post-procedure period and Allowed opportunity for questions and acknowledgement of understanding  Vitals:  Vitals Value Taken Time   BP     Temp     Pulse     Resp     SpO2         Electronically Signed By: JESSIE Harper CRNA  January 13, 2025  9:28 AM

## 2025-01-13 NOTE — DISCHARGE INSTRUCTIONS
[facility]    Home Care Following Endoscopy          Activity:  You have just undergone an endoscopic procedure usually performed with conscious sedation.  Do not work or operate machinery (including a car) for at least 12 hours.    I encourage you to walk and attempt to pass this air as soon as possible.    Diet:  Return to the diet you were on before your procedure but eat lightly for the first 12-24 hours.  Drink plenty of water.  Resume any regular medications unless otherwise advised by your physician.  Please begin any new medication prescribed as a result of your procedure as directed by your physician.   If you had any biopsy or polyp removed please refrain from aspirin or aspirin products for 2 days.  If on Coumadin please restart as instructed by your physician.   Pain:  You may take Tylenol as needed for pain.  Expected Recovery:  You can expect some mild abdominal fullness and/or discomfort due to the air used to inflate your intestinal tract.    Call Your Physician if You Have:  After Colonoscopy:  Worsening persisting abdominal pain which is worse with activity.  Fevers (>101 degrees F), chills or shakes.  Passage of continued blood with bowel movements.     Any questions or concerns about your recovery, please call [phone] or after hours [facility] ([phone]) Nurse Advice Line.    Follow-up Care:  You did have biopsy tissue sample(s) removed.  The polyps/biopsy tissue sample(s) will be sent to pathology.    You should receive letter in your My Chart with your results within 1-2 weeks. If you do not participate in My Chart a physical letter will come in the mail in 2-3 weeks.  Please call if you have not received a notification of your results.  If asked to return to clinic please make an appointment 1 week after your procedure.  Call [phone].

## 2025-01-14 ENCOUNTER — HOSPITAL ENCOUNTER (OUTPATIENT)
Dept: CT IMAGING | Facility: CLINIC | Age: 65
Discharge: HOME | End: 2025-01-14
Attending: FAMILY MEDICINE
Payer: COMMERCIAL

## 2025-01-14 DIAGNOSIS — Z87.891 PERSONAL HISTORY OF TOBACCO USE: ICD-10-CM

## 2025-01-14 LAB
PATH REPORT.COMMENTS IMP SPEC: NORMAL
PATH REPORT.FINAL DX SPEC: NORMAL
PATH REPORT.GROSS SPEC: NORMAL
PATH REPORT.MICROSCOPIC SPEC OTHER STN: NORMAL
PATH REPORT.RELEVANT HX SPEC: NORMAL
PHOTO IMAGE: NORMAL

## 2025-01-14 PROCEDURE — 88305 TISSUE EXAM BY PATHOLOGIST: CPT | Mod: 26 | Performed by: PATHOLOGY

## 2025-01-14 PROCEDURE — 71271 CT THORAX LUNG CANCER SCR C-: CPT

## 2025-01-20 ENCOUNTER — ANTICOAGULATION THERAPY VISIT (OUTPATIENT)
Dept: ANTICOAGULATION | Facility: CLINIC | Age: 65
End: 2025-01-20

## 2025-01-20 ENCOUNTER — DOCUMENTATION ONLY (OUTPATIENT)
Dept: ANTICOAGULATION | Facility: CLINIC | Age: 65
End: 2025-01-20

## 2025-01-20 ENCOUNTER — LAB (OUTPATIENT)
Dept: LAB | Facility: CLINIC | Age: 65
End: 2025-01-20
Payer: COMMERCIAL

## 2025-01-20 DIAGNOSIS — I48.0 PAROXYSMAL ATRIAL FIBRILLATION (H): Primary | ICD-10-CM

## 2025-01-20 DIAGNOSIS — I48.0 PAROXYSMAL ATRIAL FIBRILLATION (H): ICD-10-CM

## 2025-01-20 DIAGNOSIS — I48.20 CHRONIC ATRIAL FIBRILLATION (H): ICD-10-CM

## 2025-01-20 DIAGNOSIS — Z95.2 MECHANICAL HEART VALVE PRESENT: ICD-10-CM

## 2025-01-20 DIAGNOSIS — Z95.2 S/P MVR (MITRAL VALVE REPLACEMENT): ICD-10-CM

## 2025-01-20 LAB — INR BLD: 2.8 (ref 0.9–1.1)

## 2025-01-20 PROCEDURE — 85610 PROTHROMBIN TIME: CPT

## 2025-01-20 PROCEDURE — 36416 COLLJ CAPILLARY BLOOD SPEC: CPT

## 2025-01-20 NOTE — PROGRESS NOTES
ANTICOAGULATION MANAGEMENT     Derek Stover 64 year old male is on warfarin with therapeutic INR result. (Goal INR 2.5-3.5)    Recent labs: (last 7 days)     01/20/25  1557   INR 2.8*       ASSESSMENT     Source(s): Chart Review and Patient/Caregiver Call     Warfarin doses taken: Booster dose(s) recently taken which may be affecting INR  Diet: No new diet changes identified  Medication/supplement changes: None noted  New illness, injury, or hospitalization: No  Signs or symptoms of bleeding or clotting: No  Previous result: Subtherapeutic  Additional findings: Bridging with Enoxaparin until INR >= 2.5 - ok to stop today       PLAN     Recommended plan for temporary change(s) affecting INR     Dosing Instructions: Continue your current warfarin dose with next INR in 2 weeks       Summary  As of 1/20/2025      Full warfarin instructions:  5 mg every Tue, Thu, Sat; 2.5 mg all other days   Next INR check:  1/31/2025               Telephone call with Roberto who verbalizes understanding and agrees to plan and who agrees to plan and repeated back plan correctly    Lab visit scheduled    Education provided: Contact [phone] with any changes, questions or concerns.     Plan made per [facility] anticoagulation protocol    Slim Preston RN  1/20/2025  Anticoagulation Clinic  [employer] for routing messages: benji MOODY  [facility] patient phone line: [phone]        _______________________________________________________________________     Anticoagulation Episode Summary       Current INR goal:  2.5-3.5   TTR:  79.2% (1 y)   Target end date:  Indefinite   Send INR reminders to:  SOPHIA MOODY    Indications    Atrial fibrillation (H) [I48.91]  S/P MVR (mitral valve replacement) [Z95.2]  Mechanical heart valve present [Z95.2]  Chronic atrial fibrillation (H) [I48.20]  Paroxysmal atrial fibrillation (H) [I48.0]             Comments:  --             Anticoagulation Care Providers       Provider Role Specialty Phone  number    Osbaldo Renee MD [facility] Family Medicine [phone]    Carlos Archibald DO [facility] Internal Medicine [phone]

## 2025-01-20 NOTE — PROGRESS NOTES
ANTICOAGULATION CLINIC REFERRAL RENEWAL REQUEST       An annual renewal order is required for all patients referred to [facility] Anticoagulation Clinic.?  Please review and sign the pended referral order for Derek Stover.       ANTICOAGULATION SUMMARY      Warfarin indication(s)   Atrial Fibrillation and Mechanical MVR    Mechanical heart valve present?  Mechanical MVR       Current goal range   INR: 2.5-3.5     Goal appropriate for indication? Goal INR 2.5-3.5 standard for indication(s) above     Time in Therapeutic Range (TTR)  (Goal > 60%) 79.2%       Office visit with referring provider's group within last year yes on 9/4/24       Slim Preston RN  [facility] Anticoagulation Clinic

## 2025-02-11 ENCOUNTER — LAB (OUTPATIENT)
Dept: LAB | Facility: CLINIC | Age: 65
End: 2025-02-11
Payer: COMMERCIAL

## 2025-02-11 ENCOUNTER — ANTICOAGULATION THERAPY VISIT (OUTPATIENT)
Dept: ANTICOAGULATION | Facility: CLINIC | Age: 65
End: 2025-02-11

## 2025-02-11 DIAGNOSIS — Z95.2 S/P MVR (MITRAL VALVE REPLACEMENT): ICD-10-CM

## 2025-02-11 DIAGNOSIS — I48.0 PAROXYSMAL ATRIAL FIBRILLATION (H): Primary | ICD-10-CM

## 2025-02-11 DIAGNOSIS — I48.0 PAROXYSMAL ATRIAL FIBRILLATION (H): ICD-10-CM

## 2025-02-11 DIAGNOSIS — I48.20 CHRONIC ATRIAL FIBRILLATION (H): ICD-10-CM

## 2025-02-11 DIAGNOSIS — Z95.2 MECHANICAL HEART VALVE PRESENT: ICD-10-CM

## 2025-02-11 LAB — INR BLD: 3 (ref 0.9–1.1)

## 2025-02-11 PROCEDURE — 36415 COLL VENOUS BLD VENIPUNCTURE: CPT

## 2025-02-11 PROCEDURE — 85610 PROTHROMBIN TIME: CPT

## 2025-02-11 NOTE — PROGRESS NOTES
ANTICOAGULATION MANAGEMENT     Derek Stover 64 year old male is on warfarin with therapeutic INR result. (Goal INR 2.5-3.5)    Recent labs: (last 7 days)     02/11/25  1525   INR 3.0*       ASSESSMENT     Source(s): Chart Review and Patient/Caregiver Call     Warfarin doses taken: Warfarin taken as instructed  Diet: No new diet changes identified  Medication/supplement changes: None noted  New illness, injury, or hospitalization: No  Signs or symptoms of bleeding or clotting: No  Previous result: Subtherapeutic  Additional findings: None       PLAN     Recommended plan for no diet, medication or health factor changes affecting INR     Dosing Instructions: Continue your current warfarin dose with next INR in 4 weeks       Summary  As of 2/11/2025      Full warfarin instructions:  5 mg every Tue, Thu, Sat; 2.5 mg all other days   Next INR check:  3/11/2025               Telephone call with Roberto who verbalizes understanding and agrees to plan and who agrees to plan and repeated back plan correctly    Lab visit scheduled    Education provided: None required    Plan made per [facility] anticoagulation protocol    Danni Patton RN  2/11/2025  Anticoagulation Clinic  [employer] for routing messages: benji MOODY  [facility] patient phone line: [phone]        _______________________________________________________________________     Anticoagulation Episode Summary       Current INR goal:  2.5-3.5   TTR:  75.9% (1 y)   Target end date:  Indefinite   Send INR reminders to:  SOPHIA MOODY    Indications    Atrial fibrillation (H) [I48.91]  S/P MVR (mitral valve replacement) [Z95.2]  Mechanical heart valve present [Z95.2]  Chronic atrial fibrillation (H) [I48.20]  Paroxysmal atrial fibrillation (H) [I48.0]             Comments:  --             Anticoagulation Care Providers       Provider Role Specialty Phone number    Osbaldo Renee MD Referring Family Medicine [phone]    Carlos Archibald DO  [facility] Internal Medicine [phone]

## 2025-02-26 ENCOUNTER — MYC REFILL (OUTPATIENT)
Dept: FAMILY MEDICINE | Facility: CLINIC | Age: 65
End: 2025-02-26
Payer: COMMERCIAL

## 2025-02-26 DIAGNOSIS — N52.9 VASCULOGENIC ERECTILE DYSFUNCTION, UNSPECIFIED VASCULOGENIC ERECTILE DYSFUNCTION TYPE: ICD-10-CM

## 2025-02-27 ENCOUNTER — TELEPHONE (OUTPATIENT)
Dept: FAMILY MEDICINE | Facility: CLINIC | Age: 65
End: 2025-02-27
Payer: COMMERCIAL

## 2025-02-27 RX ORDER — TADALAFIL 5 MG/1
5 TABLET ORAL DAILY
Qty: 30 TABLET | Refills: 1 | Status: SHIPPED | OUTPATIENT
Start: 2025-02-27

## 2025-02-27 NOTE — TELEPHONE ENCOUNTER
Prior Authorization Retail Medication Request    Medication/Dose: tadalafil (CIALIS) 5 MG tablet  Diagnosis and ICD code (if different than what is on RX):      New/renewal/insurance change PA/secondary ins. PA:  Previously Tried and Failed:    Rationale:      Insurance   Primary: [employer]   Insurance ID:  [id number]     Secondary (if applicable):  Insurance ID:      Pharmacy Information (if different than what is on RX)  Name:    [facility] [address] [place] [address] [address]     Phone:    Telephone Fax   [phone]         Clinic Information  Preferred routing pool for dept communication: [facility] Pool

## 2025-03-04 NOTE — TELEPHONE ENCOUNTER
The patient will most likely need to pay cash for this medication.  Treatment for erectile dysfunction is seldom covered under the insurance plans.    Dragan

## 2025-03-04 NOTE — TELEPHONE ENCOUNTER
Retail Pharmacy Prior Authorization Team   Phone: [phone]    PA Initiation    Medication: TADALAFIL 5 MG PO TABS  Insurance Company: [employer] - Phone [phone] Fax [phone]  Pharmacy Filling the Rx: [facility] [address] [place] [address] - [address] [place] [address] [place]  Filling Pharmacy Phone: [phone]  Filling Pharmacy Fax:    Start Date: 3/4/2025

## 2025-03-04 NOTE — TELEPHONE ENCOUNTER
Note: Due to record-high volumes, our turn-around time is taking longer than usual . We are currently 4  business days behind in the pools.   We are working diligently to submit all requests in a timely manner and in the order they are received. Please only flag TRUE URGENT requests as high priority to the pool at this time.   If you have questions on status of PA's,  please send a note/message in the active PA encounter and send back to the [facility] PA pool [[id number]].    If you have questions about the turn-around time or about our process, please reach out to our supervisor Rema Morales.   Thank you!   RPPA (Retail Pharmacy Prior Authorization) team    PRIOR AUTHORIZATION DENIED    Medication: TADALAFIL 5 MG PO TABS  Insurance Company: [employer] - Phone [phone] Fax [phone]  Denial Date: 3/4/2025  Denial Rational:         Appeal Information: N/A        Patient Notified: No

## 2025-05-06 ENCOUNTER — ANTICOAGULATION THERAPY VISIT (OUTPATIENT)
Dept: ANTICOAGULATION | Facility: CLINIC | Age: 65
End: 2025-05-06

## 2025-05-06 ENCOUNTER — LAB (OUTPATIENT)
Dept: LAB | Facility: CLINIC | Age: 65
End: 2025-05-06
Payer: COMMERCIAL

## 2025-05-06 DIAGNOSIS — I48.91 ATRIAL FIBRILLATION (H): Primary | ICD-10-CM

## 2025-05-06 DIAGNOSIS — Z95.2 S/P MVR (MITRAL VALVE REPLACEMENT): ICD-10-CM

## 2025-05-06 DIAGNOSIS — I48.0 PAROXYSMAL ATRIAL FIBRILLATION (H): ICD-10-CM

## 2025-05-06 DIAGNOSIS — I48.20 CHRONIC ATRIAL FIBRILLATION (H): ICD-10-CM

## 2025-05-06 DIAGNOSIS — Z95.2 MECHANICAL HEART VALVE PRESENT: ICD-10-CM

## 2025-05-06 LAB — INR BLD: 2.5 (ref 0.9–1.1)

## 2025-05-06 PROCEDURE — 36416 COLLJ CAPILLARY BLOOD SPEC: CPT

## 2025-05-06 PROCEDURE — 85610 PROTHROMBIN TIME: CPT

## 2025-05-06 NOTE — PROGRESS NOTES
ANTICOAGULATION MANAGEMENT     Derek Stover 64 year old male is on warfarin with therapeutic INR result. (Goal INR 2.5-3.5)    Recent labs: (last 7 days)     05/06/25  1504   INR 2.5*       ASSESSMENT     Source(s): Chart Review and Patient/Caregiver Call     Warfarin doses taken: Warfarin taken as instructed  Diet: No new diet changes identified  Medication/supplement changes: None noted  New illness, injury, or hospitalization: No  Signs or symptoms of bleeding or clotting: No  Previous result: Supratherapeutic  Additional findings: None       PLAN     Recommended plan for no diet, medication or health factor changes affecting INR     Dosing Instructions: Continue your current warfarin dose with next INR in 4 weeks       Summary  As of 5/6/2025      Full warfarin instructions:  5 mg every Tue, Thu, Sat; 2.5 mg all other days   Next INR check:  6/3/2025               Telephone call with Roberto who verbalizes understanding and agrees to plan    Lab visit scheduled    Education provided: Please call back if any changes to your diet, medications or how you've been taking warfarin    Plan made per [facility] anticoagulation protocol    Chyna Keene RN  5/6/2025  Anticoagulation Clinic  [employer] for routing messages: benji MOODY  [facility] patient phone line: [phone]        _______________________________________________________________________     Anticoagulation Episode Summary       Current INR goal:  2.5-3.5   TTR:  81.7% (1 y)   Target end date:  Indefinite   Send INR reminders to:  [facility]    Indications    Atrial fibrillation (H) [I48.91]  S/P MVR (mitral valve replacement) [Z95.2]  Mechanical heart valve present [Z95.2]  Chronic atrial fibrillation (H) [I48.20]  Paroxysmal atrial fibrillation (H) [I48.0]             Comments:  --             Anticoagulation Care Providers       Provider Role Specialty Phone number    Osbaldo Renee MD Referring Family Medicine [phone]    Dragan  Carlos Yarbrough DO [facility] Internal Medicine [phone]

## 2025-06-03 ENCOUNTER — ANTICOAGULATION THERAPY VISIT (OUTPATIENT)
Dept: ANTICOAGULATION | Facility: CLINIC | Age: 65
End: 2025-06-03

## 2025-06-03 ENCOUNTER — LAB (OUTPATIENT)
Dept: LAB | Facility: CLINIC | Age: 65
End: 2025-06-03
Payer: COMMERCIAL

## 2025-06-03 ENCOUNTER — RESULTS FOLLOW-UP (OUTPATIENT)
Dept: ANTICOAGULATION | Facility: CLINIC | Age: 65
End: 2025-06-03

## 2025-06-03 DIAGNOSIS — I48.0 PAROXYSMAL ATRIAL FIBRILLATION (H): Primary | ICD-10-CM

## 2025-06-03 DIAGNOSIS — Z95.2 MECHANICAL HEART VALVE PRESENT: ICD-10-CM

## 2025-06-03 DIAGNOSIS — I48.20 CHRONIC ATRIAL FIBRILLATION (H): ICD-10-CM

## 2025-06-03 DIAGNOSIS — I48.0 PAROXYSMAL ATRIAL FIBRILLATION (H): ICD-10-CM

## 2025-06-03 DIAGNOSIS — Z95.2 S/P MVR (MITRAL VALVE REPLACEMENT): ICD-10-CM

## 2025-06-03 LAB — INR BLD: 2.3 (ref 0.9–1.1)

## 2025-06-03 PROCEDURE — 85610 PROTHROMBIN TIME: CPT

## 2025-06-03 PROCEDURE — 36416 COLLJ CAPILLARY BLOOD SPEC: CPT

## 2025-06-03 NOTE — PROGRESS NOTES
ANTICOAGULATION MANAGEMENT     Derek Stover 64 year old male is on warfarin with subtherapeutic INR result. (Goal INR 2.5-3.5)    Recent labs: (last 7 days)     06/03/25  1508   INR 2.3*       ASSESSMENT     Source(s): Chart Review and Patient/Caregiver Call     Warfarin doses taken: Warfarin taken as instructed  Diet: Increased greens/vitamin K in diet; plans to resume previous intake  Medication/supplement changes: None noted  New illness, injury, or hospitalization: No  Signs or symptoms of bleeding or clotting: No  Previous result: Therapeutic last visit; previously outside of goal range  Additional findings: Upcoming surgery/procedure Dental extraction (1) 6/20       PLAN     Recommended plan for temporary change(s) affecting INR     Dosing Instructions: booster dose then continue your current warfarin dose with next INR in 2 weeks       Summary  As of 6/3/2025      Full warfarin instructions:  6/3: 10 mg; Otherwise 5 mg every Tue, Thu, Sat; 2.5 mg all other days   Next INR check:  6/17/2025               Telephone call with Roberto who verbalizes understanding and agrees to plan and who agrees to plan and repeated back plan correctly    Lab visit scheduled    Education provided: Importance of notifying anticoagulation clinic for: changes in medications; a sooner lab recheck maybe needed and upcoming surgeries and procedures 2 weeks in advance    Plan made per [facility] anticoagulation protocol    Danni Patton RN  6/3/2025  Anticoagulation Clinic  [employer] for routing messages: benji MOODY  [facility] patient phone line: [phone]        _______________________________________________________________________     Anticoagulation Episode Summary       Current INR goal:  2.5-3.5   TTR:  79.7% (1 y)   Target end date:  Indefinite   Send INR reminders to:  SOPHIA MOODY    Indications    Atrial fibrillation (H) [I48.91]  S/P MVR (mitral valve replacement) [Z95.2]  Mechanical heart valve present  [Z95.2]  Chronic atrial fibrillation (H) [I48.20]  Paroxysmal atrial fibrillation (H) [I48.0]             Comments:  --             Anticoagulation Care Providers       Provider Role Specialty Phone number    Osbaldo Renee MD Referring Family Medicine [phone]    Carlos Archibald DO [facility] Internal Medicine [phone]

## 2025-06-10 DIAGNOSIS — Z79.01 LONG TERM CURRENT USE OF ANTICOAGULANT THERAPY: ICD-10-CM

## 2025-06-10 DIAGNOSIS — I48.20 CHRONIC ATRIAL FIBRILLATION (H): ICD-10-CM

## 2025-06-10 RX ORDER — WARFARIN SODIUM 5 MG/1
TABLET ORAL
Qty: 100 TABLET | Refills: 1 | Status: SHIPPED | OUTPATIENT
Start: 2025-06-10

## 2025-06-10 NOTE — TELEPHONE ENCOUNTER
ANTICOAGULATION MANAGEMENT:  Medication Refill    Anticoagulation Summary  As of 6/3/2025      Warfarin maintenance plan:  5 mg (5 mg x 1) every Tue, Thu, Sat; 2.5 mg (5 mg x 0.5) all other days   Next INR check:  6/17/2025   Target end date:  Indefinite    Indications    Atrial fibrillation (H) [I48.91]  S/P MVR (mitral valve replacement) [Z95.2]  Mechanical heart valve present [Z95.2]  Chronic atrial fibrillation (H) [I48.20]  Paroxysmal atrial fibrillation (H) [I48.0]                 Anticoagulation Care Providers       Provider Role Specialty Phone number    Osbaldo Renee MD Referring Family Medicine [phone]    Carlos Archibald DO Responsible Internal Medicine [phone]            Refill Criteria    Visit with referring provider/group: Meets criteria: visit within referring provider group in the last 15 months on 9/4/2024    [facility] referral last signed: 01/20/2025; within last year:  Yes    Lab monitoring is up to date (not exceeding 2 weeks overdue): Yes    Derek meets all criteria for refill. Rx instructions and quantity supplied updated to match patient's current dosing plan. Warfarin 90 day supply with 1 refill granted per [facility] protocol     Chyna Keene RN  Anticoagulation Clinic

## 2025-06-17 ENCOUNTER — LAB (OUTPATIENT)
Dept: LAB | Facility: CLINIC | Age: 65
End: 2025-06-17
Payer: COMMERCIAL

## 2025-06-17 ENCOUNTER — RESULTS FOLLOW-UP (OUTPATIENT)
Dept: ANTICOAGULATION | Facility: CLINIC | Age: 65
End: 2025-06-17

## 2025-06-17 ENCOUNTER — ANTICOAGULATION THERAPY VISIT (OUTPATIENT)
Dept: ANTICOAGULATION | Facility: CLINIC | Age: 65
End: 2025-06-17

## 2025-06-17 DIAGNOSIS — I48.0 PAROXYSMAL ATRIAL FIBRILLATION (H): Primary | ICD-10-CM

## 2025-06-17 DIAGNOSIS — Z95.2 MECHANICAL HEART VALVE PRESENT: ICD-10-CM

## 2025-06-17 DIAGNOSIS — I48.20 CHRONIC ATRIAL FIBRILLATION (H): ICD-10-CM

## 2025-06-17 DIAGNOSIS — I48.0 PAROXYSMAL ATRIAL FIBRILLATION (H): ICD-10-CM

## 2025-06-17 DIAGNOSIS — Z95.2 S/P MVR (MITRAL VALVE REPLACEMENT): ICD-10-CM

## 2025-06-17 LAB — INR BLD: 3.1 (ref 0.9–1.1)

## 2025-06-17 PROCEDURE — 36416 COLLJ CAPILLARY BLOOD SPEC: CPT

## 2025-06-17 PROCEDURE — 85610 PROTHROMBIN TIME: CPT

## 2025-06-17 NOTE — PROGRESS NOTES
ANTICOAGULATION MANAGEMENT     Derek Stover 64 year old male is on warfarin with therapeutic INR result. (Goal INR 2.5-3.5)    Recent labs: (last 7 days)     06/17/25  1554   INR 3.1*       ASSESSMENT     Source(s): Chart Review and Patient/Caregiver Call     Warfarin doses taken: Warfarin taken as instructed  Diet: No new diet changes identified  Medication/supplement changes: None noted  New illness, injury, or hospitalization: No  Signs or symptoms of bleeding or clotting: No  Previous result: Subtherapeutic  Additional findings: Upcoming surgery/procedure dental extraction Friday- no hold       PLAN     Recommended plan for no diet, medication or health factor changes affecting INR     Dosing Instructions: Continue your current warfarin dose with next INR in 4 weeks       Summary  As of 6/17/2025      Full warfarin instructions:  5 mg every Tue, Thu, Sat; 2.5 mg all other days   Next INR check:  7/15/2025               Telephone call with Roberto who verbalizes understanding and agrees to plan and who agrees to plan and repeated back plan correctly    Lab visit scheduled    Education provided: None required    Plan made per [facility] anticoagulation protocol    Danni Patton RN  6/17/2025  Anticoagulation Clinic  [employer] for routing messages: [facility]: [phone]        _______________________________________________________________________     Anticoagulation Episode Summary       Current INR goal:  2.5-3.5   TTR:  79.6% (1 y)   Target end date:  Indefinite   Send INR reminders to:  [facility]    Indications    Atrial fibrillation (H) [I48.91]  S/P MVR (mitral valve replacement) [Z95.2]  Mechanical heart valve present [Z95.2]  Chronic atrial fibrillation (H) [I48.20]  Paroxysmal atrial fibrillation (H) [I48.0]             Comments:  --             Anticoagulation Care Providers       Provider Role Specialty Phone number    Osbaldo Renee MD Referring Family Medicine  [phone]    Carlos Archibald DO [facility] Internal Medicine [phone]

## 2025-06-20 ENCOUNTER — RESULTS FOLLOW-UP (OUTPATIENT)
Dept: CARDIOLOGY | Facility: CLINIC | Age: 65
End: 2025-06-20

## 2025-06-20 ENCOUNTER — HOSPITAL ENCOUNTER (OUTPATIENT)
Dept: CARDIOLOGY | Facility: CLINIC | Age: 65
Discharge: HOME | End: 2025-06-20
Attending: INTERNAL MEDICINE | Admitting: INTERNAL MEDICINE
Payer: COMMERCIAL

## 2025-06-20 DIAGNOSIS — I48.19 PERSISTENT ATRIAL FIBRILLATION (H): ICD-10-CM

## 2025-06-20 DIAGNOSIS — Z95.2 MECHANICAL HEART VALVE PRESENT: ICD-10-CM

## 2025-06-20 DIAGNOSIS — F17.200 SMOKER: ICD-10-CM

## 2025-06-20 DIAGNOSIS — Z98.890 S/P MVR (MITRAL VALVE REPAIR): ICD-10-CM

## 2025-06-20 DIAGNOSIS — I25.10 CORONARY ARTERY DISEASE INVOLVING NATIVE CORONARY ARTERY OF NATIVE HEART WITHOUT ANGINA PECTORIS: ICD-10-CM

## 2025-06-20 LAB — BI-PLANE LVEF ECHO: NORMAL

## 2025-06-20 PROCEDURE — 93306 TTE W/DOPPLER COMPLETE: CPT

## 2025-06-20 PROCEDURE — 93306 TTE W/DOPPLER COMPLETE: CPT | Mod: 26 | Performed by: INTERNAL MEDICINE

## 2025-07-15 ENCOUNTER — ANTICOAGULATION THERAPY VISIT (OUTPATIENT)
Dept: ANTICOAGULATION | Facility: CLINIC | Age: 65
End: 2025-07-15

## 2025-07-15 ENCOUNTER — LAB (OUTPATIENT)
Dept: LAB | Facility: CLINIC | Age: 65
End: 2025-07-15
Payer: COMMERCIAL

## 2025-07-15 DIAGNOSIS — I48.0 PAROXYSMAL ATRIAL FIBRILLATION (H): ICD-10-CM

## 2025-07-15 DIAGNOSIS — Z95.2 S/P MVR (MITRAL VALVE REPLACEMENT): ICD-10-CM

## 2025-07-15 DIAGNOSIS — I48.91 ATRIAL FIBRILLATION (H): Primary | ICD-10-CM

## 2025-07-15 DIAGNOSIS — I48.20 CHRONIC ATRIAL FIBRILLATION (H): ICD-10-CM

## 2025-07-15 DIAGNOSIS — Z95.2 MECHANICAL HEART VALVE PRESENT: ICD-10-CM

## 2025-07-15 LAB — INR BLD: 2.7 (ref 0.9–1.1)

## 2025-07-15 PROCEDURE — 85610 PROTHROMBIN TIME: CPT

## 2025-07-15 PROCEDURE — 36416 COLLJ CAPILLARY BLOOD SPEC: CPT

## 2025-07-15 NOTE — PROGRESS NOTES
ANTICOAGULATION MANAGEMENT     Derek Stover 64 year old male is on warfarin with therapeutic INR result. (Goal INR 2.5-3.5)    Recent labs: (last 7 days)     07/15/25  1520   INR 2.7*       ASSESSMENT     Source(s): Chart Review and Patient/Caregiver Call     Warfarin doses taken: Warfarin taken as instructed  Diet: No new diet changes identified  Medication/supplement changes: None noted  New illness, injury, or hospitalization: No  Signs or symptoms of bleeding or clotting: No  Previous result: Therapeutic last visit; previously outside of goal range  Additional findings: None       PLAN     Recommended plan for no diet, medication or health factor changes affecting INR     Dosing Instructions: Continue your current warfarin dose with next INR in 4 weeks       Summary  As of 7/15/2025      Full warfarin instructions:  5 mg every Tue, Thu, Sat; 2.5 mg all other days   Next INR check:  8/12/2025               Telephone call with Roberto who verbalizes understanding and agrees to plan    Lab visit scheduled    Education provided: Please call back if any changes to your diet, medications or how you've been taking warfarin    Plan made per [facility] anticoagulation protocol    Chyna Keene RN  7/15/2025  Anticoagulation Clinic  [employer] for routing messages: benji [facility] patient phone line: [phone]        _______________________________________________________________________     Anticoagulation Episode Summary       Current INR goal:  2.5-3.5   TTR:  80.8% (1 y)   Target end date:  Indefinite   [facility]    Indications    Atrial fibrillation (H) [I48.91]  S/P MVR (mitral valve replacement) [Z95.2]  Mechanical heart valve present [Z95.2]  Chronic atrial fibrillation (H) [I48.20]  Paroxysmal atrial fibrillation (H) [I48.0]             Comments:  --             Anticoagulation Care Providers       Provider Role Specialty Phone number    Osbaldo Renee MD Referring  Family Medicine [phone]    Carlos Archibald DO [facility] Internal Medicine [phone]

## 2025-08-12 ENCOUNTER — ANTICOAGULATION THERAPY VISIT (OUTPATIENT)
Dept: ANTICOAGULATION | Facility: CLINIC | Age: 65
End: 2025-08-12

## 2025-08-12 ENCOUNTER — LAB (OUTPATIENT)
Dept: LAB | Facility: CLINIC | Age: 65
End: 2025-08-12
Payer: COMMERCIAL

## 2025-08-12 ENCOUNTER — RESULTS FOLLOW-UP (OUTPATIENT)
Dept: ANTICOAGULATION | Facility: CLINIC | Age: 65
End: 2025-08-12

## 2025-08-12 DIAGNOSIS — Z95.2 S/P MVR (MITRAL VALVE REPLACEMENT): ICD-10-CM

## 2025-08-12 DIAGNOSIS — I48.20 CHRONIC ATRIAL FIBRILLATION (H): ICD-10-CM

## 2025-08-12 DIAGNOSIS — I48.0 PAROXYSMAL ATRIAL FIBRILLATION (H): ICD-10-CM

## 2025-08-12 DIAGNOSIS — Z95.2 MECHANICAL HEART VALVE PRESENT: ICD-10-CM

## 2025-08-12 DIAGNOSIS — I48.0 PAROXYSMAL ATRIAL FIBRILLATION (H): Primary | ICD-10-CM

## 2025-08-12 LAB — INR BLD: 2.8 (ref 0.9–1.1)

## 2025-08-12 PROCEDURE — 85610 PROTHROMBIN TIME: CPT

## 2025-08-12 PROCEDURE — 36416 COLLJ CAPILLARY BLOOD SPEC: CPT

## (undated) DEVICE — SU PROLENE 4-0 SHDA 36" 8521H

## (undated) DEVICE — TOTE ANGIO CORP PC15AT SAN32CC83O

## (undated) DEVICE — RAD INFLATOR BASIC COMPAK  IN4130

## (undated) DEVICE — TOURNIQUET VASCULAR

## (undated) DEVICE — VALVE HEMOSTASIS .096" COPILOT MECH 1003331

## (undated) DEVICE — SU UMBILICAL TAPE 36X.125" U12T

## (undated) DEVICE — SU ETHIBOND 2-0 SHDA 30" X563H

## (undated) DEVICE — DRAIN CHEST TUBE RIGHT ANGLED 32FR 8132

## (undated) DEVICE — SU VICRYL 3-0 FS-1 27" J442H

## (undated) DEVICE — CANNULA PERFUSION 14FRX16" LEFT 12016

## (undated) DEVICE — Device

## (undated) DEVICE — PACK MINI VAC CUSTOM CARDOPULMONARY BB5Z97R15

## (undated) DEVICE — CATH ANGIO JUDKINS R4 6FRX100CM INFINITI 534621T

## (undated) DEVICE — DRAPE IOBAN INCISE 36X23" 6651EZ

## (undated) DEVICE — MANIFOLD KIT ANGIO AUTOMATED 014613

## (undated) DEVICE — SU PROLENE 3-0 SHDA 36" 8522H

## (undated) DEVICE — SU ETHIBOND 3-0 BBDA 36" X588H

## (undated) DEVICE — SU SILK 1 TIE 10X30" SA87G

## (undated) DEVICE — SU ETHIBOND 2-0 V-7 DA 10X30" PXX77

## (undated) DEVICE — TUBING PERFUSION 1/4X1/16X8FT

## (undated) DEVICE — LINEN TOWEL PACK X30 5481

## (undated) DEVICE — CABLE MYO/LEAD PACING WHITE DISP 019-530

## (undated) DEVICE — SYR ANGIOGRAPHY MULTIUSE KIT ACIST 014612

## (undated) DEVICE — RESERVOIR CELL SAVING BLOOD COLLECTION EL2120

## (undated) DEVICE — LINEN TOWEL PACK X6 WHITE 5487

## (undated) DEVICE — BONE WAX OSTENE 1.0GM BW-05

## (undated) DEVICE — SU VICRYL 0 CTX 36" J370H

## (undated) DEVICE — SURGICEL HEMOSTAT 2X14" 1951

## (undated) DEVICE — CONNECTOR PERFUSION Y TYPE 1/2X3/8X3/8" W/O LL 6040

## (undated) DEVICE — KIT WASH CELL SAVING ATL2001

## (undated) DEVICE — SU PLEDGET SOFT TFE 13MMX7MMX1.5MM D7044

## (undated) DEVICE — CATH LAUNCHER 6FR EBU 3.5 LA6EBU35

## (undated) DEVICE — PACK TUBING MINI VAC CUSTOM 1/2X3/8T BB9J78R4

## (undated) DEVICE — CANNULA PERFUSION VENOUS 34FR 12-15" SGL STAGE STR 66134

## (undated) DEVICE — SU STEEL 6 CCS 4X18" M654G

## (undated) DEVICE — CATH DIAGNOSTIC RADIAL 5FR TIG 4.0

## (undated) DEVICE — SPONGE PEANUT

## (undated) DEVICE — MEDITRACE MULTIFUNTION ADULT RADIOTRANSPARENT ELECTRODE FOR ZOLL

## (undated) DEVICE — SUCTION CANISTER MEDIVAC LINER 3000ML W/LID 65651-530

## (undated) DEVICE — CATH GUIDELINER 6FR 5571

## (undated) DEVICE — SOL WATER IRRIG 1000ML BOTTLE 2F7114

## (undated) DEVICE — SOMASENSOR CEREBRAL OXIMETER ADULT SAFB-SM

## (undated) DEVICE — PACK OPEN HEART PV12OH524

## (undated) DEVICE — ENDO FORCEP ENDOJAW BIOPSY 2.8MMX230CM FB-220U

## (undated) DEVICE — WIRE GUIDE 0.035"X260CM SAFE-T-J EXCHANGE G00517

## (undated) DEVICE — CATH BALLOON NC EMERGE 2.50X20MM H7493926720250

## (undated) DEVICE — CANNULA PERFUSION AORTIC ROOT 22FR 20012

## (undated) DEVICE — GLOVE PROTEXIS W/NEU-THERA 7.5  2D73TE75

## (undated) DEVICE — INTRO GLIDESHEATH SLENDER 6FR 10X45CM 60-1060

## (undated) DEVICE — TUBING SUCTION 12"X1/4" N612

## (undated) DEVICE — KIT LG BORE TOUHY ACCESS PLUS MAP152

## (undated) DEVICE — KIT HAND CONTROL ANGIOTOUCH ACIST 65CM AT-P65

## (undated) DEVICE — SU DEVICE COR KNOT KIT STD SHORT 2/KIT 030884

## (undated) DEVICE — SU DEVICE ENDO COR KNOT QUICK LOAD RELOAD 030874

## (undated) DEVICE — SUCTION TIP YANKAUER STR K87

## (undated) DEVICE — SU MYOSTERNAL WIRE  046-267

## (undated) DEVICE — SUCTION CATH 18FR ORAL TRI-FLO T62

## (undated) DEVICE — CATH BALLOON NC EMERGE 2.25X20MM H7493926720220

## (undated) DEVICE — TUBING SUCTION 6"X3/16" N56A

## (undated) DEVICE — GUIDEWIRE VASC 0.014INX180CM RUNTHROUGH 25-1011

## (undated) DEVICE — SU PROLENE 4-0 RB-1DA 36" 8557H

## (undated) DEVICE — SU DEVICE ENDO COR KNOT QUICK LOAD 030850

## (undated) DEVICE — VALVE VRV 9156R2

## (undated) DEVICE — KT CATH DRAGONFLY INTVASC IMG

## (undated) DEVICE — SU ETHIBOND 0 CT-1 CR 8X18" CX21D

## (undated) DEVICE — SOL NACL 0.9% IRRIG 1000ML BOTTLE 07138-09

## (undated) DEVICE — KIT ENDO TURNOVER/PROCEDURE CARRY-ON 101822

## (undated) DEVICE — SLEEVE TR BAND RADIAL COMPRESSION DEVICE 24CM TRB24-REG

## (undated) DEVICE — COVER TABLE POLY 65X90" 8186

## (undated) DEVICE — LINEN TOWEL PACK X5 5464

## (undated) DEVICE — DRAIN CHEST TUBE 32FR STR 8032

## (undated) DEVICE — CANNULA PERFUSION 24FR SGL STAGE RT ANGLE 69324

## (undated) DEVICE — DEVICE ASSEMBLY SUCTION/ANTI COAG BTC93

## (undated) RX ORDER — VERAPAMIL HYDROCHLORIDE 2.5 MG/ML
INJECTION, SOLUTION INTRAVENOUS
Status: DISPENSED
Start: 2020-08-26

## (undated) RX ORDER — ETOMIDATE 2 MG/ML
INJECTION INTRAVENOUS
Status: DISPENSED
Start: 2017-09-12

## (undated) RX ORDER — HEPARIN SODIUM 1000 [USP'U]/ML
INJECTION, SOLUTION INTRAVENOUS; SUBCUTANEOUS
Status: DISPENSED
Start: 2017-09-12

## (undated) RX ORDER — FENTANYL CITRATE 50 UG/ML
INJECTION, SOLUTION INTRAMUSCULAR; INTRAVENOUS
Status: DISPENSED
Start: 2017-08-14

## (undated) RX ORDER — FENTANYL CITRATE 50 UG/ML
INJECTION, SOLUTION INTRAMUSCULAR; INTRAVENOUS
Status: DISPENSED
Start: 2020-08-26

## (undated) RX ORDER — LIDOCAINE HYDROCHLORIDE 10 MG/ML
INJECTION, SOLUTION EPIDURAL; INFILTRATION; INTRACAUDAL; PERINEURAL
Status: DISPENSED
Start: 2020-08-26

## (undated) RX ORDER — CEFAZOLIN SODIUM 1 G/3ML
INJECTION, POWDER, FOR SOLUTION INTRAMUSCULAR; INTRAVENOUS
Status: DISPENSED
Start: 2017-09-12

## (undated) RX ORDER — CEFAZOLIN SODIUM 1 G/50ML
SOLUTION INTRAVENOUS
Status: DISPENSED
Start: 2017-09-12

## (undated) RX ORDER — SUFENTANIL CITRATE 50 UG/ML
INJECTION EPIDURAL; INTRAVENOUS
Status: DISPENSED
Start: 2017-09-12

## (undated) RX ORDER — HEPARIN SODIUM 200 [USP'U]/100ML
INJECTION, SOLUTION INTRAVENOUS
Status: DISPENSED
Start: 2020-08-26

## (undated) RX ORDER — PROPOFOL 10 MG/ML
INJECTION, EMULSION INTRAVENOUS
Status: DISPENSED
Start: 2017-09-12

## (undated) RX ORDER — PROTAMINE SULFATE 10 MG/ML
INJECTION, SOLUTION INTRAVENOUS
Status: DISPENSED
Start: 2017-09-12

## (undated) RX ORDER — MUPIROCIN 20 MG/G
OINTMENT TOPICAL
Status: DISPENSED
Start: 2017-09-12

## (undated) RX ORDER — NITROGLYCERIN 5 MG/ML
VIAL (ML) INTRAVENOUS
Status: DISPENSED
Start: 2020-08-26

## (undated) RX ORDER — VECURONIUM BROMIDE 1 MG/ML
INJECTION, POWDER, LYOPHILIZED, FOR SOLUTION INTRAVENOUS
Status: DISPENSED
Start: 2017-09-12

## (undated) RX ORDER — LIDOCAINE HYDROCHLORIDE 10 MG/ML
INJECTION, SOLUTION EPIDURAL; INFILTRATION; INTRACAUDAL; PERINEURAL
Status: DISPENSED
Start: 2017-08-14

## (undated) RX ORDER — NALOXONE HYDROCHLORIDE 0.4 MG/ML
INJECTION, SOLUTION INTRAMUSCULAR; INTRAVENOUS; SUBCUTANEOUS
Status: DISPENSED
Start: 2017-07-25

## (undated) RX ORDER — HEPARIN SODIUM 1000 [USP'U]/ML
INJECTION, SOLUTION INTRAVENOUS; SUBCUTANEOUS
Status: DISPENSED
Start: 2020-08-26

## (undated) RX ORDER — FLUMAZENIL 0.1 MG/ML
INJECTION, SOLUTION INTRAVENOUS
Status: DISPENSED
Start: 2017-07-25

## (undated) RX ORDER — LIDOCAINE HYDROCHLORIDE 20 MG/ML
INJECTION, SOLUTION EPIDURAL; INFILTRATION; INTRACAUDAL; PERINEURAL
Status: DISPENSED
Start: 2017-09-12

## (undated) RX ORDER — FENTANYL CITRATE 50 UG/ML
INJECTION, SOLUTION INTRAMUSCULAR; INTRAVENOUS
Status: DISPENSED
Start: 2017-09-12

## (undated) RX ORDER — HEPARIN SODIUM 1000 [USP'U]/ML
INJECTION, SOLUTION INTRAVENOUS; SUBCUTANEOUS
Status: DISPENSED
Start: 2017-08-14

## (undated) RX ORDER — FENTANYL CITRATE 50 UG/ML
INJECTION, SOLUTION INTRAMUSCULAR; INTRAVENOUS
Status: DISPENSED
Start: 2017-07-25

## (undated) RX ORDER — GLYCOPYRROLATE 0.2 MG/ML
INJECTION, SOLUTION INTRAMUSCULAR; INTRAVENOUS
Status: DISPENSED
Start: 2017-07-25

## (undated) RX ORDER — CLOPIDOGREL 300 MG/1
TABLET, FILM COATED ORAL
Status: DISPENSED
Start: 2020-08-26